# Patient Record
Sex: MALE | Race: WHITE | Employment: OTHER | URBAN - METROPOLITAN AREA
[De-identification: names, ages, dates, MRNs, and addresses within clinical notes are randomized per-mention and may not be internally consistent; named-entity substitution may affect disease eponyms.]

---

## 2018-02-22 ENCOUNTER — HOSPITAL ENCOUNTER (OUTPATIENT)
Dept: SURGERY | Age: 53
Discharge: HOME | End: 2018-02-22
Payer: MEDICARE

## 2018-02-22 VITALS
SYSTOLIC BLOOD PRESSURE: 123 MMHG | RESPIRATION RATE: 16 BRPM | OXYGEN SATURATION: 93 % | TEMPERATURE: 98 F | DIASTOLIC BLOOD PRESSURE: 72 MMHG | BODY MASS INDEX: 34.95 KG/M2 | HEART RATE: 64 BPM | HEIGHT: 72 IN | WEIGHT: 258 LBS

## 2018-02-22 LAB
ALBUMIN SERPL-MCNC: 4.3 G/DL (ref 3.5–5)
ALBUMIN/GLOB SERPL: 1.2 {RATIO} (ref 1.2–3.5)
ALP SERPL-CCNC: 81 U/L (ref 50–136)
ALT SERPL-CCNC: 25 U/L (ref 12–65)
ANION GAP SERPL CALC-SCNC: 10 MMOL/L (ref 7–16)
AST SERPL-CCNC: 24 U/L (ref 15–37)
BILIRUB SERPL-MCNC: 0.5 MG/DL (ref 0.2–1.1)
BUN SERPL-MCNC: 37 MG/DL (ref 6–23)
CALCIUM SERPL-MCNC: 9.6 MG/DL (ref 8.3–10.4)
CHLORIDE SERPL-SCNC: 99 MMOL/L (ref 98–107)
CO2 SERPL-SCNC: 30 MMOL/L (ref 21–32)
CREAT SERPL-MCNC: 1.76 MG/DL (ref 0.8–1.5)
ERYTHROCYTE [DISTWIDTH] IN BLOOD BY AUTOMATED COUNT: 14.6 % (ref 11.9–14.6)
GLOBULIN SER CALC-MCNC: 3.7 G/DL (ref 2.3–3.5)
GLUCOSE BLD STRIP.AUTO-MCNC: 215 MG/DL (ref 65–100)
GLUCOSE SERPL-MCNC: 210 MG/DL (ref 65–100)
HCT VFR BLD AUTO: 44.8 % (ref 41.1–50.3)
HGB BLD-MCNC: 15.2 G/DL (ref 13.6–17.2)
INR PPP: 2.6
MCH RBC QN AUTO: 28.4 PG (ref 26.1–32.9)
MCHC RBC AUTO-ENTMCNC: 33.9 G/DL (ref 31.4–35)
MCV RBC AUTO: 83.7 FL (ref 79.6–97.8)
PLATELET # BLD AUTO: 156 K/UL (ref 150–450)
PMV BLD AUTO: 11.9 FL (ref 10.8–14.1)
POTASSIUM SERPL-SCNC: 4.8 MMOL/L (ref 3.5–5.1)
PROT SERPL-MCNC: 8 G/DL (ref 6.3–8.2)
PROTHROMBIN TIME: 27.3 SEC (ref 11.5–14.5)
RBC # BLD AUTO: 5.35 M/UL (ref 4.23–5.67)
SODIUM SERPL-SCNC: 139 MMOL/L (ref 136–145)
WBC # BLD AUTO: 9.9 K/UL (ref 4.3–11.1)

## 2018-02-22 PROCEDURE — 80053 COMPREHEN METABOLIC PANEL: CPT | Performed by: ANESTHESIOLOGY

## 2018-02-22 PROCEDURE — 82962 GLUCOSE BLOOD TEST: CPT

## 2018-02-22 PROCEDURE — 85027 COMPLETE CBC AUTOMATED: CPT | Performed by: ANESTHESIOLOGY

## 2018-02-22 PROCEDURE — 85610 PROTHROMBIN TIME: CPT | Performed by: ANESTHESIOLOGY

## 2018-02-22 RX ORDER — WARFARIN 7.5 MG/1
7.5 TABLET ORAL
COMMUNITY
End: 2019-09-11

## 2018-02-22 RX ORDER — OXYCODONE HYDROCHLORIDE 15 MG/1
15 TABLET ORAL 3 TIMES DAILY
COMMUNITY

## 2018-02-22 RX ORDER — SERTRALINE HYDROCHLORIDE 100 MG/1
150 TABLET, FILM COATED ORAL DAILY
COMMUNITY

## 2018-02-22 RX ORDER — INSULIN ASPART 100 [IU]/ML
INJECTION, SUSPENSION SUBCUTANEOUS
COMMUNITY
End: 2018-12-04

## 2018-02-22 NOTE — PERIOP NOTES
Left message with Dr. Sera Peterson assistant that pt is on coumadin and needs instructed on what to do with coumadin. Dr. Sera Peterson office will need to obtain written clearance and notify if they wish for him to hold coumadin.

## 2018-02-22 NOTE — PERIOP NOTES
Recent Results (from the past 12 hour(s))   GLUCOSE, POC    Collection Time: 02/22/18  1:58 PM   Result Value Ref Range    Glucose (POC) 215 (H) 65 - 100 mg/dL   PROTHROMBIN TIME + INR    Collection Time: 02/22/18  1:59 PM   Result Value Ref Range    Prothrombin time 27.3 (H) 11.5 - 14.5 sec    INR 2.6     CBC W/O DIFF    Collection Time: 02/22/18  1:59 PM   Result Value Ref Range    WBC 9.9 4.3 - 11.1 K/uL    RBC 5.35 4.23 - 5.67 M/uL    HGB 15.2 13.6 - 17.2 g/dL    HCT 44.8 41.1 - 50.3 %    MCV 83.7 79.6 - 97.8 FL    MCH 28.4 26.1 - 32.9 PG    MCHC 33.9 31.4 - 35.0 g/dL    RDW 14.6 11.9 - 14.6 %    PLATELET 701 977 - 996 K/uL    MPV 11.9 10.8 - 43.2 FL   METABOLIC PANEL, COMPREHENSIVE    Collection Time: 02/22/18  1:59 PM   Result Value Ref Range    Sodium 139 136 - 145 mmol/L    Potassium 4.8 3.5 - 5.1 mmol/L    Chloride 99 98 - 107 mmol/L    CO2 30 21 - 32 mmol/L    Anion gap 10 7 - 16 mmol/L    Glucose 210 (H) 65 - 100 mg/dL    BUN 37 (H) 6 - 23 MG/DL    Creatinine 1.76 (H) 0.8 - 1.5 MG/DL    GFR est AA 52 (L) >60 ml/min/1.73m2    GFR est non-AA 43 (L) >60 ml/min/1.73m2    Calcium 9.6 8.3 - 10.4 MG/DL    Bilirubin, total 0.5 0.2 - 1.1 MG/DL    ALT (SGPT) 25 12 - 65 U/L    AST (SGOT) 24 15 - 37 U/L    Alk.  phosphatase 81 50 - 136 U/L    Protein, total 8.0 6.3 - 8.2 g/dL    Albumin 4.3 3.5 - 5.0 g/dL    Globulin 3.7 (H) 2.3 - 3.5 g/dL    A-G Ratio 1.2 1.2 - 3.5

## 2018-02-22 NOTE — PERIOP NOTES
POC glucose 215  . Instructed  Patient that if blood sugar 300 or > , surgery may be cancelled. Patient verified name, , and surgery as listed in [facility]. Patient provided medical/health information and PTA medications to the best of their ability. TYPE  CASE:1a- elodia block. Orders per surgeon: Received  and dated 18. Labs per surgeon:none  Labs per anesthesia protocol: cbc, cmp, pt/inr. Results pending. Pt/inr on the dos. EKG  :  None. Notified Dr. Twin Wakefield that pt is concerned about having a elodia block with history of necrotizing fascitis in his upper left arm . Dr. Anne Meza states it will be addressed on the dos. Not seen today. Patient provided with and instructed on education handouts including Guide to Surgery, blood transfusions, pain management, and hand hygiene for the family and community, and [facility] brochure. Ondina mist soap and instructions given per hospital policy. Instructed patient to continue previous medications as prescribed prior to surgery unless otherwise directed and to take the following medications the day of surgery according to anesthesia guidelines : aspirin, pepcid, lantus 48 units the dos and the night before surgery. Oxycodone, protonix, sertraline, betapace. Bring all inhalers. novolog 70/30 1/2 of usual am dose if blood sugar is 120 or above and hold if blood sugar is less than 120 . Instructed patient to hold  the following medications: none. Original medication prescription bottles not  visualized during patient appointment. Patient teach back successful and patient demonstrates knowledge of instruction.

## 2018-02-26 ENCOUNTER — ANESTHESIA EVENT (OUTPATIENT)
Dept: SURGERY | Age: 53
End: 2018-02-26
Payer: MEDICARE

## 2018-02-27 ENCOUNTER — HOSPITAL ENCOUNTER (OUTPATIENT)
Age: 53
Setting detail: OUTPATIENT SURGERY
Discharge: HOME | End: 2018-02-27
Attending: ORTHOPAEDIC SURGERY | Admitting: ORTHOPAEDIC SURGERY
Payer: MEDICARE

## 2018-02-27 ENCOUNTER — ANESTHESIA (OUTPATIENT)
Dept: SURGERY | Age: 53
End: 2018-02-27
Payer: MEDICARE

## 2018-02-27 VITALS
TEMPERATURE: 97.3 F | SYSTOLIC BLOOD PRESSURE: 117 MMHG | BODY MASS INDEX: 34.99 KG/M2 | OXYGEN SATURATION: 97 % | HEART RATE: 60 BPM | DIASTOLIC BLOOD PRESSURE: 70 MMHG | WEIGHT: 258 LBS | RESPIRATION RATE: 15 BRPM

## 2018-02-27 DIAGNOSIS — G89.18 POST-OP PAIN: Primary | ICD-10-CM

## 2018-02-27 LAB
GLUCOSE BLD STRIP.AUTO-MCNC: 112 MG/DL (ref 65–100)
GLUCOSE BLD STRIP.AUTO-MCNC: 126 MG/DL (ref 65–100)
INR BLD: 1.2 (ref 0.9–1.2)
POTASSIUM BLD-SCNC: 3.8 MMOL/L (ref 3.5–5.1)
PT BLD: 14.3 SECS (ref 9.6–11.6)

## 2018-02-27 PROCEDURE — 77030003602 HC NDL NRV BLK BBMI -B: Performed by: ANESTHESIOLOGY

## 2018-02-27 PROCEDURE — 77030011640 HC PAD GRND REM COVD -A: Performed by: ORTHOPAEDIC SURGERY

## 2018-02-27 PROCEDURE — 77030018836 HC SOL IRR NACL ICUM -A: Performed by: ORTHOPAEDIC SURGERY

## 2018-02-27 PROCEDURE — 74011250636 HC RX REV CODE- 250/636

## 2018-02-27 PROCEDURE — 76010000160 HC OR TIME 0.5 TO 1 HR INTENSV-TIER 1: Performed by: ORTHOPAEDIC SURGERY

## 2018-02-27 PROCEDURE — 77030000032 HC CUF TRNQT ZIMM -B: Performed by: ORTHOPAEDIC SURGERY

## 2018-02-27 PROCEDURE — 76210000063 HC OR PH I REC FIRST 0.5 HR: Performed by: ORTHOPAEDIC SURGERY

## 2018-02-27 PROCEDURE — 76010010054 HC POST OP PAIN BLOCK: Performed by: ORTHOPAEDIC SURGERY

## 2018-02-27 PROCEDURE — 82962 GLUCOSE BLOOD TEST: CPT

## 2018-02-27 PROCEDURE — 77030002933 HC SUT MCRYL J&J -A: Performed by: ORTHOPAEDIC SURGERY

## 2018-02-27 PROCEDURE — A4565 SLINGS: HCPCS | Performed by: ORTHOPAEDIC SURGERY

## 2018-02-27 PROCEDURE — 74011250636 HC RX REV CODE- 250/636: Performed by: ANESTHESIOLOGY

## 2018-02-27 PROCEDURE — 84132 ASSAY OF SERUM POTASSIUM: CPT

## 2018-02-27 PROCEDURE — 74011250636 HC RX REV CODE- 250/636: Performed by: ORTHOPAEDIC SURGERY

## 2018-02-27 PROCEDURE — 76060000032 HC ANESTHESIA 0.5 TO 1 HR: Performed by: ORTHOPAEDIC SURGERY

## 2018-02-27 PROCEDURE — 76210000021 HC REC RM PH II 0.5 TO 1 HR: Performed by: ORTHOPAEDIC SURGERY

## 2018-02-27 PROCEDURE — 76942 ECHO GUIDE FOR BIOPSY: CPT | Performed by: ORTHOPAEDIC SURGERY

## 2018-02-27 PROCEDURE — 85610 PROTHROMBIN TIME: CPT

## 2018-02-27 RX ORDER — FLUMAZENIL 0.1 MG/ML
0.2 INJECTION INTRAVENOUS AS NEEDED
Status: DISCONTINUED | OUTPATIENT
Start: 2018-02-27 | End: 2018-02-27 | Stop reason: HOSPADM

## 2018-02-27 RX ORDER — LIDOCAINE HYDROCHLORIDE 10 MG/ML
0.1 INJECTION INFILTRATION; PERINEURAL AS NEEDED
Status: DISCONTINUED | OUTPATIENT
Start: 2018-02-27 | End: 2018-02-27 | Stop reason: HOSPADM

## 2018-02-27 RX ORDER — SODIUM CHLORIDE, SODIUM LACTATE, POTASSIUM CHLORIDE, CALCIUM CHLORIDE 600; 310; 30; 20 MG/100ML; MG/100ML; MG/100ML; MG/100ML
75 INJECTION, SOLUTION INTRAVENOUS CONTINUOUS
Status: DISCONTINUED | OUTPATIENT
Start: 2018-02-27 | End: 2018-02-27 | Stop reason: HOSPADM

## 2018-02-27 RX ORDER — CEFAZOLIN SODIUM/WATER 2 G/20 ML
2 SYRINGE (ML) INTRAVENOUS
Status: COMPLETED | OUTPATIENT
Start: 2018-02-27 | End: 2018-02-27

## 2018-02-27 RX ORDER — MIDAZOLAM HYDROCHLORIDE 1 MG/ML
2 INJECTION, SOLUTION INTRAMUSCULAR; INTRAVENOUS
Status: COMPLETED | OUTPATIENT
Start: 2018-02-27 | End: 2018-02-27

## 2018-02-27 RX ORDER — MIDAZOLAM HYDROCHLORIDE 1 MG/ML
INJECTION, SOLUTION INTRAMUSCULAR; INTRAVENOUS AS NEEDED
Status: DISCONTINUED | OUTPATIENT
Start: 2018-02-27 | End: 2018-02-27 | Stop reason: HOSPADM

## 2018-02-27 RX ORDER — DIPHENHYDRAMINE HYDROCHLORIDE 50 MG/ML
12.5 INJECTION, SOLUTION INTRAMUSCULAR; INTRAVENOUS
Status: DISCONTINUED | OUTPATIENT
Start: 2018-02-27 | End: 2018-02-27 | Stop reason: HOSPADM

## 2018-02-27 RX ORDER — HYDROMORPHONE HYDROCHLORIDE 2 MG/ML
0.5 INJECTION, SOLUTION INTRAMUSCULAR; INTRAVENOUS; SUBCUTANEOUS
Status: DISCONTINUED | OUTPATIENT
Start: 2018-02-27 | End: 2018-02-27 | Stop reason: HOSPADM

## 2018-02-27 RX ORDER — OXYCODONE HYDROCHLORIDE 5 MG/1
5 TABLET ORAL
Status: DISCONTINUED | OUTPATIENT
Start: 2018-02-27 | End: 2018-02-27 | Stop reason: HOSPADM

## 2018-02-27 RX ORDER — OXYCODONE HYDROCHLORIDE 5 MG/1
10 TABLET ORAL
Status: DISCONTINUED | OUTPATIENT
Start: 2018-02-27 | End: 2018-02-27 | Stop reason: HOSPADM

## 2018-02-27 RX ORDER — PREGABALIN 100 MG/1
100 CAPSULE ORAL 3 TIMES DAILY
COMMUNITY

## 2018-02-27 RX ORDER — OXYCODONE HYDROCHLORIDE 5 MG/1
5 TABLET ORAL
Qty: 25 TAB | Refills: 0 | Status: SHIPPED | OUTPATIENT
Start: 2018-02-27 | End: 2018-09-11

## 2018-02-27 RX ORDER — NALOXONE HYDROCHLORIDE 0.4 MG/ML
0.1 INJECTION, SOLUTION INTRAMUSCULAR; INTRAVENOUS; SUBCUTANEOUS
Status: DISCONTINUED | OUTPATIENT
Start: 2018-02-27 | End: 2018-02-27 | Stop reason: HOSPADM

## 2018-02-27 RX ADMIN — MIDAZOLAM HYDROCHLORIDE 2 MG: 1 INJECTION, SOLUTION INTRAMUSCULAR; INTRAVENOUS at 07:05

## 2018-02-27 RX ADMIN — Medication 2 G: at 07:48

## 2018-02-27 RX ADMIN — MIDAZOLAM HYDROCHLORIDE 2 MG: 1 INJECTION, SOLUTION INTRAMUSCULAR; INTRAVENOUS at 08:03

## 2018-02-27 RX ADMIN — SODIUM CHLORIDE, SODIUM LACTATE, POTASSIUM CHLORIDE, AND CALCIUM CHLORIDE 75 ML/HR: 600; 310; 30; 20 INJECTION, SOLUTION INTRAVENOUS at 06:45

## 2018-02-27 NOTE — OP NOTES
Cubital Tunnel Release    Merline Cerise       2/27/2018      Indications: This is a 52 yrs male  who presents with left cubital tunnel syndrome. The patient was admitted for surgery as conservative measures have failed. Pre-operative Diagnosis:  LEFT CUBITAL TUNNEL SYNDROME    Post-operative Diagnosis:   LEFT CUBITAL TUNNEL SYNDROME    Procedure: Left Cubital Tunnel Release - Ulnar Nerve at the Elbow    Surgeon: GUS Portillo. Anesthesia:  Supraclavicular block    Procedure/Operative Note:  After appropriate informed consent was obtained, the patient was taken to the operating suite and placed in a supine position on the operating room table with the left arm outstretched. Preop prophylactic IV antibiotics were given. Anesthesia was instituted. All pressure points were carefully padded. The left upper extremity was prepped and draped in the usual sterile fashion. A timeout was completed and once confirmed by the operative team we proceeded. A posteromedial incision was made posterior to the medial epicondyle and carried down through the subcutaneous tissues. Small bleeders were electrocoagulated. The ulnar nerve was identified. Carefull disection was used to release the nerve at the epicondyle then it was further released both proximally and distally by spreading the sub Q tissue and the releasing the nerve with the scissors . The nerve was released for approximately 8-9 cm proximally as well as down into the pronator flexor fascia and muscle. There was a thickened area in the nerve posterior and distal to the epicondyle. No tendency for anterior subluxation was noted. The margins of the wound were injected with 1% Lidocaine with epinephrine. The subcutaneous tissues were closed with inverted 4-0 Monocryl stitches and then SteriStrips were applied with Mastisol adhesive. A sterile occlusive Honeycomb dressings was applied. An ACE wrapping was used for compression.   The patient tolerated the procedure well and was sent to the RR in good condition. Signed By: Aisha Oleary. Mathieu Left.

## 2018-02-27 NOTE — DISCHARGE INSTRUCTIONS
POST OP INSTRUCTIONS      1. Patient has appointment for 3/9/18 at 9:50 AM at the [facility]. 2.  For problems call Dr Justine Drummond, [facility],  [phone]          Appointments only,  [phone]    3. Ice and elevate the surgical site. Sling for comfort, wear until block wears off    4. May remove Ace wrap tomorrow, leave Honey comb dressing on, and wash in running water or shower. Do not submerge in bath or dish water. TYPICAL SIDE EFFECTS OF PAIN MEDICATION:  *    Constipation: Drink lots of fluids, try prune juice. OTC stool softener if needed. *    Nausea: Take pain medication with food. ACTIVITY  · As tolerated and as directed by your doctor. · Bathe or shower as directed by your doctor. DIET  · Day of surgery: Clear liquids until no nausea or vomiting; small portion, light diet Dare foods (ex: baked chicken, plain rice, grits, scrambled eggs, toast). Nothing greasy, fried or spicy today. · Advance to regular diet on second day, unless your doctor orders otherwise. · If nausea and vomiting continues, call your doctor. PAIN  · Take pain medication as directed by your doctor. · DO NOT take aspirin or blood thinners unless directed by your doctor. CALL YOUR DOCTOR IF    s Call your doctor if pain is NOT relieved by medication.   s Excessive bleeding that does not stop after holding pressure over the area  · Temperature of 101 degrees F or above  · Excessive redness, swelling or bruising, and/ or green or yellow, smelly discharge from incision    AFTER ANESTHESIA   · For the first 24 hours: DO NOT Drive, Drink alcoholic beverages, or Make important decisions. · Be aware of dizziness following anesthesia and while taking pain medication.        DISCHARGE SUMMARY from Nurse    PATIENT INSTRUCTIONS:    After general anesthesia or intravenous sedation, for 24 hours or while taking prescription Narcotics:  · Limit your activities  · Do not drive and operate hazardous machinery  · Do not make important personal or business decisions  · Do  not drink alcoholic beverages  · If you have not urinated within 8 hours after discharge, please contact your surgeon on call. *  Please give a list of your current medications to your Primary Care Provider. *  Please update this list whenever your medications are discontinued, doses are      changed, or new medications (including over-the-counter products) are added. *  Please carry medication information at all times in case of emergency situations. Preventing Infection at Home  We care about preventing infection and avoiding the spread of germs - not only when you are in the hospital but also when you return home. When you return home from the hospital, its important to take the following steps to help prevent infection and avoid spreading germs that could infect you and others. Ask everyone in your home to follow these guidelines, too. Clean Your Hands  · Clean your hands whenever your hands are visibly dirty, before you eat, before or after touching your mouth, nose or eyes, and before preparing food. Clean them after contact with body fluids, using the restroom, touching animals or changing diapers. · When washing hands, wet them with warm water and work up a lather. Rub hands for at least 15 seconds, then rinse them and pat them dry with a clean towel or paper towel. · When using hand sanitizers, it should take about 15 seconds to rub your hands dry. If not, you probably didnt apply enough . Cover Your Sneeze or Cough  Germs are released into the air whenever you sneeze or cough. To prevent the spread of infection:  · Turn away from other people before coughing or sneezing. · Cover your mouth or nose with a tissue when you cough or sneeze. Put the tissue in the trash. · If you dont have a tissue, cough or sneeze into your upper sleeve, not your hands.   · Always clean your hands after coughing or sneezing. Care for Wounds  Your skin is your bodys first line of defense against germs, but an open wound leaves an easy way for germs to enter your body. To prevent infection:  · Clean your hands before and after changing wound dressings, and wear gloves to change dressings if recommended by your doctor. · Take special care with IV lines or other devices inserted into the body. If you must touch them, clean your hands first.  · Follow any specific instructions from your doctor to care for your wounds. Contact your doctor if you experience any signs of infection, such as fever or increased redness at the surgical or wound site. Keep a Clean Home  · Clean or wipe commonly touched hard surfaces like door handles, sinks, tabletops, phones and TV remotes. · Use products labeled disinfectant to kill harmful bacteria and viruses. · Use a clean cloth or paper towel to clean and dry surfaces. Wiping surfaces with a dirty dishcloth, sponge or towel will only spread germs. · Never share toothbrushes, bocanegra, drinking glasses, utensils, razor blades, face cloths or bath towels to avoid spreading germs. · Be sure that the linens that you sleep on are clean. · Keep pets away from wounds and wash your hands after touching pets, their toys or bedding. We care about you and your health. Remember, preventing infections is a team effort between you, your family, friends and health care providers. These are general instructions for a healthy lifestyle:    No smoking/ No tobacco products/ Avoid exposure to second hand smoke    Surgeon General's Warning:  Quitting smoking now greatly reduces serious risk to your health.     Obesity, smoking, and sedentary lifestyle greatly increases your risk for illness    A healthy diet, regular physical exercise & weight monitoring are important for maintaining a healthy lifestyle    You may be retaining fluid if you have a history of heart failure or if you experience any of the following symptoms:  Weight gain of 3 pounds or more overnight or 5 pounds in a week, increased swelling in our hands or feet or shortness of breath while lying flat in bed. Please call your doctor as soon as you notice any of these symptoms; do not wait until your next office visit. Recognize signs and symptoms of STROKE:    F-face looks uneven    A-arms unable to move or move unevenly    S-speech slurred or non-existent    T-time-call 911 as soon as signs and symptoms begin-DO NOT go       Back to bed or wait to see if you get better-TIME IS BRAIN.

## 2018-02-27 NOTE — PERIOP NOTES
Discharge instructions given to spouse. Verbalized understanding and opportunity for questions was given. Dr Mary simonay to discharge at this time. Pt and belongings taken via wheelchair to car.

## 2018-02-27 NOTE — ANESTHESIA PREPROCEDURE EVALUATION
Anesthetic History   No history of anesthetic complications            Review of Systems / Medical History  Patient summary reviewed and pertinent labs reviewed    Pulmonary        Sleep apnea: CPAP           Neuro/Psych             Comments: Chronic pain Cardiovascular    Hypertension: well controlled        Dysrhythmias (s/p ablation - remains on Coumadin and Sotalol) : atrial fibrillation  Hyperlipidemia    Exercise tolerance: >4 METS     GI/Hepatic/Renal     GERD: well controlled           Endo/Other    Diabetes: well controlled, type 2, using insulin    Obesity     Other Findings   Comments: Necrotizing fasciitis of L shoulder - healed but remains painful - Pt will be unable to tolerate L upper arm tourniquet. Physical Exam    Airway  Mallampati: III  TM Distance: > 6 cm  Neck ROM: normal range of motion   Mouth opening: Normal     Cardiovascular    Rhythm: regular  Rate: normal         Dental  No notable dental hx       Pulmonary  Breath sounds clear to auscultation               Abdominal         Other Findings            Anesthetic Plan    ASA: 3  Anesthesia type: total IV anesthesia      Post-op pain plan if not by surgeon: peripheral nerve block single      Anesthetic plan and risks discussed with: Patient      Supraclavicular block - pt will be unable to tolerate upper arm tourniquet for [place].

## 2018-02-27 NOTE — PERIOP NOTES
Discussed with Dr. Sherita Fortune whether or not to put tourniquet on patient's left upper arm due to necrotizing fascitis in that area in the past.  Informed Dr. Sherita Fortune that patient stated left upper arm had healed within the past month. Dr. Sherita Fortune stated he wanted to still have the tourniquet placed on patient's left upper arm.

## 2018-02-27 NOTE — ANESTHESIA POSTPROCEDURE EVALUATION
Post-Anesthesia Evaluation and Assessment    Patient: Nisreen Carballo MRN: [id number]  SSN: [id number]    YOB: 1965  Age: 52 y.o. Sex: male       Cardiovascular Function/Vital Signs  Visit Vitals    /70    Pulse (!) 58    Temp 36.3 °C (97.3 °F)    Resp 16    Wt 117 kg (258 lb)    SpO2 97%    BMI 34.99 kg/m2       Patient is status post total IV anesthesia anesthesia for Procedure(s):  LEFT ELBOW CUBITAL TUNNEL RELEASE. Nausea/Vomiting: None    Postoperative hydration reviewed and adequate. Pain:  Pain Scale 1: Numeric (0 - 10) (02/27/18 0834)  Pain Intensity 1: 0 (02/27/18 0834)   Managed    Neurological Status:   Neuro (WDL): Exceptions to WDL (02/27/18 4645)  Neuro  LUE Motor Response: Pharmacologically paralyzed;Numbness (02/27/18 0834)   At baseline    Mental Status and Level of Consciousness: Arousable    Pulmonary Status:   O2 Device: Nasal cannula (02/27/18 0834)   Adequate oxygenation and airway patent    Complications related to anesthesia: None    Post-anesthesia assessment completed.  No concerns    Signed By: Kyleigh Davenport MD     February 27, 2018

## 2018-02-27 NOTE — IP AVS SNAPSHOT
[facility] 
 
 
 [address] 
[phone] Patient: Michael Epperson MRN: [id number] :1965 About your hospitalization You were admitted on:  2018 You last received care in the:  [facility] OP PACU You were discharged on:  2018 Why you were hospitalized Your primary diagnosis was:  Not on File Follow-up Information Follow up With Details Comments Contact Info MD Zen Craven [address] [facility] [address] 
[phone] Patience Clarke MD   [address] [facility] [address] 
[phone] Discharge Orders None A check hilario indicates which time of day the medication should be taken. My Medications CHANGE how you take these medications Instructions Each Dose to Equal  
 Morning Noon Evening Bedtime * oxyCODONE IR 15 mg immediate release tablet Commonly known as:  OXY-IR What changed:  Another medication with the same name was added. Make sure you understand how and when to take each. Your last dose was: Your next dose is: Take 15 mg by mouth three (3) times daily. Indications: take on the [address] 15 mg  
    
   
   
   
  
 * oxyCODONE IR 5 mg immediate release tablet Commonly known as:  Keith Crowley What changed: You were already taking a medication with the same name, and this prescription was added. Make sure you understand how and when to take each. Your last dose was: Your next dose is: Take 1 Tab by mouth every four (4) hours as needed for Pain (Take as needed for break through pain). Max Daily Amount: 30 mg.  
 5 mg * Notice: This list has 2 medication(s) that are the same as other medications prescribed for you. Read the directions carefully, and ask your doctor or other care provider to review them with you. CONTINUE taking these medications Instructions Each Dose to Equal  
 Morning Noon Evening Bedtime  
 albuterol 90 mcg/actuation inhaler Commonly known as:  PROVENTIL HFA, VENTOLIN HFA, PROAIR HFA Your last dose was: Your next dose is: Take 2 Puffs by inhalation every six (6) hours as needed. Indications: BRONCHOSPASM PREVENTION, bring on the [facility] 2 Puff  
    
   
   
   
  
 aspirin delayed-release 81 mg tablet Your last dose was: Your next dose is: Take 81 mg by mouth daily. Indications: am- take on the [facility] 81 mg  
    
   
   
   
  
 atorvastatin 40 mg tablet Commonly known as:  LIPITOR Your last dose was: Your next dose is: Take 1 Tab by mouth nightly. 40 mg  
    
   
   
   
  
 budesonide-formoterol 80-4.5 mcg/actuation Hfaa Commonly known as:  SYMBICORT Your last dose was: Your next dose is: Take 2 Puffs by inhalation two (2) times daily as needed. Indications: BRONCHOSPASM PREVENTION WITH COPD, bring on the [facility] 2 Puff  
    
   
   
   
  
 cetirizine 10 mg tablet Commonly known as:  ZYRTEC Your last dose was: Your next dose is: Take 10 mg by mouth nightly. Indications: Allergic Rhinitis 10 mg  
    
   
   
   
  
 colestipol 1 gram tablet Commonly known as:  COLESTID Your last dose was: Your next dose is: Take 1 g by mouth two (2) times a day. Indications: hypercholesterolemia 1 g COUMADIN 7.5 mg tablet Generic drug:  warfarin Your last dose was: Your next dose is: Take 7.5 mg by mouth nightly. Indications: has not been instructed to hold 7.5 mg  
    
   
   
   
  
 CREON 24,000-76,000 -120,000 unit capsule Generic drug:  amylase-lipase-protease Your last dose was: Your next dose is: Take 1 Cap by mouth three (3) times daily (with meals). Indications: exocrine pancreatic insufficiency 1 Cap  
    
   
   
   
  
 famotidine 40 mg tablet Commonly known as:  PEPCID Your last dose was: Your next dose is: Take 1 Tab by mouth every twelve (12) hours. 40 mg  
    
   
   
   
  
 furosemide 40 mg tablet Commonly known as:  LASIX Your last dose was: Your next dose is: Take 1 Tab by mouth daily. 40 mg  
    
   
   
   
  
 insulin glargine 100 unit/mL injection Commonly known as:  LANTUS Your last dose was: Your next dose is:    
   
   
 60 Units by SubCUTAneous route Before breakfast and dinner. Indications: type 2 diabetes mellitus, 48 units the night before surgery and 48 units  on the [place] 60 Units  
    
   
   
   
  
 ipratropium-albuterol  mcg/actuation inhaler Commonly known as:  COMBIVENT Your last dose was: Your next dose is: Take 1 Puff by inhalation every six (6) hours as needed. Indications: bring on the [place] 1 Puff  
    
   
   
   
  
 losartan 25 mg tablet Commonly known as:  COZAAR Your last dose was: Your next dose is: Take 12.5 mg by mouth daily. Indications: am  
 12.5 mg  
    
   
   
   
  
 NovoLOG Mix 70-30 U-100 Insuln 100 unit/mL (70-30) injection Generic drug:  insulin aspart protamine/insulin aspart Your last dose was: Your next dose is:    
   
   
 by SubCUTAneous route Before breakfast, lunch, and dinner. Indications: type 2 diabetes mellitus, sliding scale- 1/2 usual am dose if blood sugar is 120 or ^ And hold if blood  sugar is less than or 120  
     
   
   
   
  
 pantoprazole 40 mg tablet Commonly known as:  PROTONIX Your last dose was: Your next dose is: Take 1 Tab by mouth daily. 40 mg  
    
   
   
   
  
 pregabalin 100 mg capsule Commonly known as:  Leslie Courser Your last dose was: Your next dose is: Take 100 mg by mouth three (3) times daily. Indications: Diabetic Peripheral Neuropathy 100 mg  
    
   
   
   
  
 sertraline 100 mg tablet Commonly known as:  ZOLOFT Your last dose was: Your next dose is: Take 100 mg by mouth daily. Indications: am- take on the [facility] 100 mg  
    
   
   
   
  
 sotalol 160 mg tablet Commonly known as:  Dirk Jones Your last dose was: Your next dose is: Take 1 Tab by mouth every twelve (12) hours. 160 mg Where to Get Your Medications Information on where to get these meds will be given to you by the nurse or doctor. ! Ask your nurse or doctor about these medications  
  oxyCODONE IR 5 mg immediate release tablet Discharge Instructions POST OP INSTRUCTIONS 1. Patient has appointment for 3/9/18 at 9:50 AM at the [facility] office. 2.  For problems call Dr Sheila Douglas, [address] [place] [address],  [phone] Appointments only,  [phone] 
 
3. Ice and elevate the surgical site. Sling for comfort, wear until block wears off 4. May remove Ace wrap tomorrow, leave Honey comb dressing on, and wash in running water or shower. Do not submerge in bath or dish water. TYPICAL SIDE EFFECTS OF PAIN MEDICATION: 
*    Constipation: Drink lots of fluids, try prune juice. OTC stool softener if needed. *    Nausea: Take pain medication with food. ACTIVITY · As tolerated and as directed by your doctor. · Bathe or shower as directed by your doctor. DIET 
· Day of surgery: Clear liquids until no nausea or vomiting; small portion, light diet [place] foods (ex: baked chicken, plain rice, grits, scrambled eggs, toast). Nothing greasy, fried or spicy today. · [place] to regular diet on second day, unless your doctor orders otherwise. · If nausea and vomiting continues, call your doctor. PAIN 
· Take pain medication as directed by your doctor. · DO NOT take aspirin or blood thinners unless directed by your doctor. CALL YOUR DOCTOR IF   
s Call your doctor if pain is NOT relieved by medication.  
s Excessive bleeding that does not stop after holding pressure over the area · Temperature of 101 degrees F or above · Excessive redness, swelling or bruising, and/ or green or yellow, smelly discharge from incision AFTER ANESTHESIA · For the first 24 hours: DO NOT Drive, Drink alcoholic beverages, or Make important decisions. · Be aware of dizziness following anesthesia and while taking pain medication. DISCHARGE SUMMARY from Nurse PATIENT INSTRUCTIONS: 
 
After general anesthesia or intravenous sedation, for 24 hours or while taking prescription Narcotics: · Limit your activities · Do not drive and operate hazardous machinery · Do not make important personal or business decisions · Do  not drink alcoholic beverages · If you have not urinated within 8 hours after discharge, please contact your surgeon on call. *  Please give a list of your current medications to your Primary Care Provider. *  Please update this list whenever your medications are discontinued, doses are 
    changed, or new medications (including over-the-counter products) are added. *  Please carry medication information at all times in case of emergency situations. Preventing Infection at Home We care about preventing infection and avoiding the spread of germs  not only when you are in the hospital but also when you return home. When you return home from the hospital, its important to take the following steps to help prevent infection and avoid spreading germs that could infect you and others. Ask everyone in your home to follow these guidelines, too. Clean Your Hands · Clean your hands whenever your hands are visibly dirty, before you eat, before or after touching your mouth, nose or eyes, and before preparing food. Clean them after contact with body fluids, using the restroom, touching animals or changing diapers. · When washing hands, wet them with warm water and work up a lather. Rub hands for at least 15 seconds, then rinse them and pat them dry with a clean towel or paper towel. · When using hand sanitizers, it should take about 15 seconds to rub your hands dry. If not, you probably didnt apply enough . Cover Your Sneeze or Cough Germs are released into the air whenever you sneeze or cough. To prevent the spread of infection: · Turn away from other people before coughing or sneezing. · Cover your mouth or nose with a tissue when you cough or sneeze. Put the tissue in the trash. · If you dont have a tissue, cough or sneeze into your upper sleeve, not your hands. · Always clean your hands after coughing or sneezing. Care for Wounds Your skin is your bodys first line of defense against germs, but an open wound leaves an easy way for germs to enter your body. To prevent infection: · Clean your hands before and after changing wound dressings, and wear gloves to change dressings if recommended by your doctor. · Take special care with IV lines or other devices inserted into the body. If you must touch them, clean your hands first. 
· Follow any specific instructions from your doctor to care for your wounds. Contact your doctor if you experience any signs of infection, such as fever or increased redness at the surgical or wound site. Keep a [place] [address] · Clean or wipe commonly touched hard surfaces like door handles, sinks, tabletops, phones and TV remotes. · Use products labeled disinfectant to kill harmful bacteria and viruses. · Use a clean cloth or paper towel to clean and dry surfaces.  Wiping surfaces with a dirty dishcloth, sponge or towel will only spread germs. · Never share toothbrushes, bocanegra, drinking glasses, utensils, razor blades, face cloths or bath towels to avoid spreading germs. · Be sure that the linens that you sleep on are clean. · Keep pets away from wounds and wash your hands after touching pets, their toys or bedding. We care about you and your health. Remember, preventing infections is a team effort between you, your family, friends and health care providers. These are general instructions for a healthy lifestyle: No smoking/ No tobacco products/ Avoid exposure to second hand smoke Surgeon General's Warning:  Quitting smoking now greatly reduces serious risk to your health. Obesity, smoking, and sedentary lifestyle greatly increases your risk for illness A healthy diet, regular physical exercise & weight monitoring are important for maintaining a healthy lifestyle You may be retaining fluid if you have a history of heart failure or if you experience any of the following symptoms:  Weight gain of 3 pounds or more overnight or 5 pounds in a week, increased swelling in our hands or feet or shortness of breath while lying flat in bed. Please call your doctor as soon as you notice any of these symptoms; do not wait until your next office visit. Recognize signs and symptoms of STROKE: 
 
F-face looks uneven A-arms unable to move or move unevenly S-speech slurred or non-existent T-time-call 911 as soon as signs and symptoms begin-DO NOT go Back to bed or wait to see if you get better-TIME IS BRAIN. Introducing [facility]! Roxana Bailey introduces [employer] patient portal. Now you can access parts of your medical record, email your doctor's office, and request medication refills online. 1. In your internet browser, go to [email or web address] 2. Click on the First Time User? Click Here link in the Sign In box. You will see the New Member Sign Up page. 3. Enter your [employer] Access Code exactly as it appears below. You will not need to use this code after youve completed the sign-up process. If you do not sign up before the expiration date, you must request a new code. · [employer] Access Code: [email or web address] Expires: 5/21/2018  4:40 PM 
 
4. Enter the last four digits of your Social Security Number (xxxx) and Date of Birth (mm/dd/yyyy) as indicated and click Submit. You will be taken to the next sign-up page. 5. Create a Pro[employer]ify ID. This will be your [employer] login ID and cannot be changed, so think of one that is secure and easy to remember. 6. Create a [employer] password. You can change your password at any time. 7. Enter your Password Reset Question and Answer. This can be used at a later time if you forget your password. 8. Enter your e-mail address. You will receive e-mail notification when new information is available in [address] 9. Click Sign Up. You can now view and download portions of your medical record. 10. Click the Download Summary menu link to download a portable copy of your medical information. If you have questions, please visit the Frequently Asked Questions section of the [employer] website. Remember, [employer] is NOT to be used for urgent needs. For medical emergencies, dial 911. Now available from your iPhone and Android! Providers Seen During Your Hospitalization Provider Specialty Primary office phone Parveen Davidson MD Orthopedic Surgery [phone] Your Primary Care Physician (PCP) Primary Care Physician Office Phone Office Fax Sandy HARO [phone] You are allergic to the following Allergen Reactions Eliquis (Apixaban) Rash Lisinopril Unknown (comments) Lyrica (Pregabalin) Rash Pt denies allergy. Metformin Shortness of Breath Rash Morphine Nausea and Vomiting Recent Documentation Weight BMI Smoking Status 117 kg 34.99 kg/m2 Current Every Day Smoker Emergency Contacts Name Discharge Info Relation Home Work Mobile Elizabeth Morgan DISCHARGE CAREGIVER [3] Spouse [[phone] Patient Belongings The following personal items are in your possession at time of discharge: 
  Dental Appliances: None  Visual Aid: Glasses      Home Medications: None   Jewelry: None  Clothing: Footwear, Pants, Shirt    Other Valuables: Froilan, [employer] Please provide this summary of care documentation to your next provider. Signatures-by signing, you are acknowledging that this After Visit Summary has been reviewed with you and you have received a copy. Patient Signature:  ____________________________________________________________ Date:  ____________________________________________________________  
  
[facility] Provider Signature:  ____________________________________________________________ Date:  ____________________________________________________________

## 2018-02-27 NOTE — PERIOP NOTES
[facility] (RN & OT, at [facility] in [address]) would like Dr Jyoti Danielle office to call with new orders for what to do with LEFT  arm post operatively (i.e. Wound care and OT).

## 2018-08-17 ENCOUNTER — HOSPITAL ENCOUNTER (OUTPATIENT)
Age: 53
Setting detail: OBSERVATION
Discharge: HOME | End: 2018-08-20
Attending: EMERGENCY MEDICINE | Admitting: INTERNAL MEDICINE
Payer: MEDICARE

## 2018-08-17 ENCOUNTER — APPOINTMENT (OUTPATIENT)
Dept: GENERAL RADIOLOGY | Age: 53
End: 2018-08-17
Attending: EMERGENCY MEDICINE
Payer: MEDICARE

## 2018-08-17 DIAGNOSIS — I48.91 ATRIAL FIBRILLATION, UNSPECIFIED TYPE (HCC): Primary | ICD-10-CM

## 2018-08-17 LAB
ALBUMIN SERPL-MCNC: 4.2 G/DL (ref 3.5–5)
ALBUMIN/GLOB SERPL: 1.1 {RATIO} (ref 1.2–3.5)
ALP SERPL-CCNC: 62 U/L (ref 50–136)
ALT SERPL-CCNC: 36 U/L (ref 12–65)
ANION GAP SERPL CALC-SCNC: 11 MMOL/L (ref 7–16)
APTT PPP: 46.8 SEC (ref 23.2–35.3)
AST SERPL-CCNC: 25 U/L (ref 15–37)
ATRIAL RATE: 104 BPM
ATRIAL RATE: 144 BPM
BASOPHILS # BLD: 0.1 K/UL
BASOPHILS NFR BLD: 0 % (ref 0–2)
BILIRUB SERPL-MCNC: 0.5 MG/DL (ref 0.2–1.1)
BNP SERPL-MCNC: 74 PG/ML
BUN SERPL-MCNC: 22 MG/DL (ref 6–23)
CALCIUM SERPL-MCNC: 9.1 MG/DL (ref 8.3–10.4)
CALCULATED R AXIS, ECG10: 41 DEGREES
CALCULATED R AXIS, ECG10: 59 DEGREES
CALCULATED T AXIS, ECG11: 28 DEGREES
CALCULATED T AXIS, ECG11: 67 DEGREES
CHLORIDE SERPL-SCNC: 99 MMOL/L (ref 98–107)
CO2 SERPL-SCNC: 29 MMOL/L (ref 21–32)
CREAT SERPL-MCNC: 1.76 MG/DL (ref 0.8–1.5)
DIAGNOSIS, 93000: NORMAL
DIAGNOSIS, 93000: NORMAL
DIFFERENTIAL METHOD BLD: ABNORMAL
EOSINOPHIL # BLD: 0.1 K/UL
EOSINOPHIL NFR BLD: 1 % (ref 0.5–7.8)
ERYTHROCYTE [DISTWIDTH] IN BLOOD BY AUTOMATED COUNT: 16.5 %
GLOBULIN SER CALC-MCNC: 3.7 G/DL (ref 2.3–3.5)
GLUCOSE BLD STRIP.AUTO-MCNC: 128 MG/DL (ref 65–100)
GLUCOSE BLD STRIP.AUTO-MCNC: 207 MG/DL (ref 65–100)
GLUCOSE SERPL-MCNC: 151 MG/DL (ref 65–100)
HCT VFR BLD AUTO: 49.6 % (ref 41.1–50.3)
HGB BLD-MCNC: 16.1 G/DL (ref 13.6–17.2)
IMM GRANULOCYTES # BLD: 0.1 K/UL
IMM GRANULOCYTES NFR BLD AUTO: 1 % (ref 0–5)
INR PPP: 1.2
LYMPHOCYTES # BLD: 5.3 K/UL
LYMPHOCYTES NFR BLD: 46 % (ref 13–44)
MAGNESIUM SERPL-MCNC: 2.1 MG/DL (ref 1.8–2.4)
MCH RBC QN AUTO: 28.2 PG (ref 26.1–32.9)
MCHC RBC AUTO-ENTMCNC: 32.5 G/DL (ref 31.4–35)
MCV RBC AUTO: 86.9 FL (ref 79.6–97.8)
MONOCYTES # BLD: 0.7 K/UL
MONOCYTES NFR BLD: 6 % (ref 4–12)
NEUTS SEG # BLD: 5.2 K/UL
NEUTS SEG NFR BLD: 46 % (ref 43–78)
NRBC # BLD: 0 K/UL (ref 0–0.2)
PLATELET # BLD AUTO: 184 K/UL (ref 150–450)
PMV BLD AUTO: 11.9 FL (ref 9.4–12.3)
POTASSIUM SERPL-SCNC: 4.4 MMOL/L (ref 3.5–5.1)
PROT SERPL-MCNC: 7.9 G/DL (ref 6.3–8.2)
PROTHROMBIN TIME: 14.7 SEC (ref 11.5–14.5)
Q-T INTERVAL, ECG07: 362 MS
Q-T INTERVAL, ECG07: 392 MS
QRS DURATION, ECG06: 80 MS
QRS DURATION, ECG06: 80 MS
QTC CALCULATION (BEZET), ECG08: 435 MS
QTC CALCULATION (BEZET), ECG08: 457 MS
RBC # BLD AUTO: 5.71 M/UL (ref 4.23–5.6)
SODIUM SERPL-SCNC: 139 MMOL/L (ref 136–145)
TROPONIN I BLD-MCNC: 0 NG/ML (ref 0.02–0.05)
TROPONIN I SERPL-MCNC: 0.02 NG/ML (ref 0.02–0.05)
TROPONIN I SERPL-MCNC: 0.02 NG/ML (ref 0.02–0.05)
TROPONIN I SERPL-MCNC: <0.02 NG/ML (ref 0.02–0.05)
TSH SERPL DL<=0.005 MIU/L-ACNC: 2.19 UIU/ML (ref 0.36–3.74)
VENTRICULAR RATE, ECG03: 74 BPM
VENTRICULAR RATE, ECG03: 96 BPM
WBC # BLD AUTO: 11.4 K/UL (ref 4.3–11.1)

## 2018-08-17 PROCEDURE — 84443 ASSAY THYROID STIM HORMONE: CPT

## 2018-08-17 PROCEDURE — 80053 COMPREHEN METABOLIC PANEL: CPT

## 2018-08-17 PROCEDURE — 84484 ASSAY OF TROPONIN QUANT: CPT

## 2018-08-17 PROCEDURE — 85730 THROMBOPLASTIN TIME PARTIAL: CPT

## 2018-08-17 PROCEDURE — 74011250636 HC RX REV CODE- 250/636: Performed by: NURSE PRACTITIONER

## 2018-08-17 PROCEDURE — 83735 ASSAY OF MAGNESIUM: CPT

## 2018-08-17 PROCEDURE — 96366 THER/PROPH/DIAG IV INF ADDON: CPT

## 2018-08-17 PROCEDURE — 71046 X-RAY EXAM CHEST 2 VIEWS: CPT

## 2018-08-17 PROCEDURE — 74011636637 HC RX REV CODE- 636/637: Performed by: PHYSICIAN ASSISTANT

## 2018-08-17 PROCEDURE — 96360 HYDRATION IV INFUSION INIT: CPT | Performed by: EMERGENCY MEDICINE

## 2018-08-17 PROCEDURE — 85025 COMPLETE CBC W/AUTO DIFF WBC: CPT

## 2018-08-17 PROCEDURE — 99218 HC RM OBSERVATION: CPT

## 2018-08-17 PROCEDURE — 74011250636 HC RX REV CODE- 250/636: Performed by: INTERNAL MEDICINE

## 2018-08-17 PROCEDURE — 82962 GLUCOSE BLOOD TEST: CPT

## 2018-08-17 PROCEDURE — 74011250637 HC RX REV CODE- 250/637: Performed by: INTERNAL MEDICINE

## 2018-08-17 PROCEDURE — 74011250636 HC RX REV CODE- 250/636: Performed by: EMERGENCY MEDICINE

## 2018-08-17 PROCEDURE — 85610 PROTHROMBIN TIME: CPT

## 2018-08-17 PROCEDURE — 83880 ASSAY OF NATRIURETIC PEPTIDE: CPT

## 2018-08-17 PROCEDURE — 36415 COLL VENOUS BLD VENIPUNCTURE: CPT

## 2018-08-17 PROCEDURE — 99285 EMERGENCY DEPT VISIT HI MDM: CPT | Performed by: EMERGENCY MEDICINE

## 2018-08-17 PROCEDURE — 74011000250 HC RX REV CODE- 250: Performed by: INTERNAL MEDICINE

## 2018-08-17 PROCEDURE — 96365 THER/PROPH/DIAG IV INF INIT: CPT

## 2018-08-17 PROCEDURE — 93005 ELECTROCARDIOGRAM TRACING: CPT | Performed by: EMERGENCY MEDICINE

## 2018-08-17 PROCEDURE — C8929 TTE W OR WO FOL WCON,DOPPLER: HCPCS

## 2018-08-17 RX ORDER — OXYCODONE HYDROCHLORIDE 5 MG/1
5 TABLET ORAL
Status: DISCONTINUED | OUTPATIENT
Start: 2018-08-17 | End: 2018-08-20 | Stop reason: HOSPADM

## 2018-08-17 RX ORDER — ACETAMINOPHEN 500 MG
1000 TABLET ORAL
Status: DISCONTINUED | OUTPATIENT
Start: 2018-08-17 | End: 2018-08-20 | Stop reason: HOSPADM

## 2018-08-17 RX ORDER — BUDESONIDE 0.5 MG/2ML
500 INHALANT ORAL
Status: DISCONTINUED | OUTPATIENT
Start: 2018-08-17 | End: 2018-08-20 | Stop reason: HOSPADM

## 2018-08-17 RX ORDER — INSULIN DEGLUDEC 100 U/ML
120 INJECTION, SOLUTION SUBCUTANEOUS
COMMUNITY
End: 2018-12-04

## 2018-08-17 RX ORDER — ATORVASTATIN CALCIUM 40 MG/1
40 TABLET, FILM COATED ORAL
Status: DISCONTINUED | OUTPATIENT
Start: 2018-08-17 | End: 2018-08-20 | Stop reason: HOSPADM

## 2018-08-17 RX ORDER — INSULIN LISPRO 100 [IU]/ML
INJECTION, SOLUTION INTRAVENOUS; SUBCUTANEOUS
Status: DISCONTINUED | OUTPATIENT
Start: 2018-08-17 | End: 2018-08-20 | Stop reason: HOSPADM

## 2018-08-17 RX ORDER — HEPARIN SODIUM 5000 [USP'U]/ML
4000 INJECTION, SOLUTION INTRAVENOUS; SUBCUTANEOUS ONCE
Status: COMPLETED | OUTPATIENT
Start: 2018-08-17 | End: 2018-08-17

## 2018-08-17 RX ORDER — HEPARIN SODIUM 5000 [USP'U]/100ML
12-25 INJECTION, SOLUTION INTRAVENOUS
Status: DISCONTINUED | OUTPATIENT
Start: 2018-08-17 | End: 2018-08-19

## 2018-08-17 RX ORDER — NITROGLYCERIN 0.4 MG/1
0.4 TABLET SUBLINGUAL
Status: DISCONTINUED | OUTPATIENT
Start: 2018-08-17 | End: 2018-08-20 | Stop reason: HOSPADM

## 2018-08-17 RX ORDER — LORATADINE 10 MG/1
10 TABLET ORAL DAILY
Status: DISCONTINUED | OUTPATIENT
Start: 2018-08-18 | End: 2018-08-20 | Stop reason: HOSPADM

## 2018-08-17 RX ORDER — ONDANSETRON 2 MG/ML
4 INJECTION INTRAMUSCULAR; INTRAVENOUS
Status: DISCONTINUED | OUTPATIENT
Start: 2018-08-17 | End: 2018-08-20 | Stop reason: HOSPADM

## 2018-08-17 RX ORDER — SODIUM CHLORIDE 0.9 % (FLUSH) 0.9 %
5-10 SYRINGE (ML) INJECTION AS NEEDED
Status: DISCONTINUED | OUTPATIENT
Start: 2018-08-17 | End: 2018-08-20 | Stop reason: HOSPADM

## 2018-08-17 RX ORDER — MONTELUKAST SODIUM 4 MG/1
1 TABLET, CHEWABLE ORAL 2 TIMES DAILY
Status: DISCONTINUED | OUTPATIENT
Start: 2018-08-17 | End: 2018-08-20 | Stop reason: HOSPADM

## 2018-08-17 RX ORDER — ALBUTEROL SULFATE 0.83 MG/ML
1.25 SOLUTION RESPIRATORY (INHALATION)
Status: DISCONTINUED | OUTPATIENT
Start: 2018-08-17 | End: 2018-08-20 | Stop reason: HOSPADM

## 2018-08-17 RX ORDER — IPRATROPIUM BROMIDE AND ALBUTEROL SULFATE 2.5; .5 MG/3ML; MG/3ML
3 SOLUTION RESPIRATORY (INHALATION)
Status: DISCONTINUED | OUTPATIENT
Start: 2018-08-17 | End: 2018-08-20 | Stop reason: HOSPADM

## 2018-08-17 RX ORDER — HEPARIN SODIUM 5000 [USP'U]/ML
35 INJECTION, SOLUTION INTRAVENOUS; SUBCUTANEOUS ONCE
Status: COMPLETED | OUTPATIENT
Start: 2018-08-17 | End: 2018-08-17

## 2018-08-17 RX ORDER — INSULIN DEGLUDEC 200 U/ML
120 INJECTION, SOLUTION SUBCUTANEOUS
Status: DISCONTINUED | OUTPATIENT
Start: 2018-08-17 | End: 2018-08-20 | Stop reason: HOSPADM

## 2018-08-17 RX ORDER — SODIUM CHLORIDE 0.9 % (FLUSH) 0.9 %
5-10 SYRINGE (ML) INJECTION EVERY 8 HOURS
Status: DISCONTINUED | OUTPATIENT
Start: 2018-08-17 | End: 2018-08-20 | Stop reason: HOSPADM

## 2018-08-17 RX ORDER — PANTOPRAZOLE SODIUM 40 MG/1
40 TABLET, DELAYED RELEASE ORAL
Status: DISCONTINUED | OUTPATIENT
Start: 2018-08-18 | End: 2018-08-20 | Stop reason: HOSPADM

## 2018-08-17 RX ORDER — INSULIN GLARGINE 100 [IU]/ML
60 INJECTION, SOLUTION SUBCUTANEOUS
Status: DISCONTINUED | OUTPATIENT
Start: 2018-08-17 | End: 2018-08-17

## 2018-08-17 RX ORDER — INSULIN DEGLUDEC 100 U/ML
120 INJECTION, SOLUTION SUBCUTANEOUS
Status: DISCONTINUED | OUTPATIENT
Start: 2018-08-17 | End: 2018-08-17 | Stop reason: SDUPTHER

## 2018-08-17 RX ORDER — SOTALOL HYDROCHLORIDE 80 MG/1
160 TABLET ORAL EVERY 12 HOURS
Status: DISCONTINUED | OUTPATIENT
Start: 2018-08-17 | End: 2018-08-20 | Stop reason: HOSPADM

## 2018-08-17 RX ORDER — ASPIRIN 81 MG/1
81 TABLET ORAL DAILY
Status: DISCONTINUED | OUTPATIENT
Start: 2018-08-18 | End: 2018-08-20 | Stop reason: HOSPADM

## 2018-08-17 RX ORDER — OXYCODONE HYDROCHLORIDE 15 MG/1
15 TABLET ORAL 3 TIMES DAILY
Status: DISCONTINUED | OUTPATIENT
Start: 2018-08-17 | End: 2018-08-20 | Stop reason: HOSPADM

## 2018-08-17 RX ORDER — SERTRALINE HYDROCHLORIDE 50 MG/1
100 TABLET, FILM COATED ORAL DAILY
Status: DISCONTINUED | OUTPATIENT
Start: 2018-08-18 | End: 2018-08-20 | Stop reason: HOSPADM

## 2018-08-17 RX ADMIN — OXYCODONE HYDROCHLORIDE 15 MG: 15 TABLET ORAL at 21:04

## 2018-08-17 RX ADMIN — HEPARIN SODIUM 4400 UNITS: 5000 INJECTION INTRAVENOUS; SUBCUTANEOUS at 23:03

## 2018-08-17 RX ADMIN — PERFLUTREN 1 ML: 6.52 INJECTION, SUSPENSION INTRAVENOUS at 16:45

## 2018-08-17 RX ADMIN — SODIUM CHLORIDE 1000 ML: 900 INJECTION, SOLUTION INTRAVENOUS at 12:51

## 2018-08-17 RX ADMIN — Medication 5 ML: at 16:00

## 2018-08-17 RX ADMIN — INSULIN DEGLUDEC 120 UNITS: 200 INJECTION, SOLUTION SUBCUTANEOUS at 21:09

## 2018-08-17 RX ADMIN — INSULIN LISPRO 4 UNITS: 100 INJECTION, SOLUTION INTRAVENOUS; SUBCUTANEOUS at 22:34

## 2018-08-17 RX ADMIN — OXYCODONE HYDROCHLORIDE 15 MG: 15 TABLET ORAL at 15:45

## 2018-08-17 RX ADMIN — Medication 1 AMPULE: at 21:05

## 2018-08-17 RX ADMIN — ATORVASTATIN CALCIUM 40 MG: 40 TABLET, FILM COATED ORAL at 21:05

## 2018-08-17 RX ADMIN — Medication 10 ML: at 21:05

## 2018-08-17 RX ADMIN — HEPARIN SODIUM AND DEXTROSE 12 UNITS/KG/HR: 5000; 5 INJECTION INTRAVENOUS at 16:19

## 2018-08-17 RX ADMIN — HEPARIN SODIUM 4000 UNITS: 5000 INJECTION INTRAVENOUS; SUBCUTANEOUS at 16:19

## 2018-08-17 RX ADMIN — PANCRELIPASE LIPASE, PANCRELIPASE PROTEASE, PANCRELIPASE AMYLASE 1 CAPSULE: 20000; 63000; 84000 CAPSULE, DELAYED RELEASE ORAL at 17:31

## 2018-08-17 RX ADMIN — SOTALOL HYDROCHLORIDE 160 MG: 80 TABLET ORAL at 21:05

## 2018-08-17 NOTE — ED NOTES
TRANSFER - OUT REPORT:    Verbal report given to Beau Kiser RN on Laqueta Schwab  being transferred to [facility] for routine progression of care       Report consisted of patients Situation, Background, Assessment and   Recommendations(SBAR). Information from the following report(s) SBAR was reviewed with the receiving nurse. Lines:   Peripheral IV 08/17/18 Right Antecubital (Active)        Opportunity for questions and clarification was provided.       Patient transported with:   Monitor  Registered Nurse

## 2018-08-17 NOTE — IP AVS SNAPSHOT
[address] [facility] 
 
 
 [address] [facility] [address] 
[phone] Patient: Declan Ellis MRN: [id number] :1965 About your hospitalization You were admitted on:  2018 You last received care in the:  [facility] 3 CLINICAL OBSERVATION You were discharged on:  2018 Why you were hospitalized Your primary diagnosis was:  Not on File Your diagnoses also included:  Htn (Hypertension), Atrial Fibrillation (Hcc), Diabetes Mellitus (Hcc), Jesus On Cpap, Dyslipidemia Follow-up Information Follow up With Details Comments Contact Info Alie Thomas MD   [address] [facility] [address] 
[phone] Jaguar Granados MD   @ 8:15 in [address] [place] [address] Dr KOHLI 400 [facility] Cardiology MARY Diaz [place] [address] 
[phone] Your Scheduled Appointments 2018  8:15 AM [facility] HOSPITAL FOLLOW-UP with Jaguar Granados MD  
[facility] ([address] [facility]) [address] [place] [address] Dr Trent 400 [facility] [address]  
[phone] Discharge Orders None A check hilario indicates which time of day the medication should be taken. My Medications CONTINUE taking these medications Instructions Each Dose to Equal  
 Morning Noon Evening Bedtime  
 albuterol 90 mcg/actuation inhaler Commonly known as:  PROVENTIL HFA, VENTOLIN HFA, PROAIR HFA Take 2 Puffs by inhalation every six (6) hours as needed. Indications: BRONCHOSPASM PREVENTION, bring on the [facility] 2 Puff  
    
   
   
   
  
 aspirin delayed-release 81 mg tablet Take 81 mg by mouth daily. Indications: am- take on the [facility] 81 mg  
    
  
   
   
   
  
 atorvastatin 40 mg tablet Commonly known as:  LIPITOR Take 1 Tab by mouth nightly. 40 mg  
    
   
   
   
  
  
 budesonide-formoterol 80-4.5 mcg/actuation Hfaa Commonly known as:  SYMBICORT  
   
 Take 2 Puffs by inhalation two (2) times daily as needed. Indications: BRONCHOSPASM PREVENTION WITH COPD, bring on the [facility] 2 Puff  
    
   
   
   
  
 cetirizine 10 mg tablet Commonly known as:  ZYRTEC Take 10 mg by mouth nightly. Indications: Allergic Rhinitis 10 mg  
    
   
   
   
  
  
 colestipol 1 gram tablet Commonly known as:  COLESTID Take 1 g by mouth two (2) times a day. Indications: hypercholesterolemia 1 g COUMADIN 7.5 mg tablet Generic drug:  warfarin Take 7.5 mg by mouth nightly. Indications: has not been instructed to hold 7.5 mg  
    
   
   
   
  
  
 CREON 24,000-76,000 -120,000 unit capsule Generic drug:  lipase-protease-amylase Take 1 Cap by mouth three (3) times daily (with meals). Indications: exocrine pancreatic insufficiency 1 Cap  
    
  
   
  
   
  
   
  
 famotidine 40 mg tablet Commonly known as:  PEPCID Take 1 Tab by mouth every twelve (12) hours. 40 mg  
    
  
   
   
  
   
  
 furosemide 40 mg tablet Commonly known as:  LASIX Take 1 Tab by mouth daily. 40 mg  
    
  
   
   
   
  
 ipratropium-albuterol  mcg/actuation inhaler Commonly known as:  COMBIVENT Take 1 Puff by inhalation every six (6) hours as needed. Indications: bring on the [facility] 1 Puff  
    
   
   
   
  
 losartan 25 mg tablet Commonly known as:  COZAAR Take 12.5 mg by mouth daily. Indications: am  
 12.5 mg  
    
  
   
   
   
  
 NovoLOG Mix 70-30 U-100 Insuln 100 unit/mL (70-30) injection Generic drug:  insulin aspart protamine/insulin aspart  
   
 by SubCUTAneous route Before breakfast, lunch, and dinner. Indications: type 2 diabetes mellitus, sliding scale- 1/2 usual am dose if blood sugar is 120 or ^ And hold if blood  sugar is less than or 120  
     
   
   
   
  
 * oxyCODONE IR 15 mg immediate release tablet Commonly known as:  OXY-IR  
   
 Take 15 mg by mouth three (3) times daily. Indications: take on the [facility] 15 mg  
    
   
   
   
  
 * oxyCODONE IR 5 mg immediate release tablet Commonly known as:  Vivi Aquino Take 1 Tab by mouth every four (4) hours as needed for Pain (Take as needed for break through pain). Max Daily Amount: 30 mg.  
 5 mg  
    
   
   
   
  
 pantoprazole 40 mg tablet Commonly known as:  PROTONIX Take 1 Tab by mouth daily. 40 mg  
    
  
   
   
   
  
 pregabalin 100 mg capsule Commonly known as:  Drew Kelly Take 100 mg by mouth three (3) times daily. Indications: Diabetic Peripheral Neuropathy 100 mg  
    
   
   
   
  
 sertraline 100 mg tablet Commonly known as:  ZOLOFT Take 100 mg by mouth daily. Indications: am- take on the [facility] 100 mg  
    
  
   
   
   
  
 sotalol 160 mg tablet Commonly known as:  Sharmila Ni Take 1 Tab by mouth every twelve (12) hours. 160 mg  
    
  
   
   
   
  
  
 TRESIBA FLEXTOUCH U-100 100 unit/mL (3 mL) Inpn Generic drug:  insulin degludec  
   
 120 Units by SubCUTAneous route nightly. Indications: type 2 diabetes mellitus 120 Units * Notice: This list has 2 medication(s) that are the same as other medications prescribed for you. Read the directions carefully, and ask your doctor or other care provider to review them with you. Opioid Education Prescription Opioids: What You Need to Know: 
 
Prescription opioids can be used to help relieve moderate-to-severe pain and are often prescribed following a surgery or injury, or for certain health conditions. These medications can be an important part of treatment but also come with serious risks. Opioids are strong pain medicines. Examples include hydrocodone, oxycodone, fentanyl, and morphine. Heroin is an example of an illegal opioid.   It is important to work with your health care provider to make sure you are getting the safest, most effective care. WHAT ARE THE RISKS AND SIDE EFFECTS OF OPIOID USE? Prescription opioids carry serious risks of addiction and overdose, especially with prolonged use. An opioid overdose, often marked by slow breathing, can cause sudden death. The use of prescription opioids can have a number of side effects as well, even when taken as directed. · Tolerance-meaning you might need to take more of a medication for the same pain relief · Physical dependence-meaning you have symptoms of withdrawal when the medication is stopped. Withdrawal symptoms can include nausea, sweating, chills, diarrhea, stomach cramps, and muscle aches. Withdrawal can last up to several weeks, depending on which drug you took and how long you took it. · Increased sensitivity to pain · Constipation · Nausea, vomiting, and dry mouth · Sleepiness and dizziness · Confusion · Depression · Low levels of testosterone that can result in lower sex drive, energy, and strength · Itching and sweating RISKS ARE GREATER WITH:      
· History of drug misuse, substance use disorder, or overdose · Mental health conditions (such as depression or anxiety) · Sleep apnea · Older age (72 years or older) · Pregnancy Avoid alcohol while taking prescription opioids. Also, unless specifically advised by your health care provider, medications to avoid include: · Benzodiazepines (such as Xanax or Valium) · Muscle relaxants (such as Soma or Flexeril) · Hypnotics (such as Ambien or Lunesta) · Other prescription opioids KNOW YOUR OPTIONS Talk to your health care provider about ways to manage your pain that don't involve prescription opioids. Some of these options may actually work better and have fewer risks and side effects. Options may include: 
· Pain relievers such as acetaminophen, ibuprofen, and naproxen · Some medications that are also used for depression or seizures · Physical therapy and exercise · Counseling to help patients learn how to cope better with triggers of pain and stress. · Application of heat or cold compress · Massage therapy · Relaxation techniques Be Informed Make sure you know the name of your medication, how much and how often to take it, and its potential risks & side effects. IF YOU ARE PRESCRIBED OPIOIDS FOR PAIN: 
· Never take opioids in greater amounts or more often than prescribed. Remember the goal is not to be pain-free but to manage your pain at a tolerable level. · Follow up with your primary care provider to: · Work together to create a plan on how to manage your pain. · Talk about ways to help manage your pain that don't involve prescription opioids. · Talk about any and all concerns and side effects. · Help prevent misuse and abuse. · Never sell or share prescription opioids · Help prevent misuse and abuse. · Store prescription opioids in a secure place and out of reach of others (this may include visitors, children, friends, and family). · Safely dispose of unused/unwanted prescription opioids: Find your community drug take-back program or your pharmacy mail-back program, or flush them down the toilet, following guidance from the Food and Drug Administration (www.fda.gov/Drugs/ResourcesForYou). · Visit www.cdc.gov/drugoverdose to learn about the risks of opioid abuse and overdose. · If you believe you may be struggling with addiction, tell your health care provider and ask for guidance or call [address] [place] [address] at [phone]. Discharge Instructions Atrial Fibrillation: Care Instructions Your Care Instructions Atrial fibrillation is an irregular and often fast heartbeat. Treating this condition is important for several reasons. It can cause blood clots, which can travel from your heart to your brain and cause a stroke.  If you have a fast heartbeat, you may feel lightheaded, dizzy, and weak. An irregular heartbeat can also increase your risk for heart failure. Atrial fibrillation is often the result of another heart condition, such as high blood pressure or coronary artery disease. Making changes to improve your heart condition will help you stay healthy and active. Follow-up care is a key part of your treatment and safety. Be sure to make and go to all appointments, and call your doctor if you are having problems. It's also a good idea to know your test results and keep a list of the medicines you take. How can you care for yourself at home? Medicines 
  · Take your medicines exactly as prescribed. Call your doctor if you think you are having a problem with your medicine. You will get more details on the specific medicines your doctor prescribes.  
  · If your doctor has given you a blood thinner to prevent a stroke, be sure you get instructions about how to take your medicine safely. Blood thinners can cause serious bleeding problems.  
  · Do not take any vitamins, over-the-counter drugs, or herbal products without talking to your doctor first.  
 Lifestyle changes 
  · Do not smoke. Smoking can increase your chance of a stroke and heart attack. If you need help quitting, talk to your doctor about stop-smoking programs and medicines. These can increase your chances of quitting for good.  
  · Eat a heart-healthy diet.  
  · Stay at a healthy weight. Lose weight if you need to.  
  · Limit alcohol to 2 drinks a day for men and 1 drink a day for women. Too much alcohol can cause health problems.  
  · Avoid colds and flu. Get a pneumococcal vaccine shot. If you have had one before, ask your doctor whether you need another dose. Get a flu shot every year. If you must be around people with colds or flu, wash your hands often. Activity 
  · If your doctor recommends it, get more exercise.  Walking is a good choice. Bit by bit, increase the amount you walk every day. Try for at least 30 minutes on most days of the week. You also may want to swim, bike, or do other activities. Your doctor may suggest that you join a cardiac rehabilitation program so that you can have help increasing your physical activity safely.  
  · Start light exercise if your doctor says it is okay. Even a small amount will help you get stronger, have more energy, and manage stress. Walking is an easy way to get exercise. Start out by walking a little more than you did in the hospital. Gradually increase the amount you walk.  
  · When you exercise, watch for signs that your heart is working too hard. You are pushing too hard if you cannot talk while you are exercising. If you become short of breath or dizzy or have chest pain, sit down and rest immediately.  
  · Check your pulse regularly. Place two fingers on the artery at the palm side of your wrist, in line with your thumb. If your heartbeat seems uneven or fast, talk to your doctor. When should you call for help? Call 911 anytime you think you may need emergency care. For example, call if: 
  · You have symptoms of a heart attack. These may include: ¨ Chest pain or pressure, or a strange feeling in the chest. 
¨ Sweating. ¨ Shortness of breath. ¨ Nausea or vomiting. ¨ Pain, pressure, or a strange feeling in the back, neck, jaw, or upper belly or in one or both shoulders or arms. ¨ Lightheadedness or sudden weakness. ¨ A fast or irregular heartbeat. After you call 911, the  may tell you to chew 1 adult-strength or 2 to 4 low-dose aspirin. Wait for an ambulance. Do not try to drive yourself.  
  · You have symptoms of a stroke. These may include: 
¨ Sudden numbness, tingling, weakness, or loss of movement in your face, arm, or leg, especially on only one side of your body. ¨ Sudden vision changes. ¨ Sudden trouble speaking. ¨ Sudden confusion or trouble understanding simple statements. ¨ Sudden problems with walking or balance. ¨ A sudden, severe headache that is different from past headaches.  
  · You passed out (lost consciousness).  
 Call your doctor now or seek immediate medical care if: 
  · You have new or increased shortness of breath.  
  · You feel dizzy or lightheaded, or you feel like you may faint.  
  · Your heart rate becomes irregular.  
  · You can feel your heart flutter in your chest or skip heartbeats. Tell your doctor if these symptoms are new or worse.  
 Watch closely for changes in your health, and be sure to contact your doctor if you have any problems. Where can you learn more? Go to http://pebbles-royer.info/. Enter U020 in the search box to learn more about \"Atrial Fibrillation: Care Instructions. \" Current as of: December 6, 2017 Content Version: 11.7 © 8840-1898 [employer]. Care instructions adapted under license by [employer] (which disclaims liability or warranty for this information). If you have questions about a medical condition or this instruction, always ask your healthcare professional. [address] any warranty or liability for your use of this information. eigital Announcement We are excited to announce that we are making your provider's discharge notes available to you in eigital. You will see these notes when they are completed and signed by the physician that discharged you from your recent hospital stay. If you have any questions or concerns about any information you see in eigital, please call the Health Information Department where you were seen or reach out to your Primary Care Provider for more information about your plan of care. Introducing [facility]!    
 Bonnie Santos introduces eigital patient portal. Now you can access parts of your medical record, email your doctor's office, and request medication refills online. 1. In your internet browser, go to [email or web address] 2. Click on the First Time User? Click Here link in the Sign In box. You will see the New Member Sign Up page. 3. Enter your [employer] Access Code exactly as it appears below. You will not need to use this code after youve completed the sign-up process. If you do not sign up before the expiration date, you must request a new code. · [employer] Access Code: [email or web address] Expires: 11/15/2018 11:34 AM 
 
4. Enter the last four digits of your Social Security Number (xxxx) and Date of Birth (mm/dd/yyyy) as indicated and click Submit. You will be taken to the next sign-up page. 5. Create a [employer] ID. This will be your [employer] login ID and cannot be changed, so think of one that is secure and easy to remember. 6. Create a [employer] password. You can change your password at any time. 7. Enter your Password Reset Question and Answer. This can be used at a later time if you forget your password. 8. Enter your e-mail address. You will receive e-mail notification when new information is available in [address] 9. Click Sign Up. You can now view and download portions of your medical record. 10. Click the Download Summary menu link to download a portable copy of your medical information. If you have questions, please visit the Frequently Asked Questions section of the [employer] website. Remember, [employer] is NOT to be used for urgent needs. For medical emergencies, dial 911. Now available from your iPhone and Android! Introducing Nick Miranda As a [facility] patient, I wanted to make you aware of our electronic visit tool called Nick Miranda. [facility] 24/7 allows you to connect within minutes with a medical provider 24 hours a day, seven days a week via a mobile device or tablet or logging into a secure website from your computer. You can access [address] from anywhere in the [place]. A virtual visit might be right for you when you have a simple condition and feel like you just dont want to get out of bed, or cant get away from work for an appointment, when your regular [facility] provider is not available (evenings, weekends or holidays), or when youre out of town and need minor care. Electronic visits cost only $49 and if the [employer] provider determines a prescription is needed to treat your condition, one can be electronically transmitted to a nearby pharmacy*. Please take a moment to enroll today if you have not already done so. The enrollment process is free and takes just a few minutes. To enroll, please download the SCHAD ryan to your tablet or phone, or visit [email or web address] to enroll on your computer. And, as an [address] [place] [address] patient with a [employer] account, the results of your visits will be scanned into your electronic medical record and your primary care provider will be able to view the scanned results. We urge you to continue to see your regular [facility] provider for your ongoing medical care. And while your primary care provider may not be the one available when you seek a In Flow[employer] virtual visit, the peace of mind you get from getting a real diagnosis real time can be priceless. For more information on [address], view our Frequently Asked Questions (FAQs) at [email or web address]. Sincerely, 
 
Debra Smith MD 
Chief Medical Officer [address] *:  certain medications cannot be prescribed via [address] Providers Seen During Your Hospitalization Provider Specialty Primary office phone Nani Cordero MD Emergency Medicine [phone] Matt Rios MD Cardiology [phone] Your Primary Care Physician (PCP) Primary Care Physician Office Phone Office Fax Tyler HARO [phone] You are allergic to the following Allergen Reactions Eliquis (Apixaban) Rash Lisinopril Unknown (comments) Lyrica (Pregabalin) Rash Pt denies allergy. Metformin Shortness of Breath Rash Morphine Nausea and Vomiting Recent Documentation Height Weight BMI Smoking Status 1.829 m 125.1 kg 37.42 kg/m2 Current Every Day Smoker Emergency Contacts Name Discharge Info Relation Home Work Mobile Elizabeth Morgan DISCHARGE CAREGIVER [3] Spouse [3] [phone] Patient Belongings The following personal items are in your possession at time of discharge: 
  Dental Appliances: Uppers, At home  Visual Aid: Glasses, With patient      Home Medications: Sent to pharmacy   Jewelry: Bracelet  Clothing: Shirt, Pants, Socks, Undergarments, Footwear    Other Valuables: Cell Phone Please provide this summary of care documentation to your next provider. Signatures-by signing, you are acknowledging that this After Visit Summary has been reviewed with you and you have received a copy. Patient Signature:  ____________________________________________________________ Date:  ____________________________________________________________  
  
[facility] Provider Signature:  ____________________________________________________________ Date:  ____________________________________________________________

## 2018-08-17 NOTE — PROGRESS NOTES
Physical Exam   Skin:             TRANSFER - IN REPORT:    Verbal report received from ER on Pedro Gu     Report consisted of patients Situation, Background, Assessment and Recommendations(SBAR). Information from the following report(s) SBAR and Recent Results was reviewed. Opportunity for questions and clarification was provided. Assessment completed upon patients arrival to unit and care assumed. Patient received to room 335. Patient connected to monitor and assessment completed. Plan of care reviewed. Patient oriented to room and call light. Patient aware to use call light to communicate any chest pain or needs.

## 2018-08-17 NOTE — H&P
[address] Cardiology History & Physical      Date of  Admission: 8/17/2018 11:34 AM     Primary Care Physician: Dr. Stuart Austin  Primary Cardiologist: Dr. Praveen Gutierrez  Referring Physician: Dr. Tamera Lucia [facility] ER MD)  Admitting Physician: Dr. Luanne Quinteros    CC: SOB, palpitations    HPI:  Werner Winchester is a 52 y.o. obese WM with h/o HTN, dyslipidemia, DM II, WILBERTO, COPD, tobacco abuse and AF and atrial flutter s/p flutter ablation 2016 on Sotalol. He has not seen Cardiology since 3 month follow up appt after ablation in 2016. He states he had rashes on all newer oral AC and is now on warfarin. His last INR was last week and was subtherapeutic at 1.8 and he didn't take the warfarin last night in preparation for oral surgery on this coming Tuesday. He went camping last week and noted LE swelling with associated chest pressure 3 mornings when he woke up lasting about 1 hour each morning. He thought it was reflux and therefore did not seek medical attention. Yesterday, he was feeling fine w/o complaints but this AM he felt fatigued and SOB. He presented to [facility] ER and ECG showed AF 96 bpm. Troponin negative, BNP 74 and CXR showed no acute abnormality. He had a LHC in 1/2016 showing normal LVSF and normal coronaries. Pt only c/o HA currently. Past Medical History:   Diagnosis Date    Acute renal failure (ARF) ([address] 06/2017    septic from necrotizing fascitis. was on dialysis short term.  Anticoagulated on Coumadin     has not been instructed to hold.  Atrial fibrillation ([address]     Chest pain, precordial 5/4/2016    Chronic pain     back    Chronic pancreatitis ([address]     Diabetes ([address] 2008    insulin reliant. bs: 120's .      Edema     Gastrointestinal disorder     Headache     History of peptic ulcer disease     years ago    History of sepsis 06/2017    Hypertension     Morbid obesity ([address] 5/4/2016    35 bmi    Necrotizing fasciitis ([address] 06/2017    left upper arm    Palpitations     Restless leg syndrome 5/4/2016    Sleep apnea     cpap    Tobacco dependence 5/4/2016      Past Surgical History:   Procedure Laterality Date    HX APPENDECTOMY      HX CATARACT REMOVAL Right     with IOL    HX CHOLECYSTECTOMY      HX HEART CATHETERIZATION      HX LUMBAR FUSION  2012 and 2013    x 2    HX OTHER SURGICAL      cardioversion       Allergies   Allergen Reactions    Eliquis [Apixaban] Rash    Lisinopril Unknown (comments)    Lyrica [Pregabalin] Rash     Pt denies allergy.  Metformin Shortness of Breath and Rash    Morphine Nausea and Vomiting      Social History     Social History    Marital status:      Spouse name: N/A    Number of children: N/A    Years of education: N/A     Occupational History    Not on file. Social History Main Topics    Smoking status: Current Every Day Smoker     Packs/day: 1.00     Years: 40.00    Smokeless tobacco: Never Used      Comment: currently cutting down    Alcohol use No    Drug use: No    Sexual activity: Not on file     Other Topics Concern    Not on file     Social History Narrative     Family History   Problem Relation Age of Onset    Diabetes Mother     Heart Disease Mother     No Known Problems Father     Pulmonary Embolism Sister         Current Facility-Administered Medications   Medication Dose Route Frequency    sodium chloride 0.9 % bolus infusion 1,000 mL  1,000 mL IntraVENous ONCE     Current Outpatient Prescriptions   Medication Sig    pregabalin (LYRICA) 100 mg capsule Take 100 mg by mouth three (3) times daily. Indications: Diabetic Peripheral Neuropathy    oxyCODONE IR (ROXICODONE) 5 mg immediate release tablet Take 1 Tab by mouth every four (4) hours as needed for Pain (Take as needed for break through pain). Max Daily Amount: 30 mg.    sertraline (ZOLOFT) 100 mg tablet Take 100 mg by mouth daily.  Indications: am- take on the [facility] oxyCODONE IR (OXY-IR) 15 mg immediate release tablet Take 15 mg by mouth three (3) times daily. Indications: take on the dos    insulin aspart protamine/insulin aspart (NOVOLOG MIX 70-30 U-100 INSULN) 100 unit/mL (70-30) injection by SubCUTAneous route Before breakfast, lunch, and dinner. Indications: type 2 diabetes mellitus, sliding scale- 1/2 usual am dose if blood sugar is 120 or ^ And hold if blood  sugar is less than or 120    warfarin (COUMADIN) 7.5 mg tablet Take 7.5 mg by mouth nightly. Indications: has not been instructed to hold    amylase-lipase-protease (CREON) 24,000-76,000 -120,000 unit capsule Take 1 Cap by mouth three (3) times daily (with meals). Indications: exocrine pancreatic insufficiency    sotalol (BETAPACE) 160 mg tablet Take 1 Tab by mouth every twelve (12) hours. (Patient taking differently: Take 160 mg by mouth every twelve (12) hours. Indications: PREVENTION OF RECURRENT ATRIAL FIBRILLATION, take on the dos)    famotidine (PEPCID) 40 mg tablet Take 1 Tab by mouth every twelve (12) hours. (Patient taking differently: Take 40 mg by mouth every twelve (12) hours. Indications: take on the dos)    pantoprazole (PROTONIX) 40 mg tablet Take 1 Tab by mouth daily. (Patient taking differently: Take 40 mg by mouth daily. Indications: am- take on the dos)    colestipol (COLESTID) 1 gram tablet Take 1 g by mouth two (2) times a day. Indications: hypercholesterolemia    aspirin delayed-release 81 mg tablet Take 81 mg by mouth daily. Indications: am- take on the dos    budesonide-formoterol (SYMBICORT) 80-4.5 mcg/actuation HFAA inhaler Take 2 Puffs by inhalation two (2) times daily as needed. Indications: BRONCHOSPASM PREVENTION WITH COPD, bring on the dos    albuterol (PROVENTIL HFA, VENTOLIN HFA, PROAIR HFA) 90 mcg/actuation inhaler Take 2 Puffs by inhalation every six (6) hours as needed. Indications: BRONCHOSPASM PREVENTION, bring on the dos    furosemide (LASIX) 40 mg tablet Take 1 Tab by mouth daily. (Patient taking differently: Take 40 mg by mouth daily. Indications: Edema, am)    atorvastatin (LIPITOR) 40 mg tablet Take 1 Tab by mouth nightly.  losartan (COZAAR) 25 mg tablet Take 12.5 mg by mouth daily. Indications: am    insulin glargine (LANTUS) 100 unit/mL injection 60 Units by SubCUTAneous route Before breakfast and dinner. Indications: type 2 diabetes mellitus, 48 units the night before surgery and 48 units  on the dos    cetirizine (ZYRTEC) 10 mg tablet Take 10 mg by mouth nightly. Indications: Allergic Rhinitis    ipratropium-albuterol (COMBIVENT)  mcg/actuation inhaler Take 1 Puff by inhalation every six (6) hours as needed.  Indications: bring on the dos       Review of symptoms:  General: no recent weight loss/gain, +weakness/fatigue, no fever or chills   Skin: no rashes, lumps, or other skin changes   HEENT: +headache, dizziness, lightheadedness, vision changes, hearing changes, tinnitus, vertigo, sinus pressure/pain, bleeding gums, sore throat, or hoarseness   Neck: no swollen glands, goiter, pain or stiffness   Respiratory: no cough, sputum, hemoptysis, +dyspnea, no wheezing   Cardiovascular: +chest pain or discomfort- last week, +palpitations, +dyspnea, no orthopnea, paroxysmal nocturnal dyspnea, peripheral edema   Gastrointestinal: no trouble swallowing, heartburn, change of appetite, nausea, change in bowel habits, pain with defecation, rectal bleeding or black/tarry stools, hemorrhoids, constipation, diarrhea, abdominal pain, jaundice, liver or gallbladder problems   Urinary: no frequency, urgency , hematuria, burning/pain with urination, recent flank pain, polyuria, nocturia, or difficulty urinating   Peripheral Vascular: no claudication, leg cramps, prior DVTs, swelling of calves, legs, or feet, color change, or swelling with redness or tenderness   Musculoskeletal: no muscle or joint pain/stiffness, joint swelling, erythema of joints, or back pain   Psychiatric: no depression, mental disorders, or excessive stress   Neurological: no history of CVA, dizziness, no sensory or motor loss, seizures, syncope, tremors, numbness, tingling, no changes in mood, attention, or speech, no changes in orientation, memory, insight, or judgment. no headache, vertigo. Hematologic: no anemia, +easy bruising or bleeding   Endocrine: +diabetes, no thyroid problems, heat or cold intolerance, excessive sweating, polyuria, polydipsia      Subjective:   Physical Exam    Visit Vitals    BP 92/50    Pulse 75    Temp 98 °F (36.7 °C)    Resp 15    Ht 6' (1.829 m)    Wt 124.7 kg (275 lb)    SpO2 99%    BMI 37.3 kg/m2     General Appearance:  Well developed, obese, alert and oriented x 3, and individual in no acute distress. Ears/Nose/Mouth/Throat:   Hearing grossly normal.         Neck: Supple. No JVD   Chest:   Lungs fairly clear to auscultation bilaterally. No wheezing, rales   Cardiovascular:  Regular rate and rhythm, S1, S2 normal, no murmur. Abdomen:   Soft, obese, non-tender, bowel sounds are active. Extremities: No edema bilaterally. Skin: Warm and dry.            Cardiographics  Telemetry: AFIB  ECG: atrial fibrillation, rate 96 bpm  Echocardiogram: pending    Labs:   Recent Results (from the past 24 hour(s))   EKG, 12 LEAD, INITIAL    Collection Time: 08/17/18 11:36 AM   Result Value Ref Range    Ventricular Rate 96 BPM    Atrial Rate 144 BPM    QRS Duration 80 ms    Q-T Interval 362 ms    QTC Calculation (Bezet) 457 ms    Calculated R Axis 59 degrees    Calculated T Axis 28 degrees    Diagnosis       Atrial fibrillation  Cannot rule out Anterior infarct , age undetermined  Abnormal ECG  When compared with ECG of 24-JUN-2016 10:34,  Atrial fibrillation has replaced Sinus rhythm     CBC WITH AUTOMATED DIFF    Collection Time: 08/17/18 11:51 AM   Result Value Ref Range    WBC 11.4 (H) 4.3 - 11.1 K/uL    RBC 5.71 (H) 4.23 - 5.6 M/uL    HGB 16.1 13.6 - 17.2 g/dL    HCT 49.6 41.1 - 50.3 %    MCV 86.9 79.6 - 97.8 FL    MCH 28.2 26.1 - 32.9 PG    MCHC 32.5 31.4 - 35.0 g/dL    RDW 16.5 %    PLATELET 609 894 - 138 K/uL    MPV 11.9 9.4 - 12.3 FL    ABSOLUTE NRBC 0.00 0.0 - 0.2 K/uL    DF AUTOMATED      NEUTROPHILS 46 43 - 78 %    LYMPHOCYTES 46 (H) 13 - 44 %    MONOCYTES 6 4.0 - 12.0 %    EOSINOPHILS 1 0.5 - 7.8 %    BASOPHILS 0 0.0 - 2.0 %    IMMATURE GRANULOCYTES 1 0.0 - 5.0 %    ABS. NEUTROPHILS 5.2 K/UL    ABS. LYMPHOCYTES 5.3 K/UL    ABS. MONOCYTES 0.7 K/UL    ABS. EOSINOPHILS 0.1 K/UL    ABS. BASOPHILS 0.1 K/UL    ABS. IMM. GRANS. 0.1 K/UL   METABOLIC PANEL, COMPREHENSIVE    Collection Time: 08/17/18 11:51 AM   Result Value Ref Range    Sodium 139 136 - 145 mmol/L    Potassium 4.4 3.5 - 5.1 mmol/L    Chloride 99 98 - 107 mmol/L    CO2 29 21 - 32 mmol/L    Anion gap 11 7 - 16 mmol/L    Glucose 151 (H) 65 - 100 mg/dL    BUN 22 6 - 23 MG/DL    Creatinine 1.76 (H) 0.8 - 1.5 MG/DL    GFR est AA 52 (L) >60 ml/min/1.73m2    GFR est non-AA 43 (L) >60 ml/min/1.73m2    Calcium 9.1 8.3 - 10.4 MG/DL    Bilirubin, total 0.5 0.2 - 1.1 MG/DL    ALT (SGPT) 36 12 - 65 U/L    AST (SGOT) 25 15 - 37 U/L    Alk.  phosphatase 62 50 - 136 U/L    Protein, total 7.9 6.3 - 8.2 g/dL    Albumin 4.2 3.5 - 5.0 g/dL    Globulin 3.7 (H) 2.3 - 3.5 g/dL    A-G Ratio 1.1 (L) 1.2 - 3.5     TROPONIN I    Collection Time: 08/17/18 11:51 AM   Result Value Ref Range    Troponin-I, Qt. <0.02 (L) 0.02 - 0.05 NG/ML   BNP    Collection Time: 08/17/18 11:51 AM   Result Value Ref Range    BNP 74 pg/mL   MAGNESIUM    Collection Time: 08/17/18 11:51 AM   Result Value Ref Range    Magnesium 2.1 1.8 - 2.4 mg/dL   POC TROPONIN-I    Collection Time: 08/17/18 11:57 AM   Result Value Ref Range    Troponin-I (POC) 0 (L) 0.02 - 0.05 ng/ml       Pt has been seen and examined by Dr. Brenda Sparks and he agrees with the following assessment and plan:     Assessment/Plan:          Diagnosis    Atrial fibrillation [address]- admit to telemetry,Pt is off Warfarin for oral surgery scheduled Tuesday and INR 1.2 today-start Heparin, continue Sotalol, check echo and TSH, serial CE's to r/o MI, further recommendations will be made based upon echo results    CARYN- Cr 1.76, IVF, monitor, hold ARB with lower BP and CARYN, monitor    HTN (hypertension)- controlled, continue home meds, monitor    H/o Atrial flutter ([address] s/p ablation 2016- on Sotalol, Warfarin    Diabetes mellitus ([address]- insulin, SSI    WILBERTO on CPAP, COPD    Dyslipidemia- statin       Lorri Amezcua PA-C     ATTENDING ADDENDUM:    Patient seen and examined by me. Agree with above note by physician extender. Key findings are:  No CP or palpitations but increased SOB, fatigue/malaise starting early this AM.  Felt fine last night. Similar symptoms prior to AFlutter ablation a couple of years ago. In AF today with good rate control and compliant with sotalol therapy, INR subtherapeutic and holding for oral surgery this Tuesday. CV- IRRR IRRR without murmur  Lungs- Clear bilaterally, mildly decreased bibasilar but no crackles or wheezes  Abd- soft, nontender, nondistended  Ext- no edema    Plan:  Admit, serial trop's, echo, continue sotalol for now. Follow tele and start heparin gtt for now. Difficult situation given need for oral surgery and holding coumadin, ? Converted to AF early this AM prior to awakening? INR low. Will need to discuss delaying oral surgery, resuming coumadin and heparin bridge with KSENIA/DCCV over weekend, vs. Continuing rate control/sotalol, get oral surgery, resume coumadin post oral surgery when OK with MD's, and then do KSENIA/DCCV after that. CHADS-VASC IS 2. A.  Lynn Mckeon MD  [facility]iology  Pager [phone]

## 2018-08-17 NOTE — PROGRESS NOTES
Bedside and Verbal shift change report given to Cindy Hooker RN (oncoming nurse) by self Kevin Rojasg nurse). Report included the following information SBAR and Recent Results.

## 2018-08-17 NOTE — ED PROVIDER NOTES
HPI Comments: Patient is ER complaining of shortness of breath. Patient states he awoke this morning feeling short of breath. Reports shortness of breath is worse with exertion. Denies any chest pain, vomiting or diaphoresis. Reports he Feels that his heart is skipping. Reports a history of a flutter as well as A. Fib status post ablation. Currently does take liquids as well as sotalol. Patient is a 52 y.o. male presenting with shortness of breath. The history is provided by the patient. Shortness of Breath   This is a new problem. The problem occurs frequently. The current episode started 3 to 5 hours ago. The problem has not changed since onset. Pertinent negatives include no cough, no orthopnea, no chest pain, no abdominal pain, no rash, no leg pain and no leg swelling. Past Medical History:   Diagnosis Date    Acute renal failure (ARF) ([address] 06/2017    septic from necrotizing fascitis. was on dialysis short term.  Anticoagulated on Coumadin     has not been instructed to hold.  Atrial fibrillation ([address]     Chest pain, precordial 5/4/2016    Chronic pain     back    Chronic pancreatitis ([address]     Diabetes ([address] 2008    insulin reliant. bs: 120's .      Edema     Gastrointestinal disorder     Headache     History of peptic ulcer disease     years ago    History of sepsis 06/2017    Hypertension     Morbid obesity ([address] 5/4/2016    35 bmi    Necrotizing fasciitis ([address] 06/2017    left upper arm    Palpitations     Restless leg syndrome 5/4/2016    Sleep apnea     cpap    Tobacco dependence 5/4/2016       Past Surgical History:   Procedure Laterality Date    HX APPENDECTOMY      HX CATARACT REMOVAL Right     with IOL    HX CHOLECYSTECTOMY      HX HEART CATHETERIZATION      HX LUMBAR FUSION  2012 and 2013    x 2    HX OTHER SURGICAL      cardioversion         Family History:   Problem Relation Age of Onset    Diabetes Mother     Heart Disease Mother     No Known Problems Father     Pulmonary Embolism Sister        Social History     Social History    Marital status:      Spouse name: N/A    Number of children: N/A    Years of education: N/A     Occupational History    Not on file. Social History Main Topics    Smoking status: Current Every Day Smoker     Packs/day: 1.00     Years: 40.00    Smokeless tobacco: Never Used      Comment: currently cutting down    Alcohol use No    Drug use: No    Sexual activity: Not on file     Other Topics Concern    Not on file     Social History Narrative         ALLERGIES: Eliquis [apixaban]; Lisinopril; Lyrica [pregabalin]; Metformin; and Morphine    Review of Systems   Constitutional: Positive for fatigue. Negative for unexpected weight change. HENT: Negative for congestion and dental problem. Eyes: Negative for photophobia, redness and visual disturbance. Respiratory: Positive for shortness of breath. Negative for cough and chest tightness. Cardiovascular: Negative for chest pain, palpitations, orthopnea and leg swelling. Gastrointestinal: Negative for abdominal pain. Endocrine: Negative for polydipsia, polyphagia and polyuria. Genitourinary: Negative for flank pain, frequency and urgency. Musculoskeletal: Negative for back pain. Skin: Negative for rash. Allergic/Immunologic: Negative for food allergies and immunocompromised state. Neurological: Negative for light-headedness and numbness. Hematological: Negative for adenopathy. Does not bruise/bleed easily. Psychiatric/Behavioral: Negative for behavioral problems and confusion. All other systems reviewed and are negative. Vitals:    08/17/18 1134   BP: 125/76   Pulse: 80   Resp: 22   Temp: 98 °F (36.7 °C)   SpO2: 98%   Weight: 124.7 kg (275 lb)   Height: 6' (1.829 m)            Physical Exam   Constitutional: He is oriented to person, place, and time. He appears well-developed and well-nourished.    HENT:   Head: Normocephalic and atraumatic. Mouth/Throat: Oropharynx is clear and moist.   Eyes: Conjunctivae and EOM are normal. Pupils are equal, round, and reactive to light. Neck: Normal range of motion. Neck supple. No tracheal deviation present. No thyromegaly present. Cardiovascular: Normal rate. An irregularly irregular rhythm present. No murmur heard. Pulmonary/Chest: Effort normal and breath sounds normal. No respiratory distress. He has no wheezes. Abdominal: Soft. Bowel sounds are normal. He exhibits no distension. There is no tenderness. Musculoskeletal: Normal range of motion. He exhibits no edema or tenderness. Neurological: He is alert and oriented to person, place, and time. He has normal reflexes. Nursing note and vitals reviewed. MDM  Number of Diagnoses or Management Options  Diagnosis management comments: Patient appears to be a recurrent show A. Fib currently  We'll check basic labs as well as electrolytes here. Vital signs appear stable. 1:00 PM  Normal basic labs and electrolytes here. Case has been discussed with cardiology.   We'll have them come and evaluate patient       Amount and/or Complexity of Data Reviewed  Clinical lab tests: ordered and reviewed  Tests in the radiology section of CPT®: ordered and reviewed    Risk of Complications, Morbidity, and/or Mortality  Presenting problems: moderate  Diagnostic procedures: moderate  Management options: moderate    Patient Progress  Patient progress: stable        ED Course       Procedures

## 2018-08-18 LAB
ANION GAP SERPL CALC-SCNC: 10 MMOL/L (ref 7–16)
APTT PPP: 103.4 SEC (ref 23.2–35.3)
APTT PPP: 155.5 SEC (ref 23.2–35.3)
APTT PPP: 55.2 SEC (ref 23.2–35.3)
ATRIAL RATE: 52 BPM
BUN SERPL-MCNC: 20 MG/DL (ref 6–23)
CALCIUM SERPL-MCNC: 8.3 MG/DL (ref 8.3–10.4)
CALCULATED P AXIS, ECG09: 40 DEGREES
CALCULATED R AXIS, ECG10: 33 DEGREES
CALCULATED T AXIS, ECG11: 36 DEGREES
CHLORIDE SERPL-SCNC: 104 MMOL/L (ref 98–107)
CHOLEST SERPL-MCNC: 84 MG/DL
CO2 SERPL-SCNC: 27 MMOL/L (ref 21–32)
CREAT SERPL-MCNC: 1.39 MG/DL (ref 0.8–1.5)
DIAGNOSIS, 93000: NORMAL
ERYTHROCYTE [DISTWIDTH] IN BLOOD BY AUTOMATED COUNT: 16.1 %
GLUCOSE BLD STRIP.AUTO-MCNC: 127 MG/DL (ref 65–100)
GLUCOSE BLD STRIP.AUTO-MCNC: 154 MG/DL (ref 65–100)
GLUCOSE BLD STRIP.AUTO-MCNC: 170 MG/DL (ref 65–100)
GLUCOSE SERPL-MCNC: 81 MG/DL (ref 65–100)
HCT VFR BLD AUTO: 39.3 % (ref 41.1–50.3)
HDLC SERPL-MCNC: 26 MG/DL (ref 40–60)
HDLC SERPL: 3.2 {RATIO}
HGB BLD-MCNC: 12.9 G/DL (ref 13.6–17.2)
LDLC SERPL CALC-MCNC: 16.8 MG/DL
LIPID PROFILE,FLP: ABNORMAL
MCH RBC QN AUTO: 28 PG (ref 26.1–32.9)
MCHC RBC AUTO-ENTMCNC: 32.8 G/DL (ref 31.4–35)
MCV RBC AUTO: 85.4 FL (ref 79.6–97.8)
NRBC # BLD: 0 K/UL (ref 0–0.2)
P-R INTERVAL, ECG05: 208 MS
PLATELET # BLD AUTO: 114 K/UL (ref 150–450)
PMV BLD AUTO: 12 FL (ref 9.4–12.3)
POTASSIUM SERPL-SCNC: 3.5 MMOL/L (ref 3.5–5.1)
Q-T INTERVAL, ECG07: 446 MS
QRS DURATION, ECG06: 86 MS
QTC CALCULATION (BEZET), ECG08: 414 MS
RBC # BLD AUTO: 4.6 M/UL (ref 4.23–5.6)
SODIUM SERPL-SCNC: 141 MMOL/L (ref 136–145)
TRIGL SERPL-MCNC: 206 MG/DL (ref 35–150)
TROPONIN I SERPL-MCNC: 0.02 NG/ML (ref 0.02–0.05)
VENTRICULAR RATE, ECG03: 52 BPM
VLDLC SERPL CALC-MCNC: 41.2 MG/DL (ref 6–23)
WBC # BLD AUTO: 8.7 K/UL (ref 4.3–11.1)

## 2018-08-18 PROCEDURE — 99218 HC RM OBSERVATION: CPT

## 2018-08-18 PROCEDURE — 93005 ELECTROCARDIOGRAM TRACING: CPT | Performed by: INTERNAL MEDICINE

## 2018-08-18 PROCEDURE — 80048 BASIC METABOLIC PNL TOTAL CA: CPT

## 2018-08-18 PROCEDURE — 74011250636 HC RX REV CODE- 250/636: Performed by: INTERNAL MEDICINE

## 2018-08-18 PROCEDURE — 74011250637 HC RX REV CODE- 250/637: Performed by: INTERNAL MEDICINE

## 2018-08-18 PROCEDURE — 80061 LIPID PANEL: CPT

## 2018-08-18 PROCEDURE — 74011250637 HC RX REV CODE- 250/637: Performed by: NURSE PRACTITIONER

## 2018-08-18 PROCEDURE — 85027 COMPLETE CBC AUTOMATED: CPT

## 2018-08-18 PROCEDURE — 85730 THROMBOPLASTIN TIME PARTIAL: CPT

## 2018-08-18 PROCEDURE — 36415 COLL VENOUS BLD VENIPUNCTURE: CPT

## 2018-08-18 PROCEDURE — 96366 THER/PROPH/DIAG IV INF ADDON: CPT

## 2018-08-18 PROCEDURE — 84484 ASSAY OF TROPONIN QUANT: CPT

## 2018-08-18 PROCEDURE — 74011636637 HC RX REV CODE- 636/637: Performed by: PHYSICIAN ASSISTANT

## 2018-08-18 PROCEDURE — 82962 GLUCOSE BLOOD TEST: CPT

## 2018-08-18 RX ORDER — DIPHENHYDRAMINE HCL 25 MG
50 CAPSULE ORAL
Status: DISCONTINUED | OUTPATIENT
Start: 2018-08-18 | End: 2018-08-20 | Stop reason: HOSPADM

## 2018-08-18 RX ORDER — HEPARIN SODIUM 5000 [USP'U]/ML
35 INJECTION, SOLUTION INTRAVENOUS; SUBCUTANEOUS ONCE
Status: COMPLETED | OUTPATIENT
Start: 2018-08-18 | End: 2018-08-18

## 2018-08-18 RX ADMIN — DIPHENHYDRAMINE HYDROCHLORIDE 50 MG: 25 CAPSULE ORAL at 23:37

## 2018-08-18 RX ADMIN — Medication 1 AMPULE: at 07:55

## 2018-08-18 RX ADMIN — Medication 5 ML: at 14:39

## 2018-08-18 RX ADMIN — OXYCODONE HYDROCHLORIDE 5 MG: 5 TABLET ORAL at 13:42

## 2018-08-18 RX ADMIN — SOTALOL HYDROCHLORIDE 160 MG: 80 TABLET ORAL at 07:52

## 2018-08-18 RX ADMIN — ASPIRIN 81 MG: 81 TABLET, COATED ORAL at 07:53

## 2018-08-18 RX ADMIN — OXYCODONE HYDROCHLORIDE 15 MG: 15 TABLET ORAL at 21:08

## 2018-08-18 RX ADMIN — PANCRELIPASE LIPASE, PANCRELIPASE PROTEASE, PANCRELIPASE AMYLASE 1 CAPSULE: 20000; 63000; 84000 CAPSULE, DELAYED RELEASE ORAL at 11:58

## 2018-08-18 RX ADMIN — HEPARIN SODIUM AND DEXTROSE 16 UNITS/KG/HR: 5000; 5 INJECTION INTRAVENOUS at 10:53

## 2018-08-18 RX ADMIN — OXYCODONE HYDROCHLORIDE 15 MG: 15 TABLET ORAL at 07:54

## 2018-08-18 RX ADMIN — Medication 1 AMPULE: at 21:08

## 2018-08-18 RX ADMIN — ATORVASTATIN CALCIUM 40 MG: 40 TABLET, FILM COATED ORAL at 21:08

## 2018-08-18 RX ADMIN — OXYCODONE HYDROCHLORIDE 5 MG: 5 TABLET ORAL at 23:37

## 2018-08-18 RX ADMIN — WARFARIN SODIUM 7.5 MG: 2.5 TABLET ORAL at 21:08

## 2018-08-18 RX ADMIN — PANTOPRAZOLE SODIUM 40 MG: 40 TABLET, DELAYED RELEASE ORAL at 07:54

## 2018-08-18 RX ADMIN — LORATADINE 10 MG: 10 TABLET ORAL at 07:53

## 2018-08-18 RX ADMIN — OXYCODONE HYDROCHLORIDE 15 MG: 15 TABLET ORAL at 16:35

## 2018-08-18 RX ADMIN — INSULIN LISPRO 2 UNITS: 100 INJECTION, SOLUTION INTRAVENOUS; SUBCUTANEOUS at 11:59

## 2018-08-18 RX ADMIN — SOTALOL HYDROCHLORIDE 160 MG: 80 TABLET ORAL at 21:08

## 2018-08-18 RX ADMIN — INSULIN LISPRO 2 UNITS: 100 INJECTION, SOLUTION INTRAVENOUS; SUBCUTANEOUS at 16:35

## 2018-08-18 RX ADMIN — PANCRELIPASE LIPASE, PANCRELIPASE PROTEASE, PANCRELIPASE AMYLASE 1 CAPSULE: 20000; 63000; 84000 CAPSULE, DELAYED RELEASE ORAL at 16:36

## 2018-08-18 RX ADMIN — HEPARIN SODIUM 4350 UNITS: 5000 INJECTION INTRAVENOUS; SUBCUTANEOUS at 13:37

## 2018-08-18 RX ADMIN — SERTRALINE HYDROCHLORIDE 100 MG: 50 TABLET ORAL at 07:53

## 2018-08-18 RX ADMIN — INSULIN DEGLUDEC 120 UNITS: 200 INJECTION, SOLUTION SUBCUTANEOUS at 21:11

## 2018-08-18 RX ADMIN — PANCRELIPASE LIPASE, PANCRELIPASE PROTEASE, PANCRELIPASE AMYLASE 1 CAPSULE: 20000; 63000; 84000 CAPSULE, DELAYED RELEASE ORAL at 07:54

## 2018-08-18 NOTE — PROGRESS NOTES
Problem: Falls - Risk of  Goal: *Absence of Falls  Document Tasneem Fall Risk and appropriate interventions in the flowsheet. Outcome: Progressing Towards Goal  Fall Risk Interventions:  Medication Interventions: Patient to call before getting OOB, Teach patient to arise slowly   Patient progressing towards goal with no falls on current admission. Patient without confusion, agitation, or sensory perception deficits. Patient has steady gait on ambulation. Personal belongings are within reach. Bed is in the low and locked position with side rails up x2. Yellow gripper socks to bilateral feet. Call light within reach and patient verbalizes understanding of use. Problem: Afib Pathway: Day 1  Goal: *Stable cardiac rhythm  Outcome: Resolved/Met Date Met: 08/17/18  Patient in Sinus Rhythm. Rate WNL.

## 2018-08-18 NOTE — PROGRESS NOTES
Verbal and bedside report received from [place] [id number] [facility]. Heparin gtt verified.

## 2018-08-18 NOTE — PROGRESS NOTES
Bedside and Verbal shift change report given to self (oncoming nurse) by Nicki Kussmaul (offgoing nurse). Report included the following information SBAR, Kardex, ED Summary, Procedure Summary, Intake/Output, MAR, Recent Results and Cardiac Rhythm SR. Heparin gtt verified at bedside and on MAR.

## 2018-08-18 NOTE — PROGRESS NOTES
Bedside and Verbal shift change report given to Leana Sharp RN (oncoming nurse) by self Ann Marie harrison). Report included the following information SBAR, Kardex, ED Summary, Procedure Summary, Intake/Output, MAR, Recent Results and Cardiac Rhythm SR. Heparin gtt verified at bedside and on MAR.

## 2018-08-18 NOTE — PROGRESS NOTES
[facility] CARDIOLOGY PROGRESS NOTE           8/18/2018 11:54 AM    Admit Date: 8/17/2018      Subjective:   Feeling better today now in sinus rhythm    Review of Systems   Constitutional: Negative for fever. Respiratory: Negative for cough. Cardiovascular: Negative for chest pain. Skin: Negative for rash. Neurological: Negative for dizziness. Objective:      Vitals:    08/17/18 2102 08/18/18 0119 08/18/18 0524 08/18/18 0905   BP: 109/64 107/60 122/70 126/66   Pulse: 65 (!) 55 (!) 54 (!) 56   Resp: 18 18 18 18   Temp: 98 °F (36.7 °C) 98.2 °F (36.8 °C) 97.7 °F (36.5 °C) 96.8 °F (36 °C)   SpO2: 99% 98% 96% 96%   Weight:   124 kg (273 lb 4.8 oz)    Height:             Physical Exam   Constitutional: He appears well-developed. HENT:   Head: Normocephalic and atraumatic. Eyes: Pupils are equal, round, and reactive to light. Neck: Normal range of motion. Cardiovascular: Normal rate. No murmur heard. Pulmonary/Chest: Effort normal.   Abdominal: Soft. He exhibits no distension. There is no tenderness. Musculoskeletal: He exhibits no edema. Neurological: He is alert. No cranial nerve deficit. Skin: Skin is warm and dry. No rash noted. No erythema.        Data Review:   Recent Labs      08/18/18   0927  08/18/18   0453  08/17/18   2206   08/17/18   1152  08/17/18   1151   NA   --   141   --    --    --   139   K   --   3.5   --    --    --   4.4   MG   --    --    --    --    --   2.1   BUN   --   20   --    --    --   22   CREA   --   1.39   --    --    --   1.76*   GLU   --   81   --    --    --   151*   WBC   --   8.7   --    --    --   11.4*   HGB   --   12.9*   --    --    --   16.1   HCT   --   39.3*   --    --    --   49.6   PLT   --   114*   --    --    --   184   INR   --    --    --    --   1.2   --    TROIQ  0.02   --   0.02   < >   --   <0.02*   CHOL   --   84   --    --    --    --    LDLC   --   16.8   --    --    --    --    HDL   --   26*   --    --    --    -- < > = values in this interval not displayed.          Intake/Output Summary (Last 24 hours) at 08/18/18 1154  Last data filed at 08/18/18 0905   Gross per 24 hour   Intake              980 ml   Output             1125 ml   Net             -145 ml     Current Facility-Administered Medications   Medication Dose Route Frequency    warfarin (COUMADIN) tablet 7.5 mg  7.5 mg Oral QHS    albuterol (PROVENTIL VENTOLIN) nebulizer solution 1.25 mg  1.25 mg Nebulization Q6H PRN    lipase-protease-amylase (ZENPEP 20,000) capsule 1 Cap  1 Cap Oral TID WITH MEALS    atorvastatin (LIPITOR) tablet 40 mg  40 mg Oral QHS    aspirin delayed-release tablet 81 mg  81 mg Oral DAILY    budesonide (PULMICORT) 500 mcg/2 ml nebulizer suspension  500 mcg Nebulization BID PRN    loratadine (CLARITIN) tablet 10 mg  10 mg Oral DAILY    colestipol (COLESTID) tablet 1 g (Patient Supplied)  1 g Oral BID    albuterol-ipratropium (DUO-NEB) 2.5 MG-0.5 MG/3 ML  3 mL Nebulization Q6H PRN    pantoprazole (PROTONIX) tablet 40 mg  40 mg Oral ACB    oxyCODONE IR (OXY-IR) immediate release tablet 15 mg  15 mg Oral TID    oxyCODONE IR (ROXICODONE) tablet 5 mg  5 mg Oral Q4H PRN    sertraline (ZOLOFT) tablet 100 mg  100 mg Oral DAILY    sotalol (BETAPACE) tablet 160 mg  160 mg Oral Q12H    sodium chloride (NS) flush 5-10 mL  5-10 mL IntraVENous Q8H    sodium chloride (NS) flush 5-10 mL  5-10 mL IntraVENous PRN    nitroglycerin (NITROSTAT) tablet 0.4 mg  0.4 mg SubLINGual Q5MIN PRN    ondansetron (ZOFRAN) injection 4 mg  4 mg IntraVENous Q4H PRN    heparin 25,000 units in dextrose 500 mL infusion  12-25 Units/kg/hr (Adjusted) IntraVENous TITRATE    acetaminophen (TYLENOL) tablet 1,000 mg  1,000 mg Oral Q6H PRN    insulin lispro (HUMALOG) injection   SubCUTAneous AC&HS    alcohol 62% (NOZIN) nasal  1 Ampule  1 Ampule Topical Q12H    insulin degludec inpn 120 Units (Patient Supplied)  120 Units SubCUTAneous QHS Assessment/Plan:     1. Hypertension controlled  2. Atrial fibrillation now in sinus rhythm unclear if patient is compliant with sotalol as atrial fibrillation is of unknown duration possibly greater than 48 hours and patient is undergone cardioversion with antiarrhythmic drug therapy he should be therapeutically treated prior to discharge. Currently on heparin drip on warfarin. Normally this patient would be able to be bridged on novel anticoagulants but this had allergies on these in the past.  We'll continue to monitor to see if sotalol is effectively rate controlling patient for the next 24 hours. The patient is stable for discharge one option would be initiating Lovenox therapy at discharge. Patient has planned oral surgery often these procedures could be done on anticoagulation this would have to be discussed with surgeon in the future. 3. Diabetes controlled elevated blood sugar reading yesterday continue current therapies  4.  WILBERTO on CPAP      Ashley Awan MD  8/18/2018 11:54 AM

## 2018-08-18 NOTE — PROGRESS NOTES
Initial visit to assess pt's spiritual needs. Ministry of presence & prayer to demonstrate caring & concern, convey emotional & spiritual support.     Chaplain Sarah Madden MDiv,ThM,PhD

## 2018-08-18 NOTE — PROGRESS NOTES
Nutrition:  BPA for EN/PN PTA. Visited and interviewed the patient in room 335 related to receiving EN and/or PN PTA. The patient reports that he was a patient at [facility] in June 2017 for about 3 weeks for the treatment of RUE necrotizing fasciitis. He reports that he received EN while on the ventilator in CCU but doesn't remember anything about that admission. Once extubated he transitioned to an oral diet. He continues on an oral diet with no changes in his weight. Brenda Cotto.  [facility]  [phone]

## 2018-08-19 LAB
ANION GAP SERPL CALC-SCNC: 12 MMOL/L (ref 7–16)
APTT PPP: 51.5 SEC (ref 23.2–35.3)
APTT PPP: 67.1 SEC (ref 23.2–35.3)
BUN SERPL-MCNC: 16 MG/DL (ref 6–23)
CALCIUM SERPL-MCNC: 8.5 MG/DL (ref 8.3–10.4)
CHLORIDE SERPL-SCNC: 105 MMOL/L (ref 98–107)
CO2 SERPL-SCNC: 24 MMOL/L (ref 21–32)
CREAT SERPL-MCNC: 1.57 MG/DL (ref 0.8–1.5)
GLUCOSE BLD STRIP.AUTO-MCNC: 111 MG/DL (ref 65–100)
GLUCOSE BLD STRIP.AUTO-MCNC: 112 MG/DL (ref 65–100)
GLUCOSE BLD STRIP.AUTO-MCNC: 141 MG/DL (ref 65–100)
GLUCOSE BLD STRIP.AUTO-MCNC: 145 MG/DL (ref 65–100)
GLUCOSE SERPL-MCNC: 220 MG/DL (ref 65–100)
INR PPP: 1.3
POTASSIUM SERPL-SCNC: 4 MMOL/L (ref 3.5–5.1)
PROTHROMBIN TIME: 15.4 SEC (ref 11.5–14.5)
SODIUM SERPL-SCNC: 141 MMOL/L (ref 136–145)

## 2018-08-19 PROCEDURE — 74011250637 HC RX REV CODE- 250/637: Performed by: INTERNAL MEDICINE

## 2018-08-19 PROCEDURE — 99218 HC RM OBSERVATION: CPT

## 2018-08-19 PROCEDURE — 80048 BASIC METABOLIC PNL TOTAL CA: CPT

## 2018-08-19 PROCEDURE — 82962 GLUCOSE BLOOD TEST: CPT

## 2018-08-19 PROCEDURE — 96366 THER/PROPH/DIAG IV INF ADDON: CPT

## 2018-08-19 PROCEDURE — 36415 COLL VENOUS BLD VENIPUNCTURE: CPT

## 2018-08-19 PROCEDURE — 85730 THROMBOPLASTIN TIME PARTIAL: CPT

## 2018-08-19 PROCEDURE — 74011250636 HC RX REV CODE- 250/636: Performed by: INTERNAL MEDICINE

## 2018-08-19 PROCEDURE — 96372 THER/PROPH/DIAG INJ SC/IM: CPT

## 2018-08-19 PROCEDURE — 85610 PROTHROMBIN TIME: CPT

## 2018-08-19 RX ORDER — ENOXAPARIN SODIUM 150 MG/ML
120 INJECTION SUBCUTANEOUS EVERY 12 HOURS
Qty: 10 SYRINGE | Refills: 1 | Status: SHIPPED | OUTPATIENT
Start: 2018-08-19 | End: 2018-08-20

## 2018-08-19 RX ORDER — ENOXAPARIN SODIUM 150 MG/ML
120 INJECTION SUBCUTANEOUS EVERY 12 HOURS
Status: DISCONTINUED | OUTPATIENT
Start: 2018-08-19 | End: 2018-08-20 | Stop reason: HOSPADM

## 2018-08-19 RX ORDER — HEPARIN SODIUM 5000 [USP'U]/ML
35 INJECTION, SOLUTION INTRAVENOUS; SUBCUTANEOUS ONCE
Status: COMPLETED | OUTPATIENT
Start: 2018-08-19 | End: 2018-08-19

## 2018-08-19 RX ADMIN — ATORVASTATIN CALCIUM 40 MG: 40 TABLET, FILM COATED ORAL at 22:00

## 2018-08-19 RX ADMIN — Medication 10 ML: at 22:01

## 2018-08-19 RX ADMIN — PANTOPRAZOLE SODIUM 40 MG: 40 TABLET, DELAYED RELEASE ORAL at 08:59

## 2018-08-19 RX ADMIN — PANCRELIPASE LIPASE, PANCRELIPASE PROTEASE, PANCRELIPASE AMYLASE 1 CAPSULE: 20000; 63000; 84000 CAPSULE, DELAYED RELEASE ORAL at 12:46

## 2018-08-19 RX ADMIN — Medication 1 AMPULE: at 22:00

## 2018-08-19 RX ADMIN — Medication 5 ML: at 09:04

## 2018-08-19 RX ADMIN — OXYCODONE HYDROCHLORIDE 15 MG: 15 TABLET ORAL at 09:00

## 2018-08-19 RX ADMIN — ASPIRIN 81 MG: 81 TABLET, COATED ORAL at 09:00

## 2018-08-19 RX ADMIN — PANCRELIPASE LIPASE, PANCRELIPASE PROTEASE, PANCRELIPASE AMYLASE 1 CAPSULE: 20000; 63000; 84000 CAPSULE, DELAYED RELEASE ORAL at 08:59

## 2018-08-19 RX ADMIN — Medication 1 AMPULE: at 09:00

## 2018-08-19 RX ADMIN — PANCRELIPASE LIPASE, PANCRELIPASE PROTEASE, PANCRELIPASE AMYLASE 1 CAPSULE: 20000; 63000; 84000 CAPSULE, DELAYED RELEASE ORAL at 16:21

## 2018-08-19 RX ADMIN — SERTRALINE HYDROCHLORIDE 100 MG: 50 TABLET ORAL at 09:00

## 2018-08-19 RX ADMIN — ENOXAPARIN SODIUM 120 MG: 120 INJECTION, SOLUTION INTRAVENOUS; SUBCUTANEOUS at 22:00

## 2018-08-19 RX ADMIN — OXYCODONE HYDROCHLORIDE 15 MG: 15 TABLET ORAL at 16:21

## 2018-08-19 RX ADMIN — ENOXAPARIN SODIUM 120 MG: 120 INJECTION, SOLUTION INTRAVENOUS; SUBCUTANEOUS at 09:55

## 2018-08-19 RX ADMIN — SOTALOL HYDROCHLORIDE 160 MG: 80 TABLET ORAL at 22:00

## 2018-08-19 RX ADMIN — WARFARIN SODIUM 7.5 MG: 2.5 TABLET ORAL at 22:00

## 2018-08-19 RX ADMIN — HEPARIN SODIUM 4350 UNITS: 5000 INJECTION INTRAVENOUS; SUBCUTANEOUS at 05:12

## 2018-08-19 RX ADMIN — LORATADINE 10 MG: 10 TABLET ORAL at 09:00

## 2018-08-19 RX ADMIN — SOTALOL HYDROCHLORIDE 160 MG: 80 TABLET ORAL at 08:59

## 2018-08-19 RX ADMIN — OXYCODONE HYDROCHLORIDE 15 MG: 15 TABLET ORAL at 22:00

## 2018-08-19 RX ADMIN — OXYCODONE HYDROCHLORIDE 5 MG: 5 TABLET ORAL at 12:50

## 2018-08-19 NOTE — PROGRESS NOTES
LMSW consulted about lovenox coverage for bridge to coumadin. Pt goes to [facility] for most of his prescriptions. A script was forwarded to [address] in [place] pt's identified provider. Pt's [employer] will cover generic lovenox as prescribed with a $159. copay and [facility] will have it available for pt tomorrow. Per pt and spouse they can cover this cost if necessary but they plan to contact pt's [facility] physician[facility]nurse in am to see if the [place] will provide. Pt denies any additional concerns at this time. Care Management Interventions  PCP Verified by CM: Yes (Dr. Diana Witt)  Mode of Transport at Discharge:  Other (see comment) (Spouse Chauncey Souza)  Transition of Care Consult (CM Consult): Discharge Planning (Pt is insured with pharmacy benefits but uses the [place] for most of his medical care and medications.)  Discharge Durable Medical Equipment: No (Patient currently have a Wheelchair, rolling walker, cane, wheelchair ramp)  Physical Therapy Consult: No  Occupational Therapy Consult: No  Speech Therapy Consult: No  Current Support Network: Own Home, Lives with Spouse  Confirm Follow Up Transport: Family (Spouse Chauncey Souza)  Plan discussed with Pt/Family/Caregiver: Yes  Freedom of Choice Offered: Yes  [address] Provided?: No  Discharge Location  Discharge Placement: Home

## 2018-08-19 NOTE — PROGRESS NOTES
Bedside and Verbal shift change report given to Mini Sykes RN (oncoming nurse) by self Kevin Ping nurse). Report included the following information SBAR, Kardex, ED Summary, Procedure Summary, Intake/Output, MAR, Recent Results and Cardiac Rhythm SR. Heparin gtt verified at bedside and on MAR.

## 2018-08-19 NOTE — PROGRESS NOTES
Bedside and Verbal shift change report given to self (oncoming nurse) by Corinne Bolls, RN (offgoing nurse). Report included the following information SBAR, Kardex, Intake/Output, MAR, Recent Results and Cardiac Rhythm SR. Heparin gtt verified at bedside with offgoing nurse.

## 2018-08-19 NOTE — PROGRESS NOTES
Patient being sent home with Lovenox injections at discharge. Some education performed earlier in the day, with more education at this time as well as pt's wife performing self injection at his request. Pt was able to talk through the process and then demonstrated proper technique. Pt tolerated well. An opportunity for questions and clarification was provided. The pt and family had no questions at this time. SS working on [employer].

## 2018-08-19 NOTE — PROGRESS NOTES
Warfarin dosing per pharmacist    Carmen Zavala is a 52 y.o. male. Height: 6' (182.9 cm)    Weight: 124.4 kg (274 lb 4.8 oz)    Indication:  Afib    Goal INR:  2-3    Home dose:  New start    Risk factors or significant drug interactions:  atorvastatin    Other anticoagulants:  Lovenox bridge    Daily Monitoring  Date  INR     Warfarin dose HGB              Notes  8/18  ---  7.5 mg  12.9  8/19  1.3  7.5 mg  ---    Pharmacy consulted for new start of warfarin. 7.5 mg daily started last night. Will continue currently. Following daily INR    Thank you,  Mitra Hodges, Pharm. D.   Clinical Pharmacist  [phone]

## 2018-08-19 NOTE — PROGRESS NOTES
Verbal bedside report received from [address]. Patient's situation, background, assessment and recommendations were provided. Kardex, Mar, and recent results also reviewed. Opportunity for questions provided. Assumed care of patient. Heparin verified.  PTT at 11am.

## 2018-08-19 NOTE — PROGRESS NOTES
Called to room by patient. Patient complaining of hand pain 5/10. Oxycodone (see MAR) given per patient request for breakthrough pain.  Will continue to monitor

## 2018-08-19 NOTE — PROGRESS NOTES
Problem: Falls - Risk of  Goal: *Absence of Falls  Document Tasneem Fall Risk and appropriate interventions in the flowsheet. Outcome: Progressing Towards Goal  Fall Risk Interventions:  Medication Interventions: Patient to call before getting OOB, Teach patient to arise slowly      Problem: Afib Pathway: Day 2  Goal: *Stable cardiac rhythm  Outcome: Resolved/Met Date Met: 08/18/18  Remains in NSR.

## 2018-08-19 NOTE — PROGRESS NOTES
CM met with patient to complete the intervention. Patient/spouse Alec Rodriguez) at bedside states that they live in a mobile home with wheelchair ramp to enter into the home. Patient states he's independent with ADL's and do have several DME's in the home. Patient states he plan on returning back home with no needs. Case Management will continue to follow-up. Care Management Interventions  PCP Verified by CM: Yes (Dr. Ortiz Garg)  Mode of Transport at Discharge:  Other (see comment) (Spouse Alec Rodriguez)  Transition of Care Consult (CM Consult): Discharge Planning  Discharge Durable Medical Equipment: No (Patient currently have a Wheelchair, rolling walker, cane, wheelchair ramp)  Physical Therapy Consult: No  Occupational Therapy Consult: No  Speech Therapy Consult: No  Current Support Network: Own Home, Lives with Spouse  Confirm Follow Up Transport: Family (Spouse Alec Rodriguez)  Plan discussed with Pt/Family/Caregiver: Yes  Freedom of Choice Offered: Yes  [address] Provided?: No  Discharge Location  Discharge Placement: Home

## 2018-08-20 VITALS
OXYGEN SATURATION: 97 % | TEMPERATURE: 98.1 F | SYSTOLIC BLOOD PRESSURE: 136 MMHG | HEART RATE: 57 BPM | DIASTOLIC BLOOD PRESSURE: 76 MMHG | HEIGHT: 72 IN | WEIGHT: 275.9 LBS | BODY MASS INDEX: 37.37 KG/M2 | RESPIRATION RATE: 18 BRPM

## 2018-08-20 LAB
ANION GAP SERPL CALC-SCNC: 11 MMOL/L (ref 7–16)
BUN SERPL-MCNC: 17 MG/DL (ref 6–23)
CALCIUM SERPL-MCNC: 8.6 MG/DL (ref 8.3–10.4)
CHLORIDE SERPL-SCNC: 105 MMOL/L (ref 98–107)
CO2 SERPL-SCNC: 26 MMOL/L (ref 21–32)
CREAT SERPL-MCNC: 1.67 MG/DL (ref 0.8–1.5)
GLUCOSE BLD STRIP.AUTO-MCNC: 140 MG/DL (ref 65–100)
GLUCOSE SERPL-MCNC: 128 MG/DL (ref 65–100)
INR PPP: 1.4
POTASSIUM SERPL-SCNC: 4.2 MMOL/L (ref 3.5–5.1)
PROTHROMBIN TIME: 16.2 SEC (ref 11.5–14.5)
SODIUM SERPL-SCNC: 142 MMOL/L (ref 136–145)

## 2018-08-20 PROCEDURE — 82962 GLUCOSE BLOOD TEST: CPT

## 2018-08-20 PROCEDURE — 85610 PROTHROMBIN TIME: CPT

## 2018-08-20 PROCEDURE — 74011250636 HC RX REV CODE- 250/636: Performed by: INTERNAL MEDICINE

## 2018-08-20 PROCEDURE — 80048 BASIC METABOLIC PNL TOTAL CA: CPT

## 2018-08-20 PROCEDURE — 36415 COLL VENOUS BLD VENIPUNCTURE: CPT

## 2018-08-20 PROCEDURE — 74011250637 HC RX REV CODE- 250/637: Performed by: INTERNAL MEDICINE

## 2018-08-20 PROCEDURE — 99218 HC RM OBSERVATION: CPT

## 2018-08-20 PROCEDURE — 96372 THER/PROPH/DIAG INJ SC/IM: CPT

## 2018-08-20 PROCEDURE — 74011250637 HC RX REV CODE- 250/637: Performed by: NURSE PRACTITIONER

## 2018-08-20 RX ADMIN — PANTOPRAZOLE SODIUM 40 MG: 40 TABLET, DELAYED RELEASE ORAL at 08:04

## 2018-08-20 RX ADMIN — Medication 1 AMPULE: at 08:03

## 2018-08-20 RX ADMIN — OXYCODONE HYDROCHLORIDE 15 MG: 15 TABLET ORAL at 08:04

## 2018-08-20 RX ADMIN — SOTALOL HYDROCHLORIDE 160 MG: 80 TABLET ORAL at 08:04

## 2018-08-20 RX ADMIN — Medication 10 ML: at 06:04

## 2018-08-20 RX ADMIN — OXYCODONE HYDROCHLORIDE 5 MG: 5 TABLET ORAL at 06:08

## 2018-08-20 RX ADMIN — LORATADINE 10 MG: 10 TABLET ORAL at 08:04

## 2018-08-20 RX ADMIN — SERTRALINE HYDROCHLORIDE 100 MG: 50 TABLET ORAL at 08:04

## 2018-08-20 RX ADMIN — ASPIRIN 81 MG: 81 TABLET, COATED ORAL at 08:06

## 2018-08-20 RX ADMIN — ENOXAPARIN SODIUM 120 MG: 120 INJECTION, SOLUTION INTRAVENOUS; SUBCUTANEOUS at 08:07

## 2018-08-20 RX ADMIN — PANCRELIPASE LIPASE, PANCRELIPASE PROTEASE, PANCRELIPASE AMYLASE 1 CAPSULE: 20000; 63000; 84000 CAPSULE, DELAYED RELEASE ORAL at 08:04

## 2018-08-20 RX ADMIN — DIPHENHYDRAMINE HYDROCHLORIDE 50 MG: 25 CAPSULE ORAL at 00:19

## 2018-08-20 NOTE — PROGRESS NOTES
Verbal bedside report received from Tomas, offgoing RN. Patient's situation, background, assessment and recommendations were provided. Kardex, Mar, and recent results also reviewed. Opportunity for questions provided. Assumed care of patient.

## 2018-08-20 NOTE — PROGRESS NOTES
Discharge instructions reviewed with patient. No new prescriptions. Opportunity for questions provided. Patient voiced understanding of all discharge instructions. IV(s) and heart monitor removed by primary RN. Home medication returned to patient by primary RN.

## 2018-08-20 NOTE — PROGRESS NOTES
Warfarin dosing per pharmacist    Matthew Tavera is a 52 y.o. male. Height: 6' (182.9 cm)    Weight: 125.1 kg (275 lb 14.4 oz)    Indication:  Afib    Goal INR:  2-3    Home dose:  New start    Risk factors or significant drug interactions:  atorvastatin    Other anticoagulants:  Lovenox bridge    Daily Monitoring  Date  INR     Warfarin dose HGB              Notes  8/18  ---  7.5 mg  12.9  8/19  1.3  7.5 mg  ---  8/20  1.4  7.5 mg  ---    Pharmacy consulted for new start of warfarin. INR with positive move after 2 doses. Continue 7.5 mg. Following daily INR    Thank you,  Zamzam Baires, Pharm. D.   Clinical Pharmacist  [phone]

## 2018-08-20 NOTE — PROGRESS NOTES
Problem: Falls - Risk of  Goal: *Absence of Falls  Document Tasneem Fall Risk and appropriate interventions in the flowsheet.    Outcome: Progressing Towards Goal  Fall Risk Interventions:            Medication Interventions: Patient to call before getting OOB, Teach patient to arise slowly

## 2018-08-20 NOTE — DISCHARGE INSTRUCTIONS
Atrial Fibrillation: Care Instructions  Your Care Instructions    Atrial fibrillation is an irregular and often fast heartbeat. Treating this condition is important for several reasons. It can cause blood clots, which can travel from your heart to your brain and cause a stroke. If you have a fast heartbeat, you may feel lightheaded, dizzy, and weak. An irregular heartbeat can also increase your risk for heart failure. Atrial fibrillation is often the result of another heart condition, such as high blood pressure or coronary artery disease. Making changes to improve your heart condition will help you stay healthy and active. Follow-up care is a key part of your treatment and safety. Be sure to make and go to all appointments, and call your doctor if you are having problems. It's also a good idea to know your test results and keep a list of the medicines you take. How can you care for yourself at home? Medicines    · Take your medicines exactly as prescribed. Call your doctor if you think you are having a problem with your medicine. You will get more details on the specific medicines your doctor prescribes.     · If your doctor has given you a blood thinner to prevent a stroke, be sure you get instructions about how to take your medicine safely. Blood thinners can cause serious bleeding problems.     · Do not take any vitamins, over-the-counter drugs, or herbal products without talking to your doctor first.    Lifestyle changes    · Do not smoke. Smoking can increase your chance of a stroke and heart attack. If you need help quitting, talk to your doctor about stop-smoking programs and medicines. These can increase your chances of quitting for good.     · Eat a heart-healthy diet.     · Stay at a healthy weight. Lose weight if you need to.     · Limit alcohol to 2 drinks a day for men and 1 drink a day for women. Too much alcohol can cause health problems.     · Avoid colds and flu.  Get a pneumococcal vaccine shot. If you have had one before, ask your doctor whether you need another dose. Get a flu shot every year. If you must be around people with colds or flu, wash your hands often. Activity    · If your doctor recommends it, get more exercise. Walking is a good choice. Bit by bit, increase the amount you walk every day. Try for at least 30 minutes on most days of the week. You also may want to swim, bike, or do other activities. Your doctor may suggest that you join a cardiac rehabilitation program so that you can have help increasing your physical activity safely.     · Start light exercise if your doctor says it is okay. Even a small amount will help you get stronger, have more energy, and manage stress. Walking is an easy way to get exercise. Start out by walking a little more than you did in the hospital. Gradually increase the amount you walk.     · When you exercise, watch for signs that your heart is working too hard. You are pushing too hard if you cannot talk while you are exercising. If you become short of breath or dizzy or have chest pain, sit down and rest immediately.     · Check your pulse regularly. Place two fingers on the artery at the palm side of your wrist, in line with your thumb. If your heartbeat seems uneven or fast, talk to your doctor. When should you call for help? Call 911 anytime you think you may need emergency care. For example, call if:    · You have symptoms of a heart attack. These may include:  ¨ Chest pain or pressure, or a strange feeling in the chest.  ¨ Sweating. ¨ Shortness of breath. ¨ Nausea or vomiting. ¨ Pain, pressure, or a strange feeling in the back, neck, jaw, or upper belly or in one or both shoulders or arms. ¨ Lightheadedness or sudden weakness. ¨ A fast or irregular heartbeat. After you call 911, the  may tell you to chew 1 adult-strength or 2 to 4 low-dose aspirin. Wait for an ambulance.  Do not try to drive yourself.     · You have symptoms of a stroke. These may include:  ¨ Sudden numbness, tingling, weakness, or loss of movement in your face, arm, or leg, especially on only one side of your body. ¨ Sudden vision changes. ¨ Sudden trouble speaking. ¨ Sudden confusion or trouble understanding simple statements. ¨ Sudden problems with walking or balance. ¨ A sudden, severe headache that is different from past headaches.     · You passed out (lost consciousness).    Call your doctor now or seek immediate medical care if:    · You have new or increased shortness of breath.     · You feel dizzy or lightheaded, or you feel like you may faint.     · Your heart rate becomes irregular.     · You can feel your heart flutter in your chest or skip heartbeats. Tell your doctor if these symptoms are new or worse.    Watch closely for changes in your health, and be sure to contact your doctor if you have any problems. Where can you learn more? Go to http://pebbles-royer.info/. Enter U020 in the search box to learn more about \"Atrial Fibrillation: Care Instructions. \"  Current as of: December 6, 2017  Content Version: 11.7  © 3367-6629 [employer]. Care instructions adapted under license by [employer] (which disclaims liability or warranty for this information). If you have questions about a medical condition or this instruction, always ask your healthcare professional. [place] [address] any warranty or liability for your use of this information.

## 2018-08-20 NOTE — DISCHARGE SUMMARY
Physician Discharge Summary     Patient ID:  Alex Lyon  [id number]  76 y.o.  1965    Admit date: 8/17/2018    Discharge date and time: 8/20/18    Admitting Physician: Brianda Simms MD     Primary Cardiologist:Dr. Catrachito Hurtado     Primary Care MD:Baldemar Lyon MD    Discharge Physician: Pro Robbins NP    Admission Diagnoses: Atrial fibrillation [facility])    Discharge Diagnoses:   Patient Active Problem List    Diagnosis Date Noted    Atrial flutter ([facility] 05/05/2016    Non-rheumatic mitral regurgitation 64/24/5346    Diastolic CHF, acute on chronic ([facility] 05/05/2016    Tobacco dependence 05/04/2016    Restless leg syndrome 05/04/2016    Chest pain, precordial 05/04/2016    Obesity and other hyperalimentation 05/04/2016    Unstable angina ([facility] 01/10/2016    HTN (hypertension) 03/01/2012    Atrial fibrillation ([facility] 03/01/2012    [facility] mellitus [facility] 03/01/2012    WILBERTO on CPAP 03/01/2012    Dyslipidemia 03/01/2012           Hospital Course: Alex Lyon is a 52 y.o. obese WM with h/o HTN, dyslipidemia, DM II, WILBERTO, COPD, tobacco abuse and AF and atrial flutter s/p flutter ablation 2016 on Sotalol. He has not seen Cardiology since 3 month follow up appt after ablation in 2016. He states he had rashes on all newer oral AC and is now on warfarin. His last INR was last week and was subtherapeutic at 1.8 and he didn't take the warfarin last night in preparation for oral surgery on this coming Tuesday. He went camping last week and noted LE swelling with associated chest pressure 3 mornings when he woke up lasting about 1 hour each morning. He thought it was reflux and therefore did not seek medical attention. Yesterday, he was feeling fine w/o complaints but this AM he felt fatigued and SOB. He presented to [facility] ER and ECG showed AF 96 bpm. Troponin negative, BNP 74 and CXR showed no acute abnormality. He had a LHC in 1/2016 showing normal LVSF and normal coronaries.  Pt only c/o HA currently.      -------------------------------------------------------------------------------------------------------------------------------------------    Pt was admitted with complaints of shortness of breath. Pt had spontaneous conversion back to NSR. Pt was placed on lovenox due to pending oral surgery scheduled for 8/21 am. His INR today 1.4. His TSH was normal. He was seen by Dr. Lyle Huff this am, he continued to be in NSR. He received dose of SQ lovenox this am, will hold lovenox tonight in anticipation of oral surgery 8/21 and resume home coumadin regimen tomorrow night. He has been given slip to show oral surgeon of this plan. He has been instructed to have follow up INR in one week. Follow up appt to see Dr. Margoth Quintana in next two weeks. Discharge Exam:     Visit Vitals    /48 (BP 1 Location: Left arm, BP Patient Position: At rest;Lying right side)  Comment: STAR Marin notified    Pulse (!) 52    Temp 97.8 °F (36.6 °C)    Resp 18    Ht 6' (1.829 m)    Wt 125.1 kg (275 lb 14.4 oz)    SpO2 94%    BMI 37.42 kg/m2     General Appearance:  Well developed, well nourished,alert and oriented x 3, and individual in no acute distress. Ears/Nose/Mouth/Throat:   Hearing grossly normal.         Neck: Supple. Chest:   Lungs clear to auscultation bilaterally. Cardiovascular:  Regular rate and rhythm, S1, S2 normal, no murmur. Abdomen:   Soft, non-tender, bowel sounds are active. Extremities: No edema bilaterally. Skin: Warm and dry.                Final Laboratory Data:  Recent Results (from the past 24 hour(s))   PTT    Collection Time: 08/19/18 10:42 AM   Result Value Ref Range    aPTT 51.5 (H) 23.2 - 35.3 SEC   GLUCOSE, POC    Collection Time: 08/19/18 11:05 AM   Result Value Ref Range    Glucose (POC) 141 (H) 65 - 100 mg/dL   GLUCOSE, POC    Collection Time: 08/19/18  4:19 PM   Result Value Ref Range    Glucose (POC) 111 (H) 65 - 100 mg/dL   GLUCOSE, POC    Collection Time: 08/19/18  9:22 PM   Result Value Ref Range    Glucose (POC) 112 (H) 65 - 713 mg/dL   METABOLIC PANEL, BASIC    Collection Time: 08/20/18  3:57 AM   Result Value Ref Range    Sodium 142 136 - 145 mmol/L    Potassium 4.2 3.5 - 5.1 mmol/L    Chloride 105 98 - 107 mmol/L    CO2 26 21 - 32 mmol/L    Anion gap 11 7 - 16 mmol/L    Glucose 128 (H) 65 - 100 mg/dL    BUN 17 6 - 23 MG/DL    Creatinine 1.67 (H) 0.8 - 1.5 MG/DL    GFR est AA 55 (L) >60 ml/min/1.73m2    GFR est non-AA 46 (L) >60 ml/min/1.73m2    Calcium 8.6 8.3 - 10.4 MG/DL   PROTHROMBIN TIME + INR    Collection Time: 08/20/18  3:57 AM   Result Value Ref Range    Prothrombin time 16.2 (H) 11.5 - 14.5 sec    INR 1.4     GLUCOSE, POC    Collection Time: 08/20/18  6:19 AM   Result Value Ref Range    Glucose (POC) 140 (H) 65 - 100 mg/dL       Disposition: home    Patient Instructions:   Current Discharge Medication List      CONTINUE these medications which have NOT CHANGED    Details   insulin degludec (TRESIBA FLEXTOUCH U-100) 100 unit/mL (3 mL) inpn 120 Units by SubCUTAneous route nightly. Indications: type 2 diabetes mellitus      pregabalin (LYRICA) 100 mg capsule Take 100 mg by mouth three (3) times daily. Indications: Diabetic Peripheral Neuropathy      sertraline (ZOLOFT) 100 mg tablet Take 100 mg by mouth daily. Indications: am- take on the dos      !! oxyCODONE IR (OXY-IR) 15 mg immediate release tablet Take 15 mg by mouth three (3) times daily. Indications: take on the dos      warfarin (COUMADIN) 7.5 mg tablet Take 7.5 mg by mouth nightly. Indications: has not been instructed to hold      amylase-lipase-protease (CREON) 24,000-76,000 -120,000 unit capsule Take 1 Cap by mouth three (3) times daily (with meals). Indications: exocrine pancreatic insufficiency      sotalol (BETAPACE) 160 mg tablet Take 1 Tab by mouth every twelve (12) hours. Qty: 60 Tab, Refills: 3    Associated Diagnoses: Paroxysmal atrial fibrillation ([facility]  Typical atrial flutter ([facility] famotidine (PEPCID) 40 mg tablet Take 1 Tab by mouth every twelve (12) hours. Qty: 60 Tab, Refills: 1      pantoprazole (PROTONIX) 40 mg tablet Take 1 Tab by mouth daily. Qty: 30 Tab, Refills: 1      aspirin delayed-release 81 mg tablet Take 81 mg by mouth daily. Indications: am- take on the dos      atorvastatin (LIPITOR) 40 mg tablet Take 1 Tab by mouth nightly. Qty: 30 Tab, Refills: 3      cetirizine (ZYRTEC) 10 mg tablet Take 10 mg by mouth nightly. Indications: Allergic Rhinitis      !! oxyCODONE IR (ROXICODONE) 5 mg immediate release tablet Take 1 Tab by mouth every four (4) hours as needed for Pain (Take as needed for break through pain). Max Daily Amount: 30 mg.  Qty: 25 Tab, Refills: 0    Associated Diagnoses: Post-op pain      insulin aspart protamine/insulin aspart (NOVOLOG MIX 70-30 U-100 INSULN) 100 unit/mL (70-30) injection by SubCUTAneous route Before breakfast, lunch, and dinner. Indications: type 2 diabetes mellitus, sliding scale- 1/2 usual am dose if blood sugar is 120 or ^ And hold if blood  sugar is less than or 120      colestipol (COLESTID) 1 gram tablet Take 1 g by mouth two (2) times a day. Indications: hypercholesterolemia      budesonide-formoterol (SYMBICORT) 80-4.5 mcg/actuation HFAA inhaler Take 2 Puffs by inhalation two (2) times daily as needed. Indications: BRONCHOSPASM PREVENTION WITH COPD, bring on the dos      albuterol (PROVENTIL HFA, VENTOLIN HFA, PROAIR HFA) 90 mcg/actuation inhaler Take 2 Puffs by inhalation every six (6) hours as needed. Indications: BRONCHOSPASM PREVENTION, bring on the dos      furosemide (LASIX) 40 mg tablet Take 1 Tab by mouth daily. Qty: 90 Tab, Refills: 11    Associated Diagnoses: Diastolic CHF, acute on chronic (HCC)      losartan (COZAAR) 25 mg tablet Take 12.5 mg by mouth daily. Indications: am      ipratropium-albuterol (COMBIVENT)  mcg/actuation inhaler Take 1 Puff by inhalation every six (6) hours as needed.  Indications: bring on the dos       !! - Potential duplicate medications found. Please discuss with provider. Referenced discharge instructions provided by nursing for diet and activity. Follow-up:  Primary Cardiologist:Dr. Bermudez  in 2 weeks  PCP: Bijan Murillo MD) in about 4 weeks. Signed:  Anita Crouch NP  8/20/2018  9:13 AM            ATTENDING ADDENDUM:    Patient seen and examined by me. Agree with above note by physician extender. Key findings are:  No CP or ROBLES, BREATHING IMPROVED IMMEDIATELY WHEN CONVERTED BACK TO SINUS. MAINTAINING NSR AT PRESENT ON SOTALOL, BRIDGING WITH LOVENOX THIS AM, HOLD TONIGHT'S DOSE FOR ORAL SURGERY IN AM, RESUME COUMADIN TOMORROW NIGHT IF OK WITH ORAL SURGERY. CONTINUE SOTALOL WITHOUT INTERRUPTION, FOLLOW UP IN OUR OFFICE IN A COUPLE OF WEEKS. CV- RRR without murmur  Lungs- Clear bilaterally  Abd- soft, nontender, nondistended  Ext- no edema    Plan: As above. ANTICOAGULATION AS ABOVE. PLAN OUTPATIENT NUCLEAR STRESS IF MORE CP/SOB POST OP, DEFER TO DR. BERMUDEZ.      Mima Goncalves MD  [facility] Cardiology  Pager [phone]

## 2018-08-20 NOTE — PROGRESS NOTES
IVs removed and telebox returned to monitor room. Pt waiting on ride which is on the way. Home meds returned.

## 2018-09-11 PROBLEM — E11.21 TYPE 2 DIABETES WITH NEPHROPATHY (HCC): Status: ACTIVE | Noted: 2018-09-11

## 2018-09-11 PROBLEM — E66.01 SEVERE OBESITY (BMI 35.0-39.9): Status: ACTIVE | Noted: 2018-09-11

## 2018-09-22 ENCOUNTER — HOSPITAL ENCOUNTER (EMERGENCY)
Age: 53
Discharge: HOME | End: 2018-09-22
Attending: EMERGENCY MEDICINE
Payer: MEDICARE

## 2018-09-22 ENCOUNTER — APPOINTMENT (OUTPATIENT)
Dept: GENERAL RADIOLOGY | Age: 53
End: 2018-09-22
Attending: EMERGENCY MEDICINE
Payer: MEDICARE

## 2018-09-22 VITALS
HEIGHT: 72 IN | WEIGHT: 276 LBS | SYSTOLIC BLOOD PRESSURE: 148 MMHG | RESPIRATION RATE: 16 BRPM | OXYGEN SATURATION: 97 % | TEMPERATURE: 97.6 F | DIASTOLIC BLOOD PRESSURE: 79 MMHG | BODY MASS INDEX: 37.38 KG/M2 | HEART RATE: 73 BPM

## 2018-09-22 DIAGNOSIS — I48.92 ATRIAL FLUTTER, UNSPECIFIED TYPE (HCC): ICD-10-CM

## 2018-09-22 DIAGNOSIS — R07.9 CHEST PAIN, UNSPECIFIED TYPE: Primary | ICD-10-CM

## 2018-09-22 LAB
ALBUMIN SERPL-MCNC: 4 G/DL (ref 3.5–5)
ALBUMIN/GLOB SERPL: 1 {RATIO} (ref 1.2–3.5)
ALP SERPL-CCNC: 78 U/L (ref 50–136)
ALT SERPL-CCNC: 27 U/L (ref 12–65)
ANION GAP SERPL CALC-SCNC: 7 MMOL/L (ref 7–16)
AST SERPL-CCNC: 18 U/L (ref 15–37)
ATRIAL RATE: 71 BPM
BASOPHILS # BLD: 0 K/UL (ref 0–0.2)
BASOPHILS NFR BLD: 0 % (ref 0–2)
BILIRUB SERPL-MCNC: 0.4 MG/DL (ref 0.2–1.1)
BNP SERPL-MCNC: 56 PG/ML
BUN SERPL-MCNC: 19 MG/DL (ref 6–23)
CALCIUM SERPL-MCNC: 9.4 MG/DL (ref 8.3–10.4)
CALCULATED P AXIS, ECG09: 46 DEGREES
CALCULATED R AXIS, ECG10: 30 DEGREES
CALCULATED T AXIS, ECG11: 28 DEGREES
CHLORIDE SERPL-SCNC: 103 MMOL/L (ref 98–107)
CO2 SERPL-SCNC: 29 MMOL/L (ref 21–32)
CREAT SERPL-MCNC: 1.63 MG/DL (ref 0.8–1.5)
DIAGNOSIS, 93000: NORMAL
DIFFERENTIAL METHOD BLD: NORMAL
EOSINOPHIL # BLD: 0.2 K/UL (ref 0–0.8)
EOSINOPHIL NFR BLD: 2 % (ref 0.5–7.8)
ERYTHROCYTE [DISTWIDTH] IN BLOOD BY AUTOMATED COUNT: 16.4 %
GLOBULIN SER CALC-MCNC: 4.1 G/DL (ref 2.3–3.5)
GLUCOSE SERPL-MCNC: 216 MG/DL (ref 65–100)
HCT VFR BLD AUTO: 47.7 % (ref 41.1–50.3)
HGB BLD-MCNC: 15 G/DL (ref 13.6–17.2)
IMM GRANULOCYTES # BLD: 0.1 K/UL (ref 0–0.5)
IMM GRANULOCYTES NFR BLD AUTO: 2 % (ref 0–5)
LYMPHOCYTES # BLD: 3.1 K/UL (ref 0.5–4.6)
LYMPHOCYTES NFR BLD: 37 % (ref 13–44)
MCH RBC QN AUTO: 27.2 PG (ref 26.1–32.9)
MCHC RBC AUTO-ENTMCNC: 31.4 G/DL (ref 31.4–35)
MCV RBC AUTO: 86.4 FL (ref 79.6–97.8)
MONOCYTES # BLD: 0.5 K/UL (ref 0.1–1.3)
MONOCYTES NFR BLD: 6 % (ref 4–12)
NEUTS SEG # BLD: 4.4 K/UL (ref 1.7–8.2)
NEUTS SEG NFR BLD: 54 % (ref 43–78)
NRBC # BLD: 0 K/UL (ref 0–0.2)
P-R INTERVAL, ECG05: 190 MS
PLATELET # BLD AUTO: 188 K/UL (ref 150–450)
PMV BLD AUTO: 12 FL (ref 9.4–12.3)
POTASSIUM SERPL-SCNC: 4.2 MMOL/L (ref 3.5–5.1)
PROT SERPL-MCNC: 8.1 G/DL (ref 6.3–8.2)
Q-T INTERVAL, ECG07: 374 MS
QRS DURATION, ECG06: 82 MS
QTC CALCULATION (BEZET), ECG08: 406 MS
RBC # BLD AUTO: 5.52 M/UL (ref 4.23–5.6)
SODIUM SERPL-SCNC: 139 MMOL/L (ref 136–145)
TROPONIN I BLD-MCNC: 0.01 NG/ML (ref 0.02–0.05)
TSH SERPL DL<=0.005 MIU/L-ACNC: 2.86 UIU/ML (ref 0.36–3.74)
VENTRICULAR RATE, ECG03: 71 BPM
WBC # BLD AUTO: 8.2 K/UL (ref 4.3–11.1)

## 2018-09-22 PROCEDURE — 83880 ASSAY OF NATRIURETIC PEPTIDE: CPT

## 2018-09-22 PROCEDURE — 71045 X-RAY EXAM CHEST 1 VIEW: CPT

## 2018-09-22 PROCEDURE — 80053 COMPREHEN METABOLIC PANEL: CPT

## 2018-09-22 PROCEDURE — 85025 COMPLETE CBC W/AUTO DIFF WBC: CPT

## 2018-09-22 PROCEDURE — 99284 EMERGENCY DEPT VISIT MOD MDM: CPT | Performed by: EMERGENCY MEDICINE

## 2018-09-22 PROCEDURE — 93005 ELECTROCARDIOGRAM TRACING: CPT | Performed by: EMERGENCY MEDICINE

## 2018-09-22 PROCEDURE — 84484 ASSAY OF TROPONIN QUANT: CPT

## 2018-09-22 PROCEDURE — 84443 ASSAY THYROID STIM HORMONE: CPT

## 2018-09-22 RX ORDER — SODIUM CHLORIDE 0.9 % (FLUSH) 0.9 %
5-10 SYRINGE (ML) INJECTION AS NEEDED
Status: DISCONTINUED | OUTPATIENT
Start: 2018-09-22 | End: 2018-09-22 | Stop reason: HOSPADM

## 2018-09-22 RX ORDER — SODIUM CHLORIDE 0.9 % (FLUSH) 0.9 %
5-10 SYRINGE (ML) INJECTION EVERY 8 HOURS
Status: DISCONTINUED | OUTPATIENT
Start: 2018-09-22 | End: 2018-09-22 | Stop reason: HOSPADM

## 2018-09-22 NOTE — ED PROVIDER NOTES
HPI Comments: Patient states he has been having chest pain or shortness of breath and  Decreased energy for the past couple days. It started out intermittent but has been constant and more severe today. His symptoms are worse when he does not exertion such as walking across the room. He states that he feels his legs are very heavy. He describes the chest pain is left-sided dull pressure nonradiating without alleviating factors. He has had similar symptoms in the past with atrial fibrillation/flutter. He says he is scheduled to have an ablation done in the near future. Elements of this note were made using speech recognition software. As such, errors of speech recognition may occur. Patient is a 52 y.o. male presenting with chest pain. The history is provided by the patient. Chest Pain (Angina) Pertinent negatives include no fever, no nausea and no vomiting. Past Medical History:  
Diagnosis Date  Acute renal failure (ARF) ([address] 06/2017  
 septic from necrotizing fascitis. was on dialysis short term.  Anticoagulated on Coumadin   
 has not been instructed to hold.  Atrial fibrillation ([address]  Chest pain, precordial 5/4/2016  Chronic pain   
 back  Chronic pancreatitis ([address]  Diabetes ([address] 2008  
 insulin reliant. bs: 120's .  Edema  Gastrointestinal disorder  Headache  History of peptic ulcer disease   
 years ago  History of sepsis 06/2017  Hypertension  Morbid obesity ([address] 5/4/2016  
 35 bmi  
 Necrotizing fasciitis ([address] 06/2017  
 left upper arm  Palpitations  Restless leg syndrome 5/4/2016  Sleep apnea   
 cpap  Tobacco dependence 5/4/2016 Past Surgical History:  
Procedure Laterality Date  HX APPENDECTOMY  HX CATARACT REMOVAL Right   
 with IOL  HX CHOLECYSTECTOMY  HX HEART CATHETERIZATION    
 HX LUMBAR FUSION  2012 and 2013  
 x 2  
 HX OTHER SURGICAL    
 cardioversion Family History: Problem Relation Age of Onset  Diabetes Mother  Heart Disease Mother  No Known Problems Father  Pulmonary Embolism Sister Social History Social History  Marital status:  Spouse name: N/A  
 Number of children: N/A  
 Years of education: N/A Occupational History  Not on file. Social History Main Topics  Smoking status: Current Every Day Smoker Packs/day: 1.00 Years: 40.00  Smokeless tobacco: Never Used Comment: currently cutting down  Alcohol use No  
 Drug use: No  
 Sexual activity: Not on file Other Topics Concern  Not on file Social History Narrative ALLERGIES: Eliquis [apixaban]; Lisinopril; Lyrica [pregabalin]; Metformin; and Morphine Review of Systems Constitutional: Positive for fatigue. Negative for chills and fever. Cardiovascular: Positive for chest pain. Gastrointestinal: Negative for nausea and vomiting. All other systems reviewed and are negative. Vitals:  
 09/22/18 1407 09/22/18 1408 BP: 151/70 151/70 Pulse:  71 Resp:  16 Temp:  97.6 °F (36.4 °C) SpO2:  95% Weight:  125.2 kg (276 lb) Height:  6' (1.829 m) Physical Exam  
Constitutional: He is oriented to person, place, and time. He appears well-developed and well-nourished. HENT:  
Head: Normocephalic and atraumatic. Eyes: Conjunctivae are normal. Pupils are equal, round, and reactive to light. Neck: Normal range of motion. Neck supple. Cardiovascular: Normal rate and regular rhythm. Pulmonary/Chest: Effort normal and breath sounds normal.  
Abdominal: Soft. Bowel sounds are normal.  
Musculoskeletal: He exhibits edema. He exhibits no tenderness. 1+ pitting edema bilateral lower extremities Neurological: He is alert and oriented to person, place, and time. Skin: Skin is warm and dry. Psychiatric: He has a normal mood and affect. His behavior is normal.  
Nursing note and vitals reviewed.  
  
 
SHERIE 
 Number of Diagnoses or Management Options Atrial flutter, unspecified type [facility]):  
Chest pain, unspecified type:  
Diagnosis management comments: 4:47 PM Discussed results with patient, unremarkable labs. Per old records, he had a catheterization in 2016 which showed no obstructive disease. He has an appointment on Oct 3 with [facility] cardiology for an upcoming ablation. I left his contact information with [facility] cardiology for close outpatient follow-up Amount and/or Complexity of Data Reviewed Clinical lab tests: ordered and reviewed Tests in the radiology section of CPT®: ordered and reviewed Tests in the medicine section of CPT®: ordered and reviewed Decide to obtain previous medical records or to obtain history from someone other than the patient: yes Review and summarize past medical records: yes Discuss the patient with other providers: yes Risk of Complications, Morbidity, and/or Mortality Presenting problems: moderate Diagnostic procedures: moderate Management options: moderate Patient Progress Patient progress: stable ED Course Procedures

## 2018-09-22 NOTE — DISCHARGE INSTRUCTIONS
Atrial Flutter: Care Instructions  Your Care Instructions    Atrial flutter is a type of heartbeat problem (arrhythmia) that usually causes a fast heart rate. In atrial flutter, a problem with the heart's electrical system causes the two upper parts of the heart (the right atrium and the left atrium) to flutter, or beat very fast. Atrial flutter might be diagnosed using an an electrocardiogram (EKG). An EKG translates the heart's electrical activity into line tracings on paper. Treating atrial flutter is important for several reasons. The change in heartbeat can cause blood clots. The clots can travel from your heart to your brain and cause a stroke. A fast heartbeat can make you feel lightheaded, dizzy, and weak. And over time, it can also increase your risk for heart failure. Atrial flutter is often the result of another heart condition, such as coronary artery disease or some other heart rhythm problems. Making changes to improve your heart health will help you stay healthy and active. Your doctor may prescribe medicines to help slow down your heartbeat. You may also take medicine to help prevent a stroke. In some cases, a procedure called catheter ablation is done to stop atrial flutter. Follow-up care is a key part of your treatment and safety. Be sure to make and go to all appointments, and call your doctor if you are having problems. It's also a good idea to know your test results and keep a list of the medicines you take. How can you care for yourself at home? Medicines    · Take your medicines exactly as prescribed. Call your doctor if you think you are having a problem with your medicine. You will get more details on the specific medicines your doctor prescribes.     · If your doctor has given you a blood thinner to prevent a stroke, be sure you get instructions about how to take your medicine safely.  Blood thinners can cause serious bleeding problems.     · Do not take any vitamins, over-the-counter drugs, or herbal products without talking to your doctor first.    Lifestyle changes    · Do not smoke. Smoking can increase your chance of a stroke and heart attack. If you need help quitting, talk to your doctor about stop-smoking programs and medicines. These can increase your chances of quitting for good.     · Eat a heart-healthy diet.     · Stay at a healthy weight. Lose weight if you need to.     · Limit alcohol to 2 drinks a day for men and 1 drink a day for women. Too much alcohol can cause health problems.     · Avoid colds and flu. Get a pneumococcal vaccine shot. If you have had one before, ask your doctor whether you need another dose. Get a flu shot every year. If you must be around people with colds or flu, wash your hands often. Activity    · Talk to your doctor about what type and level of exercise is safe for you. Start light exercise if your doctor says it is okay. Walking is a good choice. Try for at least 30 minutes on most days of the week. You also may want to swim, bike, or do other activities.     · When you exercise, watch for signs that your heart is working too hard. You are pushing too hard if you can't talk while you exercise. If you become short of breath or dizzy or have chest pain, sit down and rest right away. When should you call for help? Call 911 anytime you think you may need emergency care. For example, call if:    · You have symptoms of a stroke. These may include:  ¨ Sudden numbness, tingling, weakness, or loss of movement in your face, arm, or leg, especially on only one side of your body. ¨ Sudden vision changes. ¨ Sudden trouble speaking. ¨ Sudden confusion or trouble understanding simple statements. ¨ Sudden problems with walking or balance.   ¨ A sudden, severe headache that is different from past headaches.     · You passed out (lost consciousness).    Call your doctor now or seek immediate medical care if:    · You have new or increased shortness of breath.     · You feel dizzy or lightheaded, or you feel like you may faint.     · Your heart rate becomes irregular.     · You can feel your heart flutter in your chest or skip heartbeats. Tell your doctor if these symptoms are new or worse.    Watch closely for changes in your health, and be sure to contact your doctor if you have any problems. Where can you learn more? Go to http://pebbles-royer.info/. Enter W561 in the search box to learn more about \"Atrial Flutter: Care Instructions. \"  Current as of: December 6, 2017  Content Version: 11.7  © 5032-6599 "[employer]. Care instructions adapted under license by [employer] (which disclaims liability or warranty for this information). If you have questions about a medical condition or this instruction, always ask your healthcare professional. [address] any warranty or liability for your use of this information.

## 2018-09-22 NOTE — ED NOTES
I have reviewed discharge instructions with the patient. The patient verbalized understanding. Patient left ED via Discharge Method: ambulatory to Home with family. Opportunity for questions and clarification provided. Patient given 0 scripts. To continue your aftercare when you leave the hospital, you may receive an automated call from our care team to check in on how you are doing. This is a free service and part of our promise to provide the best care and service to meet your aftercare needs.  If you have questions, or wish to unsubscribe from this service please call [phone]. Thank you for Choosing our [facility] Emergency Department.

## 2018-09-22 NOTE — ED TRIAGE NOTES
Pt states chest pain and SOB, states that he is scheduled to see cardiology to set up and appt for ablation. Chest pain that started yesterday and leg heaviness.

## 2018-10-15 RX ORDER — WARFARIN SODIUM 5 MG/1
5 TABLET ORAL
COMMUNITY
End: 2019-09-11

## 2018-10-15 RX ORDER — INSULIN GLARGINE 100 [IU]/ML
60 INJECTION, SOLUTION SUBCUTANEOUS 2 TIMES DAILY
COMMUNITY
End: 2020-08-06

## 2018-10-15 NOTE — PROGRESS NOTES
Patient pre-assessment complete for atrial fib ablation with Dr Blanca Gonzalez scheduled for 10/17/18 at 7:30am, arrival time 6am. Patient verified using . Patient instructed to bring all home medications in labeled bottles on the day of procedure. NPO status reinforced. Patient instructed to HOLD insulin & lasix the am of procedure & take 1/2 insulin on Tuesday night. . Instructed they can take all other medications excluding vitamins & supplements. Patient verbalizes understanding of all instructions & denies any questions at this time.    INR = 1.8 pt will continue coumadin

## 2018-10-16 ENCOUNTER — HOSPITAL ENCOUNTER (OUTPATIENT)
Dept: CT IMAGING | Age: 53
Discharge: HOME | End: 2018-10-16
Attending: INTERNAL MEDICINE
Payer: MEDICARE

## 2018-10-16 ENCOUNTER — HOSPITAL ENCOUNTER (OUTPATIENT)
Dept: LAB | Age: 53
Discharge: HOME | End: 2018-10-16
Attending: INTERNAL MEDICINE
Payer: MEDICARE

## 2018-10-16 ENCOUNTER — ANESTHESIA EVENT (OUTPATIENT)
Dept: SURGERY | Age: 53
End: 2018-10-16
Payer: MEDICARE

## 2018-10-16 VITALS — HEIGHT: 72 IN | WEIGHT: 280 LBS | BODY MASS INDEX: 37.93 KG/M2

## 2018-10-16 DIAGNOSIS — I48.92 ATRIAL FLUTTER, UNSPECIFIED TYPE (HCC): ICD-10-CM

## 2018-10-16 DIAGNOSIS — F17.200 TOBACCO DEPENDENCE: ICD-10-CM

## 2018-10-16 DIAGNOSIS — E11.8 TYPE 2 DIABETES MELLITUS WITH COMPLICATION, WITHOUT LONG-TERM CURRENT USE OF INSULIN (HCC): ICD-10-CM

## 2018-10-16 DIAGNOSIS — E11.21 TYPE 2 DIABETES WITH NEPHROPATHY (HCC): ICD-10-CM

## 2018-10-16 DIAGNOSIS — G47.33 OSA ON CPAP: ICD-10-CM

## 2018-10-16 DIAGNOSIS — I48.19 PERSISTENT ATRIAL FIBRILLATION (HCC): ICD-10-CM

## 2018-10-16 DIAGNOSIS — Z99.89 OSA ON CPAP: ICD-10-CM

## 2018-10-16 LAB
ANION GAP SERPL CALC-SCNC: 7 MMOL/L (ref 7–16)
BASOPHILS # BLD: 0 K/UL (ref 0–0.2)
BASOPHILS NFR BLD: 0 % (ref 0–2)
BUN SERPL-MCNC: 12 MG/DL (ref 6–23)
CALCIUM SERPL-MCNC: 8.5 MG/DL (ref 8.3–10.4)
CHLORIDE SERPL-SCNC: 103 MMOL/L (ref 98–107)
CO2 SERPL-SCNC: 29 MMOL/L (ref 21–32)
CREAT SERPL-MCNC: 1.54 MG/DL (ref 0.8–1.5)
DIFFERENTIAL METHOD BLD: ABNORMAL
EOSINOPHIL # BLD: 0.1 K/UL (ref 0–0.8)
EOSINOPHIL NFR BLD: 1 % (ref 0.5–7.8)
ERYTHROCYTE [DISTWIDTH] IN BLOOD BY AUTOMATED COUNT: 15.9 %
GLUCOSE SERPL-MCNC: 276 MG/DL (ref 65–100)
HCT VFR BLD AUTO: 40.3 % (ref 41.1–50.3)
HGB BLD-MCNC: 13.1 G/DL (ref 13.6–17.2)
IMM GRANULOCYTES # BLD: 0 K/UL (ref 0–0.5)
IMM GRANULOCYTES NFR BLD AUTO: 0 % (ref 0–5)
INR PPP: 4.5
LYMPHOCYTES # BLD: 1.9 K/UL (ref 0.5–4.6)
LYMPHOCYTES NFR BLD: 21 % (ref 13–44)
MCH RBC QN AUTO: 28.1 PG (ref 26.1–32.9)
MCHC RBC AUTO-ENTMCNC: 32.5 G/DL (ref 31.4–35)
MCV RBC AUTO: 86.5 FL (ref 79.6–97.8)
MONOCYTES # BLD: 0.6 K/UL (ref 0.1–1.3)
MONOCYTES NFR BLD: 6 % (ref 4–12)
NEUTS SEG # BLD: 6.6 K/UL (ref 1.7–8.2)
NEUTS SEG NFR BLD: 71 % (ref 43–78)
NRBC # BLD: 0 K/UL (ref 0–0.2)
PLATELET # BLD AUTO: 141 K/UL (ref 150–450)
PMV BLD AUTO: 11.5 FL (ref 9.4–12.3)
POTASSIUM SERPL-SCNC: 4.4 MMOL/L (ref 3.5–5.1)
PROTHROMBIN TIME: 40.4 SEC (ref 11.5–14.5)
RBC # BLD AUTO: 4.66 M/UL (ref 4.23–5.6)
SODIUM SERPL-SCNC: 139 MMOL/L (ref 136–145)
WBC # BLD AUTO: 9.3 K/UL (ref 4.3–11.1)

## 2018-10-16 PROCEDURE — 80048 BASIC METABOLIC PNL TOTAL CA: CPT

## 2018-10-16 PROCEDURE — 75572 CT HRT W/3D IMAGE: CPT

## 2018-10-16 PROCEDURE — 85610 PROTHROMBIN TIME: CPT

## 2018-10-16 PROCEDURE — 74011636320 HC RX REV CODE- 636/320: Performed by: INTERNAL MEDICINE

## 2018-10-16 PROCEDURE — 74011000258 HC RX REV CODE- 258: Performed by: INTERNAL MEDICINE

## 2018-10-16 PROCEDURE — 85025 COMPLETE CBC W/AUTO DIFF WBC: CPT

## 2018-10-16 PROCEDURE — 36415 COLL VENOUS BLD VENIPUNCTURE: CPT

## 2018-10-16 RX ORDER — SODIUM CHLORIDE, SODIUM LACTATE, POTASSIUM CHLORIDE, CALCIUM CHLORIDE 600; 310; 30; 20 MG/100ML; MG/100ML; MG/100ML; MG/100ML
100 INJECTION, SOLUTION INTRAVENOUS CONTINUOUS
Status: CANCELLED | OUTPATIENT
Start: 2018-10-16

## 2018-10-16 RX ORDER — SODIUM CHLORIDE 0.9 % (FLUSH) 0.9 %
10 SYRINGE (ML) INJECTION
Status: COMPLETED | OUTPATIENT
Start: 2018-10-16 | End: 2018-10-16

## 2018-10-16 RX ORDER — SODIUM CHLORIDE 0.9 % (FLUSH) 0.9 %
5-10 SYRINGE (ML) INJECTION AS NEEDED
Status: CANCELLED | OUTPATIENT
Start: 2018-10-16

## 2018-10-16 RX ADMIN — IOPAMIDOL 119 ML: 755 INJECTION, SOLUTION INTRAVENOUS at 09:20

## 2018-10-16 RX ADMIN — SODIUM CHLORIDE 100 ML: 900 INJECTION, SOLUTION INTRAVENOUS at 09:20

## 2018-10-16 RX ADMIN — Medication 10 ML: at 09:20

## 2018-10-17 ENCOUNTER — APPOINTMENT (OUTPATIENT)
Dept: CARDIAC CATH/INVASIVE PROCEDURES | Age: 53
End: 2018-10-17
Payer: MEDICARE

## 2018-10-17 ENCOUNTER — ANESTHESIA (OUTPATIENT)
Dept: SURGERY | Age: 53
End: 2018-10-17
Payer: MEDICARE

## 2018-10-17 ENCOUNTER — HOSPITAL ENCOUNTER (OUTPATIENT)
Age: 53
Setting detail: OUTPATIENT SURGERY
Discharge: HOME | End: 2018-10-17
Attending: INTERNAL MEDICINE | Admitting: INTERNAL MEDICINE
Payer: MEDICARE

## 2018-10-17 VITALS
SYSTOLIC BLOOD PRESSURE: 122 MMHG | RESPIRATION RATE: 20 BRPM | WEIGHT: 270 LBS | OXYGEN SATURATION: 94 % | BODY MASS INDEX: 36.57 KG/M2 | HEIGHT: 72 IN | DIASTOLIC BLOOD PRESSURE: 55 MMHG

## 2018-10-17 LAB
ANION GAP SERPL CALC-SCNC: 7 MMOL/L (ref 7–16)
ATRIAL RATE: 70 BPM
BUN SERPL-MCNC: 13 MG/DL (ref 6–23)
CALCIUM SERPL-MCNC: 8.6 MG/DL (ref 8.3–10.4)
CALCULATED P AXIS, ECG09: 19 DEGREES
CALCULATED R AXIS, ECG10: 38 DEGREES
CALCULATED T AXIS, ECG11: 25 DEGREES
CHLORIDE SERPL-SCNC: 101 MMOL/L (ref 98–107)
CO2 SERPL-SCNC: 30 MMOL/L (ref 21–32)
CREAT SERPL-MCNC: 1.56 MG/DL (ref 0.8–1.5)
DIAGNOSIS, 93000: NORMAL
ERYTHROCYTE [DISTWIDTH] IN BLOOD BY AUTOMATED COUNT: 15.9 %
GLUCOSE SERPL-MCNC: 275 MG/DL (ref 65–100)
HCT VFR BLD AUTO: 39.9 % (ref 41.1–50.3)
HGB BLD-MCNC: 13.1 G/DL (ref 13.6–17.2)
INR PPP: 3.5
MAGNESIUM SERPL-MCNC: 1.8 MG/DL (ref 1.8–2.4)
MCH RBC QN AUTO: 28.1 PG (ref 26.1–32.9)
MCHC RBC AUTO-ENTMCNC: 32.8 G/DL (ref 31.4–35)
MCV RBC AUTO: 85.6 FL (ref 79.6–97.8)
NRBC # BLD: 0 K/UL (ref 0–0.2)
P-R INTERVAL, ECG05: 218 MS
PLATELET # BLD AUTO: 138 K/UL (ref 150–450)
PMV BLD AUTO: 11.5 FL (ref 9.4–12.3)
POTASSIUM SERPL-SCNC: 4.2 MMOL/L (ref 3.5–5.1)
PROTHROMBIN TIME: 34.1 SEC (ref 11.5–14.5)
Q-T INTERVAL, ECG07: 420 MS
QRS DURATION, ECG06: 88 MS
QTC CALCULATION (BEZET), ECG08: 453 MS
RBC # BLD AUTO: 4.66 M/UL (ref 4.23–5.6)
SODIUM SERPL-SCNC: 138 MMOL/L (ref 136–145)
VENTRICULAR RATE, ECG03: 70 BPM
WBC # BLD AUTO: 7.3 K/UL (ref 4.3–11.1)

## 2018-10-17 PROCEDURE — 85027 COMPLETE CBC AUTOMATED: CPT

## 2018-10-17 PROCEDURE — 83735 ASSAY OF MAGNESIUM: CPT

## 2018-10-17 PROCEDURE — 93005 ELECTROCARDIOGRAM TRACING: CPT | Performed by: INTERNAL MEDICINE

## 2018-10-17 PROCEDURE — 74011250636 HC RX REV CODE- 250/636

## 2018-10-17 PROCEDURE — 85610 PROTHROMBIN TIME: CPT

## 2018-10-17 PROCEDURE — 80048 BASIC METABOLIC PNL TOTAL CA: CPT

## 2018-10-17 RX ORDER — SODIUM CHLORIDE, SODIUM LACTATE, POTASSIUM CHLORIDE, CALCIUM CHLORIDE 600; 310; 30; 20 MG/100ML; MG/100ML; MG/100ML; MG/100ML
100 INJECTION, SOLUTION INTRAVENOUS CONTINUOUS
Status: DISCONTINUED | OUTPATIENT
Start: 2018-10-17 | End: 2018-10-17 | Stop reason: HOSPADM

## 2018-10-17 RX ORDER — SODIUM CHLORIDE 9 MG/ML
75 INJECTION, SOLUTION INTRAVENOUS CONTINUOUS
Status: DISCONTINUED | OUTPATIENT
Start: 2018-10-17 | End: 2018-10-17 | Stop reason: HOSPADM

## 2018-10-17 NOTE — PROGRESS NOTES
Patient received to [address] [place] [address] room # 9  Ambulatory from [facility]. Patient scheduled for KSENIA/AFib ABlation today with Dr Abby Santiago. Procedure reviewed & questions answered, voiced good understanding consent obtained & placed on chart. All medications and medical history reviewed. Will prep patient per orders. Patient & family updated on plan of care. The patient has a fraility score of 3-MANAGING WELL, based on patient A&Ox3, patient able to ambulate to room without difficulty, patient able to perform ADL's independently.

## 2018-10-17 NOTE — ANESTHESIA PREPROCEDURE EVALUATION
Anesthetic History No history of anesthetic complications Review of Systems / Medical History Patient summary reviewed and pertinent labs reviewed Pulmonary Sleep apnea: CPAP Neuro/Psych Cardiovascular Hypertension Dysrhythmias : atrial fibrillation GI/Hepatic/Renal 
  
 
 
 
 
 
 Endo/Other Diabetes (> 9): poorly controlled, type 2, using insulin Obesity Other Findings Physical Exam 
 
Airway Mallampati: III 
TM Distance: 4 - 6 cm Neck ROM: normal range of motion Mouth opening: Normal 
 
 Cardiovascular Rhythm: regular Rate: normal 
 
Murmur: Grade 3, Mitral area Dental 
 
Dentition: Full upper dentures Pulmonary Breath sounds clear to auscultation Abdominal 
 
 
 
 Other Findings Anesthetic Plan ASA: 3 Anesthesia type: general 
 
 
 
 
Induction: Intravenous Anesthetic plan and risks discussed with: Patient Off Coumadin 2d. Will await INR. Update:  INR 3.5 - procedure cancelled per Dr. Shahriar Peters

## 2018-11-21 ENCOUNTER — APPOINTMENT (OUTPATIENT)
Dept: GENERAL RADIOLOGY | Age: 53
End: 2018-11-21
Payer: MEDICARE

## 2018-11-21 ENCOUNTER — HOSPITAL ENCOUNTER (EMERGENCY)
Age: 53
Discharge: HOME | End: 2018-11-21
Payer: MEDICARE

## 2018-11-21 ENCOUNTER — APPOINTMENT (OUTPATIENT)
Dept: CT IMAGING | Age: 53
End: 2018-11-21
Payer: MEDICARE

## 2018-11-21 VITALS
HEART RATE: 55 BPM | RESPIRATION RATE: 20 BRPM | OXYGEN SATURATION: 92 % | SYSTOLIC BLOOD PRESSURE: 116 MMHG | TEMPERATURE: 97.3 F | DIASTOLIC BLOOD PRESSURE: 53 MMHG

## 2018-11-21 DIAGNOSIS — G89.29 EXACERBATION OF CHRONIC BACK PAIN: ICD-10-CM

## 2018-11-21 DIAGNOSIS — R51.9 HEADACHE: ICD-10-CM

## 2018-11-21 DIAGNOSIS — M54.50 ACUTE BILATERAL LOW BACK PAIN WITHOUT SCIATICA: Primary | ICD-10-CM

## 2018-11-21 DIAGNOSIS — M54.9 EXACERBATION OF CHRONIC BACK PAIN: ICD-10-CM

## 2018-11-21 LAB
INR PPP: 2.1
PROTHROMBIN TIME: 22.5 SEC (ref 11.5–14.5)

## 2018-11-21 PROCEDURE — 74011250636 HC RX REV CODE- 250/636

## 2018-11-21 PROCEDURE — 96374 THER/PROPH/DIAG INJ IV PUSH: CPT

## 2018-11-21 PROCEDURE — 70450 CT HEAD/BRAIN W/O DYE: CPT

## 2018-11-21 PROCEDURE — 85610 PROTHROMBIN TIME: CPT

## 2018-11-21 PROCEDURE — 72100 X-RAY EXAM L-S SPINE 2/3 VWS: CPT

## 2018-11-21 PROCEDURE — 96375 TX/PRO/DX INJ NEW DRUG ADDON: CPT

## 2018-11-21 PROCEDURE — 85027 COMPLETE CBC AUTOMATED: CPT

## 2018-11-21 PROCEDURE — 80053 COMPREHEN METABOLIC PANEL: CPT

## 2018-11-21 PROCEDURE — 71046 X-RAY EXAM CHEST 2 VIEWS: CPT

## 2018-11-21 PROCEDURE — 99284 EMERGENCY DEPT VISIT MOD MDM: CPT

## 2018-11-21 RX ORDER — HYDROMORPHONE HYDROCHLORIDE 1 MG/ML
INJECTION, SOLUTION INTRAMUSCULAR; INTRAVENOUS; SUBCUTANEOUS
Status: DISCONTINUED
Start: 2018-11-21 | End: 2018-11-21 | Stop reason: HOSPADM

## 2018-11-21 RX ORDER — HYDROMORPHONE HYDROCHLORIDE 1 MG/ML
1 INJECTION, SOLUTION INTRAMUSCULAR; INTRAVENOUS; SUBCUTANEOUS ONCE
Status: COMPLETED | OUTPATIENT
Start: 2018-11-21 | End: 2018-11-21

## 2018-11-21 RX ORDER — ONDANSETRON 2 MG/ML
INJECTION INTRAMUSCULAR; INTRAVENOUS
Status: DISCONTINUED
Start: 2018-11-21 | End: 2018-11-21 | Stop reason: HOSPADM

## 2018-11-21 RX ORDER — CYCLOBENZAPRINE HCL 10 MG
10 TABLET ORAL
Qty: 15 TAB | Refills: 0 | Status: SHIPPED | OUTPATIENT
Start: 2018-11-21 | End: 2019-08-16

## 2018-11-21 RX ORDER — ONDANSETRON 2 MG/ML
4 INJECTION INTRAMUSCULAR; INTRAVENOUS
Status: COMPLETED | OUTPATIENT
Start: 2018-11-21 | End: 2018-11-21

## 2018-11-21 RX ADMIN — ONDANSETRON 4 MG: 2 INJECTION INTRAMUSCULAR; INTRAVENOUS at 06:45

## 2018-11-21 RX ADMIN — HYDROMORPHONE HYDROCHLORIDE 1 MG: 1 INJECTION, SOLUTION INTRAMUSCULAR; INTRAVENOUS; SUBCUTANEOUS at 06:45

## 2018-11-21 NOTE — ED TRIAGE NOTES
Pt arrived from home via POV with c/o back pain post fall. Pt fell from a standing position. Pt states, \"I was getting up out of my chair on the porch to go to bed and my legs just gave out. I've had two back surgeries, fusions from L1 to S1. I twisted my back when I went down. \" Pt is on coumadin for A-Fib, denies hitting head during fall.

## 2018-11-21 NOTE — ED PROVIDER NOTES
73-year-old male complaining of low back pain after falling off the porch. Patient has a history of low back pain with fusion of his lumbar spine. No loss of bowel or bladder function after fall. Patient is on Coumadin for chronic atrial fibrillation. Back Pain This is a chronic problem. The current episode started 1 to 2 hours ago. The problem has been rapidly worsening. The problem occurs constantly. The pain is associated with a fall. The pain is present in the lumbar spine. The pain is at a severity of 10/10. The pain is severe. The symptoms are aggravated by bending and twisting. Associated symptoms include headaches. Pertinent negatives include no chest pain, no fever, no numbness and no weight loss. He has tried nothing for the symptoms. Past Medical History:  
Diagnosis Date  Acute renal failure (ARF) ([address] 06/2017  
 septic from necrotizing fascitis. was on dialysis short term.  Anticoagulated on Coumadin   
 has not been instructed to hold.  Atrial fibrillation ([address]  Chest pain, precordial 5/4/2016  Chronic kidney disease  Chronic pain   
 back  Chronic pancreatitis ([address]  Diabetes ([address] 2008  
 insulin reliant. bs: 120's .  Edema  Gastrointestinal disorder  Headache   
 Heart failure ([address]  History of peptic ulcer disease   
 years ago  History of sepsis 06/2017  Hypertension  Morbid obesity ([address] 5/4/2016  
 35 bmi  
 Necrotizing fasciitis ([address] 06/2017  
 left upper arm  Palpitations  PUD (peptic ulcer disease)  Restless leg syndrome 5/4/2016  Sleep apnea   
 cpap  Tobacco dependence 5/4/2016 Past Surgical History:  
Procedure Laterality Date  HX APPENDECTOMY  HX CATARACT REMOVAL Right   
 with IOL  HX CHOLECYSTECTOMY  HX HEART CATHETERIZATION    
 HX LUMBAR FUSION  2012 and 2013  
 x 2  
 HX OTHER SURGICAL    
 cardioversion Family History:  
Problem Relation Age of Onset  Diabetes Mother  Heart Disease Mother  No Known Problems Father  Pulmonary Embolism Sister Social History Socioeconomic History  Marital status:  Spouse name: Not on file  Number of children: Not on file  Years of education: Not on file  Highest education level: Not on file Social Needs  Financial resource strain: Not on file  Food insecurity - worry: Not on file  Food insecurity - inability: Not on file  Transportation needs - medical: Not on file  Transportation needs - non-medical: Not on file Occupational History  Not on file Tobacco Use  Smoking status: Current Every Day Smoker Packs/day: 0.50 Years: 40.00 Pack years: 20.00  Smokeless tobacco: Never Used  Tobacco comment: currently cutting down Substance and Sexual Activity  Alcohol use: No  
  Alcohol/week: 0.0 oz  Drug use: No  
 Sexual activity: Not on file Other Topics Concern  Not on file Social History Narrative  Not on file ALLERGIES: Eliquis [apixaban]; Lisinopril; Lyrica [pregabalin]; Metformin; and Morphine Review of Systems Constitutional: Negative. Negative for activity change, fever and weight loss. HENT: Negative. Eyes: Negative. Respiratory: Negative. Cardiovascular: Negative. Negative for chest pain. Gastrointestinal: Negative. Genitourinary: Negative. Musculoskeletal: Positive for back pain. Skin: Negative. Neurological: Positive for headaches. Negative for numbness. Psychiatric/Behavioral: Negative. All other systems reviewed and are negative. There were no vitals filed for this visit. Physical Exam  
Constitutional: He is oriented to person, place, and time. He appears well-developed and well-nourished. No distress. HENT:  
Head: Normocephalic and atraumatic.   
Right Ear: External ear normal.  
Left Ear: External ear normal.  
Nose: Nose normal.  
 Eyes: Conjunctivae and EOM are normal. Pupils are equal, round, and reactive to light. Right eye exhibits no discharge. Left eye exhibits no discharge. No scleral icterus. Neck: Normal range of motion. Cardiovascular: Regular rhythm. Pulmonary/Chest: Effort normal and breath sounds normal. No stridor. No respiratory distress. He has no wheezes. He has no rales. Abdominal: Soft. Bowel sounds are normal. He exhibits no distension. There is no tenderness. Musculoskeletal: Normal range of motion. Neurological: He is alert and oriented to person, place, and time. He exhibits normal muscle tone. Coordination normal.  
Skin: Skin is warm and dry. No rash noted. Psychiatric: He has a normal mood and affect. His behavior is normal.  
  
 
MDM Number of Diagnoses or Management Options Diagnosis management comments: Assessment strain to lumbar contusion to lumbar area secondary to fall. Prior history of lumbar fusion no obvious fractures or dislocations. Patient slipped throughout his stay in the ED. Patient has sleep apnea and required CPAP assistance while in the ED. Plan:Pain control with minimal use of narcotics. Amount and/or Complexity of Data Reviewed Clinical lab tests: ordered and reviewed Tests in the radiology section of CPT®: ordered and reviewed Tests in the medicine section of CPT®: ordered and reviewed Risk of Complications, Morbidity, and/or Mortality Presenting problems: high Diagnostic procedures: high Management options: high Procedures

## 2018-11-21 NOTE — ED NOTES
Dried blood in patient's right ear canal.  This occurred several days ago and patient was seen and given appointment with the [place] [facility] however wife wants to try to get in somewhat [place] an earlier date. We'll give referral for ENT

## 2018-11-21 NOTE — DISCHARGE INSTRUCTIONS
Learning About Relief for Back Pain  What is back tension and strain? Back strain happens when you overstretch, or pull, a muscle in your back. You may hurt your back in an accident or when you exercise or lift something. Most back pain will get better with rest and time. You can take care of yourself at home to help your back heal.  What can you do first to relieve back pain? When you first feel back pain, try these steps:  · Walk. Take a short walk (10 to 20 minutes) on a level surface (no slopes, hills, or stairs) every 2 to 3 hours. Walk only distances you can manage without pain, especially leg pain. · Relax. Find a comfortable position for rest. Some people are comfortable on the floor or a medium-firm bed with a small pillow under their head and another under their knees. Some people prefer to lie on their side with a pillow between their knees. Don't stay in one position for too long. · Try heat or ice. Try using a heating pad on a low or medium setting, or take a warm shower, for 15 to 20 minutes every 2 to 3 hours. Or you can buy single-use heat wraps that last up to 8 hours. You can also try an ice pack for 10 to 15 minutes every 2 to 3 hours. You can use an ice pack or a bag of frozen vegetables wrapped in a thin towel. There is not strong evidence that either heat or ice will help, but you can try them to see if they help. You may also want to try switching between heat and cold. · Take pain medicine exactly as directed. ¨ If the doctor gave you a prescription medicine for pain, take it as prescribed. ¨ If you are not taking a prescription pain medicine, ask your doctor if you can take an over-the-counter medicine. What else can you do? · Stretch and exercise. Exercises that increase flexibility may relieve your pain and make it easier for your muscles to keep your spine in a good, neutral position. And don't forget to keep walking. · Do self-massage.  You can use self-massage to unwind after work or school or to energize yourself in the morning. You can easily massage your feet, hands, or neck. Self-massage works best if you are in comfortable clothes and are sitting or lying in a comfortable position. Use oil or lotion to massage bare skin. · Reduce stress. Back pain can lead to a vicious [place]: Distress about the pain tenses the muscles in your back, which in turn causes more pain. Learn how to relax your mind and your muscles to lower your stress. Where can you learn more? Go to http://pebbles-royer.info/. Enter Y861 in the search box to learn more about \"Learning About Relief for Back Pain. \"  Current as of: March 21, 2017  Content Version: 11.5  © 6544-7773 [employer]. Care instructions adapted under license by [employer] (which disclaims liability or warranty for this information). If you have questions about a medical condition or this instruction, always ask your healthcare professional. [place] [address] any warranty or liability for your use of this information.

## 2018-11-21 NOTE — ED NOTES
I have reviewed discharge instructions with the patient and spouse. The patient and spouse verbalized understanding. Patient left ED via Discharge Method: ambulatory to Home with family. Opportunity for questions and clarification provided. Patient given 1 scripts.

## 2018-12-04 ENCOUNTER — ANESTHESIA EVENT (OUTPATIENT)
Dept: SURGERY | Age: 53
End: 2018-12-04
Payer: MEDICARE

## 2018-12-04 RX ORDER — INSULIN ASPART 100 [IU]/ML
20 INJECTION, SOLUTION INTRAVENOUS; SUBCUTANEOUS
COMMUNITY

## 2018-12-04 NOTE — PROGRESS NOTES
Patient pre-assessment complete for Atrial fib ablation with Dr Ablation scheduled for 18 at 7:30am, arrival time 6am. Patient verified using . Patient instructed to bring all home medications in labeled bottles on the day of procedure. NPO status reinforced. Patient instructed to HOLD lasix & insulin in am & take 1/2 insulin tonight. Instructed they can take all other medications excluding vitamins & supplements. Patient verbalizes understanding of all instructions & denies any questions at this time.

## 2018-12-05 ENCOUNTER — HOSPITAL ENCOUNTER (OUTPATIENT)
Age: 53
Setting detail: OBSERVATION
Discharge: HOME | End: 2018-12-06
Attending: INTERNAL MEDICINE | Admitting: INTERNAL MEDICINE
Payer: MEDICARE

## 2018-12-05 ENCOUNTER — APPOINTMENT (OUTPATIENT)
Dept: CARDIAC CATH/INVASIVE PROCEDURES | Age: 53
End: 2018-12-05
Payer: MEDICARE

## 2018-12-05 ENCOUNTER — ANESTHESIA (OUTPATIENT)
Dept: SURGERY | Age: 53
End: 2018-12-05
Payer: MEDICARE

## 2018-12-05 PROBLEM — I48.92 ATRIAL FIBRILLATION AND FLUTTER (HCC): Status: ACTIVE | Noted: 2018-12-05

## 2018-12-05 PROBLEM — I48.91 ATRIAL FIBRILLATION AND FLUTTER (HCC): Status: ACTIVE | Noted: 2018-12-05

## 2018-12-05 LAB
ACT BLD: 202 SECS (ref 70–128)
ACT BLD: 235 SECS (ref 70–128)
ACT BLD: 367 SECS (ref 70–128)
ACT BLD: 373 SECS (ref 70–128)
ACT BLD: 384 SECS (ref 70–128)
ANION GAP SERPL CALC-SCNC: 8 MMOL/L (ref 7–16)
ATRIAL RATE: 55 BPM
ATRIAL RATE: 56 BPM
BUN SERPL-MCNC: 16 MG/DL (ref 6–23)
CALCIUM SERPL-MCNC: 8.8 MG/DL (ref 8.3–10.4)
CALCULATED P AXIS, ECG09: -6 DEGREES
CALCULATED P AXIS, ECG09: 58 DEGREES
CALCULATED R AXIS, ECG10: 44 DEGREES
CALCULATED R AXIS, ECG10: 63 DEGREES
CALCULATED T AXIS, ECG11: 17 DEGREES
CALCULATED T AXIS, ECG11: 32 DEGREES
CHLORIDE SERPL-SCNC: 103 MMOL/L (ref 98–107)
CO2 SERPL-SCNC: 28 MMOL/L (ref 21–32)
CREAT SERPL-MCNC: 1.6 MG/DL (ref 0.8–1.5)
DIAGNOSIS, 93000: NORMAL
DIAGNOSIS, 93000: NORMAL
ERYTHROCYTE [DISTWIDTH] IN BLOOD BY AUTOMATED COUNT: 15.2 % (ref 11.9–14.6)
EST. AVERAGE GLUCOSE BLD GHB EST-MCNC: 235 MG/DL
GLUCOSE BLD STRIP.AUTO-MCNC: 162 MG/DL (ref 65–100)
GLUCOSE BLD STRIP.AUTO-MCNC: 258 MG/DL (ref 65–100)
GLUCOSE SERPL-MCNC: 204 MG/DL (ref 65–100)
HBA1C MFR BLD: 9.8 % (ref 4.8–6)
HCT VFR BLD AUTO: 41.2 % (ref 41.1–50.3)
HGB BLD-MCNC: 13.1 G/DL (ref 13.6–17.2)
INR PPP: 1.6
MAGNESIUM SERPL-MCNC: 2.2 MG/DL (ref 1.8–2.4)
MCH RBC QN AUTO: 27.6 PG (ref 26.1–32.9)
MCHC RBC AUTO-ENTMCNC: 31.8 G/DL (ref 31.4–35)
MCV RBC AUTO: 86.9 FL (ref 79.6–97.8)
NRBC # BLD: 0 K/UL (ref 0–0.2)
P-R INTERVAL, ECG05: 196 MS
P-R INTERVAL, ECG05: 210 MS
PLATELET # BLD AUTO: 153 K/UL (ref 150–450)
PMV BLD AUTO: 11.5 FL (ref 9.4–12.3)
POTASSIUM SERPL-SCNC: 3.8 MMOL/L (ref 3.5–5.1)
PROTHROMBIN TIME: 18.7 SEC (ref 11.7–14.5)
Q-T INTERVAL, ECG07: 432 MS
Q-T INTERVAL, ECG07: 460 MS
QRS DURATION, ECG06: 82 MS
QRS DURATION, ECG06: 94 MS
QTC CALCULATION (BEZET), ECG08: 416 MS
QTC CALCULATION (BEZET), ECG08: 440 MS
RBC # BLD AUTO: 4.74 M/UL (ref 4.23–5.6)
SODIUM SERPL-SCNC: 139 MMOL/L (ref 136–145)
VENTRICULAR RATE, ECG03: 55 BPM
VENTRICULAR RATE, ECG03: 56 BPM
WBC # BLD AUTO: 8.6 K/UL (ref 4.3–11.1)

## 2018-12-05 PROCEDURE — 74011250636 HC RX REV CODE- 250/636

## 2018-12-05 PROCEDURE — 74011250636 HC RX REV CODE- 250/636: Performed by: ANESTHESIOLOGY

## 2018-12-05 PROCEDURE — C1732 CATH, EP, DIAG/ABL, 3D/VECT: HCPCS

## 2018-12-05 PROCEDURE — 93005 ELECTROCARDIOGRAM TRACING: CPT | Performed by: INTERNAL MEDICINE

## 2018-12-05 PROCEDURE — 93622 COMP EP EVAL L VENTR PAC&REC: CPT

## 2018-12-05 PROCEDURE — 94640 AIRWAY INHALATION TREATMENT: CPT

## 2018-12-05 PROCEDURE — 77030035291 HC TBNG PMP SMARTABLATE J&J -B

## 2018-12-05 PROCEDURE — 93312 ECHO TRANSESOPHAGEAL: CPT

## 2018-12-05 PROCEDURE — C1894 INTRO/SHEATH, NON-LASER: HCPCS

## 2018-12-05 PROCEDURE — 74011250637 HC RX REV CODE- 250/637: Performed by: INTERNAL MEDICINE

## 2018-12-05 PROCEDURE — C1733 CATH, EP, OTHR THAN COOL-TIP: HCPCS

## 2018-12-05 PROCEDURE — C1893 INTRO/SHEATH, FIXED,NON-PEEL: HCPCS

## 2018-12-05 PROCEDURE — 77030013292 HC BOWL MX PRSM J&J -A: Performed by: REGISTERED NURSE

## 2018-12-05 PROCEDURE — 77030019908 HC STETH ESOPH SIMS -A: Performed by: REGISTERED NURSE

## 2018-12-05 PROCEDURE — 93623 PRGRMD STIMJ&PACG IV RX NFS: CPT

## 2018-12-05 PROCEDURE — 93613 INTRACARDIAC EPHYS 3D MAPG: CPT

## 2018-12-05 PROCEDURE — C1766 INTRO/SHEATH,STRBLE,NON-PEEL: HCPCS

## 2018-12-05 PROCEDURE — 76937 US GUIDE VASCULAR ACCESS: CPT

## 2018-12-05 PROCEDURE — 93662 INTRACARDIAC ECG (ICE): CPT

## 2018-12-05 PROCEDURE — 99218 HC RM OBSERVATION: CPT

## 2018-12-05 PROCEDURE — C1759 CATH, INTRA ECHOCARDIOGRAPHY: HCPCS

## 2018-12-05 PROCEDURE — 76210000006 HC OR PH I REC 0.5 TO 1 HR: Performed by: INTERNAL MEDICINE

## 2018-12-05 PROCEDURE — 74011636637 HC RX REV CODE- 636/637: Performed by: INTERNAL MEDICINE

## 2018-12-05 PROCEDURE — 93656 COMPRE EP EVAL ABLTJ ATR FIB: CPT

## 2018-12-05 PROCEDURE — 77030020506 HC NDL TRNSPTL NRG BAYL -F

## 2018-12-05 PROCEDURE — 94760 N-INVAS EAR/PLS OXIMETRY 1: CPT

## 2018-12-05 PROCEDURE — 74011000250 HC RX REV CODE- 250: Performed by: INTERNAL MEDICINE

## 2018-12-05 PROCEDURE — 77030039425 HC BLD LARYNG TRULITE DISP TELE -A: Performed by: REGISTERED NURSE

## 2018-12-05 PROCEDURE — 74011000250 HC RX REV CODE- 250

## 2018-12-05 PROCEDURE — 82962 GLUCOSE BLOOD TEST: CPT

## 2018-12-05 PROCEDURE — 77030016570 HC BLNKT BAIR HGGR 3M -B: Performed by: REGISTERED NURSE

## 2018-12-05 PROCEDURE — 77030027107 HC PTCH EXT REF CARTO3 J&J -F

## 2018-12-05 PROCEDURE — 74011250637 HC RX REV CODE- 250/637: Performed by: ANESTHESIOLOGY

## 2018-12-05 PROCEDURE — 85610 PROTHROMBIN TIME: CPT

## 2018-12-05 PROCEDURE — 80048 BASIC METABOLIC PNL TOTAL CA: CPT

## 2018-12-05 PROCEDURE — 77030003602 HC NDL NRV BLK BBMI -B: Performed by: REGISTERED NURSE

## 2018-12-05 PROCEDURE — 77030037088 HC TUBE ENDOTRACH ORAL NSL COVD-A: Performed by: REGISTERED NURSE

## 2018-12-05 PROCEDURE — 76060000040 HC ANESTHESIA 4.5 TO 5 HR: Performed by: INTERNAL MEDICINE

## 2018-12-05 PROCEDURE — 85347 COAGULATION TIME ACTIVATED: CPT

## 2018-12-05 PROCEDURE — 83036 HEMOGLOBIN GLYCOSYLATED A1C: CPT

## 2018-12-05 PROCEDURE — 83735 ASSAY OF MAGNESIUM: CPT

## 2018-12-05 PROCEDURE — 85027 COMPLETE CBC AUTOMATED: CPT

## 2018-12-05 RX ORDER — SODIUM CHLORIDE 0.9 % (FLUSH) 0.9 %
5-10 SYRINGE (ML) INJECTION EVERY 8 HOURS
Status: DISCONTINUED | OUTPATIENT
Start: 2018-12-05 | End: 2018-12-06 | Stop reason: HOSPADM

## 2018-12-05 RX ORDER — FENTANYL CITRATE 50 UG/ML
INJECTION, SOLUTION INTRAMUSCULAR; INTRAVENOUS AS NEEDED
Status: DISCONTINUED | OUTPATIENT
Start: 2018-12-05 | End: 2018-12-05 | Stop reason: HOSPADM

## 2018-12-05 RX ORDER — INSULIN LISPRO 100 [IU]/ML
INJECTION, SOLUTION INTRAVENOUS; SUBCUTANEOUS
Status: DISCONTINUED | OUTPATIENT
Start: 2018-12-05 | End: 2018-12-06 | Stop reason: HOSPADM

## 2018-12-05 RX ORDER — BUDESONIDE 0.5 MG/2ML
500 INHALANT ORAL
Status: DISCONTINUED | OUTPATIENT
Start: 2018-12-05 | End: 2018-12-06 | Stop reason: HOSPADM

## 2018-12-05 RX ORDER — PROPOFOL 10 MG/ML
INJECTION, EMULSION INTRAVENOUS AS NEEDED
Status: DISCONTINUED | OUTPATIENT
Start: 2018-12-05 | End: 2018-12-05 | Stop reason: HOSPADM

## 2018-12-05 RX ORDER — SODIUM CHLORIDE 0.9 % (FLUSH) 0.9 %
5-10 SYRINGE (ML) INJECTION AS NEEDED
Status: DISCONTINUED | OUTPATIENT
Start: 2018-12-05 | End: 2018-12-06 | Stop reason: HOSPADM

## 2018-12-05 RX ORDER — ACETAMINOPHEN 500 MG
1000 TABLET ORAL ONCE
Status: DISCONTINUED | OUTPATIENT
Start: 2018-12-05 | End: 2018-12-05 | Stop reason: HOSPADM

## 2018-12-05 RX ORDER — DOCUSATE SODIUM 100 MG/1
100 CAPSULE, LIQUID FILLED ORAL
Status: DISCONTINUED | OUTPATIENT
Start: 2018-12-05 | End: 2018-12-06 | Stop reason: HOSPADM

## 2018-12-05 RX ORDER — DEXTROSE 40 %
15 GEL (GRAM) ORAL AS NEEDED
Status: DISCONTINUED | OUTPATIENT
Start: 2018-12-05 | End: 2018-12-06 | Stop reason: HOSPADM

## 2018-12-05 RX ORDER — ONDANSETRON 2 MG/ML
4 INJECTION INTRAMUSCULAR; INTRAVENOUS
Status: DISCONTINUED | OUTPATIENT
Start: 2018-12-05 | End: 2018-12-06 | Stop reason: HOSPADM

## 2018-12-05 RX ORDER — HEPARIN SODIUM 1000 [USP'U]/ML
INJECTION, SOLUTION INTRAVENOUS; SUBCUTANEOUS AS NEEDED
Status: DISCONTINUED | OUTPATIENT
Start: 2018-12-05 | End: 2018-12-05 | Stop reason: HOSPADM

## 2018-12-05 RX ORDER — SODIUM CHLORIDE 0.9 % (FLUSH) 0.9 %
5-10 SYRINGE (ML) INJECTION AS NEEDED
Status: DISCONTINUED | OUTPATIENT
Start: 2018-12-05 | End: 2018-12-05 | Stop reason: HOSPADM

## 2018-12-05 RX ORDER — PANTOPRAZOLE SODIUM 40 MG/1
40 TABLET, DELAYED RELEASE ORAL
Status: DISCONTINUED | OUTPATIENT
Start: 2018-12-06 | End: 2018-12-06 | Stop reason: HOSPADM

## 2018-12-05 RX ORDER — SOTALOL HYDROCHLORIDE 80 MG/1
160 TABLET ORAL EVERY 12 HOURS
Status: DISCONTINUED | OUTPATIENT
Start: 2018-12-05 | End: 2018-12-06 | Stop reason: HOSPADM

## 2018-12-05 RX ORDER — SODIUM CHLORIDE, SODIUM LACTATE, POTASSIUM CHLORIDE, CALCIUM CHLORIDE 600; 310; 30; 20 MG/100ML; MG/100ML; MG/100ML; MG/100ML
100 INJECTION, SOLUTION INTRAVENOUS CONTINUOUS
Status: DISCONTINUED | OUTPATIENT
Start: 2018-12-05 | End: 2018-12-05 | Stop reason: HOSPADM

## 2018-12-05 RX ORDER — WARFARIN SODIUM 5 MG/1
5 TABLET ORAL
Status: DISCONTINUED | OUTPATIENT
Start: 2018-12-05 | End: 2018-12-05

## 2018-12-05 RX ORDER — DEXTROSE 50 % IN WATER (D50W) INTRAVENOUS SYRINGE
25-50 AS NEEDED
Status: DISCONTINUED | OUTPATIENT
Start: 2018-12-05 | End: 2018-12-06 | Stop reason: HOSPADM

## 2018-12-05 RX ORDER — WARFARIN SODIUM 5 MG/1
5 TABLET ORAL
Status: DISCONTINUED | OUTPATIENT
Start: 2018-12-05 | End: 2018-12-06 | Stop reason: HOSPADM

## 2018-12-05 RX ORDER — NEOSTIGMINE METHYLSULFATE 1 MG/ML
INJECTION INTRAVENOUS AS NEEDED
Status: DISCONTINUED | OUTPATIENT
Start: 2018-12-05 | End: 2018-12-05 | Stop reason: HOSPADM

## 2018-12-05 RX ORDER — HYDROMORPHONE HYDROCHLORIDE 1 MG/ML
1 INJECTION, SOLUTION INTRAMUSCULAR; INTRAVENOUS; SUBCUTANEOUS
Status: DISCONTINUED | OUTPATIENT
Start: 2018-12-05 | End: 2018-12-06 | Stop reason: HOSPADM

## 2018-12-05 RX ORDER — HYDROMORPHONE HYDROCHLORIDE 2 MG/ML
0.5 INJECTION, SOLUTION INTRAMUSCULAR; INTRAVENOUS; SUBCUTANEOUS
Status: DISCONTINUED | OUTPATIENT
Start: 2018-12-05 | End: 2018-12-05 | Stop reason: HOSPADM

## 2018-12-05 RX ORDER — ACETAMINOPHEN 325 MG/1
650 TABLET ORAL
Status: DISCONTINUED | OUTPATIENT
Start: 2018-12-05 | End: 2018-12-06 | Stop reason: HOSPADM

## 2018-12-05 RX ORDER — ACETAMINOPHEN 10 MG/ML
1000 INJECTION, SOLUTION INTRAVENOUS ONCE
Status: COMPLETED | OUTPATIENT
Start: 2018-12-05 | End: 2018-12-05

## 2018-12-05 RX ORDER — HYDROCODONE BITARTRATE AND ACETAMINOPHEN 5; 325 MG/1; MG/1
1 TABLET ORAL
Status: DISCONTINUED | OUTPATIENT
Start: 2018-12-05 | End: 2018-12-06 | Stop reason: HOSPADM

## 2018-12-05 RX ORDER — SODIUM CHLORIDE 0.9 % (FLUSH) 0.9 %
5-10 SYRINGE (ML) INJECTION EVERY 8 HOURS
Status: DISCONTINUED | OUTPATIENT
Start: 2018-12-05 | End: 2018-12-05 | Stop reason: HOSPADM

## 2018-12-05 RX ORDER — FUROSEMIDE 10 MG/ML
INJECTION INTRAMUSCULAR; INTRAVENOUS AS NEEDED
Status: DISCONTINUED | OUTPATIENT
Start: 2018-12-05 | End: 2018-12-05 | Stop reason: HOSPADM

## 2018-12-05 RX ORDER — PREGABALIN 50 MG/1
100 CAPSULE ORAL 3 TIMES DAILY
Status: DISCONTINUED | OUTPATIENT
Start: 2018-12-05 | End: 2018-12-06 | Stop reason: HOSPADM

## 2018-12-05 RX ORDER — DIPHENHYDRAMINE HYDROCHLORIDE 50 MG/ML
12.5 INJECTION, SOLUTION INTRAMUSCULAR; INTRAVENOUS ONCE
Status: DISCONTINUED | OUTPATIENT
Start: 2018-12-05 | End: 2018-12-05 | Stop reason: HOSPADM

## 2018-12-05 RX ORDER — INSULIN LISPRO 100 [IU]/ML
0.05 INJECTION, SOLUTION INTRAVENOUS; SUBCUTANEOUS
Status: DISCONTINUED | OUTPATIENT
Start: 2018-12-05 | End: 2018-12-06 | Stop reason: HOSPADM

## 2018-12-05 RX ORDER — OXYCODONE AND ACETAMINOPHEN 5; 325 MG/1; MG/1
1 TABLET ORAL AS NEEDED
Status: DISCONTINUED | OUTPATIENT
Start: 2018-12-05 | End: 2018-12-05 | Stop reason: HOSPADM

## 2018-12-05 RX ORDER — ATORVASTATIN CALCIUM 40 MG/1
40 TABLET, FILM COATED ORAL
Status: DISCONTINUED | OUTPATIENT
Start: 2018-12-05 | End: 2018-12-06 | Stop reason: HOSPADM

## 2018-12-05 RX ORDER — PANTOPRAZOLE SODIUM 20 MG/1
20 TABLET, DELAYED RELEASE ORAL 2 TIMES DAILY
COMMUNITY
End: 2018-12-06

## 2018-12-05 RX ORDER — SODIUM CHLORIDE, SODIUM LACTATE, POTASSIUM CHLORIDE, CALCIUM CHLORIDE 600; 310; 30; 20 MG/100ML; MG/100ML; MG/100ML; MG/100ML
INJECTION, SOLUTION INTRAVENOUS
Status: DISCONTINUED | OUTPATIENT
Start: 2018-12-05 | End: 2018-12-05 | Stop reason: HOSPADM

## 2018-12-05 RX ORDER — SUCRALFATE 1 G/10ML
1 SUSPENSION ORAL
Status: DISCONTINUED | OUTPATIENT
Start: 2018-12-06 | End: 2018-12-06

## 2018-12-05 RX ORDER — MIDAZOLAM HYDROCHLORIDE 1 MG/ML
2 INJECTION, SOLUTION INTRAMUSCULAR; INTRAVENOUS ONCE
Status: DISCONTINUED | OUTPATIENT
Start: 2018-12-05 | End: 2018-12-05 | Stop reason: HOSPADM

## 2018-12-05 RX ORDER — FUROSEMIDE 40 MG/1
40 TABLET ORAL DAILY
Status: DISCONTINUED | OUTPATIENT
Start: 2018-12-06 | End: 2018-12-06 | Stop reason: HOSPADM

## 2018-12-05 RX ORDER — HEPARIN SODIUM 5000 [USP'U]/100ML
INJECTION, SOLUTION INTRAVENOUS
Status: DISCONTINUED | OUTPATIENT
Start: 2018-12-05 | End: 2018-12-05 | Stop reason: HOSPADM

## 2018-12-05 RX ORDER — ALBUTEROL SULFATE 0.83 MG/ML
2.5 SOLUTION RESPIRATORY (INHALATION)
Status: DISCONTINUED | OUTPATIENT
Start: 2018-12-05 | End: 2018-12-06 | Stop reason: HOSPADM

## 2018-12-05 RX ORDER — PANTOPRAZOLE SODIUM 20 MG/1
20 TABLET, DELAYED RELEASE ORAL 2 TIMES DAILY
Status: DISCONTINUED | OUTPATIENT
Start: 2018-12-05 | End: 2018-12-05 | Stop reason: CLARIF

## 2018-12-05 RX ORDER — OXYCODONE HYDROCHLORIDE 5 MG/1
15 TABLET ORAL 3 TIMES DAILY
Status: DISCONTINUED | OUTPATIENT
Start: 2018-12-05 | End: 2018-12-06 | Stop reason: HOSPADM

## 2018-12-05 RX ORDER — LIDOCAINE HYDROCHLORIDE 20 MG/ML
INJECTION, SOLUTION EPIDURAL; INFILTRATION; INTRACAUDAL; PERINEURAL AS NEEDED
Status: DISCONTINUED | OUTPATIENT
Start: 2018-12-05 | End: 2018-12-05 | Stop reason: HOSPADM

## 2018-12-05 RX ORDER — FAMOTIDINE 20 MG/1
20 TABLET, FILM COATED ORAL ONCE
Status: DISCONTINUED | OUTPATIENT
Start: 2018-12-05 | End: 2018-12-05 | Stop reason: HOSPADM

## 2018-12-05 RX ORDER — FENTANYL CITRATE 50 UG/ML
25 INJECTION, SOLUTION INTRAMUSCULAR; INTRAVENOUS ONCE
Status: DISCONTINUED | OUTPATIENT
Start: 2018-12-05 | End: 2018-12-05 | Stop reason: HOSPADM

## 2018-12-05 RX ORDER — ONDANSETRON 2 MG/ML
INJECTION INTRAMUSCULAR; INTRAVENOUS AS NEEDED
Status: DISCONTINUED | OUTPATIENT
Start: 2018-12-05 | End: 2018-12-05 | Stop reason: HOSPADM

## 2018-12-05 RX ORDER — PROTAMINE SULFATE 10 MG/ML
INJECTION, SOLUTION INTRAVENOUS AS NEEDED
Status: DISCONTINUED | OUTPATIENT
Start: 2018-12-05 | End: 2018-12-05 | Stop reason: HOSPADM

## 2018-12-05 RX ORDER — ALBUTEROL SULFATE 90 UG/1
2 AEROSOL, METERED RESPIRATORY (INHALATION)
Status: DISCONTINUED | OUTPATIENT
Start: 2018-12-05 | End: 2018-12-05 | Stop reason: CLARIF

## 2018-12-05 RX ORDER — OXYCODONE HYDROCHLORIDE 5 MG/1
5 TABLET ORAL
Status: COMPLETED | OUTPATIENT
Start: 2018-12-05 | End: 2018-12-05

## 2018-12-05 RX ORDER — INSULIN GLARGINE 100 [IU]/ML
0.2 INJECTION, SOLUTION SUBCUTANEOUS
Status: DISCONTINUED | OUTPATIENT
Start: 2018-12-05 | End: 2018-12-06 | Stop reason: HOSPADM

## 2018-12-05 RX ORDER — GLYCOPYRROLATE 0.2 MG/ML
INJECTION INTRAMUSCULAR; INTRAVENOUS AS NEEDED
Status: DISCONTINUED | OUTPATIENT
Start: 2018-12-05 | End: 2018-12-05 | Stop reason: HOSPADM

## 2018-12-05 RX ORDER — ROCURONIUM BROMIDE 10 MG/ML
INJECTION, SOLUTION INTRAVENOUS AS NEEDED
Status: DISCONTINUED | OUTPATIENT
Start: 2018-12-05 | End: 2018-12-05 | Stop reason: HOSPADM

## 2018-12-05 RX ORDER — SODIUM CHLORIDE 9 MG/ML
50 INJECTION, SOLUTION INTRAVENOUS CONTINUOUS
Status: DISCONTINUED | OUTPATIENT
Start: 2018-12-05 | End: 2018-12-05 | Stop reason: HOSPADM

## 2018-12-05 RX ORDER — MIDAZOLAM HYDROCHLORIDE 1 MG/ML
2 INJECTION, SOLUTION INTRAMUSCULAR; INTRAVENOUS
Status: DISCONTINUED | OUTPATIENT
Start: 2018-12-05 | End: 2018-12-05 | Stop reason: HOSPADM

## 2018-12-05 RX ORDER — DEXAMETHASONE SODIUM PHOSPHATE 4 MG/ML
INJECTION, SOLUTION INTRA-ARTICULAR; INTRALESIONAL; INTRAMUSCULAR; INTRAVENOUS; SOFT TISSUE AS NEEDED
Status: DISCONTINUED | OUTPATIENT
Start: 2018-12-05 | End: 2018-12-05 | Stop reason: HOSPADM

## 2018-12-05 RX ORDER — EPHEDRINE SULFATE 50 MG/ML
INJECTION, SOLUTION INTRAVENOUS AS NEEDED
Status: DISCONTINUED | OUTPATIENT
Start: 2018-12-05 | End: 2018-12-05 | Stop reason: HOSPADM

## 2018-12-05 RX ORDER — LIDOCAINE HYDROCHLORIDE 10 MG/ML
0.1 INJECTION INFILTRATION; PERINEURAL AS NEEDED
Status: DISCONTINUED | OUTPATIENT
Start: 2018-12-05 | End: 2018-12-05 | Stop reason: HOSPADM

## 2018-12-05 RX ORDER — CYCLOBENZAPRINE HCL 10 MG
10 TABLET ORAL
Status: DISCONTINUED | OUTPATIENT
Start: 2018-12-05 | End: 2018-12-06 | Stop reason: HOSPADM

## 2018-12-05 RX ADMIN — PROTAMINE SULFATE 10 MG: 10 INJECTION, SOLUTION INTRAVENOUS at 12:04

## 2018-12-05 RX ADMIN — ACETAMINOPHEN 1000 MG: 10 INJECTION, SOLUTION INTRAVENOUS at 15:03

## 2018-12-05 RX ADMIN — EPHEDRINE SULFATE 5 MG: 50 INJECTION, SOLUTION INTRAVENOUS at 09:15

## 2018-12-05 RX ADMIN — HEPARIN SODIUM 18000 UNITS: 1000 INJECTION, SOLUTION INTRAVENOUS; SUBCUTANEOUS at 09:26

## 2018-12-05 RX ADMIN — HEPARIN SODIUM 3000 UNITS: 1000 INJECTION, SOLUTION INTRAVENOUS; SUBCUTANEOUS at 08:57

## 2018-12-05 RX ADMIN — SODIUM CHLORIDE, SODIUM LACTATE, POTASSIUM CHLORIDE, CALCIUM CHLORIDE: 600; 310; 30; 20 INJECTION, SOLUTION INTRAVENOUS at 08:52

## 2018-12-05 RX ADMIN — PROTAMINE SULFATE 10 MG: 10 INJECTION, SOLUTION INTRAVENOUS at 11:24

## 2018-12-05 RX ADMIN — PROTAMINE SULFATE 10 MG: 10 INJECTION, SOLUTION INTRAVENOUS at 11:23

## 2018-12-05 RX ADMIN — PROTAMINE SULFATE 10 MG: 10 INJECTION, SOLUTION INTRAVENOUS at 11:25

## 2018-12-05 RX ADMIN — NEOSTIGMINE METHYLSULFATE 1.5 MG: 1 INJECTION INTRAVENOUS at 12:05

## 2018-12-05 RX ADMIN — GLYCOPYRROLATE 0.2 MG: 0.2 INJECTION INTRAMUSCULAR; INTRAVENOUS at 12:05

## 2018-12-05 RX ADMIN — PROPOFOL 200 MG: 10 INJECTION, EMULSION INTRAVENOUS at 07:46

## 2018-12-05 RX ADMIN — PROTAMINE SULFATE 10 MG: 10 INJECTION, SOLUTION INTRAVENOUS at 11:26

## 2018-12-05 RX ADMIN — PANCRELIPASE LIPASE, PANCRELIPASE PROTEASE, PANCRELIPASE AMYLASE 1 CAPSULE: 20000; 63000; 84000 CAPSULE, DELAYED RELEASE ORAL at 19:55

## 2018-12-05 RX ADMIN — ROCURONIUM BROMIDE 20 MG: 10 INJECTION, SOLUTION INTRAVENOUS at 08:30

## 2018-12-05 RX ADMIN — SODIUM CHLORIDE, SODIUM LACTATE, POTASSIUM CHLORIDE, CALCIUM CHLORIDE: 600; 310; 30; 20 INJECTION, SOLUTION INTRAVENOUS at 07:33

## 2018-12-05 RX ADMIN — INSULIN GLARGINE 25 UNITS: 100 INJECTION, SOLUTION SUBCUTANEOUS at 21:45

## 2018-12-05 RX ADMIN — HEPARIN SODIUM 40 UNITS/KG/HR: 5000 INJECTION, SOLUTION INTRAVENOUS at 09:26

## 2018-12-05 RX ADMIN — EPHEDRINE SULFATE 5 MG: 50 INJECTION, SOLUTION INTRAVENOUS at 12:03

## 2018-12-05 RX ADMIN — ROCURONIUM BROMIDE 50 MG: 10 INJECTION, SOLUTION INTRAVENOUS at 07:46

## 2018-12-05 RX ADMIN — ALBUTEROL SULFATE 2.5 MG: 2.5 SOLUTION RESPIRATORY (INHALATION) at 20:29

## 2018-12-05 RX ADMIN — Medication 10 ML: at 21:47

## 2018-12-05 RX ADMIN — LIDOCAINE HYDROCHLORIDE 100 MG: 20 INJECTION, SOLUTION EPIDURAL; INFILTRATION; INTRACAUDAL; PERINEURAL at 07:46

## 2018-12-05 RX ADMIN — EPHEDRINE SULFATE 5 MG: 50 INJECTION, SOLUTION INTRAVENOUS at 09:19

## 2018-12-05 RX ADMIN — EPHEDRINE SULFATE 5 MG: 50 INJECTION, SOLUTION INTRAVENOUS at 12:01

## 2018-12-05 RX ADMIN — ATORVASTATIN CALCIUM 40 MG: 40 TABLET, FILM COATED ORAL at 21:36

## 2018-12-05 RX ADMIN — ONDANSETRON 4 MG: 2 INJECTION INTRAMUSCULAR; INTRAVENOUS at 11:52

## 2018-12-05 RX ADMIN — FUROSEMIDE 40 MG: 10 INJECTION INTRAMUSCULAR; INTRAVENOUS at 12:10

## 2018-12-05 RX ADMIN — DEXAMETHASONE SODIUM PHOSPHATE 4 MG: 4 INJECTION, SOLUTION INTRA-ARTICULAR; INTRALESIONAL; INTRAMUSCULAR; INTRAVENOUS; SOFT TISSUE at 11:52

## 2018-12-05 RX ADMIN — SOTALOL HYDROCHLORIDE 160 MG: 80 TABLET ORAL at 20:00

## 2018-12-05 RX ADMIN — OXYCODONE HYDROCHLORIDE 15 MG: 5 TABLET ORAL at 21:36

## 2018-12-05 RX ADMIN — HYDROMORPHONE HYDROCHLORIDE 0.5 MG: 2 INJECTION, SOLUTION INTRAMUSCULAR; INTRAVENOUS; SUBCUTANEOUS at 13:58

## 2018-12-05 RX ADMIN — BUDESONIDE 500 MCG: 0.5 INHALANT RESPIRATORY (INHALATION) at 20:29

## 2018-12-05 RX ADMIN — PREGABALIN 100 MG: 50 CAPSULE ORAL at 21:36

## 2018-12-05 RX ADMIN — FENTANYL CITRATE 50 MCG: 50 INJECTION, SOLUTION INTRAMUSCULAR; INTRAVENOUS at 09:47

## 2018-12-05 RX ADMIN — WARFARIN SODIUM 5 MG: 5 TABLET ORAL at 19:54

## 2018-12-05 RX ADMIN — PROTAMINE SULFATE 10 MG: 10 INJECTION, SOLUTION INTRAVENOUS at 11:42

## 2018-12-05 RX ADMIN — EPHEDRINE SULFATE 5 MG: 50 INJECTION, SOLUTION INTRAVENOUS at 08:35

## 2018-12-05 RX ADMIN — OXYCODONE HYDROCHLORIDE 5 MG: 5 TABLET ORAL at 17:10

## 2018-12-05 RX ADMIN — ROCURONIUM BROMIDE 10 MG: 10 INJECTION, SOLUTION INTRAVENOUS at 09:28

## 2018-12-05 RX ADMIN — PROTAMINE SULFATE 10 MG: 10 INJECTION, SOLUTION INTRAVENOUS at 11:43

## 2018-12-05 RX ADMIN — HYDROMORPHONE HYDROCHLORIDE 0.5 MG: 2 INJECTION, SOLUTION INTRAMUSCULAR; INTRAVENOUS; SUBCUTANEOUS at 12:51

## 2018-12-05 RX ADMIN — PROTAMINE SULFATE 10 MG: 10 INJECTION, SOLUTION INTRAVENOUS at 11:22

## 2018-12-05 RX ADMIN — FENTANYL CITRATE 50 MCG: 50 INJECTION, SOLUTION INTRAMUSCULAR; INTRAVENOUS at 07:46

## 2018-12-05 NOTE — H&P
The patient has been examined and the previous clinic note dated, 10/3/2018, has been reviewed and changes have been noted below. 52year old male with a history of pAF/AFL who comes in for planned catheter ablation. He has undergone informed consent. We will proceed if his INR is <3.0. He has undergone informed consent and will proceed with planned procedure under GA. Oral Boga. Latoya Boateng MD, MS Clinical Cardiac Electrophysiology [facility] [address], [address] [place] [address] PHONE: [phone] Chen Dsouza 
1965 
10/3/2018 
  
Referring Cardiologist: Kerrie Perez MD 
  
Reason for Consultation: Atrial fibrillation 
  
ASSESSMENT and PLAN: 
Diagnoses and all orders for this visit: 
  
1. Atrial flutter, unspecified type ([address] 2. WILBERTO on CPAP 3. Type 2 diabetes with nephropathy (HCC) 4. Negative C 2016 5. Persistent atrial fibrillation ([address] on sotalol and warfarin. [id number] = 2+ 
6. Essential hypertension 7. Dyslipidemia 8. Tobacco dependence 9. Chest pain, precordial 
  
52year old male with a history of AF/AFL s/p AFL in 2016. He comes in with recurrent AF which is drug refractory at this time. He is motivated to undergo AF ablation. We reviewed the pathophysiology of AF including progression. He is less interested in drug therapy or change in AAD at this time. We fully reviewed the ablation procedure and we will sign him up at his convenience. 
  
-Continue sotalol, warfarin and digoxin. -Monitor to assess AF burden. -CT prior to ablation 
  
Procedure to be performed: AF/AFL ablation Device/ablation Company: [employer] [id number] Procedure Date: TBD Medications to hold, days to hold [facility], AAD): On sheet, warfarin continue, but INR needs to be 2-3. Sotalol 4 days, digoxin 2 days. Anesthesia: GA If ablation, device company to join (company)?: N 
KSENIA required?: Y 
  
AF ABLATION EDUCATION (done today): 
 I discussed with the patient the pathophysiology of atrial fibrillation, including details about potential triggers from the pulmonary veins (PVs), as well as non-PV sites. We also discussed that in certain patients (especially those with more persistent AF) there is often atrial substrate (i.e. fibrosis, dilatation, etc.) that promotes more sustained AF. We also discussed the therapeutic options for treatment of atrial fibrillation, including: 
--Rate control --Rhythm control with antiarrhythmic drugs (AAD) and supplemental rate control 
--Catheter ablation, including pulmonary vein isolation (PVI), with or without additional substrate modification, in attempt to maintain sinus rhythm long-term 
--AV vladimir ablation with a permanent pacemaker (ablate & pace). 
  
I emphasized to the patient that all of these options require stroke prophylaxis with oral anticoagulation therapy ([address] [place] [address]) with  Coumadin or novel oral anticoagulant (NOACs), depending on risk factors. I explained that successful catheter ablation with sufficient long-term follow-up documenting a lack of recurrent AF may allow for discontinuation of anticoagulation in the future; however, this has not been proven safe in prospective clinical studies and is still considered experimental.  Data from retrospective trials, however, has suggested that stopping oral anticoagulation after successful ablation does not result in increased in stroke rates and appears to be safe. 
  
The benefits and risks of each of these approaches were outlined in detail.   We discussed the variable success rates of AF ablation, which can range from 50-80+%, depending on multiple factors, including paroxysmal vs. persistent AF, duration of AF, AF burden, left atrial size and remodeling, concomitant valvular or other structural heart disease, non-PV triggers, prior ablation lesion sets, obstructive sleep apnea, and other potential confounding factors. I also explained that often (approximately 30-50% of the time) patients will require multiple procedures to achieve long-term AF suppression. Additionally, we discussed that ablation may decrease AF burden and/or symptoms, but additional therapeutic strategies (i.e. concomitant AAD therapy, rate controlling medications, oral anticoagulants, etc.) may be needed long term for AF management. 
  
The catheter ablation procedure was described to the patient in detail, including the risks of recurrent AF/AT, need for repeat ablation, esophageal perforation/fistula, pulmonary vein stenosis,  bronchial injury/fistula, stroke, cardiac perforation with the need for catheter drainage or surgical repair, vascular damage, DVT/PE, bleeding, thermal skin burns, radiation skin injury, kidney failure, pneumo/hemothorax, need for permanent pacemaker, phrenic or vagus nerve damage resulting in diaphragmatic paralysis or gastroparesis, stiff left atrial syndrome, and even death.   
  
We also discussed in detail today the options for anticoagulation periprocedure. Recent data suggests that performing AF ablation while therapeutic on Coumadin is safe and potentially reduces periprocedure complications associated with LMWH, including bleeding and hematomas, as well as stroke. We discussed that theoretically there could be greater difficulty reversing anticoagulation during the procedure if there were a perforation, and this might require plasma transfusion and/or an increased risk of needing surgical intervention.   The patient is aware of these risks/benefits and wishes to proceed with the ablation while therapeutic on Coumadin. 
  
TOTAL TIME: 25 minutes, >50% during counseling and coordination of care 
  
  
Patient has been instructed and agrees to call our office with any issues or other concerns related to their cardiac condition(s) and/or complaint(s). 
  
Follow-up Disposition: Not on File 
  
 Thank you for allowing me to participate in the electrophysiologic care of Mr. Monster Roman. Please contact me if any questions or concerns were to arise. 
  
Oralia Pozo. Mirella COTA, MS Clinical Cardiac Electrophysiology [address] [facility] Cardiology 10/03/18 
8:30 AM 
  
=================================================================== Chief Complant:   
   
Chief Complaint Patient presents with  Irregular Heart Beat  
  
  
Consultation is requested by [place] [place] for evaluation of Irregular Heart Beat 
  
  
History: 
Monster Roman is a most pleasant 52 y.o. male with a past medical and cardiac history significant for atrial fibrillation and atrial flutter.  He was diagnosed with atrial fibrillation of March 2012 and underwent a cardioversion he had done well until the spring of 2016 when he developed atrial fibrillation and atrial flutter with severe symptoms of shortness of breath. He underwent a flutter ablation at that time. 
  
He comes in for follow up today. He as admitted in August of this year with a recurrence of atrial fibrillation. He continues to have drug refractory symptoms. He does have WILBERTO and is treated with CPAP. He is down to 5 cigarettes per day. He is a  of the [employer] and gets his medicines from there. He is working with the bariatric team.  
  
Cardiac PMH: (Old records have been reviewed and summarized below) 1. Evern Ray fibrillation-diagnosed March 2012 2. Evern Ray flutter-diagnosed 2016 3.  Hypertension 4. Roach Snooks 5.  Diabetes 6.  COPD 7.  Cath-chamber 2016-no significant coronary artery disease 8.  Obstructive sleep apnea-starting CPAP 9.  Event monitor-May 2016-documenting atrial fibrillation and atrial flutter 10. A. Flutter ablation - June 2016 
  
EKG:  (EKG has been independently visualized by me with interpretation below): NSR, flat p waves.  
  
ECHO: 8/17/2018 -  Left ventricle: Systolic function was at the lower limits of normal. 
 Ejection fraction was estimated to be 50 %. Although no diagnostic regional 
wall motion abnormality was identified, this possibility cannot be completely 
excluded on the basis of this study. Wall thickness was mildly to moderately 
increased. -  Aortic valve: The valve was trileaflet. Leaflets exhibited mild to Moderate sclerosis (predominantly the left coronary cusp). There was mild Regurgitation. 
- LA size was normal. 
  
Previous Heart Catheterization: 1/2016, normal coronary anatomy and normal EF. FINDINGS 1. Left ventricle: Normal left ventricular size, normal left ventricular 
systolic function, ejection fraction 60 to 65%. There is no significant 
mitral regurgitation. There is no aortic valve gradient. Left ventricular 
end-diastolic pressures measured at 18 mmHg. 2. Left main: The left main is large, bifurcates in the LAD and 
circumflex systems, appears angiographically normal. 
3. Left anterior descending coronary: This is a very large vessel, gives 
rise to 3 moderate-sized diagonals. The entire LAD diagonal system 
appears angiographically normal. 
4. Left circumflex coronary: This is a large vessel, gives rise to a 
moderate-sized first obtuse marginal, large second obtuse marginal, a 
moderate-sized left posterolateral branch and a moderate size left 
posterior descending branch. The entire system appears angiographically 
normal. 
5. Right coronary: This is a small to moderate size nondominant vessel 
which appears angiographically normal. 
6. Successful hemostasis with pneumatic radial band. 
  
CONCLUSIONS 1. Normal left ventricular systolic function. 2. Normal-appearing coronary arteries. 
  
Past Medical History, Past Surgical History, Family history, Social History, and Medications were all reviewed with the patient today and updated as necessary.  
  
      
Current Outpatient Prescriptions Medication Sig Dispense Refill  sertraline (ZOLOFT) 50 mg tablet Take 50 mg by mouth daily.      
 digoxin (LANOXIN) 0.125 mg tablet Take 1 Tab by mouth daily. 30 Tab 11  
 insulin degludec (TRESIBA FLEXTOUCH U-100) 100 unit/mL (3 mL) inpn 120 Units by SubCUTAneous route nightly. Indications: type 2 diabetes mellitus      
 pregabalin (LYRICA) 100 mg capsule Take 100 mg by mouth three (3) times daily. Indications: Diabetic Peripheral Neuropathy      
 sertraline (ZOLOFT) 100 mg tablet Take 100 mg by mouth daily. Indications: am- take on the dos      
 oxyCODONE IR (OXY-IR) 15 mg immediate release tablet Take 15 mg by mouth three (3) times daily. Indications: take on the dos      
 insulin aspart protamine/insulin aspart (NOVOLOG MIX 70-30 U-100 INSULN) 100 unit/mL (70-30) injection by SubCUTAneous route Before breakfast, lunch, and dinner. Indications: type 2 diabetes mellitus, sliding scale- 1/2 usual am dose if blood sugar is 120 or ^ And hold if blood  sugar is less than or 120      
 warfarin (COUMADIN) 7.5 mg tablet Take 7.5 mg by mouth nightly. Indications: has not been instructed to hold      
 amylase-lipase-protease (CREON) 24,000-76,000 -120,000 unit capsule Take 1 Cap by mouth three (3) times daily (with meals). Indications: exocrine pancreatic insufficiency      
 sotalol (BETAPACE) 160 mg tablet Take 1 Tab by mouth every twelve (12) hours. (Patient taking differently: Take 160 mg by mouth every twelve (12) hours. Indications: PREVENTION OF RECURRENT ATRIAL FIBRILLATION, take on the dos) 60 Tab 3  
 famotidine (PEPCID) 40 mg tablet Take 1 Tab by mouth every twelve (12) hours. (Patient taking differently: Take 40 mg by mouth every twelve (12) hours. Indications: take on the dos) 60 Tab 1  
 pantoprazole (PROTONIX) 40 mg tablet Take 1 Tab by mouth daily. (Patient taking differently: Take 40 mg by mouth daily.  Indications: am- take on the dos) 30 Tab 1  
 colestipol (COLESTID) 1 gram tablet Take 1 g by mouth two (2) times a day. Indications: hypercholesterolemia      
 aspirin delayed-release 81 mg tablet Take 81 mg by mouth daily. Indications: am- take on the dos      
 budesonide-formoterol (SYMBICORT) 80-4.5 mcg/actuation HFAA inhaler Take 2 Puffs by inhalation two (2) times daily as needed. Indications: BRONCHOSPASM PREVENTION WITH COPD, bring on the dos      
 albuterol (PROVENTIL HFA, VENTOLIN HFA, PROAIR HFA) 90 mcg/actuation inhaler Take 2 Puffs by inhalation every six (6) hours as needed. Indications: BRONCHOSPASM PREVENTION, bring on the dos      
 furosemide (LASIX) 40 mg tablet Take 1 Tab by mouth daily. (Patient taking differently: Take 40 mg by mouth daily. Indications: Edema, am) 90 Tab 11  
 atorvastatin (LIPITOR) 40 mg tablet Take 1 Tab by mouth nightly. 30 Tab 3  cetirizine (ZYRTEC) 10 mg tablet Take 10 mg by mouth nightly. Indications: Allergic Rhinitis      
 ipratropium-albuterol (COMBIVENT)  mcg/actuation inhaler Take 1 Puff by inhalation every six (6) hours as needed. Indications: bring on the dos      
  
     
Allergies Allergen Reactions  Eliquis [Apixaban] Rash  Lisinopril Unknown (comments)  Lyrica [Pregabalin] Rash  
    Pt denies allergy.  Metformin Shortness of Breath and Rash  Morphine Nausea and Vomiting  
  
    
Patient Active Problem List  
  Diagnosis  Type 2 diabetes with nephropathy ([address]  Severe obesity (BMI 35.0-39. 9)  Atrial flutter (HCC)  
    1. A. Flutter ablation - June 2016 
   
 Non-rheumatic mitral regurgitation  
    moderate on [place]  echo   EF nl   
   
 Diastolic CHF, acute on chronic (HCC)  
    new onset with aflutter  
   
 Tobacco dependence  Restless leg syndrome  Chest pain, precordial  
    Normal Cors cath 1/2016  
   
 Obesity and other hyperalimentation  Unstable angina (HCC)  HTN (hypertension)  Atrial fibrillation ([address]  
    atrial flutter  
   
 Diabetes mellitus ([address]  WILBERTO on CPAP  Dyslipidemia  
  
   
    
Past Medical History:  
Diagnosis Date  Acute renal failure (ARF) ([facility] 06/2017  
  septic from necrotizing fascitis. was on dialysis short term.  Anticoagulated on Coumadin    
  has not been instructed to hold.  Atrial fibrillation [facility])    
 Chest pain, precordial 5/4/2016  Chronic pain    
  back  Chronic pancreatitis ([facility]    
 Diabetes ([facility] 2008  
  insulin reliant. bs: 120's .  Edema    
 Gastrointestinal disorder    
 Headache    
 History of peptic ulcer disease    
  years ago  History of sepsis 06/2017  Hypertension    
 Morbid obesity ([facility] 5/4/2016  
  35 bmi  
 Necrotizing fasciitis ([facility] 06/2017  
  left upper arm  Palpitations    
 Restless leg syndrome 5/4/2016  Sleep apnea    
  cpap  Tobacco dependence 5/4/2016  
  
     
Past Surgical History:  
Procedure Laterality Date  HX APPENDECTOMY      
 HX CATARACT REMOVAL Right    
  with IOL  HX CHOLECYSTECTOMY      
 HX HEART CATHETERIZATION      
 HX LUMBAR FUSION   2012 and 2013  
  x 2  
 HX OTHER SURGICAL      
  cardioversion  
  
     
Family History Problem Relation Age of Onset  Diabetes Mother    
 Heart Disease Mother    
 No Known Problems Father    
 Pulmonary Embolism Sister    
  
      
Social History Substance Use Topics  Smoking status: Current Every Day Smoker  
    Packs/day: 1.00  
    Years: 40.00  Smokeless tobacco: Never Used  
      Comment: currently cutting down  Alcohol use No  
  
  
ROS:  A comprehensive review of systems was performed with the pertinent positives and negatives as noted in the HPI in addition to: 
  
Review of Systems Constitutional: Positive for malaise/fatigue. HENT: Negative. Eyes: Negative. Respiratory: Negative. Cardiovascular: Positive for palpitations. Gastrointestinal: Negative. Genitourinary: Negative. Musculoskeletal: Negative. Skin: Negative. Neurological: Negative. Endo/Heme/Allergies: Negative. Psychiatric/Behavioral: Negative.   
  
PHYSICAL EXAM: 
  
    
Visit Vitals  /64  Pulse 67  Ht 6' (1.829 m)  Wt 283 lb (128.4 kg)  BMI 38.38 kg/m2  
  
  
   
Wt Readings from Last 3 Encounters:  
10/03/18 283 lb (128.4 kg) 09/27/18 280 lb (127 kg) 09/22/18 276 lb (125.2 kg)  
  
   
BP Readings from Last 3 Encounters:  
10/03/18 124/64  
09/27/18 110/70  
09/22/18 148/79  
  
  
Gen: Well appearing, well developed, no acute distress Eyes: Pupils equal, round. Extraocular movements are intact ENT: Oropharynx clear, no oral lesions, normal dentition CV: S1S2, regular rate and rhythm, no murmurs, rubs or gallops, normal JVD, no carotid bruits, normal distal pulses, no TANVI Pulm: Clear to auscultation bilaterally, no accessory muscle uses, no wheezes or rales GI: Soft, NT, ND, +BS Neuro: Alert and oriented, nonfocal 
Psych: Appropriate affect Skin: Normal color and skin turgor MSK: Normal muscle bulk and tone 
  
Medical problems and test results were reviewed with the patient today.  
  
No results found for any visits on 10/03/18. 
  
     
Lab Results Component Value Date/Time  
  Potassium 4.2 09/22/2018 02:10 PM  
  Potassium, POC 3.8 02/27/2018 06:53 AM  
  
     
Lab Results Component Value Date/Time  
  Creatinine 1.63 (H) 09/22/2018 02:10 PM  
  
     
Lab Results Component Value Date/Time  
  HGB 15.0 09/22/2018 02:10 PM  
  
     
Lab Results Component Value Date/Time  
  INR 1.4 08/20/2018 03:57 AM  
  INR 1.3 08/19/2018 03:29 AM  
  INR 1.2 08/17/2018 11:52 AM  
  Prothrombin time 16.2 (H) 08/20/2018 03:57 AM  
  Prothrombin time 15.4 (H) 08/19/2018 03:29 AM  
  Prothrombin time 14.7 (H) 08/17/2018 11:52 AM

## 2018-12-05 NOTE — PROGRESS NOTES
Patient received to [address] [place] [address] room # 9  Ambulatory from [facility]. Patient scheduled for Fib Ablation today with Dr Mamta Morfin. Procedure reviewed & questions answered, voiced good understanding consent obtained & placed on chart. All medications and medical history reviewed. Will prep patient per orders. Patient & family updated on plan of care. The patient has a fraility score of 3-MANAGING WELL, based on independent of ADLs/ambulation. Increased symptoms with exertion.

## 2018-12-05 NOTE — PROGRESS NOTES
Dual admission skin assessment completed. Bilateral groin sites are clean, dry, and intact with some bruising noted- covered with gauze and tegaderm. Some healed scars seen. Sacrum and heels dry and intact. No other abnormalities noted.

## 2018-12-05 NOTE — ROUTINE PROCESS
TRANSFER - OUT REPORT: 
 
Verbal report given to [address] [place] [address] on [address]  being transferred to [facility] routine post - op Report consisted of patients Situation, Background, Assessment and  
Recommendations(SBAR). Information from the following report(s) Procedure Summary and Cardiac Rhythm Sinus Bradycardia with First Degree AV Block was reviewed with the receiving nurse. Lines:  
Peripheral IV 12/05/18 Inferior;Left;Lower; Posterior Arm (Active) Site Assessment Clean, dry, & intact 12/5/2018  1:13 PM  
Phlebitis Assessment 0 12/5/2018  1:13 PM  
Infiltration Assessment 0 12/5/2018  1:13 PM  
Dressing Status Clean, dry, & intact 12/5/2018  1:13 PM  
Dressing Type Transparent;Tape 12/5/2018  1:13 PM  
Hub Color/Line Status Pink; Infusing 12/5/2018  1:13 PM  
Alcohol Cap Used No 12/5/2018  1:13 PM  
   
Peripheral IV 12/05/18 Anterior; Inferior; Lower;Right Arm (Active) Site Assessment Clean, dry, & intact 12/5/2018  1:13 PM  
Phlebitis Assessment 0 12/5/2018  1:13 PM  
Infiltration Assessment 0 12/5/2018  1:13 PM  
Dressing Status Clean, dry, & intact 12/5/2018  1:13 PM  
Dressing Type Transparent;Tape 12/5/2018  1:13 PM  
Hub Color/Line Status Pink; Infusing 12/5/2018  1:13 PM  
Alcohol Cap Used No 12/5/2018  1:13 PM  
  
 
Opportunity for questions and clarification was provided. Patient transported with: 
 Tech 
 
VTE prophylaxis orders have not been written for [address] [place], [place] [address]s Yang. Patient and family given floor number and nurses name. Family updated re: pt status after security code verified.

## 2018-12-05 NOTE — ANESTHESIA PROCEDURE NOTES
Arterial Line Placement Start time: 12/5/2018 8:00 AM 
End time: 12/5/2018 8:10 AM 
Performed by: Omar Shannon MD 
Authorized by: Omar Shannon MD  
 
Pre-Procedure Indications:  Arterial pressure monitoring Preanesthetic Checklist: patient identified, risks and benefits discussed, anesthesia consent, patient being monitored, timeout performed and patient being monitored Timeout Time: 08:00 Procedure:  
Prep:  Chlorhexidine Orientation:  Left Location:  Radial artery Catheter size:  20 G Number of attempts:  3 or more Cont Cardiac Output Sensor: No   
 
Assessment:  
Post-procedure:  Line secured and sterile dressing applied Patient Tolerance:  Patient tolerated the procedure well with no immediate complications Comment:  
Ultrasound guided placement. No adverse sequelae noted.

## 2018-12-05 NOTE — PROGRESS NOTES
Bedside and Verbal shift change report given to Nela Melgar RN (oncoming nurse) by self Donnell Vickers nurse). Report included the following information SBAR, Kardex, Procedure Summary, MAR and Recent Results. Bilateral groin sites visualized with offgoing/oncoming RN.

## 2018-12-05 NOTE — PROCEDURES
Attending: Delfino Khanna. Abby Santiago MD    Referring: Matilda Perez MD    Pre-Electrophysiology Diagnosis  1. Atrial fibrillation  2. Atrial flutter     Procedure Performed  1. EPS afib ablation/pulmonary vein isolation  2. Left atrial pacing recording from the coronary sinus. 3. 3-D Electroanatomical mapping  4. Intracardiac echo  5. Transesophageal echo  6. Drug infusion    Anesthesia: General     Estimated Blood Loss: Less than 50 cc     Specimens: * No specimens in log *    Antibiotics: None    Fluroscopy time: 11.3 minutes    Fluoroscopy dose: 543.0 mGy    Complications: None      Procedure in Detail:  The patient was brought to the electrophysiology lab in the fasting state. The patient was intubated by anesthesiology and an esophageal temperature probe inserted and advanced to a location directly posterior to the LA at the level of the pulmonary veins. The esophageal temperature probe was repositioned throughout the case to a location as close the the ablation catheter as possible. If the esophageal temperature increased 0.5 degrees Celsius ablation was stopped and high flush performed until the temperature returned to baseline. A tranesophageal echocardiogram was performed directly prior to the procedure and was negative for a THERESA thrombus (see full report in chart). A Ref-Star CARTO patch was placed, the patient was then prepped and draped in sterile fashion. Access:  Venous access was obtained under ultrasound guidance x4 using modified Seldinger technique, with placement of three 8Fr short sidearm sheaths into the right femoral vein and placement of a 10Fr sheath into the left femoral vein. Two of the RFV short 8Fr sheaths were exchanged over long wires for an 8.5Fr SL-1 sheath and an 8.5Fr medium curve Agilis sheaths. These sheaths were advanced into the right atrium under fluoroscopic and ICE guidance. The patient presented to the EP lab in normal sinus rhythm.  An intra-cardiac echo probe was prepped, and then inserted into an 10 Fr short sheath and used to localize the fossa ovalis and create 3D LA and pulmonary vein anatomy utilizing [facility]. A baseline echo study demonstrated grossly normal LV function and no significant pericardial effusion. The fossa ovalis was noted to be more superiorly directed that usual and a floppy septum was noted. A Biosense Imner Pentaray catheter was then inserted into an 8.5 SL1 Fr sheath and used to create a 3D electroanatomical map of the right atrium, including attempts at His localization and the os of the CS. Then a Smart Touch porous irrigated BW 3.5mm ablation catheter was used to map out the body of the CS. A decapolar Biosense Miner CS catheter was inserted via an 8Fr sheath and positioned in the mid coronary sinus. Next, a trans-septal needle was inserted into the SL1 sheath and was used to perform a trans-septal puncture with assistance from ICE (intra-cardiac echo) as well as the [address] [place] [address],[address] map and the right atrial impedence map. The SL1 sheath was advanced into the LA. Total weight based heparin bolus was administered just after transeptal access, and systemic blood pressure monitored invasively. The patient was then placed on our heparin weight based nomogram for targeted ACT of 300-350 during the ablation procedure. A second trans-septal access was obtained with the ablation catheter by use of the errol wire technique. The ablation catheter and Agilis sheath were then carefully advanced into the LA. The Pentaray catheter was then inserted into the LA via the SL-1 sheath and was used to map pulmonary vein potentials and target ablation. An 8 Fr, 3.5mm tip saline irrigated bidirectional D/F curve Thermo-cool SmartTouch catheter was used for RF ablation and ablation was performed at 30-35W unless ablation was in the proximity of the esophagus where ablation was performed at 20-25W.  The esophagus was noted to be closest to the right pulmonary veins. Antral pulmonary vein isolation was then performed for all 4 pulmonary veins. Entrance and exit block was demonstrated by left atrial pacing and within the pulmonary veins. Lack of any conducted atrial EGMs into the pulmonary veins was documented. Prior to ablation of the right antral pulmonary veins, the ablation catheter was used to pace at high output to try to localize the right phrenic nerve and map its location prior to ablation. Repeat ablation of the right posteroinferior wall was required to fully isolate the RIPV. Adenosine was injected (12 mg) to demonstrate the lack of early reconnection with the Pentaray in the PVs. There was no early reconnection with adenosine. All catheters were then removed from the LA. Cavotricuspid Isthmus ablation (right atrial flutter)  The patient had a previous flutter ablation. The local electrogram activation sequence, differential pacing maneuvers and electrogram timing was used to demonstrate bidirectional block along the cavotricuspid isthmus. Post-Ablation trans-isthmus time: 160 msec  Local double potentials: 110 msec    Next, baseline recordings and a diagnostic EP study was performed. The coronary sinus multipolar catheter was used to pace the left atrium during the EP study. The LA CS electrograms were documented and interpreted during the procedure. A comprehensive EP study was performed with 1:1 AV decremental pacing, atrial extrastimuli and ventricular pacing to assess retrograde conduction. The patient did not have sustained slow pathway conduction or evidence of an accessory pathway. Ventricular pacing revealed retrograde AV conduction which was concentric and decremental. The patient was noted to have mild AV vladimir disease. At the completion of the ablation and EPS, all catheters were removed, 60mg Protamine was administered and sheaths were pulled. Hemostasis was achieved by manual pressure.  The patient tolerated the procedure well with no acute complications recognized. The patient left the EP lab in stable condition, in normal sinus rhythm. Just prior to pulling shealths, the ICE catheter was used to obtain ultrasound images and revealed no evidence of pericardial effusion. Baseline Intervals:  AH interval: 140 msec  HV interval: 43 msec  UT interval: 219 msec  QRS duration: 93 msec  QT interval: 502 msec  RR interval: 1224 msec      EP Study  AV Wenchebach: 510 msec  AV vladimir ERP: < or equal to 600/480 msec  VA [place]: 550 msec    Figures 1 and 2. Anterior and posterior 3D electroanatomic voltage maps using the CARTO 3D mapping system. Plan of care: The patient will be placed in observation on telemetry, 5 hour flat time, followed by ambulation as tolerated and will continue anticoagulation as prescribed pre-procedure. Impression:   1. Successful pulmonary vein isolation (PVAI) of the 4 pulmonary veins. 2.  The previous flutter line demonstrated bidirectional block  3. Comprehensive EP study with intra-atrial and infrahissian conduction times. There was mild AV vladimir disease. Plan:   1. Continue OAC (warfarin) indefinitely until EP follow up. 2.  Continue AAD (sotalol) for at least 4-6 weeks and stop if no AF. 3.  Family updated with plan. 4.  Routine cardiology follow up with Dr. Christopher Perkins. 5.  Patient and family updated about small risk of atrioesophageal fistula and need for emergent ED evaluation if any concerning symptoms arise. Neal Sanders.  Mirella COTA, MS  Clinical Cardiac Electrophysiology  12/5/2018  11:45 AM

## 2018-12-05 NOTE — ANESTHESIA PREPROCEDURE EVALUATION
Anesthetic History No history of anesthetic complications Review of Systems / Medical History Patient summary reviewed and pertinent labs reviewed Pulmonary Sleep apnea: CPAP Smoker Neuro/Psych Headaches Cardiovascular Hypertension: well controlled CHF Dysrhythmias : atrial fibrillation CAD Exercise tolerance: <4 METS: Limited by back/leg pain; also worse now with a fib Comments: Ef 50% GI/Hepatic/Renal 
  
 
 
Renal disease: ARF 
PUD Endo/Other Diabetes: well controlled, type 2, using insulin Morbid obesity Other Findings Comments: RLS Necrotizing fascitis--from steroid shot left arm Pancreatitis; required dialysis for 5 months 6/17-11/17 Physical Exam 
 
Airway Mallampati: I 
TM Distance: 4 - 6 cm Neck ROM: normal range of motion Mouth opening: Normal 
 
 Cardiovascular Regular rate and rhythm,  S1 and S2 normal,  no murmur, click, rub, or gallop Rhythm: regular Rate: normal 
 
 
 
 Dental 
 
Dentition: Edentulous Comments: Recently had teeth extracted on bottom; has stitches in place Pulmonary Breath sounds clear to auscultation Abdominal 
GI exam deferred Other Findings Anesthetic Plan ASA: 3 Anesthesia type: general 
 
Monitoring Plan: Arterial line Induction: Intravenous Anesthetic plan and risks discussed with: Patient

## 2018-12-05 NOTE — ANESTHESIA POSTPROCEDURE EVALUATION
Procedure(s): AFIB  ABLATION. Anesthesia Post Evaluation Multimodal analgesia: multimodal analgesia used between 6 hours prior to anesthesia start to PACU discharge Patient location during evaluation: bedside Patient participation: complete - patient participated Level of consciousness: awake Pain management: adequate Airway patency: patent Anesthetic complications: no 
Cardiovascular status: acceptable and stable Respiratory status: acceptable and room air Hydration status: acceptable Post anesthesia nausea and vomiting:  none Visit Vitals /72 Pulse (!) 51 Temp 36.5 °C (97.7 °F) Resp 16 Ht 6' (1.829 m) Wt 122.5 kg (270 lb) SpO2 96% BMI 36.62 kg/m²

## 2018-12-05 NOTE — PROGRESS NOTES
Warfarin dosing per pharmacist 
 
Kushal Tavares is a 52 y.o. male. Height: 6' (182.9 cm)    Weight: 125.6 kg (277 lb) Indication:  afib Goal INR:  2-3 Home dose:  7.5 mg (5 mg x 1.5) on Mon, Fri; 5 mg (5 mg x 1) all other days Risk factors or significant drug interactions:  n/a Other anticoagulants:  n/a Daily Monitoring Date  INR     Warfarin dose HGB              Notes 12/5  1.6  (5mg)  13.1 Thank you, 
Clarita Damonr PharmD 
[phone]

## 2018-12-05 NOTE — PROGRESS NOTES
TRANSFER - IN REPORT: 
 
Verbal report received from Darrel Thompson RN on [id number]  being received from PACU for routine progression of care Report consisted of patients Situation, Background, Assessment and  
Recommendations(SBAR). Information from the following report(s) SBAR, Kardex, Procedure Summary, MAR and Recent Results was reviewed with the receiving nurse. Opportunity for questions and clarification was provided. Assessment completed upon patients arrival to unit and care assumed.

## 2018-12-05 NOTE — PROGRESS NOTES
Bedside and Verbal shift change report given to self (oncoming nurse) by Meaghan Sorensen RN (offgoing nurse). Report included the following information SBAR, Kardex, Procedure Summary, Intake/Output, MAR, Recent Results and Cardiac Rhythm SB 1st degree HB. BL groin sites visualized. CDI with gauze and tegaderm applied.

## 2018-12-06 VITALS
WEIGHT: 279.7 LBS | OXYGEN SATURATION: 91 % | SYSTOLIC BLOOD PRESSURE: 113 MMHG | HEIGHT: 72 IN | RESPIRATION RATE: 18 BRPM | BODY MASS INDEX: 37.88 KG/M2 | HEART RATE: 64 BPM | DIASTOLIC BLOOD PRESSURE: 50 MMHG | TEMPERATURE: 99.3 F

## 2018-12-06 LAB
ANION GAP SERPL CALC-SCNC: 7 MMOL/L (ref 7–16)
BUN SERPL-MCNC: 16 MG/DL (ref 6–23)
CALCIUM SERPL-MCNC: 8.4 MG/DL (ref 8.3–10.4)
CHLORIDE SERPL-SCNC: 102 MMOL/L (ref 98–107)
CO2 SERPL-SCNC: 28 MMOL/L (ref 21–32)
CREAT SERPL-MCNC: 1.31 MG/DL (ref 0.8–1.5)
ERYTHROCYTE [DISTWIDTH] IN BLOOD BY AUTOMATED COUNT: 15 % (ref 11.9–14.6)
GLUCOSE BLD STRIP.AUTO-MCNC: 217 MG/DL (ref 65–100)
GLUCOSE SERPL-MCNC: 196 MG/DL (ref 65–100)
HCT VFR BLD AUTO: 37.2 % (ref 41.1–50.3)
HGB BLD-MCNC: 12 G/DL (ref 13.6–17.2)
INR PPP: 1.8
MCH RBC QN AUTO: 27.6 PG (ref 26.1–32.9)
MCHC RBC AUTO-ENTMCNC: 32.3 G/DL (ref 31.4–35)
MCV RBC AUTO: 85.5 FL (ref 79.6–97.8)
NRBC # BLD: 0 K/UL (ref 0–0.2)
PLATELET # BLD AUTO: 128 K/UL (ref 150–450)
PMV BLD AUTO: 12 FL (ref 9.4–12.3)
POTASSIUM SERPL-SCNC: 3.5 MMOL/L (ref 3.5–5.1)
PROTHROMBIN TIME: 20.9 SEC (ref 11.7–14.5)
RBC # BLD AUTO: 4.35 M/UL (ref 4.23–5.6)
SODIUM SERPL-SCNC: 137 MMOL/L (ref 136–145)
WBC # BLD AUTO: 11.6 K/UL (ref 4.3–11.1)

## 2018-12-06 PROCEDURE — 80048 BASIC METABOLIC PNL TOTAL CA: CPT

## 2018-12-06 PROCEDURE — 85027 COMPLETE CBC AUTOMATED: CPT

## 2018-12-06 PROCEDURE — 82962 GLUCOSE BLOOD TEST: CPT

## 2018-12-06 PROCEDURE — 36415 COLL VENOUS BLD VENIPUNCTURE: CPT

## 2018-12-06 PROCEDURE — 74011636637 HC RX REV CODE- 636/637: Performed by: INTERNAL MEDICINE

## 2018-12-06 PROCEDURE — 99218 HC RM OBSERVATION: CPT

## 2018-12-06 PROCEDURE — 85610 PROTHROMBIN TIME: CPT

## 2018-12-06 PROCEDURE — 74011250637 HC RX REV CODE- 250/637: Performed by: INTERNAL MEDICINE

## 2018-12-06 RX ORDER — SUCRALFATE 1 G/1
1 TABLET ORAL
Status: DISCONTINUED | OUTPATIENT
Start: 2018-12-06 | End: 2018-12-06 | Stop reason: HOSPADM

## 2018-12-06 RX ORDER — SUCRALFATE 1 G/1
1 TABLET ORAL
Qty: 120 TAB | Refills: 0 | Status: SHIPPED | OUTPATIENT
Start: 2018-12-06 | End: 2019-01-05

## 2018-12-06 RX ORDER — PANTOPRAZOLE SODIUM 40 MG/1
40 TABLET, DELAYED RELEASE ORAL
Qty: 60 TAB | Refills: 0 | Status: SHIPPED | OUTPATIENT
Start: 2018-12-06 | End: 2019-01-05

## 2018-12-06 RX ADMIN — SERTRALINE 150 MG: 100 TABLET, FILM COATED ORAL at 08:36

## 2018-12-06 RX ADMIN — FUROSEMIDE 40 MG: 40 TABLET ORAL at 08:36

## 2018-12-06 RX ADMIN — INSULIN LISPRO 4 UNITS: 100 INJECTION, SOLUTION INTRAVENOUS; SUBCUTANEOUS at 07:30

## 2018-12-06 RX ADMIN — SOTALOL HYDROCHLORIDE 160 MG: 80 TABLET ORAL at 08:36

## 2018-12-06 RX ADMIN — Medication 10 ML: at 05:17

## 2018-12-06 RX ADMIN — PREGABALIN 100 MG: 50 CAPSULE ORAL at 08:36

## 2018-12-06 RX ADMIN — PANTOPRAZOLE SODIUM 40 MG: 40 TABLET, DELAYED RELEASE ORAL at 08:36

## 2018-12-06 RX ADMIN — OXYCODONE HYDROCHLORIDE 15 MG: 5 TABLET ORAL at 08:36

## 2018-12-06 RX ADMIN — HYDROCODONE BITARTRATE AND ACETAMINOPHEN 1 TABLET: 5; 325 TABLET ORAL at 05:26

## 2018-12-06 NOTE — DISCHARGE SUMMARY
[facility] Cardiology Discharge Summary     Patient ID:  Rip Hernandez  [id number]  28 y.o.  1965    Admit date: 12/5/2018    Discharge date:  12/6/2018    Admitting Physician: Jeramy Brooks MD     Discharge Physician: ANA Serrano/Dr. Chiara Manrique    Admission Diagnoses: Paroxysmal atrial fibrillation [facility]) [I48.0]    Discharge Diagnoses:    Diagnosis    Atrial fibrillation and flutter ([facility]    Type 2 diabetes with nephropathy ([facility]    Severe obesity (BMI 35.0-39. 9)    Atrial flutter ([facility]    Non-rheumatic mitral regurgitation    Diastolic CHF, acute on chronic (HCC)    Tobacco dependence    Restless leg syndrome    Chest pain, precordial    Obesity and other hyperalimentation    Unstable angina (HCC)    HTN (hypertension)    Atrial fibrillation (HCC)    Diabetes mellitus ([facility]    WILBERTO on CPAP    Dyslipidemia       Cardiology Procedures this admission:  A fib ablation   Consults: None    Hospital Course: Patient was seen at the office of [facility] Cardiology by Dr. Abby Santiago for management of a fib/flutter and was scheduled for an AM Admission AF ablation at [facility] on 12/5/18. INR was 1.6. Patient underwent ablation by Dr. Abby Santiago. Patient tolerated the procedure well and was taken to the telemetry floor for recovery. Echo showed EF 55-60%, inadequate for the evaluation of regional wall motion, there was an atrial septal aneurysm with probable patent foramen ovale, functionally bicuspid AV with mild sclerosis and mild MR. The morning of discharge, patient was up feeling well without any complaints of chest pain or shortness of breath. Patient's bilateral femoral cath sites were clean, dry and intact without hematoma or bruit. Patient was seen and examined by Dr. Chiara Manrique and determined stable and ready for discharge.  Patient was instructed on the importance of medication compliance including taking aspirin, Carafate, PPI and Coumadin everyday without missing a dose. The patient will follow up with [facility] -- Dr. Diane Cota in 2-4 weeks    DISPOSITION: The patient is being discharged home in stable condition on a low saturated fat, low cholesterol and low salt diet. The patient is instructed to advance activities as tolerated to the limit of fatigue or shortness of breath. The patient is instructed to avoid all heavy lifting, straining, stooping or squatting for 3-5 days. The patient is instructed to watch the cath site for bleeding/oozing; if seen, the patient is instructed to apply firm pressure with a clean cloth and call [facility] at [phone]. The patient is instructed to watch for signs of infection which include: increasing area of redness, fever/hot to touch or purulent drainage at the catheterization site. The patient is instructed not to soak in a bathtub for 7-10 days, but is cleared to shower. Discharge Exam:   Visit Vitals  /50   Pulse 64   Temp 99.3 °F (37.4 °C)   Resp 18   Ht 6' (1.829 m)   Wt 126.9 kg (279 lb 11.2 oz)   SpO2 91%   BMI 37.93 kg/m²     Patient has been seen by Dr. Renan Suarez: see his progress note for exam details.     Recent Results (from the past 24 hour(s))   CBC W/O DIFF    Collection Time: 12/05/18  6:41 AM   Result Value Ref Range    WBC 8.6 4.3 - 11.1 K/uL    RBC 4.74 4.23 - 5.6 M/uL    HGB 13.1 (L) 13.6 - 17.2 g/dL    HCT 41.2 41.1 - 50.3 %    MCV 86.9 79.6 - 97.8 FL    MCH 27.6 26.1 - 32.9 PG    MCHC 31.8 31.4 - 35.0 g/dL    RDW 15.2 (H) 11.9 - 14.6 %    PLATELET 736 915 - 579 K/uL    MPV 11.5 9.4 - 12.3 FL    ABSOLUTE NRBC 0.00 0.0 - 0.2 K/uL   METABOLIC PANEL, BASIC    Collection Time: 12/05/18  6:41 AM   Result Value Ref Range    Sodium 139 136 - 145 mmol/L    Potassium 3.8 3.5 - 5.1 mmol/L    Chloride 103 98 - 107 mmol/L    CO2 28 21 - 32 mmol/L    Anion gap 8 7 - 16 mmol/L    Glucose 204 (H) 65 - 100 mg/dL    BUN 16 6 - 23 MG/DL    Creatinine 1.60 (H) 0.8 - 1.5 MG/DL    GFR est AA 58 (L) >60 ml/min/1.73m2    GFR est non-AA 48 (L) >60 ml/min/1.73m2    Calcium 8.8 8.3 - 10.4 MG/DL   PROTHROMBIN TIME + INR    Collection Time: 12/05/18  6:41 AM   Result Value Ref Range    Prothrombin time 18.7 (H) 11.7 - 14.5 sec    INR 1.6     MAGNESIUM    Collection Time: 12/05/18  6:41 AM   Result Value Ref Range    Magnesium 2.2 1.8 - 2.4 mg/dL   HEMOGLOBIN A1C WITH EAG    Collection Time: 12/05/18  6:41 AM   Result Value Ref Range    Hemoglobin A1c 9.8 (H) 4.8 - 6.0 %    Est. average glucose 235 mg/dL   EKG, 12 LEAD, INITIAL    Collection Time: 12/05/18  7:15 AM   Result Value Ref Range    Ventricular Rate 56 BPM    Atrial Rate 56 BPM    P-R Interval 196 ms    QRS Duration 82 ms    Q-T Interval 432 ms    QTC Calculation (Bezet) 416 ms    Calculated P Axis -6 degrees    Calculated R Axis 44 degrees    Calculated T Axis 17 degrees    Diagnosis       Sinus bradycardia  Nonspecific T wave abnormality  Abnormal ECG  When compared with ECG of 17-OCT-2018 06:35,  No significant change was found  Confirmed by HECTOR COTA (), MAHI FLORES ([id number]) on 12/5/2018 11:23:47 AM     POC ACTIVATED CLOTTING TIME    Collection Time: 12/05/18  9:38 AM   Result Value Ref Range    Activated Clotting Time (POC) 384 (H) 70 - 128 SECS   POC ACTIVATED CLOTTING TIME    Collection Time: 12/05/18 10:14 AM   Result Value Ref Range    Activated Clotting Time (POC) 367 (H) 70 - 128 SECS   POC ACTIVATED CLOTTING TIME    Collection Time: 12/05/18 10:51 AM   Result Value Ref Range    Activated Clotting Time (POC) 373 (H) 70 - 128 SECS   POC ACTIVATED CLOTTING TIME    Collection Time: 12/05/18 11:37 AM   Result Value Ref Range    Activated Clotting Time (POC) 235 (H) 70 - 128 SECS   POC ACTIVATED CLOTTING TIME    Collection Time: 12/05/18 11:58 AM   Result Value Ref Range    Activated Clotting Time (POC) 202 (H) 70 - 128 SECS   GLUCOSE, POC    Collection Time: 12/05/18 12:43 PM   Result Value Ref Range    Glucose (POC) 162 (H) 65 - 100 mg/dL   EKG, 12 LEAD, INITIAL    Collection Time: 12/05/18  2:21 PM   Result Value Ref Range    Ventricular Rate 55 BPM    Atrial Rate 55 BPM    P-R Interval 210 ms    QRS Duration 94 ms    Q-T Interval 460 ms    QTC Calculation (Bezet) 440 ms    Calculated P Axis 58 degrees    Calculated R Axis 63 degrees    Calculated T Axis 32 degrees    Diagnosis       Sinus bradycardia with 1st degree A-V block  Nonspecific T wave abnormality  Abnormal ECG  When compared with ECG of 05-DEC-2018 07:15,  No significant change was found  Confirmed by [facility]  MD ()PANFILO ([id number]) on 12/5/2018 3:58:20 PM     GLUCOSE, POC    Collection Time: 12/05/18  9:45 PM   Result Value Ref Range    Glucose (POC) 258 (H) 65 - 100 mg/dL   PROTHROMBIN TIME + INR    Collection Time: 12/06/18  4:17 AM   Result Value Ref Range    Prothrombin time 20.9 (H) 11.7 - 14.5 sec    INR 1.8     CBC W/O DIFF    Collection Time: 12/06/18  4:17 AM   Result Value Ref Range    WBC 11.6 (H) 4.3 - 11.1 K/uL    RBC 4.35 4.23 - 5.6 M/uL    HGB 12.0 (L) 13.6 - 17.2 g/dL    HCT 37.2 (L) 41.1 - 50.3 %    MCV 85.5 79.6 - 97.8 FL    MCH 27.6 26.1 - 32.9 PG    MCHC 32.3 31.4 - 35.0 g/dL    RDW 15.0 (H) 11.9 - 14.6 %    PLATELET 561 (L) 554 - 450 K/uL    MPV 12.0 9.4 - 12.3 FL    ABSOLUTE NRBC 0.00 0.0 - 0.2 K/uL   METABOLIC PANEL, BASIC    Collection Time: 12/06/18  4:17 AM   Result Value Ref Range    Sodium 137 136 - 145 mmol/L    Potassium 3.5 3.5 - 5.1 mmol/L    Chloride 102 98 - 107 mmol/L    CO2 28 21 - 32 mmol/L    Anion gap 7 7 - 16 mmol/L    Glucose 196 (H) 65 - 100 mg/dL    BUN 16 6 - 23 MG/DL    Creatinine 1.31 0.8 - 1.5 MG/DL    GFR est AA >60 >60 ml/min/1.73m2    GFR est non-AA >60 >60 ml/min/1.73m2    Calcium 8.4 8.3 - 10.4 MG/DL   GLUCOSE, POC    Collection Time: 12/06/18  6:08 AM   Result Value Ref Range    Glucose (POC) 217 (H) 65 - 100 mg/dL         Patient Instructions:   Current Discharge Medication List      START taking these medications    Details sucralfate (CARAFATE) 1 gram tablet Take 1 Tab by mouth Before breakfast, lunch, dinner and at bedtime for 30 days. Qty: 120 Tab, Refills: 0         CONTINUE these medications which have CHANGED    Details   pantoprazole (PROTONIX) 40 mg tablet Take 1 Tab by mouth Before breakfast and dinner for 30 days. Qty: 60 Tab, Refills: 0         CONTINUE these medications which have NOT CHANGED    Details   insulin aspart U-100 (NOVOLOG FLEXPEN U-100 INSULIN) 100 unit/mL inpn by SubCUTAneous route Before breakfast, lunch, dinner and at bedtime. Sliding scale with meals      insulin glargine (LANTUS U-100 INSULIN) 100 unit/mL injection 60 Units by SubCUTAneous route two (2) times a day. pregabalin (LYRICA) 100 mg capsule Take 100 mg by mouth three (3) times daily. Indications: Diabetic Peripheral Neuropathy      sertraline (ZOLOFT) 100 mg tablet Take 150 mg by mouth daily. oxyCODONE IR (OXY-IR) 15 mg immediate release tablet Take 15 mg by mouth three (3) times daily. warfarin (COUMADIN) 7.5 mg tablet Take 7.5 mg by mouth every Monday, Wednesday, Friday. amylase-lipase-protease (CREON) 24,000-76,000 -120,000 unit capsule Take 1 Cap by mouth three (3) times daily (with meals). Indications: exocrine pancreatic insufficiency      sotalol (BETAPACE) 160 mg tablet Take 1 Tab by mouth every twelve (12) hours. Qty: 60 Tab, Refills: 3    Associated Diagnoses: Paroxysmal atrial fibrillation ([address] Typical atrial flutter (HCC)      colestipol (COLESTID) 1 gram tablet Take 1 g by mouth two (2) times a day. Indications: hypercholesterolemia      furosemide (LASIX) 40 mg tablet Take 1 Tab by mouth daily. Qty: 90 Tab, Refills: 11    Associated Diagnoses: Diastolic CHF, acute on chronic (HCC)      atorvastatin (LIPITOR) 40 mg tablet Take 1 Tab by mouth nightly. Qty: 30 Tab, Refills: 3      cetirizine (ZYRTEC) 10 mg tablet Take 10 mg by mouth nightly. Indications:  Allergic Rhinitis      cyclobenzaprine (FLEXERIL) 10 mg tablet Take 1 Tab by mouth three (3) times daily as needed for Muscle Spasm(s). Qty: 15 Tab, Refills: 0      warfarin (COUMADIN) 5 mg tablet Take 5 mg by mouth every Tuesday, Thursday, Saturday & Sunday. budesonide-formoterol (SYMBICORT) 80-4.5 mcg/actuation HFAA inhaler Take 2 Puffs by inhalation two (2) times daily as needed. Indications: BRONCHOSPASM PREVENTION WITH COPD, bring on the dos      albuterol (PROVENTIL HFA, VENTOLIN HFA, PROAIR HFA) 90 mcg/actuation inhaler Take 2 Puffs by inhalation every six (6) hours as needed. Indications: BRONCHOSPASM PREVENTION, bring on the dos      ipratropium-albuterol (COMBIVENT)  mcg/actuation inhaler Take 1 Puff by inhalation every six (6) hours as needed.          STOP taking these medications       digoxin (LANOXIN) 0.125 mg tablet Comments:   Reason for Stopping:             Dr. Portia Vargas

## 2018-12-06 NOTE — PROGRESS NOTES
Problem: Afib Pathway: Day 1 Goal: Activity/Safety Outcome: Progressing Towards Goal 
Pt has been told to call for assistance. Pt is on a bed alarm. Goal: Diagnostic Test/Procedures Outcome: Progressing Towards Goal 
Hgb A1C checked - 9.8 Goal: Nutrition/Diet Outcome: Progressing Towards Goal 
Pt has had a good appetite. Educating on Diabetic diet management.

## 2018-12-06 NOTE — PROGRESS NOTES
Bedside and Verbal shift change report given to [facility], RN (oncoming nurse) by self (offgoing nurse). Report included the following information SBAR, Kardex, Intake/Output, MAR and Cardiac Rhythm NSR. BL groin sites cdi

## 2018-12-06 NOTE — PROGRESS NOTES
Bedside and verbal report [facility]. BL groin sites visualized and are clean, dry, and intact.

## 2018-12-06 NOTE — DISCHARGE INSTRUCTIONS
Catheter Ablation Discharge Instructions    *Check the puncture site frequently for swelling or bleeding. If you see any bleeding, lie down and apply pressure over the area with a clean town or washcloth. Notify your doctor for any redness, swelling, drainage or oozing from the puncture site. Notify your doctor for any fever or chills. *If the leg with the puncture becomes cold, numb or painful, call [facility] Cardiology at [phone]    *Activity should be limited for the next 48 hours. Climb stairs as little as possible and avoid any stooping, bending or strenuous activity for 48 hours. No heavy lifting (anything over 10 pounds) for three days. *Do not drive for 48 hours. *You may resume your usual diet. Drink more fluids than usual.    *Have a responsible person drive you home and stay with you for at least 24 hours after your heart catheterization/angiography. *You may remove the bandage from your Right and Left groins in 24 hours. You may shower in 24 hours. No tub baths, hot tubs or swimming for one week. Do not place any lotions, creams, powders, ointments over the puncture site for one week. You may place a clean band-aid over the puncture site each day for 5 days. Change this daily. Learning About Catheter Ablation for Heart Rhythm Problems  What is catheter ablation? Catheter ablation is a procedure that treats heart rhythm problems. These problems include atrial fibrillation, supraventricular tachycardia (SVT), atrial flutter, and ventricular tachycardia. Your heart should have a strong, steady beat. That beat is controlled by the heart's electrical system. Sometimes that system misfires. This causes a heartbeat that is too fast and isn't steady. Catheter ablation is a way to get into your heart and fix the problem. Ablation is not surgery. How is catheter ablation done? Your doctor inserts thin tubes called catheters into a blood vessel in your groin, arm, or neck. Then your doctor feeds them into the heart. Wires in the catheters help the doctor find the problem areas. Then he or she uses the wires to send energy to destroy the tiny areas of heart tissue that are causing the problems. It may seem like a bad idea to destroy parts of your heart on purpose. But the areas that are destroyed are very tiny. They should not affect your heart's ability to do its job. You may be awake during the procedure. Or you may be asleep. The doctor will give you medicines to help you feel relaxed and to numb the areas where the catheters go in. You may feel a little uncomfortable, but you should not feel pain. This procedure usually takes 2 to 6 hours. In rare cases, it can take longer. You may stay overnight in the hospital. How long you stay in the hospital depends on the type of ablation you have. What can you expect after catheter ablation? Do not exercise hard or lift anything heavy for a week. You will probably be able to go back to work and to your normal routine in 1 or 2 days. You may have swelling, bruising, or a small lump around the site where the catheters went into your body. These should go away in 3 to 4 weeks. You may have to take some medicines for a while. Follow-up care is a key part of your treatment and safety. Be sure to make and go to all appointments, and call your doctor if you are having problems. It's also a good idea to know your test results and keep a list of the medicines you take. Where can you learn more? Go to http://pebbles-royer.info/. Enter O245 in the search box to learn more about \"Learning About Catheter Ablation for Heart Rhythm Problems. \"  Current as of: December 6, 2017  Content Version: 11.8  © 6088-8350 [employer]. Care instructions adapted under license by [employer] (which disclaims liability or warranty for this information).  If you have questions about a medical condition or this instruction, always ask your healthcare professional. [place] [address] any warranty or liability for your use of this information.

## 2018-12-06 NOTE — PROGRESS NOTES
Discharge instructions reviewed with patient. Prescriptions given for Carafate and protonix - med info sheets provided for all new medications. Opportunity for questions provided. Patient voiced understanding of all discharge instructions. IV(s) and heart monitor removed by primary RN.

## 2018-12-06 NOTE — PROGRESS NOTES
[facility] CARDIOLOGY PROGRESS NOTE 
      
 
12/6/2018 6:42 AM 
 
Admit Date: 12/5/2018 Admit Diagnosis: Paroxysmal atrial fibrillation ([facility] [address] [I48.0] Subjective: No complaints this AM, no chest pain or shortness of breath Interval History: (History of pertinent interval events obtained from nursing staff) S/p cardiac ablation yesterday without immediate complications ROS: 
GEN:  No fever or chills Cardiovascular:  As noted above Pulmonary:  As noted above Neuro:  No new focal motor or sensory loss Objective:  
 
Vitals:  
 12/05/18 2029 12/05/18 2204 12/06/18 0058 12/06/18 0507 BP:  155/71 148/88 113/50 Pulse:  (!) 58 65 64 Resp:  18 18 18 Temp:  97.6 °F (36.4 °C) 97.8 °F (36.6 °C) 99.3 °F (37.4 °C) SpO2: 95% 97% 96% 91% Weight:    126.9 kg (279 lb 11.2 oz) Height:      
 
 
Physical Exam: 
Arash Sandoval, Well Nourished, No Acute Distress, Alert & Oriented x 3, appropriate mood. Neck- supple, no JVD 
CV- regular rate and rhythm no MRG Lung- clear bilaterally Abd- soft, nontender, nondistended Ext- no edema bilaterally, B/L femoral access sites without hematoma or bruits Skin- warm and dry Current Facility-Administered Medications Medication Dose Route Frequency  lipase-protease-amylase (ZENPEP 20,000) capsule 1 Cap  1 Cap Oral TID WITH MEALS  
 atorvastatin (LIPITOR) tablet 40 mg  40 mg Oral QHS  cyclobenzaprine (FLEXERIL) tablet 10 mg  10 mg Oral TID PRN  
 furosemide (LASIX) tablet 40 mg  40 mg Oral DAILY  oxyCODONE IR (ROXICODONE) tablet 15 mg  15 mg Oral TID  pregabalin (LYRICA) capsule 100 mg  100 mg Oral TID  sertraline (ZOLOFT) tablet 150 mg  150 mg Oral DAILY  sotalol (BETAPACE) tablet 160 mg  160 mg Oral Q12H  
 sodium chloride (NS) flush 5-10 mL  5-10 mL IntraVENous Q8H  
 sodium chloride (NS) flush 5-10 mL  5-10 mL IntraVENous PRN  
 acetaminophen (TYLENOL) tablet 650 mg  650 mg Oral Q4H PRN  
  HYDROcodone-acetaminophen (NORCO) 5-325 mg per tablet 1 Tab  1 Tab Oral Q4H PRN  
 HYDROmorphone (PF) (DILAUDID) injection 1 mg  1 mg IntraVENous Q4H PRN  
 ondansetron (ZOFRAN) injection 4 mg  4 mg IntraVENous Q4H PRN  
 docusate sodium (COLACE) capsule 100 mg  100 mg Oral BID PRN  
 sucralfate (CARAFATE) 100 mg/mL oral suspension 1 g  1 g Oral TIDAC  insulin glargine (LANTUS) injection 25 Units  0.2 Units/kg SubCUTAneous QHS  insulin lispro (HUMALOG) injection 6 Units  0.05 Units/kg SubCUTAneous TID WITH MEALS  dextrose 40% (GLUTOSE) oral gel 1 Tube  15 g Oral PRN  
 glucagon (GLUCAGEN) injection 1 mg  1 mg IntraMUSCular PRN  
 dextrose (D50W) injection syrg 12.5-25 g  25-50 mL IntraVENous PRN  
 insulin lispro (HUMALOG) injection   SubCUTAneous TIDAC  albuterol (PROVENTIL VENTOLIN) nebulizer solution 2.5 mg  2.5 mg Nebulization Q6H PRN  
 budesonide (PULMICORT) 500 mcg/2 ml nebulizer suspension  500 mcg Nebulization BID RT And  
 albuterol (PROVENTIL VENTOLIN) nebulizer solution 2.5 mg  2.5 mg Nebulization Q6HWA RT  
 pantoprazole (PROTONIX) tablet 40 mg  40 mg Oral ACB&D  [START ON 12/7/2018] warfarin (COUMADIN) tablet 7.5 mg  7.5 mg Oral EVERY MON & FRI  warfarin (COUMADIN) tablet 5 mg  5 mg Oral Once per day on Sun Tue Wed Thu Sat Data Review:  
Recent Results (from the past 24 hour(s)) EKG, 12 LEAD, INITIAL Collection Time: 12/05/18  7:15 AM  
Result Value Ref Range Ventricular Rate 56 BPM  
 Atrial Rate 56 BPM  
 P-R Interval 196 ms QRS Duration 82 ms Q-T Interval 432 ms QTC Calculation (Bezet) 416 ms Calculated P Axis -6 degrees Calculated R Axis 44 degrees Calculated T Axis 17 degrees Diagnosis Sinus bradycardia Nonspecific T wave abnormality Abnormal ECG When compared with ECG of 17-OCT-2018 06:35, No significant change was found Confirmed by Celina Lauren MD (), Joshua Soria ([id number]) on 12/5/2018 11:23:47 AM 
  
 POC ACTIVATED CLOTTING TIME Collection Time: 12/05/18  9:38 AM  
Result Value Ref Range Activated Clotting Time (POC) 384 (H) 70 - 128 SECS  
POC ACTIVATED CLOTTING TIME Collection Time: 12/05/18 10:14 AM  
Result Value Ref Range Activated Clotting Time (POC) 367 (H) 70 - 128 SECS  
POC ACTIVATED CLOTTING TIME Collection Time: 12/05/18 10:51 AM  
Result Value Ref Range Activated Clotting Time (POC) 373 (H) 70 - 128 SECS  
POC ACTIVATED CLOTTING TIME Collection Time: 12/05/18 11:37 AM  
Result Value Ref Range Activated Clotting Time (POC) 235 (H) 70 - 128 SECS  
POC ACTIVATED CLOTTING TIME Collection Time: 12/05/18 11:58 AM  
Result Value Ref Range Activated Clotting Time (POC) 202 (H) 70 - 128 SECS  
GLUCOSE, POC Collection Time: 12/05/18 12:43 PM  
Result Value Ref Range Glucose (POC) 162 (H) 65 - 100 mg/dL EKG, 12 LEAD, INITIAL Collection Time: 12/05/18  2:21 PM  
Result Value Ref Range Ventricular Rate 55 BPM  
 Atrial Rate 55 BPM  
 P-R Interval 210 ms QRS Duration 94 ms Q-T Interval 460 ms QTC Calculation (Bezet) 440 ms Calculated P Axis 58 degrees Calculated R Axis 63 degrees Calculated T Axis 32 degrees Diagnosis Sinus bradycardia with 1st degree A-V block Nonspecific T wave abnormality Abnormal ECG When compared with ECG of 05-DEC-2018 07:15, No significant change was found Confirmed by [facility]  MD ()PANFILO ([id number]) on 12/5/2018 3:58:20 PM 
  
GLUCOSE, POC Collection Time: 12/05/18  9:45 PM  
Result Value Ref Range Glucose (POC) 258 (H) 65 - 100 mg/dL PROTHROMBIN TIME + INR Collection Time: 12/06/18  4:17 AM  
Result Value Ref Range Prothrombin time 20.9 (H) 11.7 - 14.5 sec INR 1.8    
CBC W/O DIFF Collection Time: 12/06/18  4:17 AM  
Result Value Ref Range WBC 11.6 (H) 4.3 - 11.1 K/uL  
 RBC 4.35 4.23 - 5.6 M/uL  
 HGB 12.0 (L) 13.6 - 17.2 g/dL HCT 37.2 (L) 41.1 - 50.3 % MCV 85.5 79.6 - 97.8 FL  
 MCH 27.6 26.1 - 32.9 PG  
 MCHC 32.3 31.4 - 35.0 g/dL  
 RDW 15.0 (H) 11.9 - 14.6 % PLATELET 879 (L) 131 - 450 K/uL MPV 12.0 9.4 - 12.3 FL ABSOLUTE NRBC 0.00 0.0 - 0.2 K/uL METABOLIC PANEL, BASIC Collection Time: 12/06/18  4:17 AM  
Result Value Ref Range Sodium 137 136 - 145 mmol/L Potassium 3.5 3.5 - 5.1 mmol/L Chloride 102 98 - 107 mmol/L  
 CO2 28 21 - 32 mmol/L Anion gap 7 7 - 16 mmol/L Glucose 196 (H) 65 - 100 mg/dL BUN 16 6 - 23 MG/DL Creatinine 1.31 0.8 - 1.5 MG/DL  
 GFR est AA >60 >60 ml/min/1.73m2 GFR est non-AA >60 >60 ml/min/1.73m2 Calcium 8.4 8.3 - 10.4 MG/DL  
GLUCOSE, POC Collection Time: 12/06/18  6:08 AM  
Result Value Ref Range Glucose (POC) 217 (H) 65 - 100 mg/dL EKG:  (EKG has been independently visualized by me with interpretation below) Assessment:  
 
Active Problems: 
  Atrial fibrillation and flutter ([address] (12/5/2018) Plan: 1. afib:  Pt underwent cardiac ablation for afib yesterday without immediate complication, no questions or concerns this AM, ready and stable for discharge with appropriate follow up. Cont protonix, carafate, home sotalol, and warfarin 2. Dispo: home today with coumadin clinic and  [facility] follow up Bernarda Ken MD 
Cardiology/Electrophysiology

## 2018-12-06 NOTE — PROGRESS NOTES
Pt admitted to 3rd floor tele for Atrial fib & flutter. CM met with pt to discuss CM needs & DCP. Pt is A&Ox4. Pt is indep at home with all ADLS. Pt lives with spouse. Pt has walker, WC, cane, cpap, WC ramp; no further DME needs. Pt has no difficulty with obtaining medications or transport. Pt is currently on disability. DCP home with spouse. Pt is connected to [place]. No further needs noted. CM to continue to monitor. Care Management Interventions PCP Verified by CM: Yes Last Visit to PCP: 08/13/18 Mode of Transport at Discharge: Other (see comment)(Spouse Zada Cogan [phone]) Transition of Care Consult (CM Consult): Discharge Planning Discharge Durable Medical Equipment: No 
Physical Therapy Consult: No 
Occupational Therapy Consult: No 
Speech Therapy Consult: No 
Current Support Network: Lives with Spouse, Own Home Confirm Follow Up Transport: Family Plan discussed with Pt/Family/Caregiver: Yes Freedom of Choice Offered: Yes [employer] Information Provided?: Yes(Pt already connected) Discharge Location Discharge Placement: Home

## 2019-09-09 ENCOUNTER — HOSPITAL ENCOUNTER (OUTPATIENT)
Dept: LAB | Age: 54
Discharge: HOME | End: 2019-09-09
Payer: MEDICARE

## 2019-09-09 ENCOUNTER — TELEPHONE (OUTPATIENT)
Dept: CARDIAC CATH/INVASIVE PROCEDURES | Age: 54
End: 2019-09-09

## 2019-09-09 DIAGNOSIS — I48.19 PERSISTENT ATRIAL FIBRILLATION (HCC): Primary | ICD-10-CM

## 2019-09-09 DIAGNOSIS — I48.19 PERSISTENT ATRIAL FIBRILLATION (HCC): ICD-10-CM

## 2019-09-09 LAB
ANION GAP SERPL CALC-SCNC: 5 MMOL/L (ref 7–16)
BUN SERPL-MCNC: 15 MG/DL (ref 6–23)
CALCIUM SERPL-MCNC: 9.5 MG/DL (ref 8.3–10.4)
CHLORIDE SERPL-SCNC: 99 MMOL/L (ref 98–107)
CO2 SERPL-SCNC: 32 MMOL/L (ref 21–32)
CREAT SERPL-MCNC: 1.45 MG/DL (ref 0.8–1.5)
ERYTHROCYTE [DISTWIDTH] IN BLOOD BY AUTOMATED COUNT: 14.8 % (ref 11.9–14.6)
GLUCOSE SERPL-MCNC: 404 MG/DL (ref 65–100)
HCT VFR BLD AUTO: 44.3 % (ref 41.1–50.3)
HGB BLD-MCNC: 14.4 G/DL (ref 13.6–17.2)
INR PPP: 0.9
MAGNESIUM SERPL-MCNC: 2 MG/DL (ref 1.8–2.4)
MCH RBC QN AUTO: 28.1 PG (ref 26.1–32.9)
MCHC RBC AUTO-ENTMCNC: 32.5 G/DL (ref 31.4–35)
MCV RBC AUTO: 86.4 FL (ref 79.6–97.8)
NRBC # BLD: 0 K/UL (ref 0–0.2)
PLATELET # BLD AUTO: 122 K/UL (ref 150–450)
PMV BLD AUTO: 12.7 FL (ref 9.4–12.3)
POTASSIUM SERPL-SCNC: 4 MMOL/L (ref 3.5–5.1)
PROTHROMBIN TIME: 11.9 SEC (ref 11.7–14.5)
RBC # BLD AUTO: 5.13 M/UL (ref 4.23–5.6)
SODIUM SERPL-SCNC: 136 MMOL/L (ref 136–145)
WBC # BLD AUTO: 7 K/UL (ref 4.3–11.1)

## 2019-09-09 PROCEDURE — 80048 BASIC METABOLIC PNL TOTAL CA: CPT

## 2019-09-09 PROCEDURE — 85610 PROTHROMBIN TIME: CPT

## 2019-09-09 PROCEDURE — 36415 COLL VENOUS BLD VENIPUNCTURE: CPT

## 2019-09-09 PROCEDURE — 83735 ASSAY OF MAGNESIUM: CPT

## 2019-09-09 PROCEDURE — 85027 COMPLETE CBC AUTOMATED: CPT

## 2019-09-09 NOTE — TELEPHONE ENCOUNTER
Received call from lab, pt there for labs to be collected for Loop placement scheduled on 9/11/19. No orders in system.  Orders placed

## 2019-09-11 RX ORDER — LIDOCAINE HYDROCHLORIDE 10 MG/ML
0.1 INJECTION INFILTRATION; PERINEURAL AS NEEDED
Status: CANCELLED | OUTPATIENT
Start: 2019-09-11

## 2019-09-11 RX ORDER — SODIUM CHLORIDE, SODIUM LACTATE, POTASSIUM CHLORIDE, CALCIUM CHLORIDE 600; 310; 30; 20 MG/100ML; MG/100ML; MG/100ML; MG/100ML
100 INJECTION, SOLUTION INTRAVENOUS CONTINUOUS
Status: CANCELLED | OUTPATIENT
Start: 2019-09-11 | End: 2019-09-12

## 2019-09-11 RX ORDER — ASPIRIN 81 MG/1
81 TABLET ORAL DAILY
COMMUNITY
End: 2020-05-08 | Stop reason: DRUGHIGH

## 2019-09-11 RX ORDER — FENTANYL CITRATE 50 UG/ML
100 INJECTION, SOLUTION INTRAMUSCULAR; INTRAVENOUS ONCE
Status: CANCELLED | OUTPATIENT
Start: 2019-09-11 | End: 2019-09-11

## 2019-09-11 RX ORDER — MIDAZOLAM HYDROCHLORIDE 1 MG/ML
2 INJECTION, SOLUTION INTRAMUSCULAR; INTRAVENOUS
Status: CANCELLED | OUTPATIENT
Start: 2019-09-11 | End: 2019-09-12

## 2019-09-11 RX ORDER — HYDROMORPHONE HYDROCHLORIDE 2 MG/ML
0.5 INJECTION, SOLUTION INTRAMUSCULAR; INTRAVENOUS; SUBCUTANEOUS
Status: CANCELLED | OUTPATIENT
Start: 2019-09-11

## 2019-09-11 RX ORDER — MIDAZOLAM HYDROCHLORIDE 1 MG/ML
2 INJECTION, SOLUTION INTRAMUSCULAR; INTRAVENOUS ONCE
Status: CANCELLED | OUTPATIENT
Start: 2019-09-11 | End: 2019-09-11

## 2019-09-11 RX ORDER — OXYCODONE HYDROCHLORIDE 5 MG/1
5 TABLET ORAL
Status: CANCELLED | OUTPATIENT
Start: 2019-09-11 | End: 2019-09-12

## 2019-09-11 RX ORDER — NALOXONE HYDROCHLORIDE 0.4 MG/ML
0.04 INJECTION, SOLUTION INTRAMUSCULAR; INTRAVENOUS; SUBCUTANEOUS
Status: CANCELLED | OUTPATIENT
Start: 2019-09-11

## 2019-09-11 RX ORDER — SODIUM CHLORIDE, SODIUM LACTATE, POTASSIUM CHLORIDE, CALCIUM CHLORIDE 600; 310; 30; 20 MG/100ML; MG/100ML; MG/100ML; MG/100ML
100 INJECTION, SOLUTION INTRAVENOUS CONTINUOUS
Status: CANCELLED | OUTPATIENT
Start: 2019-09-11

## 2019-09-12 ENCOUNTER — HOSPITAL ENCOUNTER (OUTPATIENT)
Dept: CARDIAC CATH/INVASIVE PROCEDURES | Age: 54
Discharge: HOME | End: 2019-09-12
Payer: MEDICARE

## 2019-09-12 ENCOUNTER — HOSPITAL ENCOUNTER (OUTPATIENT)
Age: 54
Setting detail: OUTPATIENT SURGERY
Discharge: HOME | End: 2019-09-12
Attending: INTERNAL MEDICINE | Admitting: INTERNAL MEDICINE
Payer: MEDICARE

## 2019-09-12 VITALS
OXYGEN SATURATION: 98 % | WEIGHT: 240 LBS | BODY MASS INDEX: 32.51 KG/M2 | RESPIRATION RATE: 16 BRPM | TEMPERATURE: 98.1 F | HEIGHT: 72 IN

## 2019-09-12 VITALS — HEART RATE: 73 BPM | OXYGEN SATURATION: 98 % | RESPIRATION RATE: 16 BRPM

## 2019-09-12 PROCEDURE — 74011250636 HC RX REV CODE- 250/636: Performed by: INTERNAL MEDICINE

## 2019-09-12 PROCEDURE — 74011000250 HC RX REV CODE- 250: Performed by: INTERNAL MEDICINE

## 2019-09-12 PROCEDURE — 33285 INSJ SUBQ CAR RHYTHM MNTR: CPT

## 2019-09-12 PROCEDURE — 77030012935 HC DRSG AQUACEL BMS -B

## 2019-09-12 PROCEDURE — 77030031139 HC SUT VCRL2 J&J -A

## 2019-09-12 PROCEDURE — C1764 EVENT RECORDER, CARDIAC: HCPCS

## 2019-09-12 RX ORDER — LIDOCAINE HYDROCHLORIDE 10 MG/ML
2-20 INJECTION INFILTRATION; PERINEURAL
Status: DISCONTINUED | OUTPATIENT
Start: 2019-09-12 | End: 2019-09-16 | Stop reason: HOSPADM

## 2019-09-12 RX ORDER — CEFAZOLIN SODIUM/WATER 2 G/20 ML
2 SYRINGE (ML) INTRAVENOUS ONCE
Status: COMPLETED | OUTPATIENT
Start: 2019-09-12 | End: 2019-09-12

## 2019-09-12 RX ADMIN — Medication 2 G: at 11:07

## 2019-09-12 RX ADMIN — LIDOCAINE HYDROCHLORIDE 10 ML: 10 INJECTION, SOLUTION INFILTRATION; PERINEURAL at 11:01

## 2019-09-12 NOTE — PROGRESS NOTES
Patient received to [address] [place] [address] room # 10  Ambulatory from [facility]. Patient scheduled for Loop placement today with Dr Kerrie Seaman. Procedure reviewed & questions answered, voiced good understanding consent obtained & placed on chart. All medications and medical history reviewed. Will prep patient per orders. Patient & family updated on plan of care.       The patient has a fraility score of 3-MANAGING WELL, based on reports no recent falls

## 2019-09-12 NOTE — DISCHARGE INSTRUCTIONS
Post Op Device Instructions      Incision & Dressing Care   Please keep Aquacel dressing on wound until your 1-2 week follow up in device clinic. The dressing promotes healing and is meant to stay on the wound. You may take a sponge bath or shower wound with soap and skaggs. Do not use any     Do not massage or excessively rub the implant site.      For four weeks after your implant   Do not lift anything heavier than a gallon of milk.   Avoid excessive pushing, pulling, or twisting.     Call you doctor for any of the following:   Any signs of infection, including redness, swelling or pain at the incision site, or a   temperature of 101° F or greater.        Please notify your doctors and dentists that you have a Loop monitor.                           Follow Up Appointments  You will need to have your device checked 1- 2 weeks after the procedure and should have an appointment with the device clinic.

## 2019-09-12 NOTE — PROGRESS NOTES
1120 Discharge instructions and home medications reviewed with patient. Time allowed for questions and answers. 1130 Patient discharged to home with family.

## 2020-06-23 PROBLEM — I25.10 CORONARY ARTERY DISEASE INVOLVING NATIVE CORONARY ARTERY OF NATIVE HEART: Status: ACTIVE | Noted: 2020-06-23

## 2020-07-02 ENCOUNTER — APPOINTMENT (RX ONLY)
Dept: URBAN - METROPOLITAN AREA CLINIC 349 | Facility: CLINIC | Age: 55
Setting detail: DERMATOLOGY
End: 2020-07-02

## 2020-07-02 ENCOUNTER — TELEPHONE (OUTPATIENT)
Dept: CARDIAC CATH/INVASIVE PROCEDURES | Age: 55
End: 2020-07-02

## 2020-07-02 DIAGNOSIS — D22 MELANOCYTIC NEVI: ICD-10-CM

## 2020-07-02 DIAGNOSIS — L72.8 OTHER FOLLICULAR CYSTS OF THE SKIN AND SUBCUTANEOUS TISSUE: ICD-10-CM

## 2020-07-02 PROBLEM — C44.222 SQUAMOUS CELL CARCINOMA OF SKIN OF RIGHT EAR AND EXTERNAL AURICULAR CANAL: Status: ACTIVE | Noted: 2020-07-02

## 2020-07-02 PROBLEM — D04.22 CARCINOMA IN SITU OF SKIN OF LEFT EAR AND EXTERNAL AURICULAR CANAL: Status: ACTIVE | Noted: 2020-07-02

## 2020-07-02 PROBLEM — C44.219 BASAL CELL CARCINOMA OF SKIN OF LEFT EAR AND EXTERNAL AURICULAR CANAL: Status: ACTIVE | Noted: 2020-07-02

## 2020-07-02 PROBLEM — C44.212 BASAL CELL CARCINOMA OF SKIN OF RIGHT EAR AND EXTERNAL AURICULAR CANAL: Status: ACTIVE | Noted: 2020-07-02

## 2020-07-02 PROBLEM — D22.5 MELANOCYTIC NEVI OF TRUNK: Status: ACTIVE | Noted: 2020-07-02

## 2020-07-02 PROCEDURE — 69100 BIOPSY OF EXTERNAL EAR: CPT

## 2020-07-02 PROCEDURE — ? PATHOLOGY BILLING

## 2020-07-02 PROCEDURE — 99242 OFF/OP CONSLTJ NEW/EST SF 20: CPT | Mod: 25

## 2020-07-02 PROCEDURE — 88305 TISSUE EXAM BY PATHOLOGIST: CPT

## 2020-07-02 PROCEDURE — A4550 SURGICAL TRAYS: HCPCS

## 2020-07-02 PROCEDURE — ? COUNSELING

## 2020-07-02 PROCEDURE — ? BIOPSY BY SHAVE METHOD

## 2020-07-02 PROCEDURE — 69100 BIOPSY OF EXTERNAL EAR: CPT | Mod: 76

## 2020-07-02 ASSESSMENT — LOCATION SIMPLE DESCRIPTION DERM: LOCATION SIMPLE: LEFT UPPER BACK

## 2020-07-02 ASSESSMENT — LOCATION DETAILED DESCRIPTION DERM
LOCATION DETAILED: LEFT SUPERIOR MEDIAL UPPER BACK
LOCATION DETAILED: LEFT SUPERIOR UPPER BACK

## 2020-07-02 ASSESSMENT — LOCATION ZONE DERM: LOCATION ZONE: TRUNK

## 2020-07-02 NOTE — TELEPHONE ENCOUNTER
Dr. Alaina Cheng wants patient to start coumadin 7.5mg on July 5th in preparation for his watchman procedure on July 21st. He will have his INR checked at 10:30 on 7/10 at the [facility] coumadin clinic. This information given to the patient. He verbalized his understanding and he has no questions at this time.

## 2020-07-02 NOTE — PROCEDURE: BIOPSY BY SHAVE METHOD
Accession #: [id number] [email or web address] read
Validate Lesion Size: No
Was A Bandage Applied: Yes
Wound Care: Vaseline
Biopsy Method: Dermablade
Additional Anesthesia Volume In Cc (Will Not Render If 0): 0
Electrodesiccation And Curettage Text: The wound bed was treated with electrodesiccation and curettage after the biopsy was performed.
Post-Care Instructions: I reviewed with the patient in detail post-care instructions. Patient is to keep the biopsy site dry overnight, and then apply bacitracin twice daily until healed. Patient may apply hydrogen peroxide soaks to remove any crusting.  After the procedure, the patient was observed for 5-10 minutes and was oriented to person, place and time and demied feeling dizzy, queasy, and stated that they did not feel that they were going to faint.
Anesthesia Type: 2% lidocaine with epinephrine
Type Of Destruction Used: Curettage
Cryotherapy Text: The wound bed was treated with cryotherapy after the biopsy was performed.
Notification Instructions: Patient will be notified of biopsy results. However, patient instructed to call the office if not contacted within 2 weeks.
Consent: Written consent was obtained and risks were reviewed including but not limited to scarring, infection, bleeding, scabbing, incomplete removal, nerve damage and allergy to anesthesia.
Biopsy Type: H and E
Anesthesia Volume In Cc (Will Not Render If 0): 0.5
Silver Nitrate Text: The wound bed was treated with silver nitrate after the biopsy was performed.
Electrodesiccation Text: The wound bed was treated with electrodesiccation after the biopsy was performed.
Billing Type: Third-Party Bill
Depth Of Biopsy: dermis
Hemostasis: Aluminum Chloride
Dressing: bandage
Information: Selecting Yes will display possible errors in your note based on the variables you have selected. This validation is only offered as a suggestion for you. PLEASE NOTE THAT THE VALIDATION TEXT WILL BE REMOVED WHEN YOU FINALIZE YOUR NOTE. IF YOU WANT TO FAX A PRELIMINARY NOTE YOU WILL NEED TO TOGGLE THIS TO 'NO' IF YOU DO NOT WANT IT IN YOUR FAXED NOTE.
Detail Level: Detailed

## 2020-07-02 NOTE — PROCEDURE: PATHOLOGY BILLING
Immunohistochemistry ([id number] and [id number]) billing is not performed here. Please use the Immunohistochemistry Stain Billing plan to accomplish this. Immunohistochemistry ([id number] and [id number]) billing is not performed here. Please use the Immunohistochemistry Stain Billing plan to accomplish this.

## 2020-07-02 NOTE — PROCEDURE: PATHOLOGY BILLING
Immunohistochemistry ([id number] and [id number]) billing is not performed here. Please use the Immunohistochemistry Stain Billing plan to accomplish this.

## 2020-07-07 ENCOUNTER — TELEPHONE (OUTPATIENT)
Dept: CARDIAC CATH/INVASIVE PROCEDURES | Age: 55
End: 2020-07-07

## 2020-07-07 NOTE — TELEPHONE ENCOUNTER
Watchman procedures have been canceled due to hospital volume, which will push this patient's watchman procedure to august.  Dr. Liz Faye wants patient to d/c coumadin at this time due to bleeding risk. He will restart 2 weeks prior to scheduled watchman procedure. Patient was given this information, he has no questions at this time. He will be updated once we have a date for his watchman procedure.

## 2020-07-15 ENCOUNTER — APPOINTMENT (RX ONLY)
Dept: URBAN - METROPOLITAN AREA CLINIC 349 | Facility: CLINIC | Age: 55
Setting detail: DERMATOLOGY
End: 2020-07-15

## 2020-07-15 PROBLEM — C44.222 SQUAMOUS CELL CARCINOMA OF SKIN OF RIGHT EAR AND EXTERNAL AURICULAR CANAL: Status: ACTIVE | Noted: 2020-07-15

## 2020-07-15 PROBLEM — D04.22 CARCINOMA IN SITU OF SKIN OF LEFT EAR AND EXTERNAL AURICULAR CANAL: Status: ACTIVE | Noted: 2020-07-15

## 2020-07-15 PROCEDURE — 17282 DSTR MAL LS F/E/E/N/L/M1.1-2: CPT

## 2020-07-15 PROCEDURE — ? SHAVE REMOVAL AND DESTRUCTION

## 2020-07-15 PROCEDURE — ? CURETTAGE AND DESTRUCTION

## 2020-07-15 PROCEDURE — 17282 DSTR MAL LS F/E/E/N/L/M1.1-2: CPT | Mod: 76

## 2020-07-15 PROCEDURE — A4550 SURGICAL TRAYS: HCPCS

## 2020-07-15 NOTE — PROCEDURE: CURETTAGE AND DESTRUCTION
Additional Information: (Optional): The wound was cleaned, and a pressure dressing was applied.  The patient received detailed post-op instructions.
Bill For Surgical Tray: yes
Size Of Lesion After Curettage: 1.3
Render Post-Care Instructions In Note?: no
Anesthesia Volume In Cc: 1
Detail Level: Detailed
What Was Performed First?: Curettage
Number Of Curettages: 3
Consent was obtained from the patient. The risks, benefits and alternatives to therapy were discussed in detail. Specifically, the risks of infection, scarring, bleeding, prolonged wound healing, nerve injury, incomplete removal, allergy to anesthesia and recurrence were addressed. Alternatives to ED&C, such as: surgical removal and XRT were also discussed.  Prior to the procedure, the treatment site was clearly identified and confirmed by the patient. All components of Universal Protocol/PAUSE Rule completed.
Anesthesia Type: 2% lidocaine with epinephrine
Bill As A Line Item Or As Units: Line Item
Cautery Type: electrodesiccation
Post-Care Instructions: I reviewed with the patient in detail post-care instructions. Patient is to keep the area dry for 48 hours, and not to engage in any swimming until the area is healed. Should the patient develop any fevers, chills, bleeding, severe pain patient will contact the office immediately. After the procedure, the patient was observed for 5-10 mins and was oriented to person, place anddenied feeling dizzy, queasy and stated that they did not feel that they were going to faint.            denied

## 2020-07-15 NOTE — PROCEDURE: SHAVE REMOVAL AND DESTRUCTION
Post-Care Instructions: I reviewed with the patient in detail post-care instructions. Patient is to keep the biopsy site dry overnight, and then apply bacitracin twice daily until healed. Patient may apply hydrogen peroxide soaks to remove any crusting.  After the procedure, the patient was observed for 5-10 minutes and was oriented to,person, place and time and denied feeling dizzy, queasy and stated that they were not going to faint
Was Curettage Performed?: Yes
Anesthesia Volume In Cc: 0.2
Number Of Curettages: 2
Size After Destruction (Required For Destruction Billing): 1.3
Render Path Notes In Note?: No
Anesthesia Type: 2% lidocaine with epinephrine
Size Of Lesion In Cm: 0
Cautery Type: electrodesiccation
Dressing: Band-Aid
Consent: Written consent was obtained and risks were reviewed including but not limited to scarring, infection, bleeding, scabbing, incomplete removal, nerve damage and allergy to anesthesia.
Notification Instructions: Patient will be notified of biopsy results. However, patient instructed to call the office if not contacted within 2 weeks.
Detail Level: Detailed
Billing Type: Third-Party Bill
Bill As?: Note: Bill Malignant Destruction If Path Confirms Malignant Lesion. Only Bill As Shave Removal If Path Comes Back Benign. Do Not Bill Shave Removal On Malignant Lesions.: Malignant Destruction
Wound Care: Vaseline
Hemostasis: Electrocautery

## 2020-07-30 ENCOUNTER — TELEPHONE (OUTPATIENT)
Dept: CARDIAC CATH/INVASIVE PROCEDURES | Age: 55
End: 2020-07-30

## 2020-07-30 NOTE — TELEPHONE ENCOUNTER
Patient will be scheduled for watchman implant on 8/11 with Dr. Saurabh Saxena. Patient will start coumadin 7.5mg tonight. I will call tomorrow and have an INR check scheduled in the [facility] office for 8/4/2020. Patient given this information and he voiced his understanding.

## 2020-07-31 ENCOUNTER — TELEPHONE (OUTPATIENT)
Dept: CARDIAC CATH/INVASIVE PROCEDURES | Age: 55
End: 2020-07-31

## 2020-07-31 NOTE — TELEPHONE ENCOUNTER
Patient called today stating that he has a colonoscopy scheduled for Monday 8/3. Patient was told that the Juliana Anes will need to be canceled due to the fact that he may not be able to take [facility] after the colonoscopy. Patient wishes to wait on colonoscopy and have watchman instead. Patient was encouraged to call his GI doctor and make sure this colonoscopy is not urgent. Patient states that he had a glucose breath test that was positive and he was started on antibiotics. He feels that the colonoscopy was scheduled to assess causes of diarrhea, and he still wishes to proceed with the watchman. Patient will be scheduled with Dr. Lorna Rubalcava for Juliana Anes on 8/11. If anything changes, patient is to call and update me.

## 2020-08-06 RX ORDER — WARFARIN 7.5 MG/1
7.5 TABLET ORAL
COMMUNITY
End: 2020-09-24

## 2020-08-06 NOTE — PROGRESS NOTES
Patient pre-assessment complete for [facility] scheduled for 2020, arrival time 0800. Patient verified using . Patient instructed to bring all home medications in labeled bottles the day of procedure. NPO status reinforced. Patient will be contacted on 8/10/2020 after his INR results to let him know if he will take or hold the coumadin that night. He will take [id number], protonix, zoloft and oxycodone  with a small sip of water, the morning of his procedure. He will also take 100 units of toujeo the morning of his procedure, which is 80% of his normal dose. Patient instructed to HOLD all other medications and supplements. Patient verbalizes understanding of all instructions & denies any questions at this time. Patient has watchman contact info if he were to have any other questions. I have reviewed and confirmed nurses' notes for patient's medications, allergies, medical history, and surgical history.

## 2020-08-10 ENCOUNTER — HOSPITAL ENCOUNTER (OUTPATIENT)
Dept: LAB | Age: 55
Discharge: HOME | DRG: 274 | End: 2020-08-10
Payer: MEDICARE

## 2020-08-10 ENCOUNTER — TELEPHONE (OUTPATIENT)
Dept: CARDIAC CATH/INVASIVE PROCEDURES | Age: 55
End: 2020-08-10

## 2020-08-10 ENCOUNTER — ANESTHESIA EVENT (OUTPATIENT)
Dept: SURGERY | Age: 55
DRG: 274 | End: 2020-08-10
Payer: MEDICARE

## 2020-08-10 DIAGNOSIS — I48.0 PAROXYSMAL ATRIAL FIBRILLATION (HCC): ICD-10-CM

## 2020-08-10 DIAGNOSIS — I48.0 PAROXYSMAL ATRIAL FIBRILLATION (HCC): Primary | ICD-10-CM

## 2020-08-10 LAB
ALBUMIN SERPL-MCNC: 4.1 G/DL (ref 3.5–5)
ANION GAP SERPL CALC-SCNC: 4 MMOL/L (ref 7–16)
BASOPHILS # BLD: 0 K/UL (ref 0–0.2)
BASOPHILS NFR BLD: 1 % (ref 0–2)
BUN SERPL-MCNC: 13 MG/DL (ref 6–23)
CALCIUM SERPL-MCNC: 9.3 MG/DL (ref 8.3–10.4)
CHLORIDE SERPL-SCNC: 105 MMOL/L (ref 98–107)
CO2 SERPL-SCNC: 32 MMOL/L (ref 21–32)
CREAT SERPL-MCNC: 1.49 MG/DL (ref 0.8–1.5)
DIFFERENTIAL METHOD BLD: ABNORMAL
EOSINOPHIL # BLD: 0.3 K/UL (ref 0–0.8)
EOSINOPHIL NFR BLD: 4 % (ref 0.5–7.8)
ERYTHROCYTE [DISTWIDTH] IN BLOOD BY AUTOMATED COUNT: 14.2 % (ref 11.9–14.6)
GLUCOSE SERPL-MCNC: 176 MG/DL (ref 65–100)
HCT VFR BLD AUTO: 48.4 % (ref 41.1–50.3)
HGB BLD-MCNC: 15.6 G/DL (ref 13.6–17.2)
IMM GRANULOCYTES # BLD AUTO: 0 K/UL (ref 0–0.5)
IMM GRANULOCYTES NFR BLD AUTO: 0 % (ref 0–5)
INR PPP: 1.7
LYMPHOCYTES # BLD: 2.2 K/UL (ref 0.5–4.6)
LYMPHOCYTES NFR BLD: 30 % (ref 13–44)
MCH RBC QN AUTO: 27.7 PG (ref 26.1–32.9)
MCHC RBC AUTO-ENTMCNC: 32.2 G/DL (ref 31.4–35)
MCV RBC AUTO: 86 FL (ref 79.6–97.8)
MONOCYTES # BLD: 0.3 K/UL (ref 0.1–1.3)
MONOCYTES NFR BLD: 4 % (ref 4–12)
NEUTS SEG # BLD: 4.6 K/UL (ref 1.7–8.2)
NEUTS SEG NFR BLD: 62 % (ref 43–78)
NRBC # BLD: 0 K/UL (ref 0–0.2)
PLATELET # BLD AUTO: 120 K/UL (ref 150–450)
PMV BLD AUTO: 11.7 FL (ref 9.4–12.3)
POTASSIUM SERPL-SCNC: 4.5 MMOL/L (ref 3.5–5.1)
PROTHROMBIN TIME: 20.1 SEC (ref 12–14.7)
RBC # BLD AUTO: 5.63 M/UL (ref 4.23–5.6)
SODIUM SERPL-SCNC: 141 MMOL/L (ref 136–145)
WBC # BLD AUTO: 7.5 K/UL (ref 4.3–11.1)

## 2020-08-10 PROCEDURE — 36415 COLL VENOUS BLD VENIPUNCTURE: CPT

## 2020-08-10 PROCEDURE — 86900 BLOOD TYPING SEROLOGIC ABO: CPT

## 2020-08-10 PROCEDURE — 82040 ASSAY OF SERUM ALBUMIN: CPT

## 2020-08-10 PROCEDURE — 85610 PROTHROMBIN TIME: CPT

## 2020-08-10 PROCEDURE — 80048 BASIC METABOLIC PNL TOTAL CA: CPT

## 2020-08-10 PROCEDURE — 86923 COMPATIBILITY TEST ELECTRIC: CPT

## 2020-08-10 PROCEDURE — 85025 COMPLETE CBC W/AUTO DIFF WBC: CPT

## 2020-08-10 NOTE — PROGRESS NOTES
Patient is to hold coumadin tonight, per Dr. Trenton Lopez. Patient verbalized his understanding. He has no other questions at this time.

## 2020-08-11 ENCOUNTER — ANESTHESIA (OUTPATIENT)
Dept: SURGERY | Age: 55
DRG: 274 | End: 2020-08-11
Payer: MEDICARE

## 2020-08-11 ENCOUNTER — APPOINTMENT (OUTPATIENT)
Dept: CARDIAC CATH/INVASIVE PROCEDURES | Age: 55
DRG: 274 | End: 2020-08-11
Payer: MEDICARE

## 2020-08-11 ENCOUNTER — HOSPITAL ENCOUNTER (INPATIENT)
Age: 55
LOS: 1 days | Discharge: HOME | DRG: 274 | End: 2020-08-12
Attending: INTERNAL MEDICINE | Admitting: INTERNAL MEDICINE
Payer: MEDICARE

## 2020-08-11 DIAGNOSIS — I10 ESSENTIAL HYPERTENSION: ICD-10-CM

## 2020-08-11 DIAGNOSIS — I25.10 CORONARY ARTERY DISEASE INVOLVING NATIVE CORONARY ARTERY OF NATIVE HEART WITHOUT ANGINA PECTORIS: ICD-10-CM

## 2020-08-11 DIAGNOSIS — I48.0 PAROXYSMAL ATRIAL FIBRILLATION (HCC): ICD-10-CM

## 2020-08-11 DIAGNOSIS — I50.33 DIASTOLIC CHF, ACUTE ON CHRONIC (HCC): ICD-10-CM

## 2020-08-11 PROBLEM — I48.91 A-FIB (HCC): Status: ACTIVE | Noted: 2020-08-11

## 2020-08-11 LAB
ACT BLD: 312 SECS (ref 70–128)
ANION GAP SERPL CALC-SCNC: 7 MMOL/L (ref 7–16)
ATRIAL RATE: 87 BPM
BUN SERPL-MCNC: 15 MG/DL (ref 6–23)
CALCIUM SERPL-MCNC: 9.9 MG/DL (ref 8.3–10.4)
CALCULATED P AXIS, ECG09: 26 DEGREES
CALCULATED R AXIS, ECG10: 19 DEGREES
CALCULATED T AXIS, ECG11: 72 DEGREES
CHLORIDE SERPL-SCNC: 104 MMOL/L (ref 98–107)
CO2 SERPL-SCNC: 30 MMOL/L (ref 21–32)
CREAT SERPL-MCNC: 1.5 MG/DL (ref 0.8–1.5)
DIAGNOSIS, 93000: NORMAL
GLUCOSE BLD STRIP.AUTO-MCNC: 167 MG/DL (ref 65–100)
GLUCOSE BLD STRIP.AUTO-MCNC: 266 MG/DL (ref 65–100)
GLUCOSE BLD STRIP.AUTO-MCNC: 298 MG/DL (ref 65–100)
GLUCOSE BLD STRIP.AUTO-MCNC: 355 MG/DL (ref 65–100)
GLUCOSE SERPL-MCNC: 163 MG/DL (ref 65–100)
INR PPP: 1.5
MAGNESIUM SERPL-MCNC: 2.2 MG/DL (ref 1.8–2.4)
P-R INTERVAL, ECG05: 210 MS
POTASSIUM SERPL-SCNC: 4.1 MMOL/L (ref 3.5–5.1)
PROTHROMBIN TIME: 19 SEC (ref 12–14.7)
Q-T INTERVAL, ECG07: 332 MS
QRS DURATION, ECG06: 84 MS
QTC CALCULATION (BEZET), ECG08: 399 MS
SODIUM SERPL-SCNC: 141 MMOL/L (ref 136–145)
VENTRICULAR RATE, ECG03: 87 BPM

## 2020-08-11 PROCEDURE — 74011250636 HC RX REV CODE- 250/636: Performed by: ANESTHESIOLOGY

## 2020-08-11 PROCEDURE — 77030038110 HC IMPL LAA WATCHMAN 24MM BSC -L

## 2020-08-11 PROCEDURE — B24BZZ4 ULTRASONOGRAPHY OF HEART WITH AORTA, TRANSESOPHAGEAL: ICD-10-PCS | Performed by: INTERNAL MEDICINE

## 2020-08-11 PROCEDURE — 77030037088 HC TUBE ENDOTRACH ORAL NSL COVD-A: Performed by: NURSE ANESTHETIST, CERTIFIED REGISTERED

## 2020-08-11 PROCEDURE — C1760 CLOSURE DEV, VASC: HCPCS

## 2020-08-11 PROCEDURE — 82962 GLUCOSE BLOOD TEST: CPT

## 2020-08-11 PROCEDURE — 93005 ELECTROCARDIOGRAM TRACING: CPT | Performed by: INTERNAL MEDICINE

## 2020-08-11 PROCEDURE — 77030020407 HC IV BLD WRMR ST 3M -A: Performed by: NURSE ANESTHETIST, CERTIFIED REGISTERED

## 2020-08-11 PROCEDURE — 74011636637 HC RX REV CODE- 636/637: Performed by: NURSE PRACTITIONER

## 2020-08-11 PROCEDURE — 80048 BASIC METABOLIC PNL TOTAL CA: CPT

## 2020-08-11 PROCEDURE — 77030004559 HC CATH ANGI DX SUPT CARD -B

## 2020-08-11 PROCEDURE — 76060000033 HC ANESTHESIA 1 TO 1.5 HR: Performed by: INTERNAL MEDICINE

## 2020-08-11 PROCEDURE — 85610 PROTHROMBIN TIME: CPT

## 2020-08-11 PROCEDURE — C1769 GUIDE WIRE: HCPCS

## 2020-08-11 PROCEDURE — C1894 INTRO/SHEATH, NON-LASER: HCPCS

## 2020-08-11 PROCEDURE — 93312 ECHO TRANSESOPHAGEAL: CPT

## 2020-08-11 PROCEDURE — 77030019908 HC STETH ESOPH SIMS -A: Performed by: NURSE ANESTHETIST, CERTIFIED REGISTERED

## 2020-08-11 PROCEDURE — 74011000250 HC RX REV CODE- 250: Performed by: NURSE ANESTHETIST, CERTIFIED REGISTERED

## 2020-08-11 PROCEDURE — 74011250637 HC RX REV CODE- 250/637: Performed by: INTERNAL MEDICINE

## 2020-08-11 PROCEDURE — 85347 COAGULATION TIME ACTIVATED: CPT

## 2020-08-11 PROCEDURE — 94760 N-INVAS EAR/PLS OXIMETRY 1: CPT

## 2020-08-11 PROCEDURE — 94640 AIRWAY INHALATION TREATMENT: CPT

## 2020-08-11 PROCEDURE — 77030013292 HC BOWL MX PRSM J&J -A: Performed by: NURSE ANESTHETIST, CERTIFIED REGISTERED

## 2020-08-11 PROCEDURE — 74011250637 HC RX REV CODE- 250/637: Performed by: ANESTHESIOLOGY

## 2020-08-11 PROCEDURE — 33340 PERQ CLSR TCAT L ATR APNDGE: CPT

## 2020-08-11 PROCEDURE — 74011636320 HC RX REV CODE- 636/320: Performed by: INTERNAL MEDICINE

## 2020-08-11 PROCEDURE — 02L73DK OCCLUSION OF LEFT ATRIAL APPENDAGE WITH INTRALUMINAL DEVICE, PERCUTANEOUS APPROACH: ICD-10-PCS | Performed by: INTERNAL MEDICINE

## 2020-08-11 PROCEDURE — 77030013794 HC KT TRNSDUC BLD EDWD -B: Performed by: NURSE ANESTHETIST, CERTIFIED REGISTERED

## 2020-08-11 PROCEDURE — 74011250636 HC RX REV CODE- 250/636: Performed by: NURSE ANESTHETIST, CERTIFIED REGISTERED

## 2020-08-11 PROCEDURE — C1893 INTRO/SHEATH, FIXED,NON-PEEL: HCPCS

## 2020-08-11 PROCEDURE — 77030020506 HC NDL TRNSPTL NRG BAYL -F

## 2020-08-11 PROCEDURE — 65660000000 HC RM CCU STEPDOWN

## 2020-08-11 PROCEDURE — 74011250636 HC RX REV CODE- 250/636: Performed by: INTERNAL MEDICINE

## 2020-08-11 PROCEDURE — 77030040922 HC BLNKT HYPOTHRM STRY -A: Performed by: NURSE ANESTHETIST, CERTIFIED REGISTERED

## 2020-08-11 PROCEDURE — 77030039425 HC BLD LARYNG TRULITE DISP TELE -A: Performed by: NURSE ANESTHETIST, CERTIFIED REGISTERED

## 2020-08-11 PROCEDURE — 77030005401 HC CATH RAD ARRO -A: Performed by: NURSE ANESTHETIST, CERTIFIED REGISTERED

## 2020-08-11 PROCEDURE — 83735 ASSAY OF MAGNESIUM: CPT

## 2020-08-11 PROCEDURE — 77030013687 HC GD NDL BARD -B

## 2020-08-11 RX ORDER — NALOXONE HYDROCHLORIDE 0.4 MG/ML
0.2 INJECTION, SOLUTION INTRAMUSCULAR; INTRAVENOUS; SUBCUTANEOUS AS NEEDED
Status: DISCONTINUED | OUTPATIENT
Start: 2020-08-11 | End: 2020-08-12 | Stop reason: HOSPADM

## 2020-08-11 RX ORDER — PANTOPRAZOLE SODIUM 40 MG/1
40 TABLET, DELAYED RELEASE ORAL
Status: DISCONTINUED | OUTPATIENT
Start: 2020-08-12 | End: 2020-08-12 | Stop reason: HOSPADM

## 2020-08-11 RX ORDER — VECURONIUM BROMIDE FOR INJECTION 1 MG/ML
INJECTION, POWDER, LYOPHILIZED, FOR SOLUTION INTRAVENOUS AS NEEDED
Status: DISCONTINUED | OUTPATIENT
Start: 2020-08-11 | End: 2020-08-11 | Stop reason: HOSPADM

## 2020-08-11 RX ORDER — HYDROCODONE BITARTRATE AND ACETAMINOPHEN 5; 325 MG/1; MG/1
1 TABLET ORAL
Status: DISCONTINUED | OUTPATIENT
Start: 2020-08-11 | End: 2020-08-12 | Stop reason: HOSPADM

## 2020-08-11 RX ORDER — NALOXONE HYDROCHLORIDE 0.4 MG/ML
0.2 INJECTION, SOLUTION INTRAMUSCULAR; INTRAVENOUS; SUBCUTANEOUS
Status: DISCONTINUED | OUTPATIENT
Start: 2020-08-11 | End: 2020-08-11

## 2020-08-11 RX ORDER — NEOSTIGMINE METHYLSULFATE 1 MG/ML
INJECTION, SOLUTION INTRAVENOUS AS NEEDED
Status: DISCONTINUED | OUTPATIENT
Start: 2020-08-11 | End: 2020-08-11 | Stop reason: HOSPADM

## 2020-08-11 RX ORDER — ONDANSETRON 2 MG/ML
INJECTION INTRAMUSCULAR; INTRAVENOUS AS NEEDED
Status: DISCONTINUED | OUTPATIENT
Start: 2020-08-11 | End: 2020-08-11 | Stop reason: HOSPADM

## 2020-08-11 RX ORDER — PROPOFOL 10 MG/ML
INJECTION, EMULSION INTRAVENOUS AS NEEDED
Status: DISCONTINUED | OUTPATIENT
Start: 2020-08-11 | End: 2020-08-11 | Stop reason: HOSPADM

## 2020-08-11 RX ORDER — SODIUM CHLORIDE, SODIUM LACTATE, POTASSIUM CHLORIDE, CALCIUM CHLORIDE 600; 310; 30; 20 MG/100ML; MG/100ML; MG/100ML; MG/100ML
INJECTION, SOLUTION INTRAVENOUS
Status: DISCONTINUED | OUTPATIENT
Start: 2020-08-11 | End: 2020-08-11 | Stop reason: HOSPADM

## 2020-08-11 RX ORDER — CEFAZOLIN SODIUM/WATER 2 G/20 ML
2 SYRINGE (ML) INTRAVENOUS
Status: COMPLETED | OUTPATIENT
Start: 2020-08-11 | End: 2020-08-11

## 2020-08-11 RX ORDER — HYDROMORPHONE HYDROCHLORIDE 2 MG/ML
0.5 INJECTION, SOLUTION INTRAMUSCULAR; INTRAVENOUS; SUBCUTANEOUS
Status: DISCONTINUED | OUTPATIENT
Start: 2020-08-11 | End: 2020-08-12 | Stop reason: HOSPADM

## 2020-08-11 RX ORDER — MIDAZOLAM HYDROCHLORIDE 1 MG/ML
2 INJECTION, SOLUTION INTRAMUSCULAR; INTRAVENOUS ONCE
Status: DISCONTINUED | OUTPATIENT
Start: 2020-08-11 | End: 2020-08-11

## 2020-08-11 RX ORDER — HEPARIN SODIUM 1000 [USP'U]/ML
INJECTION, SOLUTION INTRAVENOUS; SUBCUTANEOUS AS NEEDED
Status: DISCONTINUED | OUTPATIENT
Start: 2020-08-11 | End: 2020-08-11 | Stop reason: HOSPADM

## 2020-08-11 RX ORDER — SODIUM CHLORIDE 0.9 % (FLUSH) 0.9 %
5-40 SYRINGE (ML) INJECTION AS NEEDED
Status: DISCONTINUED | OUTPATIENT
Start: 2020-08-11 | End: 2020-08-12 | Stop reason: HOSPADM

## 2020-08-11 RX ORDER — PREGABALIN 100 MG/1
100 CAPSULE ORAL 3 TIMES DAILY
Status: DISCONTINUED | OUTPATIENT
Start: 2020-08-11 | End: 2020-08-12 | Stop reason: HOSPADM

## 2020-08-11 RX ORDER — LORATADINE 10 MG/1
10 TABLET ORAL DAILY
Status: DISCONTINUED | OUTPATIENT
Start: 2020-08-12 | End: 2020-08-12 | Stop reason: HOSPADM

## 2020-08-11 RX ORDER — ACETAMINOPHEN 325 MG/1
650 TABLET ORAL
Status: DISCONTINUED | OUTPATIENT
Start: 2020-08-11 | End: 2020-08-12 | Stop reason: HOSPADM

## 2020-08-11 RX ORDER — SUCCINYLCHOLINE CHLORIDE 20 MG/ML
INJECTION INTRAMUSCULAR; INTRAVENOUS AS NEEDED
Status: DISCONTINUED | OUTPATIENT
Start: 2020-08-11 | End: 2020-08-11 | Stop reason: HOSPADM

## 2020-08-11 RX ORDER — FUROSEMIDE 40 MG/1
40 TABLET ORAL DAILY
Status: DISCONTINUED | OUTPATIENT
Start: 2020-08-12 | End: 2020-08-12 | Stop reason: HOSPADM

## 2020-08-11 RX ORDER — DEXAMETHASONE SODIUM PHOSPHATE 100 MG/10ML
INJECTION INTRAMUSCULAR; INTRAVENOUS AS NEEDED
Status: DISCONTINUED | OUTPATIENT
Start: 2020-08-11 | End: 2020-08-11 | Stop reason: HOSPADM

## 2020-08-11 RX ORDER — SODIUM CHLORIDE 0.9 % (FLUSH) 0.9 %
5-40 SYRINGE (ML) INJECTION EVERY 8 HOURS
Status: DISCONTINUED | OUTPATIENT
Start: 2020-08-11 | End: 2020-08-12 | Stop reason: HOSPADM

## 2020-08-11 RX ORDER — SODIUM CHLORIDE 9 MG/ML
75 INJECTION, SOLUTION INTRAVENOUS CONTINUOUS
Status: DISCONTINUED | OUTPATIENT
Start: 2020-08-11 | End: 2020-08-12 | Stop reason: HOSPADM

## 2020-08-11 RX ORDER — ONDANSETRON 2 MG/ML
4 INJECTION INTRAMUSCULAR; INTRAVENOUS
Status: DISCONTINUED | OUTPATIENT
Start: 2020-08-11 | End: 2020-08-11

## 2020-08-11 RX ORDER — SODIUM CHLORIDE, SODIUM LACTATE, POTASSIUM CHLORIDE, CALCIUM CHLORIDE 600; 310; 30; 20 MG/100ML; MG/100ML; MG/100ML; MG/100ML
25 INJECTION, SOLUTION INTRAVENOUS CONTINUOUS
Status: DISCONTINUED | OUTPATIENT
Start: 2020-08-11 | End: 2020-08-11

## 2020-08-11 RX ORDER — HEPARIN SODIUM 200 [USP'U]/100ML
2 INJECTION, SOLUTION INTRAVENOUS CONTINUOUS
Status: DISCONTINUED | OUTPATIENT
Start: 2020-08-11 | End: 2020-08-11 | Stop reason: HOSPADM

## 2020-08-11 RX ORDER — ESMOLOL HYDROCHLORIDE 10 MG/ML
INJECTION INTRAVENOUS AS NEEDED
Status: DISCONTINUED | OUTPATIENT
Start: 2020-08-11 | End: 2020-08-11 | Stop reason: HOSPADM

## 2020-08-11 RX ORDER — EPHEDRINE SULFATE/0.9% NACL/PF 50 MG/5 ML
SYRINGE (ML) INTRAVENOUS AS NEEDED
Status: DISCONTINUED | OUTPATIENT
Start: 2020-08-11 | End: 2020-08-11 | Stop reason: HOSPADM

## 2020-08-11 RX ORDER — NITROGLYCERIN 0.4 MG/1
0.4 TABLET SUBLINGUAL
Status: DISCONTINUED | OUTPATIENT
Start: 2020-08-11 | End: 2020-08-12 | Stop reason: HOSPADM

## 2020-08-11 RX ORDER — OXYCODONE HYDROCHLORIDE 5 MG/1
15 TABLET ORAL 3 TIMES DAILY
Status: DISCONTINUED | OUTPATIENT
Start: 2020-08-11 | End: 2020-08-12 | Stop reason: HOSPADM

## 2020-08-11 RX ORDER — INSULIN LISPRO 100 [IU]/ML
INJECTION, SOLUTION INTRAVENOUS; SUBCUTANEOUS
Status: DISCONTINUED | OUTPATIENT
Start: 2020-08-11 | End: 2020-08-12 | Stop reason: HOSPADM

## 2020-08-11 RX ORDER — SODIUM CHLORIDE, SODIUM LACTATE, POTASSIUM CHLORIDE, CALCIUM CHLORIDE 600; 310; 30; 20 MG/100ML; MG/100ML; MG/100ML; MG/100ML
75 INJECTION, SOLUTION INTRAVENOUS CONTINUOUS
Status: DISCONTINUED | OUTPATIENT
Start: 2020-08-11 | End: 2020-08-12 | Stop reason: HOSPADM

## 2020-08-11 RX ORDER — HALOPERIDOL 5 MG/ML
1 INJECTION INTRAMUSCULAR
Status: DISCONTINUED | OUTPATIENT
Start: 2020-08-11 | End: 2020-08-12 | Stop reason: HOSPADM

## 2020-08-11 RX ORDER — MORPHINE SULFATE 2 MG/ML
2 INJECTION, SOLUTION INTRAMUSCULAR; INTRAVENOUS
Status: DISCONTINUED | OUTPATIENT
Start: 2020-08-11 | End: 2020-08-12 | Stop reason: HOSPADM

## 2020-08-11 RX ORDER — MIDAZOLAM HYDROCHLORIDE 1 MG/ML
2 INJECTION, SOLUTION INTRAMUSCULAR; INTRAVENOUS
Status: DISCONTINUED | OUTPATIENT
Start: 2020-08-11 | End: 2020-08-11

## 2020-08-11 RX ORDER — LIDOCAINE HYDROCHLORIDE 20 MG/ML
INJECTION, SOLUTION EPIDURAL; INFILTRATION; INTRACAUDAL; PERINEURAL AS NEEDED
Status: DISCONTINUED | OUTPATIENT
Start: 2020-08-11 | End: 2020-08-11 | Stop reason: HOSPADM

## 2020-08-11 RX ORDER — FENTANYL CITRATE 50 UG/ML
100 INJECTION, SOLUTION INTRAMUSCULAR; INTRAVENOUS ONCE
Status: DISCONTINUED | OUTPATIENT
Start: 2020-08-11 | End: 2020-08-11

## 2020-08-11 RX ORDER — GLYCOPYRROLATE 0.2 MG/ML
INJECTION INTRAMUSCULAR; INTRAVENOUS AS NEEDED
Status: DISCONTINUED | OUTPATIENT
Start: 2020-08-11 | End: 2020-08-11 | Stop reason: HOSPADM

## 2020-08-11 RX ORDER — LIDOCAINE HYDROCHLORIDE 10 MG/ML
0.1 INJECTION INFILTRATION; PERINEURAL AS NEEDED
Status: DISCONTINUED | OUTPATIENT
Start: 2020-08-11 | End: 2020-08-11

## 2020-08-11 RX ORDER — WARFARIN SODIUM 5 MG/1
5 TABLET ORAL
Status: DISCONTINUED | OUTPATIENT
Start: 2020-08-12 | End: 2020-08-12 | Stop reason: HOSPADM

## 2020-08-11 RX ORDER — ATORVASTATIN CALCIUM 40 MG/1
40 TABLET, FILM COATED ORAL
Status: DISCONTINUED | OUTPATIENT
Start: 2020-08-11 | End: 2020-08-12 | Stop reason: HOSPADM

## 2020-08-11 RX ORDER — OXYCODONE HYDROCHLORIDE 5 MG/1
5 TABLET ORAL
Status: COMPLETED | OUTPATIENT
Start: 2020-08-11 | End: 2020-08-11

## 2020-08-11 RX ORDER — ROCURONIUM BROMIDE 10 MG/ML
INJECTION, SOLUTION INTRAVENOUS AS NEEDED
Status: DISCONTINUED | OUTPATIENT
Start: 2020-08-11 | End: 2020-08-11 | Stop reason: HOSPADM

## 2020-08-11 RX ORDER — SODIUM CHLORIDE 9 MG/ML
INJECTION, SOLUTION INTRAVENOUS
Status: DISCONTINUED | OUTPATIENT
Start: 2020-08-11 | End: 2020-08-11 | Stop reason: HOSPADM

## 2020-08-11 RX ORDER — IPRATROPIUM BROMIDE AND ALBUTEROL SULFATE 2.5; .5 MG/3ML; MG/3ML
3 SOLUTION RESPIRATORY (INHALATION)
Status: DISCONTINUED | OUTPATIENT
Start: 2020-08-11 | End: 2020-08-12 | Stop reason: HOSPADM

## 2020-08-11 RX ORDER — GUAIFENESIN 100 MG/5ML
81 LIQUID (ML) ORAL DAILY
Status: DISCONTINUED | OUTPATIENT
Start: 2020-08-12 | End: 2020-08-12 | Stop reason: HOSPADM

## 2020-08-11 RX ORDER — FENTANYL CITRATE 50 UG/ML
INJECTION, SOLUTION INTRAMUSCULAR; INTRAVENOUS AS NEEDED
Status: DISCONTINUED | OUTPATIENT
Start: 2020-08-11 | End: 2020-08-11 | Stop reason: HOSPADM

## 2020-08-11 RX ORDER — BUDESONIDE AND FORMOTEROL FUMARATE DIHYDRATE 80; 4.5 UG/1; UG/1
2 AEROSOL RESPIRATORY (INHALATION)
Status: DISCONTINUED | OUTPATIENT
Start: 2020-08-11 | End: 2020-08-12 | Stop reason: HOSPADM

## 2020-08-11 RX ADMIN — ONDANSETRON 4 MG: 2 INJECTION INTRAMUSCULAR; INTRAVENOUS at 10:59

## 2020-08-11 RX ADMIN — PHENYLEPHRINE HYDROCHLORIDE 120 MCG: 10 INJECTION INTRAVENOUS at 10:55

## 2020-08-11 RX ADMIN — PREGABALIN 100 MG: 100 CAPSULE ORAL at 18:08

## 2020-08-11 RX ADMIN — SODIUM CHLORIDE, SODIUM LACTATE, POTASSIUM CHLORIDE, AND CALCIUM CHLORIDE: 600; 310; 30; 20 INJECTION, SOLUTION INTRAVENOUS at 10:35

## 2020-08-11 RX ADMIN — FENTANYL CITRATE 100 MCG: 50 INJECTION INTRAMUSCULAR; INTRAVENOUS at 10:37

## 2020-08-11 RX ADMIN — ROCURONIUM BROMIDE 45 MG: 10 INJECTION, SOLUTION INTRAVENOUS at 10:42

## 2020-08-11 RX ADMIN — GLYCOPYRROLATE 0.8 MG: 0.2 INJECTION, SOLUTION INTRAMUSCULAR; INTRAVENOUS at 11:31

## 2020-08-11 RX ADMIN — Medication 5 MG: at 11:13

## 2020-08-11 RX ADMIN — IOPAMIDOL 50 ML: 755 INJECTION, SOLUTION INTRAVENOUS at 11:34

## 2020-08-11 RX ADMIN — HEPARIN SODIUM 5000 UNITS: 1000 INJECTION, SOLUTION INTRAVENOUS; SUBCUTANEOUS at 11:14

## 2020-08-11 RX ADMIN — PHENYLEPHRINE HYDROCHLORIDE 60 MCG: 10 INJECTION INTRAVENOUS at 11:13

## 2020-08-11 RX ADMIN — VECURONIUM BROMIDE 1 MG: 1 INJECTION, POWDER, LYOPHILIZED, FOR SOLUTION INTRAVENOUS at 11:21

## 2020-08-11 RX ADMIN — INSULIN LISPRO 6 UNITS: 100 INJECTION, SOLUTION INTRAVENOUS; SUBCUTANEOUS at 18:08

## 2020-08-11 RX ADMIN — PHENYLEPHRINE HYDROCHLORIDE 60 MCG: 10 INJECTION INTRAVENOUS at 11:19

## 2020-08-11 RX ADMIN — PHENYLEPHRINE HYDROCHLORIDE 120 MCG: 10 INJECTION INTRAVENOUS at 10:56

## 2020-08-11 RX ADMIN — Medication 5 MG: at 11:30

## 2020-08-11 RX ADMIN — OXYCODONE HYDROCHLORIDE 5 MG: 5 TABLET ORAL at 14:58

## 2020-08-11 RX ADMIN — LIDOCAINE HYDROCHLORIDE 60 MG: 20 INJECTION, SOLUTION EPIDURAL; INFILTRATION; INTRACAUDAL; PERINEURAL at 10:42

## 2020-08-11 RX ADMIN — OXYCODONE 15 MG: 5 TABLET ORAL at 20:46

## 2020-08-11 RX ADMIN — PHENYLEPHRINE HYDROCHLORIDE 60 MCG: 10 INJECTION INTRAVENOUS at 11:30

## 2020-08-11 RX ADMIN — PREGABALIN 100 MG: 100 CAPSULE ORAL at 20:45

## 2020-08-11 RX ADMIN — PROPOFOL 20 MG: 10 INJECTION, EMULSION INTRAVENOUS at 11:05

## 2020-08-11 RX ADMIN — PHENYLEPHRINE HYDROCHLORIDE 120 MCG: 10 INJECTION INTRAVENOUS at 11:09

## 2020-08-11 RX ADMIN — Medication 5 MG: at 11:09

## 2020-08-11 RX ADMIN — HEPARIN SODIUM 5000 UNITS: 1000 INJECTION, SOLUTION INTRAVENOUS; SUBCUTANEOUS at 11:17

## 2020-08-11 RX ADMIN — INSULIN LISPRO 10 UNITS: 100 INJECTION, SOLUTION INTRAVENOUS; SUBCUTANEOUS at 20:42

## 2020-08-11 RX ADMIN — ATORVASTATIN CALCIUM 40 MG: 40 TABLET, FILM COATED ORAL at 20:44

## 2020-08-11 RX ADMIN — Medication 5 MG: at 11:12

## 2020-08-11 RX ADMIN — WARFARIN SODIUM 7.5 MG: 2.5 TABLET ORAL at 20:41

## 2020-08-11 RX ADMIN — Medication 5 MG: at 11:19

## 2020-08-11 RX ADMIN — DEXAMETHASONE SODIUM PHOSPHATE 4 MG: 10 INJECTION INTRAMUSCULAR; INTRAVENOUS at 10:59

## 2020-08-11 RX ADMIN — PROPOFOL 150 MG: 10 INJECTION, EMULSION INTRAVENOUS at 10:42

## 2020-08-11 RX ADMIN — PHENYLEPHRINE HYDROCHLORIDE 120 MCG: 10 INJECTION INTRAVENOUS at 10:51

## 2020-08-11 RX ADMIN — OXYCODONE 15 MG: 5 TABLET ORAL at 18:08

## 2020-08-11 RX ADMIN — VECURONIUM BROMIDE 1 MG: 1 INJECTION, POWDER, LYOPHILIZED, FOR SOLUTION INTRAVENOUS at 11:06

## 2020-08-11 RX ADMIN — Medication 10 MG: at 10:57

## 2020-08-11 RX ADMIN — PHENYLEPHRINE HYDROCHLORIDE 60 MCG: 10 INJECTION INTRAVENOUS at 11:24

## 2020-08-11 RX ADMIN — SODIUM CHLORIDE: 9 INJECTION, SOLUTION INTRAVENOUS at 10:35

## 2020-08-11 RX ADMIN — HEPARIN SODIUM 2 UNITS/HR: 200 INJECTION, SOLUTION INTRAVENOUS at 10:56

## 2020-08-11 RX ADMIN — SODIUM CHLORIDE 75 ML/HR: 900 INJECTION, SOLUTION INTRAVENOUS at 18:08

## 2020-08-11 RX ADMIN — SUCCINYLCHOLINE CHLORIDE 140 MG: 20 INJECTION, SOLUTION INTRAMUSCULAR; INTRAVENOUS at 10:43

## 2020-08-11 RX ADMIN — BUDESONIDE AND FORMOTEROL FUMARATE DIHYDRATE 2 PUFF: 80; 4.5 AEROSOL RESPIRATORY (INHALATION) at 19:47

## 2020-08-11 RX ADMIN — Medication 2 G: at 10:53

## 2020-08-11 RX ADMIN — Medication 5 MG: at 11:31

## 2020-08-11 RX ADMIN — Medication 10 ML: at 18:09

## 2020-08-11 RX ADMIN — PHENYLEPHRINE HYDROCHLORIDE 120 MCG: 10 INJECTION INTRAVENOUS at 11:12

## 2020-08-11 RX ADMIN — Medication 10 ML: at 20:47

## 2020-08-11 RX ADMIN — ESMOLOL HYDROCHLORIDE 30 MG: 10 INJECTION, SOLUTION INTRAVENOUS at 11:38

## 2020-08-11 NOTE — PROGRESS NOTES
[facility] with Dr Liz Faye  Successful deployment of Watchman device  16 fr RFV closed with Perclose device  Site covered with tegaderm and gauze  No bleeding or hematoma noted at site.  Site soft   Pt to go to PACU

## 2020-08-11 NOTE — ROUTINE PROCESS
TRANSFER - IN REPORT:    Verbal report received from Karen Queen RN(name) on Abner Dryer  being received from cath lab(unit) for routine progression of care      Report consisted of patients Situation, Background, Assessment and   Recommendations(SBAR). Information from the following report(s) SBAR, Kardex and MAR was reviewed with the receiving nurse. Opportunity for questions and clarification was provided. Pt R groin cath site CDI, heels intact. Unable to visualize sacrum at this time r/t strict BR. Scars noted to pt L arm and shoulder area and scar on midline of abdomen. No other impairments noted.

## 2020-08-11 NOTE — PROGRESS NOTES
TRANSFER - IN REPORT:    Verbal report received from Michael Bullock RN on [id number] being received from OR for routine progression of care      Report consisted of patients Situation, Background, Assessment and Recommendations(SBAR). Information from the following report(s) Procedure Summary was reviewed with the receiving nurse. Opportunity for questions and clarification was provided. Assessment completed upon patients arrival to unit and care assumed.

## 2020-08-11 NOTE — ANESTHESIA PREPROCEDURE EVALUATION
Relevant Problems   No relevant active problems       Anesthetic History   No history of anesthetic complications            Review of Systems / Medical History  Patient summary reviewed and pertinent labs reviewed    Pulmonary        Sleep apnea (Oxygen therapy also): CPAP  Smoker         Neuro/Psych              Cardiovascular    Hypertension: well controlled        Dysrhythmias : atrial fibrillation  CAD    Exercise tolerance: <4 METS  Comments: Chronic SOB  Unable to climb 2 FOS  Denies recent changes in functional status  2018 ECHO with EF of 55%;  Bicuspid AV   GI/Hepatic/Renal     GERD: poorly controlled      PUD     Endo/Other    Diabetes: poorly controlled, type 2, using insulin    Morbid obesity     Other Findings   Comments: Gastroparesis   Hx/o necrotizing fasciitis            Physical Exam    Airway  Mallampati: II  TM Distance: 4 - 6 cm  Neck ROM: normal range of motion   Mouth opening: Normal     Cardiovascular    Rhythm: regular  Rate: normal         Dental    Dentition: Edentulous     Pulmonary  Breath sounds clear to auscultation               Abdominal  GI exam deferred       Other Findings            Anesthetic Plan    ASA: 3  Anesthesia type: general    Monitoring Plan: Arterial line      Induction: Intravenous  Anesthetic plan and risks discussed with: Patient and Spouse

## 2020-08-11 NOTE — PROGRESS NOTES
Care Management Interventions  Transition of Care Consult (CM Consult): Discharge Planning  Discharge Durable Medical Equipment: No  Physical Therapy Consult: No  Occupational Therapy Consult: No  Discharge Location  Discharge Placement: Home    Chart screened by  for discharge planning. No needs identified at this time. Please consult  if any new issues arise.

## 2020-08-11 NOTE — PROGRESS NOTES
Report received from [facility] for continued care following LAAO. Remains drowsy. Right groin site remains soft with no bleeding noted. No complaints voiced.

## 2020-08-11 NOTE — PROGRESS NOTES
Patient received to [address] [place] [address] room # 17  Ambulatory from [facility]. Patient scheduled for LAAO today with Dr Koki Crow. Procedure reviewed & questions answered, voiced good understanding consent obtained & placed on chart. All medications and medical history reviewed. Will prep patient per orders. Patient & family updated on plan of care. The patient has a fraility score of 3-MANAGING WELL, based on Patient A&Ox3, patient able to ambulate to room without difficulty.

## 2020-08-11 NOTE — H&P
[facility] CARDIOLOGY History & Physical                   Subjective:     Patient presents for left atrial appendage occlusion. He has a history of atrial flutter and atrial fibrillation. He underwent atrial flutter ablation in 2016 and atrial fibrillation ablation in 2018. He is currently maintained with a rhythm control strategy on sotalol. He underwent loop recorder to evaluate for residual atrial fibrillation burden as he is not on anticoagulation. This is shown recurrent episodes of atrial fibrillation. Patient states he was unable to take Eliquis and Xarelto due to allergies. He had excessive bruising and bleeding with Coumadin. He also has Crohn's disease which increases risk GI bleeding.       Atrial Fibrillation CHADSVASC2 Score Stroke Risk:   60 y.o. <65        + 0    male Male     [de-identified]   CHF HX: No    + 0   HTN HX: Yes    +1   Stroke/TIA/Thromboembolism No    +0   Vascular Disease HX: Yes    +1   Diabetes Mellitus Yes    +1   CHADSVASC 2 Score 3      Annual Stroke Risk 3.2% - moderate-high       HAS-BLED Score      HTN Hx [x]? 1 Point   Renal Disease  []? 1 Point   Liver Disease []? [address] Hx []? 1 Point   Prior Major Bleeding or Predisposition [x]? 1 Point   Labile INR []? 1 Point   Age > 72 Years Current: 60 y. o.    []? 1 Point   Rx Usage Predisposing to Bleeding [x]? 1 Point   Alcohol Use (> or = 8 Drinks/wk) []? 1 Point   Total: UCHASBLED: Score 3 Moderate Risk           Past Medical History:   Diagnosis Date    Acute renal failure (ARF) ([address] 06/2017    septic from necrotizing fascitis. was on dialysis short term.  Anticoagulated on Coumadin     has not been instructed to hold.  Atrial fibrillation ([address]     Chest pain, precordial 5/4/2016    Chronic kidney disease     Chronic pain     back    Chronic pancreatitis ([address]     Diabetes ([address] 2008    insulin reliant. bs: 120's .      Edema     Gastrointestinal disorder     Headache     Heart failure ([address]     History of peptic ulcer disease     years ago    History of sepsis 06/2017    Hypertension     Morbid obesity ([facility] 5/4/2016    35 bmi    Necrotizing fasciitis ([facility] 06/2017    left upper arm    Palpitations     PUD (peptic ulcer disease)     Restless leg syndrome 5/4/2016    Sleep apnea     cpap    Tobacco dependence 5/4/2016      Past Surgical History:   Procedure Laterality Date    HX APPENDECTOMY      HX CATARACT REMOVAL Right     with IOL    HX CHOLECYSTECTOMY      HX HEART CATHETERIZATION      HX LUMBAR FUSION  2012 and 2013    x 2    HX OTHER SURGICAL      cardioversion      Allergies   Allergen Reactions    Eliquis [Apixaban] Rash    Lisinopril Unknown (comments)    Metformin Shortness of Breath and Rash    Morphine Nausea and Vomiting    Xarelto [Rivaroxaban] Rash     Social History     Tobacco Use    Smoking status: Current Every Day Smoker     Packs/day: 0.25     Years: 40.00     Pack years: 10.00    Smokeless tobacco: Never Used    Tobacco comment: Down to 5 cigs daily. 6/24/20KH   Substance Use Topics    Alcohol use: No     Alcohol/week: 0.0 standard drinks      FH:   Family History   Problem Relation Age of Onset    Diabetes Mother     Heart Disease Mother     No Known Problems Father     Pulmonary Embolism Sister         Review of Systems   Constitutional: Negative for chills and fever. HENT: Negative for tinnitus. Eyes: Negative for blurred vision. Respiratory: Negative for cough and shortness of breath. Cardiovascular: Negative for orthopnea. Gastrointestinal: Negative for abdominal pain and nausea. Genitourinary: Negative for dysuria. Musculoskeletal: Positive for joint pain. Skin: Negative for rash. Neurological: Negative for tremors, sensory change and headaches. Endo/Heme/Allergies: Does not bruise/bleed easily. Psychiatric/Behavioral: Negative for depression and suicidal ideas.          Objective:       Visit Vitals  /74 (BP 1 Location: Left arm, BP Patient Position: At rest)   Pulse 70   Resp 18   Ht 6' (1.829 m)   Wt 230 lb (104.3 kg)   SpO2 98%   BMI 31.19 kg/m²       No intake/output data recorded. No intake/output data recorded. Physical Exam  HENT:      Head: Atraumatic. Eyes:      Conjunctiva/sclera: Conjunctivae normal.      Pupils: Pupils are equal, round, and reactive to light. Neck:      Thyroid: No thyromegaly. Vascular: No JVD. Cardiovascular:      Rate and Rhythm: Normal rate and regular rhythm. Heart sounds: No murmur. Pulmonary:      Effort: Pulmonary effort is normal.      Breath sounds: Normal breath sounds. Abdominal:      General: Bowel sounds are normal. There is no distension. Palpations: Abdomen is soft. Tenderness: There is no abdominal tenderness. Skin:     General: Skin is warm. Findings: No rash. Neurological:      Mental Status: He is alert and oriented to person, place, and time. Psychiatric:         Mood and Affect: Mood normal.             Data Review:   Labs:   Recent Labs     08/11/20  0826 08/10/20  1222    141   K 4.1 4.5   MG 2.2  --    BUN 15 13   CREA 1.50 1.49   * 176*   WBC  --  7.5   HGB  --  15.6   HCT  --  48.4   PLT  --  120*   INR 1.5 1.7      No results found for: PJ Jones        Assessment/Plan:   Active Problems:    Paroxysmal atrial fibrillation ([facility] (8/11/2020)  Patient is an appropriate candidate for consideration of left atrial appendage occlusion. The patient's [id number] score is  3 which indicates a 3.2% annual risk of stroke. Nyla Katie Has-BLED score is 3 which indicates a 3.72% annual risk of a major bleeding event. . I have discussed with the rationale for  left atrial appendage occlusion. We discussed the available data from randomized trials which demonstate left atrial appendage occlusion is as effective as long term anticoagulation at prevention of CVA or systemic embolism.  We also discussed that left atrial appendage closure reduces risk of CVA or sytemic embolization by 67-83% compared to no anticoagulation. Randomized data also showed signifcant reduction in the following endpoints compared to long term Coumadin administration (Rate per 100 PY):     - 80% reduction in hemorrhagic CVA (0.37 vs 0.94%),   - 59% reduction in disabling CVA (0.37% vs 0.94%),     - 41% reduction in CV/unexplained death (1.3% vs 2.2%)   - 27% reduction in all cause death (3.6% vs 4.9%)   - 72% risk reduction of bleeding. The risk of left atrial appendage were also discussed. Risk of major complication is approximately 1.5% based on available data. Risk include but not limited:   - Pericardial tamponade (1.28%) which can be treated percutaneously in 63% of cases but can require sugical therapy in  31% of cases (3 out of 1000 patients). - Procedural related CVA in 2 out of 1000 patients. - Device embolization in less than 3 out of 1000 patients   - Death related to procedure in approximately 6 out of 10,000 patients. Based on our discussion, it is felt benefits of left atrial appendage significantly outweigh risk. All questions answered to the best of my ability. Patient would like to proceed with procedure      HTN (hypertension) (3/1/2012)  Resume home medication post op and monitor BP with serial assessments. Diabetes mellitus ([address] (3/1/2012)  Resume home medications post op.   Diabetic diet      Dyslipidemia (3/1/2012)  Continue Pravastatin therapy                MICHAEL Cifuentes  8/11/2020  10:15 AM

## 2020-08-11 NOTE — ANESTHESIA POSTPROCEDURE EVALUATION
Procedure(s):  LEFT ATRIAL APPENDAGE CLOSURE.    general    Anesthesia Post Evaluation      Multimodal analgesia: multimodal analgesia used between 6 hours prior to anesthesia start to PACU discharge  Patient location during evaluation: bedside  Patient participation: complete - patient participated  Level of consciousness: awake and alert  Pain score: 1  Pain management: adequate  Airway patency: patent  Anesthetic complications: no  Cardiovascular status: acceptable  Respiratory status: acceptable  Hydration status: acceptable  Comments: Patient doing well. Continue care on floor. Post anesthesia nausea and vomiting:  controlled  Final Post Anesthesia Temperature Assessment:  Normothermia (36.0-37.5 degrees C)      INITIAL Post-op Vital signs:   Vitals Value Taken Time   /60 8/11/2020 12:55 PM   Temp     Pulse 76 8/11/2020 12:56 PM   Resp     SpO2 99 % 8/11/2020 12:56 PM   Vitals shown include unvalidated device data.

## 2020-08-11 NOTE — OP NOTES
PROCEDURE/OPERATIVE REPORT    Patient: Bautista Lamb MRN: [id number]  SSN: [id number]    YOB: 1965  Age: 60 y.o. Sex: male      DATE OF PROCEDURE: 8/11/2020     PROCEDURE: Left atrial appendage occlusion with Watchman device. Pre-procedural Diagnosis  1. Paroxysmal Atrial fibrillation  2. Intolerant to long term anticoagulation     Procedure Performed  1. Transesophageal echocardiogram  2. Transeptal puncture   3. Watchman implantation      Interventional Cardiologist: Kia Daley MD    Anesthesia: General     Estimated Blood Loss: Less than 10 mL     Specimens: * No specimens in log *    Procedure in Detail:  The patient was brought to the [facility] OR in the fasting state. The patient was intubated by anesthesiology, invasive arterial blood pressure monitoring obtained, a ball catheter inserted. A tranesophageal echocardiogram was performed directly prior to the procedure and was negative for a THERESA thrombus (see full report in chart). I obtained venous access and deployed a single Perclose in a \"preclose\" fashion prior to 16 Marshallese sheath placement. I then placed an SLO and performed the transseptal puncture. I prepped and draped the Watchman delivery sheath and exchanged for the SLO via an Amplatz super stiff wire located in the left upper pulmonary vein. A pigtail catheter was then inserted into the delivery sheath and used to guide the delivery sheath into the appendage. Depth measurements were performed with fluoroscopy and depth and size measurements determined via KSENIA. The sheath was then advanced over the pigtail catheter into position within the THERESA. The Watchman device was then prepped per protocol and placed into the delivery sheath via a wet to wet connection and advanced into the sheath. The sheath was then pulled back to allow delivery of the Watchman device within the left atrial appendage. Successful delivery of a 24 mm device revealed excellent PASS criteria.  A contrast appendogram was performed in 2 views revealing a adequate seal. After extensive evaluation including a excellent tug test, the device was deployed. Further measurements were taken post implant. The sheath was removed and I deployed Perclose with good hemostasis. The patient tolerated the procedure well with no acute complications recognized. Just prior to pulling shealths, the KSENIA was used to obtain ultrasound images and revealed no evidence of pericardial effusion. Complications: None    Summary:   1.  Successful Watchman implantation of a 24 device      Rolla Halsted, MD

## 2020-08-11 NOTE — PROGRESS NOTES
Warfarin dosing per pharmacist    Cristopher Vasquez is a 60 y.o. male. Height: 6' (182.9 cm)    Weight: 104.3 kg (230 lb)    Indication:  afib    Goal INR:  2-3    Home dose:  7.5 mg Mon, Fri; 5 mg all other days    Risk factors or significant drug interactions:  none    Other anticoagulants:  none    Daily Monitoring  Date  INR     Warfarin dose HGB              Notes  8/11  1.5  7.5 mg  ---        Pharmacy is consulted to manage warfarin for Mr. Morgan during this admission. He presents with subtherapeutic INR after holding doses prior to procedure. Will give a one time dose of 7.5 mg tonight and then resume his home regimen tomorrow. Daily INR. Pharmacy will continue to follow. Please call with any questions.     Thank you,  Nestor Clayton, PharmD, [facility]  Clinical Pharmacist  [phone]

## 2020-08-11 NOTE — ANESTHESIA PROCEDURE NOTES
Arterial Line Placement    Start time: 8/11/2020 10:43 AM  End time: 8/11/2020 10:47 AM  Performed by:  Lotus Rossi CRNA  Authorized by: Gabe Hsieh MD     Pre-Procedure  Indications:  Arterial pressure monitoring and blood sampling  Preanesthetic Checklist: patient identified, risks and benefits discussed, anesthesia consent, site marked, patient being monitored, timeout performed and patient being monitored    Timeout Time: 10:45        Procedure:   Prep:  ChloraPrep  Seldinger Technique?: Yes    Orientation:  Left  Location:  Radial artery  Catheter size:  20 G  Number of attempts:  2  Cont Cardiac Output Sensor: No      Assessment:   Post-procedure:  Sterile dressing applied  Patient Tolerance:  Patient tolerated the procedure well with no immediate complications

## 2020-08-12 VITALS
DIASTOLIC BLOOD PRESSURE: 66 MMHG | OXYGEN SATURATION: 99 % | RESPIRATION RATE: 17 BRPM | SYSTOLIC BLOOD PRESSURE: 137 MMHG | WEIGHT: 240.2 LBS | BODY MASS INDEX: 32.53 KG/M2 | HEIGHT: 72 IN | TEMPERATURE: 97.6 F | HEART RATE: 62 BPM

## 2020-08-12 LAB
ABO + RH BLD: NORMAL
ANION GAP SERPL CALC-SCNC: 9 MMOL/L (ref 7–16)
BASOPHILS # BLD: 0 K/UL (ref 0–0.2)
BASOPHILS NFR BLD: 0 % (ref 0–2)
BLD PROD TYP BPU: NORMAL
BLOOD GROUP ANTIBODIES SERPL: NORMAL
BPU ID: NORMAL
BUN SERPL-MCNC: 16 MG/DL (ref 6–23)
CALCIUM SERPL-MCNC: 8.8 MG/DL (ref 8.3–10.4)
CHLORIDE SERPL-SCNC: 101 MMOL/L (ref 98–107)
CO2 SERPL-SCNC: 28 MMOL/L (ref 21–32)
CREAT SERPL-MCNC: 1.5 MG/DL (ref 0.8–1.5)
CROSSMATCH RESULT,%XM: NORMAL
DIFFERENTIAL METHOD BLD: ABNORMAL
EOSINOPHIL # BLD: 0.1 K/UL (ref 0–0.8)
EOSINOPHIL NFR BLD: 1 % (ref 0.5–7.8)
ERYTHROCYTE [DISTWIDTH] IN BLOOD BY AUTOMATED COUNT: 13.7 % (ref 11.9–14.6)
GLUCOSE BLD STRIP.AUTO-MCNC: 217 MG/DL (ref 65–100)
GLUCOSE SERPL-MCNC: 230 MG/DL (ref 65–100)
HCT VFR BLD AUTO: 41.1 % (ref 41.1–50.3)
HGB BLD-MCNC: 13.5 G/DL (ref 13.6–17.2)
IMM GRANULOCYTES # BLD AUTO: 0 K/UL (ref 0–0.5)
IMM GRANULOCYTES NFR BLD AUTO: 0 % (ref 0–5)
INR PPP: 1.6
LYMPHOCYTES # BLD: 2.2 K/UL (ref 0.5–4.6)
LYMPHOCYTES NFR BLD: 20 % (ref 13–44)
MAGNESIUM SERPL-MCNC: 2 MG/DL (ref 1.8–2.4)
MCH RBC QN AUTO: 27.6 PG (ref 26.1–32.9)
MCHC RBC AUTO-ENTMCNC: 32.8 G/DL (ref 31.4–35)
MCV RBC AUTO: 83.9 FL (ref 79.6–97.8)
MONOCYTES # BLD: 0.5 K/UL (ref 0.1–1.3)
MONOCYTES NFR BLD: 5 % (ref 4–12)
NEUTS SEG # BLD: 8.2 K/UL (ref 1.7–8.2)
NEUTS SEG NFR BLD: 74 % (ref 43–78)
NRBC # BLD: 0 K/UL (ref 0–0.2)
PLATELET # BLD AUTO: 106 K/UL (ref 150–450)
PMV BLD AUTO: 12.2 FL (ref 9.4–12.3)
POTASSIUM SERPL-SCNC: 4.2 MMOL/L (ref 3.5–5.1)
PROTHROMBIN TIME: 19.9 SEC (ref 12–14.7)
RBC # BLD AUTO: 4.9 M/UL (ref 4.23–5.6)
SODIUM SERPL-SCNC: 138 MMOL/L (ref 136–145)
SPECIMEN EXP DATE BLD: NORMAL
STATUS OF UNIT,%ST: NORMAL
UNIT DIVISION, %UDIV: 0
WBC # BLD AUTO: 11 K/UL (ref 4.3–11.1)

## 2020-08-12 PROCEDURE — 99238 HOSP IP/OBS DSCHRG MGMT 30/<: CPT | Performed by: INTERNAL MEDICINE

## 2020-08-12 PROCEDURE — 82962 GLUCOSE BLOOD TEST: CPT

## 2020-08-12 PROCEDURE — 80048 BASIC METABOLIC PNL TOTAL CA: CPT

## 2020-08-12 PROCEDURE — 83735 ASSAY OF MAGNESIUM: CPT

## 2020-08-12 PROCEDURE — 74011250637 HC RX REV CODE- 250/637: Performed by: INTERNAL MEDICINE

## 2020-08-12 PROCEDURE — 85610 PROTHROMBIN TIME: CPT

## 2020-08-12 PROCEDURE — 74011636637 HC RX REV CODE- 636/637: Performed by: NURSE PRACTITIONER

## 2020-08-12 PROCEDURE — 94640 AIRWAY INHALATION TREATMENT: CPT

## 2020-08-12 PROCEDURE — 94760 N-INVAS EAR/PLS OXIMETRY 1: CPT

## 2020-08-12 PROCEDURE — 36415 COLL VENOUS BLD VENIPUNCTURE: CPT

## 2020-08-12 PROCEDURE — 85025 COMPLETE CBC W/AUTO DIFF WBC: CPT

## 2020-08-12 RX ADMIN — OXYCODONE 15 MG: 5 TABLET ORAL at 08:32

## 2020-08-12 RX ADMIN — BUDESONIDE AND FORMOTEROL FUMARATE DIHYDRATE 2 PUFF: 80; 4.5 AEROSOL RESPIRATORY (INHALATION) at 07:32

## 2020-08-12 RX ADMIN — FUROSEMIDE 40 MG: 40 TABLET ORAL at 08:32

## 2020-08-12 RX ADMIN — PANTOPRAZOLE SODIUM 40 MG: 40 TABLET, DELAYED RELEASE ORAL at 08:32

## 2020-08-12 RX ADMIN — HYDROCODONE BITARTRATE AND ACETAMINOPHEN 1 TABLET: 5; 325 TABLET ORAL at 02:46

## 2020-08-12 RX ADMIN — PREGABALIN 100 MG: 100 CAPSULE ORAL at 08:32

## 2020-08-12 RX ADMIN — INSULIN LISPRO 4 UNITS: 100 INJECTION, SOLUTION INTRAVENOUS; SUBCUTANEOUS at 08:32

## 2020-08-12 RX ADMIN — ASPIRIN 81 MG: 81 TABLET, CHEWABLE ORAL at 08:32

## 2020-08-12 RX ADMIN — PANCRELIPASE LIPASE, PANCRELIPASE PROTEASE, PANCRELIPASE AMYLASE 1 CAPSULE: 20000; 63000; 84000 CAPSULE, DELAYED RELEASE ORAL at 08:32

## 2020-08-12 RX ADMIN — SERTRALINE HYDROCHLORIDE 150 MG: 100 TABLET ORAL at 08:32

## 2020-08-12 RX ADMIN — Medication 10 ML: at 02:47

## 2020-08-12 NOTE — PROGRESS NOTES
Care Management Interventions  Transition of Care Consult (CM Consult): Discharge Planning  Discharge Durable Medical Equipment: No  Physical Therapy Consult: No  Occupational Therapy Consult: No  Discharge Location  Discharge Placement: Home

## 2020-08-12 NOTE — DISCHARGE SUMMARY
[address] [place] [address] Cardiology Discharge Summary     Patient ID:  Maria Teresa Mercedes  [id number]  1 y.o.  1965    Admit date: 8/11/2020    Discharge date:  08/12/20     Admitting Physician: Zahira Mccray MD     Discharge Physician: Dr. Trina Lamb MD    Admission Diagnoses: Paroxysmal atrial fibrillation ([facility] [I48.0]  A-fib [facility]) [I48.91]    Discharge Diagnoses:    Diagnosis    Paroxysmal atrial fibrillation ([facility]    Coronary artery disease involving native coronary artery of native heart    Type 2 diabetes with nephropathy ([facility]    Severe obesity (BMI 35.0-39. 9)    HTN (hypertension)       Cardiology Procedures this admission:  KSENIA, Implantation of Watchman device, Echocardiogram    Consults: None    Hospital Course: Patient was seen at the office of [address] [place] [address] Cardiology by Dr. Cierra Ordoñez in follow up for consideration for watchman device. The patient presented for procedure. KSENIA was performed directly prior to procedure that was negative for THERESA thrombus. The patient underwent successful implantation of a 24 mm Watchman device. Just prior to pulling shealths, the KSENIA was used to obtain ultrasound images and revealed no evidence of pericardial effusion. The patient was monitored closely in the CV stepdown. The morning of 8/12/2020, patient was up feeling well without any complaints of chest pain or shortness of breath. Patient's labs were stable. Patient was seen and examined by Dr. Trina Lamb and determined stable and ready for discharge. Patient was instructed on the importance of medication compliance. The patient will be discharged home on warfarin and ASA 81mg. ASA 81mg will be continued indefinitely. The warfarin will be continued for 45 days until THERESA seal is examined using KSENIA. Once THERESA is noted to be sealed, warfarin will be discontinued, and Plavix 75mg will be started and will be continued until 6 months post watchman implant.  This medication plan is due to patient being unable to take Eliquis and Xarelto due to allergies.  He had excessive bruising and bleeding with Coumadin.  He also has Crohn's disease which increases risk GI bleeding. The patient will remain on coumadin for at least 45 days. The patient's discharge INR was 1.6. The patient will have INR check on Friday 8/14 at 8:30 am at [facility]. The patient will have repeat KSENIA performed on 9/24/20 with Dr Yuliana Mayes at [facility] at 8:00 am.. The patient will follow up with [facility] cardiology Dr. Sadie Ojeda on 10/1/2020 at 2:45pm at [facility]. DISPOSITION: The patient is being discharged home in stable condition on a low saturated fat, low cholesterol and low salt diet. The patient is instructed to advance activities as tolerated to the limit of fatigue or shortness of breath. The patient is instructed to avoid lifting anything heavier than 10 lbs for 1 weeks. The patient is instructed to avoid any straining, stooping or squatting for 2 weeks. The patient is instructed not to drive for 1 week. The patient is instructed to watch the groin site for bleeding/oozing; if seen, the patient is instructed to apply firm pressure with a clean cloth and call [facility] Cardiology at [phone]. The patient is instructed to watch for signs of infection which include: increasing area of redness, fever/hot to touch or purulent drainage at the groin site. The patient is instructed not to soak in a bathtub for 7-10 days, but is cleared to shower. The patient is instructed to return to the ER immediately for any severe pain, color change, or temperature change in leg.      The patient is informed not to stop any medications without discussing with our office and to contact our office if any dental work or possible surgeries are expected    Discharge Exam:   Visit Vitals  /56 (BP 1 Location: Left arm, BP Patient Position: At rest)   Pulse 76   Temp 97.8 °F (36.6 °C)   Resp 16   Ht 6' (1.829 m)   Wt 109 kg (240 lb 3.2 oz)   SpO2 97%   BMI 32.58 kg/m² Patient has been seen by Dr. Joe Conte see his progress note for exam details. Recent Results (from the past 24 hour(s))   GLUCOSE, POC    Collection Time: 08/11/20  9:14 AM   Result Value Ref Range    Glucose (POC) 167 (H) 65 - 100 mg/dL   POC ACTIVATED CLOTTING TIME    Collection Time: 08/11/20 11:25 AM   Result Value Ref Range    Activated Clotting Time (POC) 312 (H) 70 - 128 SECS   GLUCOSE, POC    Collection Time: 08/11/20  4:07 PM   Result Value Ref Range    Glucose (POC) 298 (H) 65 - 100 mg/dL   EKG, 12 LEAD, INITIAL    Collection Time: 08/11/20  5:09 PM   Result Value Ref Range    Ventricular Rate 87 BPM    Atrial Rate 87 BPM    P-R Interval 210 ms    QRS Duration 84 ms    Q-T Interval 332 ms    QTC Calculation (Bezet) 399 ms    Calculated P Axis 26 degrees    Calculated R Axis 19 degrees    Calculated T Axis 72 degrees    Diagnosis       Sinus rhythm with sinus arrhythmia with 1st degree A-V block  Nonspecific ST and T wave abnormality  Abnormal ECG  When compared with ECG of 05-DEC-2018 14:21,  Vent.  rate has increased BY  32 BPM  Nonspecific T wave abnormality no longer evident in Inferior leads  Confirmed by ST ISABELL CARRILLO MD (), JONATHAN GONZALEZ ([id number]) on 8/11/2020 9:49:37 PM     GLUCOSE, POC    Collection Time: 08/11/20  8:22 PM   Result Value Ref Range    Glucose (POC) 355 (H) 65 - 100 mg/dL   GLUCOSE, POC    Collection Time: 08/11/20 11:43 PM   Result Value Ref Range    Glucose (POC) 266 (H) 65 - 557 mg/dL   METABOLIC PANEL, BASIC    Collection Time: 08/12/20  4:52 AM   Result Value Ref Range    Sodium 138 136 - 145 mmol/L    Potassium 4.2 3.5 - 5.1 mmol/L    Chloride 101 98 - 107 mmol/L    CO2 28 21 - 32 mmol/L    Anion gap 9 7 - 16 mmol/L    Glucose 230 (H) 65 - 100 mg/dL    BUN 16 6 - 23 MG/DL    Creatinine 1.50 0.8 - 1.5 MG/DL    GFR est AA >60 >60 ml/min/1.73m2    GFR est non-AA 51 (L) >60 ml/min/1.73m2    Calcium 8.8 8.3 - 10.4 MG/DL   MAGNESIUM    Collection Time: 08/12/20  4:52 AM   Result Value Ref Range    Magnesium 2.0 1.8 - 2.4 mg/dL   CBC WITH AUTOMATED DIFF    Collection Time: 08/12/20  4:52 AM   Result Value Ref Range    WBC 11.0 4.3 - 11.1 K/uL    RBC 4.90 4.23 - 5.6 M/uL    HGB 13.5 (L) 13.6 - 17.2 g/dL    HCT 41.1 41.1 - 50.3 %    MCV 83.9 79.6 - 97.8 FL    MCH 27.6 26.1 - 32.9 PG    MCHC 32.8 31.4 - 35.0 g/dL    RDW 13.7 11.9 - 14.6 %    PLATELET 759 (L) 853 - 450 K/uL    MPV 12.2 9.4 - 12.3 FL    ABSOLUTE NRBC 0.00 0.0 - 0.2 K/uL    DF AUTOMATED      NEUTROPHILS 74 43 - 78 %    LYMPHOCYTES 20 13 - 44 %    MONOCYTES 5 4.0 - 12.0 %    EOSINOPHILS 1 0.5 - 7.8 %    BASOPHILS 0 0.0 - 2.0 %    IMMATURE GRANULOCYTES 0 0.0 - 5.0 %    ABS. NEUTROPHILS 8.2 1.7 - 8.2 K/UL    ABS. LYMPHOCYTES 2.2 0.5 - 4.6 K/UL    ABS. MONOCYTES 0.5 0.1 - 1.3 K/UL    ABS. EOSINOPHILS 0.1 0.0 - 0.8 K/UL    ABS. BASOPHILS 0.0 0.0 - 0.2 K/UL    ABS. IMM. GRANS. 0.0 0.0 - 0.5 K/UL   PROTHROMBIN TIME + INR    Collection Time: 08/12/20  4:52 AM   Result Value Ref Range    Prothrombin time 19.9 (H) 12.0 - 14.7 sec    INR 1.6       Patient Instructions:   Current Discharge Medication List      CONTINUE these medications which have NOT CHANGED    Details   insulin glargine,hum.rec.anlog (TOUJEO SOLOSTAR U-300 INSULIN SC) 126 Units by SubCUTAneous route. warfarin (Coumadin) 7.5 mg tablet Take 7.5 mg by mouth daily. pantoprazole (Protonix) 40 mg tablet Take 40 mg by mouth daily. aspirin delayed-release 81 mg tablet Take 2 Tabs by mouth daily. Qty: 60 Tab, Refills: 0      insulin aspart U-100 (NOVOLOG FLEXPEN U-100 INSULIN) 100 unit/mL inpn by SubCUTAneous route Before breakfast, lunch, dinner and at bedtime. Sliding scale with meals      pregabalin (LYRICA) 100 mg capsule Take 100 mg by mouth three (3) times daily. Indications: Diabetic Peripheral Neuropathy      sertraline (ZOLOFT) 100 mg tablet Take 150 mg by mouth daily. oxyCODONE IR (OXY-IR) 15 mg immediate release tablet Take 15 mg by mouth three (3) times daily. amylase-lipase-protease (CREON) 24,000-76,000 -120,000 unit capsule Take 1 Cap by mouth three (3) times daily (with meals). Indications: exocrine pancreatic insufficiency      colestipol (COLESTID) 1 gram tablet Take 1 g by mouth two (2) times a day. Indications: hypercholesterolemia      furosemide (LASIX) 40 mg tablet Take 1 Tab by mouth daily. Qty: 90 Tab, Refills: 11    Associated Diagnoses: Diastolic CHF, acute on chronic (HCC)      atorvastatin (LIPITOR) 40 mg tablet Take 1 Tab by mouth nightly. Qty: 30 Tab, Refills: 3      cetirizine (ZYRTEC) 10 mg tablet Take 10 mg by mouth nightly. Indications: Allergic Rhinitis      budesonide-formoterol (SYMBICORT) 80-4.5 mcg/actuation HFAA inhaler Take 2 Puffs by inhalation two (2) times daily as needed. Indications: BRONCHOSPASM PREVENTION WITH COPD, bring on the dos      albuterol (PROVENTIL HFA, VENTOLIN HFA, PROAIR HFA) 90 mcg/actuation inhaler Take 2 Puffs by inhalation every six (6) hours as needed. Indications: BRONCHOSPASM PREVENTION, bring on the dos      ipratropium-albuterol (COMBIVENT)  mcg/actuation inhaler Take 1 Puff by inhalation every six (6) hours as needed.            Signed:  Seven Moralez PA-C  8/12/2020  7:20 AM

## 2020-08-12 NOTE — ROUTINE PROCESS
Bedside and Verbal shift change report to be given to self (oncoming nurse) by Chapito Faye RN (offgoing nurse). Report included the following information SBAR, Kardex and MAR.

## 2020-08-12 NOTE — PROGRESS NOTES
Discharge instructions reviewed with Patient and wife. Opportunity for questions provided. Patient and wife voiced understanding of all discharge instructions. All lines removed. Tele monitor removed and returned to the monitor room. Patient walked out with wife at the bedside. No other needs stated.

## 2020-08-12 NOTE — DISCHARGE INSTRUCTIONS
Watchman Discharge Instructions      Activity:     Follow your doctors recommendations   Return to normal activities gradually, pacing yourself as you feel better, resting when tired. · Activity should be limited for the next 48 hours. Climb stairs as little as possible and avoid any stooping, bending or strenuous activity for 48 hours. No heavy lifting (anything over 10 pounds) for three days. · Do not drive for 48 hoursHave a responsible person drive you home and stay with you for at least 24 hours after your heart catheterization/angiography. · Check the puncture site frequently for swelling or bleeding. If you see any bleeding, lie down and apply pressure over the area with a clean town or washcloth. Notify your doctor for any redness, swelling, drainage or oozing from the puncture site. Notify your doctor for any fever or chills. · You may resume your usual diet. Drink more fluids than usual.        Incision / Wound Care       Cleanse wounds with mild soap and water. Keep wound dry.  A small amount of bloody or clear drainage is normal.   Watch for redness, swelling, incision site hot to touch, foul or colored drainage from the incision site, these are all signs of infection and should be reported immediately to the physicians.  If there is site concern please notify your implanting physician.  You may remove the bandage from your Right and Groin in 24 hours. You may shower in 24 hours. No tub baths, hot tubs or swimming for one week. Do not place any lotions, creams, powders, ointments over the puncture site for one week. You may place a clean band-aid over the puncture site each day for 5 days. Change this daily. Continued        Medications:   DO NOT STOP TAKING YOUR WARFARIN OR ASPIRIN  (and/or PLAVIX) WITHOUT SPEAKING WITH YOUR CARDIOLOGIST FIRST!  Take your medications as ordered at the time of discharge.    Do NOT stop taking any medications without first discussing it with your doctor.  Notify all your doctors of current medication lists. Follow instructions on medication administration especially if blood thinning medications are prescribed. Your doctor will monitor your medications and advise you when or if you can stop taking them. Notify your doctor immediately if any of the following:   Sudden weight gain   Increasing shortness of breath   Pain, change in color, temperature or swelling in lower legs or feet.  Fevers greater than 101 degrees, redness, swelling, incision site hot to touch, foul or colored drainage from the incision site, these are all signs of infection and should be reported immediately to the physicians. Post Watchman Discharge Instructions Timeline     After a minimum of 45 days from date of procedure you will need to return to hospital to have an outpatient KSENIA performed to determine if the implant has closed the opening of the appendage. At this time you will see the [place] [id number]. Coordinator for a follow up. If the KSENIA reveals the appendage is not fully closed - you will require another KSENIA at a later date to reassess. Once the results from KSENIA have been reviewed the physicians will determine what medication changes need to be made. Your [facility] Coordinator can facilitate arranging these appointments.  6 months post implant you will need to see the [facility] Coordinator and visit the physician for a routine follow up and potentially EKG, and lab work. Your Coordinator can facilitate with setting up these appointments.  At 1 and 2 years post implant you will be contacted by your [place] [address]. Coordinator for a brief interview. This allows us to complete required patient monitoring.        Prior to any dental work or surgery notify your dentist or surgeon about your Watchman implant and the medications you are on.     Maintain regular follow up visits with your cardiologist     Keep all bloodwork appointments. Thank you for entrusting us with your care! The patient is being discharged home in stable condition on a low saturated fat, low cholesterol and low salt diet. The patient is instructed to advance activities as tolerated to the limit of fatigue or shortness of breath. The patient is instructed to avoid lifting anything heavier than 10 lbs for 1 weeks. The patient is instructed to avoid any straining, stooping or squatting for 2 weeks. The patient is instructed not to drive for 1 week. The patient is instructed to watch the groin site for bleeding/oozing; if seen, the patient is instructed to apply firm pressure with a clean cloth and call [facility]iology at [phone]. The patient is instructed to watch for signs of infection which include: increasing area of redness, fever/hot to touch or purulent drainage at the groin site. The patient is instructed not to soak in a bathtub for 7-10 days, but is cleared to shower. The patient is instructed to return to the ER immediately for any severe pain, color change, or temperature change in leg.      The patient is informed not to stop any medications without discussing with our office and to contact our office if any dental work or possible surgeries are expected    DISCHARGE SUMMARY from Nurse    PATIENT INSTRUCTIONS:    After general anesthesia or intravenous sedation, for 24 hours or while taking prescription Narcotics:  · Limit your activities  · Do not drive and operate hazardous machinery  · Do not make important personal or business decisions  · Do  not drink alcoholic beverages  · If you have not urinated within 8 hours after discharge, please contact your surgeon on call.     Report the following to your surgeon:  · Excessive pain, swelling, redness or odor of or around the surgical area  · Temperature over 100.5  · Nausea and vomiting lasting longer than 4 hours or if unable to take medications  · Any signs of decreased circulation or nerve impairment to extremity: change in color, persistent  numbness, tingling, coldness or increase pain  · Any questions    What to do at Home:  Recommended activity: Activity as directed. *  Please give a list of your current medications to your Primary Care Provider. *  Please update this list whenever your medications are discontinued, doses are      changed, or new medications (including over-the-counter products) are added. *  Please carry medication information at all times in case of emergency situations. These are general instructions for a healthy lifestyle:    No smoking/ No tobacco products/ Avoid exposure to second hand smoke  Surgeon General's Warning:  Quitting smoking now greatly reduces serious risk to your health. Obesity, smoking, and sedentary lifestyle greatly increases your risk for illness    A healthy diet, regular physical exercise & weight monitoring are important for maintaining a healthy lifestyle    You may be retaining fluid if you have a history of heart failure or if you experience any of the following symptoms:  Weight gain of 3 pounds or more overnight or 5 pounds in a week, increased swelling in our hands or feet or shortness of breath while lying flat in bed. Please call your doctor as soon as you notice any of these symptoms; do not wait until your next office visit. The discharge information has been reviewed with the patient. The patient verbalized understanding. Discharge medications reviewed with the patient and appropriate educational materials and side effects teaching were provided.   ___________________________________________________________________________________________________________________________________

## 2020-08-12 NOTE — PROGRESS NOTES
Warfarin dosing per pharmacist    Brenden Fonseca is a 60 y.o. male. Height: 6' (182.9 cm)    Weight: 109 kg (240 lb 3.2 oz)    Indication:  afib    Goal INR:  2-3    Home dose:  7.5 mg Mon, Fri; 5 mg all other days    Risk factors or significant drug interactions:  none    Other anticoagulants:  none    Daily Monitoring  Date  INR     Warfarin dose HGB              Notes  8/11  1.5  7.5 mg  ---  8/12  1.6  5 mg  13.5         Pharmacy is consulted to manage warfarin for Mr. Morgan during this admission. He presents with subtherapeutic INR after holding doses prior to procedure. INR is subtherapeutic today at 1.6 but trending up. Boosted with 7.5 mg on 8/11, will resume the home regimen and trend INR. Daily INR for now. Pharmacy will continue to follow. Please call with any questions.     Thank you,  Jose Arroyo, PharmD, [id number]  Clinical Pharmacy Specialist  [phone]

## 2020-09-04 ENCOUNTER — APPOINTMENT (RX ONLY)
Dept: URBAN - METROPOLITAN AREA CLINIC 349 | Facility: CLINIC | Age: 55
Setting detail: DERMATOLOGY
End: 2020-09-04

## 2020-09-04 DIAGNOSIS — Z85.828 PERSONAL HISTORY OF OTHER MALIGNANT NEOPLASM OF SKIN: ICD-10-CM

## 2020-09-04 PROBLEM — C44.219 BASAL CELL CARCINOMA OF SKIN OF LEFT EAR AND EXTERNAL AURICULAR CANAL: Status: ACTIVE | Noted: 2020-09-04

## 2020-09-04 PROCEDURE — ? PATHOLOGY BILLING

## 2020-09-04 PROCEDURE — 17282 DSTR MAL LS F/E/E/N/L/M1.1-2: CPT

## 2020-09-04 PROCEDURE — ? SHAVE REMOVAL AND DESTRUCTION

## 2020-09-04 PROCEDURE — 88305 TISSUE EXAM BY PATHOLOGIST: CPT

## 2020-09-04 PROCEDURE — 99213 OFFICE O/P EST LOW 20 MIN: CPT | Mod: 25

## 2020-09-04 PROCEDURE — A4550 SURGICAL TRAYS: HCPCS

## 2020-09-04 PROCEDURE — ? COUNSELING

## 2020-09-04 ASSESSMENT — LOCATION SIMPLE DESCRIPTION DERM
LOCATION SIMPLE: RIGHT EAR
LOCATION SIMPLE: LEFT EAR

## 2020-09-04 ASSESSMENT — LOCATION DETAILED DESCRIPTION DERM
LOCATION DETAILED: LEFT ANTIHELIX
LOCATION DETAILED: RIGHT ANTIHELIX

## 2020-09-04 ASSESSMENT — LOCATION ZONE DERM: LOCATION ZONE: EAR

## 2020-09-04 NOTE — PROCEDURE: SHAVE REMOVAL AND DESTRUCTION
Billing Type: Third-Party Bill
Hemostasis: Electrocautery
Accession #: dr [email or web address] read
Render Path Notes In Note?: No
Dressing: Band-Aid
Was Curettage Performed?: Yes
Post-Care Instructions: I reviewed with the patient in detail post-care instructions. Patient is to keep the biopsy site dry overnight, and then apply bacitracin twice daily until healed. Patient may apply hydrogen peroxide soaks to remove any crusting.  After the procedure, the patient was observed for 5-10 minutes and was oriented to,person, place and time and denied feeling dizzy, queasy and stated that they were not going to faint
Anesthesia Volume In Cc: 0.2
Size After Destruction (Required For Destruction Billing): 1.5
Anesthesia Type: 2% lidocaine with epinephrine
Consent: Written consent was obtained and risks were reviewed including but not limited to scarring, infection, bleeding, scabbing, incomplete removal, nerve damage and allergy to anesthesia.
Size Of Lesion In Cm: 0
Bill As?: Note: Bill Malignant Destruction If Path Confirms Malignant Lesion. Only Bill As Shave Removal If Path Comes Back Benign. Do Not Bill Shave Removal On Malignant Lesions.: Malignant Destruction
Wound Care: Vaseline
Cautery Type: electrodesiccation
Notification Instructions: Patient will be notified of biopsy results. However, patient instructed to call the office if not contacted within 2 weeks.
Number Of Curettages: 2
Detail Level: Detailed

## 2020-09-04 NOTE — PROCEDURE: PATHOLOGY BILLING
Immunohistochemistry ([id number] and [id number]) billing is not performed here. Please use the Immunohistochemistry Stain Billing plan to accomplish this. Immunohistochemistry ([id number] and [id number]) billing is not performed here. Please use the Immunohistochemistry Stain Billing plan to accomplish this.

## 2020-09-04 NOTE — HPI: SKIN LESION
How Severe Is Your Skin Lesion?: mild
Has Your Skin Lesion Been Treated?: been treated
Is This A New Presentation, Or A Follow-Up?: Skin Lesion
When Was It Treated?: 7/2020

## 2020-09-23 NOTE — PROGRESS NOTES
Patient pre-assessment complete for KSENIA with Dr Yuliana Mayes scheduled for 20 at [address] [place] [address], arrival time 8am. Patient verified using . Patient instructed to bring all home medications in labeled bottles on the day of procedure. NPO status reinforced. Patient instructed to HOLD insulin & lasix in am. Instructed they can take all other medications excluding vitamins & supplements. Patient verbalizes understanding of all instructions & denies any questions at this time.

## 2020-09-24 ENCOUNTER — HOSPITAL ENCOUNTER (OUTPATIENT)
Dept: CARDIAC CATH/INVASIVE PROCEDURES | Age: 55
Discharge: HOME | End: 2020-09-24
Attending: INTERNAL MEDICINE | Admitting: INTERNAL MEDICINE
Payer: MEDICARE

## 2020-09-24 VITALS
RESPIRATION RATE: 16 BRPM | OXYGEN SATURATION: 99 % | SYSTOLIC BLOOD PRESSURE: 128 MMHG | HEIGHT: 72 IN | WEIGHT: 230 LBS | BODY MASS INDEX: 31.15 KG/M2 | DIASTOLIC BLOOD PRESSURE: 80 MMHG | HEART RATE: 62 BPM

## 2020-09-24 LAB
ANION GAP SERPL CALC-SCNC: 6 MMOL/L (ref 7–16)
ATRIAL RATE: 71 BPM
BUN SERPL-MCNC: 15 MG/DL (ref 6–23)
CALCIUM SERPL-MCNC: 9.3 MG/DL (ref 8.3–10.4)
CALCULATED P AXIS, ECG09: 13 DEGREES
CALCULATED R AXIS, ECG10: 33 DEGREES
CALCULATED T AXIS, ECG11: 77 DEGREES
CHLORIDE SERPL-SCNC: 101 MMOL/L (ref 98–107)
CO2 SERPL-SCNC: 31 MMOL/L (ref 21–32)
CREAT SERPL-MCNC: 1.86 MG/DL (ref 0.8–1.5)
DIAGNOSIS, 93000: NORMAL
ERYTHROCYTE [DISTWIDTH] IN BLOOD BY AUTOMATED COUNT: 13.7 % (ref 11.9–14.6)
GLUCOSE SERPL-MCNC: 347 MG/DL (ref 65–100)
HCT VFR BLD AUTO: 43.5 % (ref 41.1–50.3)
HGB BLD-MCNC: 14 G/DL (ref 13.6–17.2)
INR PPP: 1.4
MAGNESIUM SERPL-MCNC: 1.9 MG/DL (ref 1.8–2.4)
MCH RBC QN AUTO: 27.2 PG (ref 26.1–32.9)
MCHC RBC AUTO-ENTMCNC: 32.2 G/DL (ref 31.4–35)
MCV RBC AUTO: 84.6 FL (ref 79.6–97.8)
NRBC # BLD: 0 K/UL (ref 0–0.2)
P-R INTERVAL, ECG05: 226 MS
PLATELET # BLD AUTO: 202 K/UL (ref 150–450)
PMV BLD AUTO: 11.4 FL (ref 9.4–12.3)
POTASSIUM SERPL-SCNC: 4.1 MMOL/L (ref 3.5–5.1)
PROTHROMBIN TIME: 17.1 SEC (ref 12–14.7)
Q-T INTERVAL, ECG07: 352 MS
QRS DURATION, ECG06: 84 MS
QTC CALCULATION (BEZET), ECG08: 382 MS
RBC # BLD AUTO: 5.14 M/UL (ref 4.23–5.6)
SODIUM SERPL-SCNC: 138 MMOL/L (ref 136–145)
VENTRICULAR RATE, ECG03: 71 BPM
WBC # BLD AUTO: 7.9 K/UL (ref 4.3–11.1)

## 2020-09-24 PROCEDURE — 99152 MOD SED SAME PHYS/QHP 5/>YRS: CPT

## 2020-09-24 PROCEDURE — 93312 ECHO TRANSESOPHAGEAL: CPT

## 2020-09-24 PROCEDURE — 80048 BASIC METABOLIC PNL TOTAL CA: CPT

## 2020-09-24 PROCEDURE — 74011250636 HC RX REV CODE- 250/636: Performed by: INTERNAL MEDICINE

## 2020-09-24 PROCEDURE — 93005 ELECTROCARDIOGRAM TRACING: CPT | Performed by: INTERNAL MEDICINE

## 2020-09-24 PROCEDURE — 83735 ASSAY OF MAGNESIUM: CPT

## 2020-09-24 PROCEDURE — 74011000250 HC RX REV CODE- 250: Performed by: INTERNAL MEDICINE

## 2020-09-24 PROCEDURE — 85610 PROTHROMBIN TIME: CPT

## 2020-09-24 PROCEDURE — 85027 COMPLETE CBC AUTOMATED: CPT

## 2020-09-24 PROCEDURE — 99153 MOD SED SAME PHYS/QHP EA: CPT

## 2020-09-24 RX ORDER — MIDAZOLAM HYDROCHLORIDE 1 MG/ML
.5-2 INJECTION, SOLUTION INTRAMUSCULAR; INTRAVENOUS
Status: DISCONTINUED | OUTPATIENT
Start: 2020-09-24 | End: 2020-09-24 | Stop reason: HOSPADM

## 2020-09-24 RX ORDER — FENTANYL CITRATE 50 UG/ML
25-50 INJECTION, SOLUTION INTRAMUSCULAR; INTRAVENOUS
Status: DISCONTINUED | OUTPATIENT
Start: 2020-09-24 | End: 2020-09-24 | Stop reason: HOSPADM

## 2020-09-24 RX ORDER — LIDOCAINE HYDROCHLORIDE 20 MG/ML
15 SOLUTION OROPHARYNGEAL AS NEEDED
Status: DISCONTINUED | OUTPATIENT
Start: 2020-09-24 | End: 2020-09-24 | Stop reason: HOSPADM

## 2020-09-24 RX ORDER — CLOPIDOGREL BISULFATE 75 MG/1
75 TABLET ORAL DAILY
Qty: 30 TAB | Refills: 6 | Status: SHIPPED | OUTPATIENT
Start: 2020-09-24 | End: 2020-10-01 | Stop reason: SDUPTHER

## 2020-09-24 RX ORDER — SODIUM CHLORIDE 9 MG/ML
75 INJECTION, SOLUTION INTRAVENOUS CONTINUOUS
Status: DISCONTINUED | OUTPATIENT
Start: 2020-09-24 | End: 2020-09-24 | Stop reason: HOSPADM

## 2020-09-24 RX ADMIN — MIDAZOLAM HYDROCHLORIDE 1 MG: 1 INJECTION, SOLUTION INTRAMUSCULAR; INTRAVENOUS at 10:04

## 2020-09-24 RX ADMIN — MIDAZOLAM HYDROCHLORIDE 2 MG: 1 INJECTION, SOLUTION INTRAMUSCULAR; INTRAVENOUS at 09:54

## 2020-09-24 RX ADMIN — MIDAZOLAM HYDROCHLORIDE 2 MG: 1 INJECTION, SOLUTION INTRAMUSCULAR; INTRAVENOUS at 10:00

## 2020-09-24 RX ADMIN — MIDAZOLAM HYDROCHLORIDE 2 MG: 1 INJECTION, SOLUTION INTRAMUSCULAR; INTRAVENOUS at 09:55

## 2020-09-24 RX ADMIN — FENTANYL CITRATE 50 MCG: 50 INJECTION INTRAMUSCULAR; INTRAVENOUS at 09:54

## 2020-09-24 RX ADMIN — LIDOCAINE HYDROCHLORIDE 15 ML: 20 SOLUTION ORAL; TOPICAL at 09:40

## 2020-09-24 RX ADMIN — MIDAZOLAM HYDROCHLORIDE 1 MG: 1 INJECTION, SOLUTION INTRAMUSCULAR; INTRAVENOUS at 09:57

## 2020-09-24 RX ADMIN — FENTANYL CITRATE 25 MCG: 50 INJECTION INTRAMUSCULAR; INTRAVENOUS at 09:58

## 2020-09-24 RX ADMIN — FENTANYL CITRATE 25 MCG: 50 INJECTION INTRAMUSCULAR; INTRAVENOUS at 09:57

## 2020-09-24 RX ADMIN — MIDAZOLAM HYDROCHLORIDE 2 MG: 1 INJECTION, SOLUTION INTRAMUSCULAR; INTRAVENOUS at 09:58

## 2020-09-24 RX ADMIN — SODIUM CHLORIDE 75 ML/HR: 900 INJECTION, SOLUTION INTRAVENOUS at 09:06

## 2020-09-24 NOTE — PROGRESS NOTES
Discharge instructions reviewed with patient including post KSENIA care. Patient is to start Plavix and ASA daily and discontinue Coumadin as part of Watchman Protocol.

## 2020-09-24 NOTE — PROGRESS NOTES
Patient received to [facility] room # 8  Ambulatory from [facility]. Patient scheduled for KSENIA today with Dr Yuliana Mayes. Procedure reviewed & questions answered, voiced good understanding consent obtained & placed on chart. All medications and medical history reviewed. Will prep patient per orders. Patient & family updated on plan of care. The patient has a fraility score of 3-MANAGING WELL, based on ability to perform ADLs independently.

## 2020-09-24 NOTE — PROGRESS NOTES
Patient's IV out and discharge instructions reviewed. Awaiting on physician for medication changes and to speak with family.

## 2020-09-24 NOTE — DISCHARGE INSTRUCTIONS
AFTER YOU TRANSESOPHAGEAL ECHOCARDIOGRAM    Be sure someone else drives you home. You may feel drowsy for several hours. Do not eat or drink for at least two hours after your procedure. Your throat will be numb and there is a risk you might have difficulty swallowing for a while. Be careful when you do eat or drink for the first time especially with hot fluids since you could easily burn your throat. Call your doctor if:    · You are bleeding from your throat or mouth. · You have trouble breathing all of a sudden. · You have chest pain or any pain that spreads to your neck, jaw, or arms. · You have questions or concerns. · You have a fever greater than 101°F.    Doctor: Pritesh Damian    Special Instructions:    No driving for 24 hours.

## 2020-09-24 NOTE — H&P
[facility] Cardiology History & Physical      Date of  Admission: 9/24/2020  7:58 AM     CC: Post watchman KSENIA    HPI:  Brenden Fonseca is a 60 y.o. male     Patient presents for KSENIA for post left atrial appendage occlusion.  He has a history of atrial flutter and atrial fibrillation.  He underwent atrial flutter ablation in 2016 and atrial fibrillation ablation in 2018. Laure Henderson is currently maintained with a rhythm control strategy on sotalol.  He underwent loop recorder to evaluate for residual atrial fibrillation burden as was not on anticoagulation.  This has shown recurrent episodes of atrial fibrillation.  Patient states he was unable to take Eliquis and Xarelto due to allergies.  He had excessive bruising and bleeding with Coumadin.  He also has Crohn's disease which increases risk GI bleeding. No issues post device placement; on coumadin currently     Past Medical History:   Diagnosis Date    Acute renal failure (ARF) ([address] 06/2017    septic from necrotizing fascitis. was on dialysis short term.  Anticoagulated on Coumadin     has not been instructed to hold.  Atrial fibrillation (HCC)     CAD (coronary artery disease)     Chest pain, precordial 5/4/2016    Chronic kidney disease     Chronic pain     back    Chronic pancreatitis ([address]     Diabetes ([address] 2008    insulin reliant. bs: 120's .      Edema     Gastrointestinal disorder     Headache     Heart failure ([address]     History of peptic ulcer disease     years ago    History of sepsis 06/2017    Hypertension     Morbid obesity ([address] 5/4/2016    35 bmi    Necrotizing fasciitis ([address] 06/2017    left upper arm    Palpitations     PUD (peptic ulcer disease)     Restless leg syndrome 5/4/2016    Sleep apnea     cpap    Tobacco dependence 5/4/2016      Past Surgical History:   Procedure Laterality Date    HX APPENDECTOMY      HX CATARACT REMOVAL Right     with IOL    HX CHOLECYSTECTOMY      HX HEART CATHETERIZATION      HX LUMBAR FUSION  2012 and 2013    x 2    HX OTHER SURGICAL      cardioversion       Allergies   Allergen Reactions    Eliquis [Apixaban] Rash    Lisinopril Unknown (comments)    Metformin Shortness of Breath and Rash    Morphine Nausea and Vomiting    Xarelto [Rivaroxaban] Rash      Social History     Socioeconomic History    Marital status:      Spouse name: Not on file    Number of children: Not on file    Years of education: Not on file    Highest education level: Not on file   Occupational History    Not on file   Social Needs    Financial resource strain: Not on file    Food insecurity     Worry: Not on file     Inability: Not on file    Transportation needs     Medical: Not on file     Non-medical: Not on file   Tobacco Use    Smoking status: Current Every Day Smoker     Packs/day: 0.25     Years: 40.00     Pack years: 10.00    Smokeless tobacco: Never Used    Tobacco comment: Down to 5 cigs daily.  6/24/20KH   Substance and Sexual Activity    Alcohol use: No     Alcohol/week: 0.0 standard drinks    Drug use: No    Sexual activity: Not on file   Lifestyle    Physical activity     Days per week: Not on file     Minutes per session: Not on file    Stress: Not on file   Relationships    Social connections     Talks on phone: Not on file     Gets together: Not on file     Attends [employer] service: Not on file     Active member of club or organization: Not on file     Attends meetings of clubs or organizations: Not on file     Relationship status: Not on file    Intimate partner violence     Fear of current or ex partner: Not on file     Emotionally abused: Not on file     Physically abused: Not on file     Forced sexual activity: Not on file   Other Topics Concern    Not on file   Social History Narrative    Not on file     Family History   Problem Relation Age of Onset    Diabetes Mother     Heart Disease Mother     No Known Problems Father     Pulmonary Embolism Sister Current Facility-Administered Medications   Medication Dose Route Frequency    0.9% sodium chloride infusion  75 mL/hr IntraVENous CONTINUOUS    fentaNYL citrate (PF) injection 25-50 mcg  25-50 mcg IntraVENous Multiple    midazolam (VERSED) injection 0.5-2 mg  0.5-2 mg IntraVENous Multiple    lidocaine (XYLOCAINE) 2 % viscous solution 15 mL  15 mL Mouth/Throat PRN       Review of Systems    ROS  Constitutional: Negative for chills and fever. HENT: Negative for tinnitus. Eyes: Negative for blurred vision. Respiratory: Negative for cough and shortness of breath. Cardiovascular: Negative for orthopnea. Gastrointestinal: Negative for abdominal pain and nausea. Genitourinary: Negative for dysuria. Musculoskeletal: Positive for joint pain. Skin: Negative for rash. Neurological: Negative for tremors, sensory change and headaches. Endo/Heme/Allergies: Does not bruise/bleed easily. Psychiatric/Behavioral: Negative for depression and suicidal ideas.         Subjective:     Visit Vitals  /80   Pulse 62   Resp 16   Ht 6' (1.829 m)   Wt 230 lb (104.3 kg)   SpO2 99%   BMI 31.19 kg/m²       No intake/output data recorded. No intake/output data recorded.     Physical Exam:  General: Well Developed, Well Nourished, No Acute Distress  HEENT: pupils equal and round, no abnormalities noted  Neck: supple, no JVD, no carotid bruits  Heart: S1S2 with RRR without murmurs or gallops  Lungs: Clear throughout auscultation bilaterally without adventitious sounds  Abd: soft, nontender, nondistended, with good bowel sounds  Ext: warm, no edema, calves supple/nontender, pulses 2+ bilaterally  Skin: warm and dry  Psychiatric: Normal mood and affect  Neurologic: Alert and oriented X 3    Labs:   Recent Results (from the past 24 hour(s))   CBC W/O DIFF    Collection Time: 09/24/20  8:59 AM   Result Value Ref Range    WBC 7.9 4.3 - 11.1 K/uL    RBC 5.14 4.23 - 5.6 M/uL    HGB 14.0 13.6 - 17.2 g/dL    HCT 43.5 41.1 - 50.3 %    MCV 84.6 79.6 - 97.8 FL    MCH 27.2 26.1 - 32.9 PG    MCHC 32.2 31.4 - 35.0 g/dL    RDW 13.7 11.9 - 14.6 %    PLATELET 512 059 - 271 K/uL    MPV 11.4 9.4 - 12.3 FL    ABSOLUTE NRBC 0.00 0.0 - 0.2 K/uL   MAGNESIUM    Collection Time: 09/24/20  8:59 AM   Result Value Ref Range    Magnesium 1.9 1.8 - 2.4 mg/dL   METABOLIC PANEL, BASIC    Collection Time: 09/24/20  8:59 AM   Result Value Ref Range    Sodium 138 136 - 145 mmol/L    Potassium 4.1 3.5 - 5.1 mmol/L    Chloride 101 98 - 107 mmol/L    CO2 31 21 - 32 mmol/L    Anion gap 6 (L) 7 - 16 mmol/L    Glucose 347 (H) 65 - 100 mg/dL    BUN 15 6 - 23 MG/DL    Creatinine 1.86 (H) 0.8 - 1.5 MG/DL    GFR est AA 48 (L) >60 ml/min/1.73m2    GFR est non-AA 40 (L) >60 ml/min/1.73m2    Calcium 9.3 8.3 - 10.4 MG/DL   PROTHROMBIN TIME + INR    Collection Time: 09/24/20  8:59 AM   Result Value Ref Range    Prothrombin time 17.1 (H) 12.0 - 14.7 sec    INR 1.4     EKG, 12 LEAD, INITIAL    Collection Time: 09/24/20  9:17 AM   Result Value Ref Range    Ventricular Rate 71 BPM    Atrial Rate 71 BPM    P-R Interval 226 ms    QRS Duration 84 ms    Q-T Interval 352 ms    QTC Calculation (Bezet) 382 ms    Calculated P Axis 13 degrees    Calculated R Axis 33 degrees    Calculated T Axis 77 degrees    Diagnosis       Sinus rhythm with 1st degree A-V block  Nonspecific ST and T wave abnormality  Abnormal ECG  When compared with ECG of 11-AUG-2020 17:09,  No significant change was found  Confirmed by Huntley Bosworth MD (), PAMELA MCKINNEY ([id number]) on 9/24/2020 10:33:58 AM          Assessment/Plan:        Paroxysmal atrial fibrillation ([address] (8/11/2020)  Patient is s/p left atrial appendage occlusion. The patient's [id number] score is  3 which indicates a 3.2% annual risk of stroke. [facility] Has-BLED score is 3 which indicates a 3.72% annual risk of a major bleeding event.   Plan for KSENIA; if no significant peridevice flow noted, will stop anticoagulation and plan aspirin/plavix for 6 months followed by aspirin indefinitely.        Michelle Iniguez MD  9/24/2020 8:12 AM

## 2020-09-24 NOTE — PROGRESS NOTES
TRANSFER - OUT REPORT:    Post watchman KSENIA with Dr Jose Lyn 10 mg  Fentanyl 100 mcg  Pt tolerated procedure well   Report given to  [facility]

## 2020-11-05 ENCOUNTER — APPOINTMENT (OUTPATIENT)
Dept: CT IMAGING | Age: 55
End: 2020-11-05
Attending: EMERGENCY MEDICINE
Payer: MEDICARE

## 2020-11-05 ENCOUNTER — APPOINTMENT (OUTPATIENT)
Dept: GENERAL RADIOLOGY | Age: 55
End: 2020-11-05
Attending: EMERGENCY MEDICINE
Payer: MEDICARE

## 2020-11-05 ENCOUNTER — HOSPITAL ENCOUNTER (OUTPATIENT)
Age: 55
Setting detail: OBSERVATION
Discharge: HOME | End: 2020-11-09
Attending: EMERGENCY MEDICINE | Admitting: INTERNAL MEDICINE
Payer: MEDICARE

## 2020-11-05 DIAGNOSIS — K29.00 ACUTE GASTRITIS, PRESENCE OF BLEEDING UNSPECIFIED, UNSPECIFIED GASTRITIS TYPE: ICD-10-CM

## 2020-11-05 DIAGNOSIS — K86.1 IDIOPATHIC CHRONIC PANCREATITIS (HCC): ICD-10-CM

## 2020-11-05 DIAGNOSIS — R11.14 BILIOUS VOMITING WITH NAUSEA: Primary | ICD-10-CM

## 2020-11-05 DIAGNOSIS — R10.9 INTRACTABLE ABDOMINAL PAIN: ICD-10-CM

## 2020-11-05 PROBLEM — N28.89 RENAL MASS OF UNKNOWN NATURE: Status: ACTIVE | Noted: 2020-11-05

## 2020-11-05 LAB
ALBUMIN SERPL-MCNC: 3.7 G/DL (ref 3.5–5)
ALBUMIN/GLOB SERPL: 1.1 {RATIO} (ref 1.2–3.5)
ALP SERPL-CCNC: 86 U/L (ref 50–136)
ALT SERPL-CCNC: 11 U/L (ref 12–65)
ANION GAP SERPL CALC-SCNC: 6 MMOL/L (ref 7–16)
AST SERPL-CCNC: 11 U/L (ref 15–37)
ATRIAL RATE: 88 BPM
BASOPHILS # BLD: 0 K/UL (ref 0–0.2)
BASOPHILS NFR BLD: 0 % (ref 0–2)
BILIRUB SERPL-MCNC: 0.5 MG/DL (ref 0.2–1.1)
BNP SERPL-MCNC: 96 PG/ML (ref 5–125)
BUN SERPL-MCNC: 15 MG/DL (ref 6–23)
CALCIUM SERPL-MCNC: 8.5 MG/DL (ref 8.3–10.4)
CALCULATED P AXIS, ECG09: 22 DEGREES
CALCULATED R AXIS, ECG10: 67 DEGREES
CALCULATED T AXIS, ECG11: 43 DEGREES
CHLORIDE SERPL-SCNC: 102 MMOL/L (ref 98–107)
CO2 SERPL-SCNC: 29 MMOL/L (ref 21–32)
CREAT SERPL-MCNC: 1.49 MG/DL (ref 0.8–1.5)
DIAGNOSIS, 93000: NORMAL
DIFFERENTIAL METHOD BLD: ABNORMAL
EOSINOPHIL # BLD: 0.2 K/UL (ref 0–0.8)
EOSINOPHIL NFR BLD: 2 % (ref 0.5–7.8)
ERYTHROCYTE [DISTWIDTH] IN BLOOD BY AUTOMATED COUNT: 14.1 % (ref 11.9–14.6)
GLOBULIN SER CALC-MCNC: 3.3 G/DL (ref 2.3–3.5)
GLUCOSE BLD STRIP.AUTO-MCNC: 187 MG/DL (ref 65–100)
GLUCOSE SERPL-MCNC: 283 MG/DL (ref 65–100)
HCT VFR BLD AUTO: 40.7 % (ref 41.1–50.3)
HGB BLD-MCNC: 13.2 G/DL (ref 13.6–17.2)
IMM GRANULOCYTES # BLD AUTO: 0.1 K/UL (ref 0–0.5)
IMM GRANULOCYTES NFR BLD AUTO: 0 % (ref 0–5)
INR PPP: 1
LACTATE SERPL-SCNC: 1.2 MMOL/L (ref 0.4–2)
LIPASE SERPL-CCNC: 37 U/L (ref 73–393)
LYMPHOCYTES # BLD: 2.5 K/UL (ref 0.5–4.6)
LYMPHOCYTES NFR BLD: 21 % (ref 13–44)
MCH RBC QN AUTO: 27.4 PG (ref 26.1–32.9)
MCHC RBC AUTO-ENTMCNC: 32.4 G/DL (ref 31.4–35)
MCV RBC AUTO: 84.6 FL (ref 79.6–97.8)
MONOCYTES # BLD: 0.5 K/UL (ref 0.1–1.3)
MONOCYTES NFR BLD: 4 % (ref 4–12)
NEUTS SEG # BLD: 8.6 K/UL (ref 1.7–8.2)
NEUTS SEG NFR BLD: 73 % (ref 43–78)
NRBC # BLD: 0 K/UL (ref 0–0.2)
P-R INTERVAL, ECG05: 196 MS
PLATELET # BLD AUTO: 130 K/UL (ref 150–450)
PMV BLD AUTO: 11.8 FL (ref 9.4–12.3)
POTASSIUM SERPL-SCNC: 3.9 MMOL/L (ref 3.5–5.1)
PROT SERPL-MCNC: 7 G/DL (ref 6.3–8.2)
PROTHROMBIN TIME: 13.1 SEC (ref 12.5–14.7)
Q-T INTERVAL, ECG07: 306 MS
QRS DURATION, ECG06: 80 MS
QTC CALCULATION (BEZET), ECG08: 370 MS
RBC # BLD AUTO: 4.81 M/UL (ref 4.23–5.6)
SODIUM SERPL-SCNC: 137 MMOL/L (ref 136–145)
TROPONIN-HIGH SENSITIVITY: 20.4 PG/ML (ref 0–14)
TROPONIN-HIGH SENSITIVITY: 21.6 PG/ML (ref 0–14)
TROPONIN-HIGH SENSITIVITY: 22.9 PG/ML (ref 0–14)
VENTRICULAR RATE, ECG03: 88 BPM
WBC # BLD AUTO: 11.9 K/UL (ref 4.3–11.1)

## 2020-11-05 PROCEDURE — 99218 HC RM OBSERVATION: CPT

## 2020-11-05 PROCEDURE — 74011000636 HC RX REV CODE- 636: Performed by: EMERGENCY MEDICINE

## 2020-11-05 PROCEDURE — 74011250636 HC RX REV CODE- 250/636: Performed by: INTERNAL MEDICINE

## 2020-11-05 PROCEDURE — 74011250637 HC RX REV CODE- 250/637: Performed by: EMERGENCY MEDICINE

## 2020-11-05 PROCEDURE — 74011000250 HC RX REV CODE- 250: Performed by: EMERGENCY MEDICINE

## 2020-11-05 PROCEDURE — 83880 ASSAY OF NATRIURETIC PEPTIDE: CPT

## 2020-11-05 PROCEDURE — 83690 ASSAY OF LIPASE: CPT

## 2020-11-05 PROCEDURE — 83605 ASSAY OF LACTIC ACID: CPT

## 2020-11-05 PROCEDURE — 36415 COLL VENOUS BLD VENIPUNCTURE: CPT

## 2020-11-05 PROCEDURE — 96375 TX/PRO/DX INJ NEW DRUG ADDON: CPT

## 2020-11-05 PROCEDURE — 74011250637 HC RX REV CODE- 250/637: Performed by: INTERNAL MEDICINE

## 2020-11-05 PROCEDURE — 96374 THER/PROPH/DIAG INJ IV PUSH: CPT

## 2020-11-05 PROCEDURE — 96376 TX/PRO/DX INJ SAME DRUG ADON: CPT

## 2020-11-05 PROCEDURE — 84484 ASSAY OF TROPONIN QUANT: CPT

## 2020-11-05 PROCEDURE — 74177 CT ABD & PELVIS W/CONTRAST: CPT

## 2020-11-05 PROCEDURE — 85025 COMPLETE CBC W/AUTO DIFF WBC: CPT

## 2020-11-05 PROCEDURE — 99284 EMERGENCY DEPT VISIT MOD MDM: CPT

## 2020-11-05 PROCEDURE — 71046 X-RAY EXAM CHEST 2 VIEWS: CPT

## 2020-11-05 PROCEDURE — 2709999900 HC NON-CHARGEABLE SUPPLY

## 2020-11-05 PROCEDURE — 80053 COMPREHEN METABOLIC PANEL: CPT

## 2020-11-05 PROCEDURE — 85610 PROTHROMBIN TIME: CPT

## 2020-11-05 PROCEDURE — 82962 GLUCOSE BLOOD TEST: CPT

## 2020-11-05 PROCEDURE — 74011000258 HC RX REV CODE- 258: Performed by: EMERGENCY MEDICINE

## 2020-11-05 PROCEDURE — 74011250636 HC RX REV CODE- 250/636: Performed by: EMERGENCY MEDICINE

## 2020-11-05 PROCEDURE — 74011636637 HC RX REV CODE- 636/637: Performed by: INTERNAL MEDICINE

## 2020-11-05 PROCEDURE — 93005 ELECTROCARDIOGRAM TRACING: CPT | Performed by: EMERGENCY MEDICINE

## 2020-11-05 PROCEDURE — C9113 INJ PANTOPRAZOLE SODIUM, VIA: HCPCS | Performed by: EMERGENCY MEDICINE

## 2020-11-05 RX ORDER — POLYETHYLENE GLYCOL 3350 17 G/17G
17 POWDER, FOR SOLUTION ORAL DAILY PRN
Status: DISCONTINUED | OUTPATIENT
Start: 2020-11-05 | End: 2020-11-08

## 2020-11-05 RX ORDER — SODIUM CHLORIDE 9 MG/ML
100 INJECTION, SOLUTION INTRAVENOUS CONTINUOUS
Status: DISCONTINUED | OUTPATIENT
Start: 2020-11-05 | End: 2020-11-06

## 2020-11-05 RX ORDER — HYDROMORPHONE HYDROCHLORIDE 1 MG/ML
1 INJECTION, SOLUTION INTRAMUSCULAR; INTRAVENOUS; SUBCUTANEOUS ONCE
Status: COMPLETED | OUTPATIENT
Start: 2020-11-05 | End: 2020-11-05

## 2020-11-05 RX ORDER — INSULIN LISPRO 100 [IU]/ML
INJECTION, SOLUTION INTRAVENOUS; SUBCUTANEOUS
Status: DISCONTINUED | OUTPATIENT
Start: 2020-11-05 | End: 2020-11-09 | Stop reason: HOSPADM

## 2020-11-05 RX ORDER — BUDESONIDE AND FORMOTEROL FUMARATE DIHYDRATE 80; 4.5 UG/1; UG/1
2 AEROSOL RESPIRATORY (INHALATION)
Status: DISCONTINUED | OUTPATIENT
Start: 2020-11-05 | End: 2020-11-09 | Stop reason: HOSPADM

## 2020-11-05 RX ORDER — HYDROMORPHONE HYDROCHLORIDE 2 MG/1
1 TABLET ORAL
Status: DISCONTINUED | OUTPATIENT
Start: 2020-11-05 | End: 2020-11-05

## 2020-11-05 RX ORDER — PROMETHAZINE HYDROCHLORIDE 25 MG/1
12.5 TABLET ORAL
Status: DISCONTINUED | OUTPATIENT
Start: 2020-11-05 | End: 2020-11-09 | Stop reason: HOSPADM

## 2020-11-05 RX ORDER — ASPIRIN 81 MG/1
162 TABLET ORAL DAILY
Status: DISCONTINUED | OUTPATIENT
Start: 2020-11-06 | End: 2020-11-09 | Stop reason: HOSPADM

## 2020-11-05 RX ORDER — SODIUM CHLORIDE 0.9 % (FLUSH) 0.9 %
5-40 SYRINGE (ML) INJECTION AS NEEDED
Status: DISCONTINUED | OUTPATIENT
Start: 2020-11-05 | End: 2020-11-09 | Stop reason: HOSPADM

## 2020-11-05 RX ORDER — SODIUM CHLORIDE 0.9 % (FLUSH) 0.9 %
10 SYRINGE (ML) INJECTION
Status: COMPLETED | OUTPATIENT
Start: 2020-11-05 | End: 2020-11-05

## 2020-11-05 RX ORDER — ALBUTEROL SULFATE 0.83 MG/ML
2.5 SOLUTION RESPIRATORY (INHALATION)
Status: DISCONTINUED | OUTPATIENT
Start: 2020-11-05 | End: 2020-11-09 | Stop reason: HOSPADM

## 2020-11-05 RX ORDER — ONDANSETRON 2 MG/ML
4 INJECTION INTRAMUSCULAR; INTRAVENOUS
Status: DISCONTINUED | OUTPATIENT
Start: 2020-11-05 | End: 2020-11-09 | Stop reason: HOSPADM

## 2020-11-05 RX ORDER — PANTOPRAZOLE SODIUM 40 MG/1
40 TABLET, DELAYED RELEASE ORAL DAILY
Status: DISCONTINUED | OUTPATIENT
Start: 2020-11-06 | End: 2020-11-07

## 2020-11-05 RX ORDER — ASPIRIN 325 MG
325 TABLET ORAL
Status: COMPLETED | OUTPATIENT
Start: 2020-11-05 | End: 2020-11-05

## 2020-11-05 RX ORDER — OXYCODONE HYDROCHLORIDE 5 MG/1
15 TABLET ORAL 3 TIMES DAILY
Status: DISCONTINUED | OUTPATIENT
Start: 2020-11-05 | End: 2020-11-09 | Stop reason: HOSPADM

## 2020-11-05 RX ORDER — INSULIN GLARGINE 100 [IU]/ML
50 INJECTION, SOLUTION SUBCUTANEOUS
Status: DISCONTINUED | OUTPATIENT
Start: 2020-11-05 | End: 2020-11-09 | Stop reason: HOSPADM

## 2020-11-05 RX ORDER — ACETAMINOPHEN 325 MG/1
650 TABLET ORAL
Status: DISCONTINUED | OUTPATIENT
Start: 2020-11-05 | End: 2020-11-09 | Stop reason: HOSPADM

## 2020-11-05 RX ORDER — ATORVASTATIN CALCIUM 40 MG/1
40 TABLET, FILM COATED ORAL
Status: DISCONTINUED | OUTPATIENT
Start: 2020-11-05 | End: 2020-11-09 | Stop reason: HOSPADM

## 2020-11-05 RX ORDER — SODIUM CHLORIDE 0.9 % (FLUSH) 0.9 %
5-40 SYRINGE (ML) INJECTION EVERY 8 HOURS
Status: DISCONTINUED | OUTPATIENT
Start: 2020-11-05 | End: 2020-11-09 | Stop reason: HOSPADM

## 2020-11-05 RX ORDER — PREGABALIN 50 MG/1
100 CAPSULE ORAL 3 TIMES DAILY
Status: DISCONTINUED | OUTPATIENT
Start: 2020-11-05 | End: 2020-11-09 | Stop reason: HOSPADM

## 2020-11-05 RX ORDER — HYDROMORPHONE HYDROCHLORIDE 1 MG/ML
0.5 INJECTION, SOLUTION INTRAMUSCULAR; INTRAVENOUS; SUBCUTANEOUS
Status: DISCONTINUED | OUTPATIENT
Start: 2020-11-05 | End: 2020-11-07

## 2020-11-05 RX ORDER — FUROSEMIDE 40 MG/1
40 TABLET ORAL DAILY
Status: DISCONTINUED | OUTPATIENT
Start: 2020-11-06 | End: 2020-11-09 | Stop reason: HOSPADM

## 2020-11-05 RX ORDER — SUCRALFATE 1 G/1
1 TABLET ORAL
Status: COMPLETED | OUTPATIENT
Start: 2020-11-05 | End: 2020-11-05

## 2020-11-05 RX ORDER — ACETAMINOPHEN 650 MG/1
650 SUPPOSITORY RECTAL
Status: DISCONTINUED | OUTPATIENT
Start: 2020-11-05 | End: 2020-11-09 | Stop reason: HOSPADM

## 2020-11-05 RX ADMIN — SODIUM CHLORIDE 100 ML: 900 INJECTION, SOLUTION INTRAVENOUS at 17:21

## 2020-11-05 RX ADMIN — SODIUM CHLORIDE 40 MG: 9 INJECTION, SOLUTION INTRAMUSCULAR; INTRAVENOUS; SUBCUTANEOUS at 18:31

## 2020-11-05 RX ADMIN — HYDROMORPHONE HYDROCHLORIDE 1 MG: 1 INJECTION, SOLUTION INTRAMUSCULAR; INTRAVENOUS; SUBCUTANEOUS at 16:00

## 2020-11-05 RX ADMIN — HYDROMORPHONE HYDROCHLORIDE 0.5 MG: 1 INJECTION, SOLUTION INTRAMUSCULAR; INTRAVENOUS; SUBCUTANEOUS at 21:42

## 2020-11-05 RX ADMIN — FAMOTIDINE 40 MG: 10 INJECTION INTRAVENOUS at 15:57

## 2020-11-05 RX ADMIN — ASPIRIN 325 MG ORAL TABLET 325 MG: 325 PILL ORAL at 15:56

## 2020-11-05 RX ADMIN — DIATRIZOATE MEGLUMINE AND DIATRIZOATE SODIUM 15 ML: 600; 100 SOLUTION ORAL; RECTAL at 16:01

## 2020-11-05 RX ADMIN — Medication 10 ML: at 21:34

## 2020-11-05 RX ADMIN — SUCRALFATE 1 G: 1 TABLET ORAL at 15:56

## 2020-11-05 RX ADMIN — ATORVASTATIN CALCIUM 40 MG: 40 TABLET, FILM COATED ORAL at 21:32

## 2020-11-05 RX ADMIN — SODIUM CHLORIDE 100 ML/HR: 900 INJECTION, SOLUTION INTRAVENOUS at 20:58

## 2020-11-05 RX ADMIN — INSULIN GLARGINE 50 UNITS: 100 INJECTION, SOLUTION SUBCUTANEOUS at 21:32

## 2020-11-05 RX ADMIN — PREGABALIN 100 MG: 50 CAPSULE ORAL at 21:32

## 2020-11-05 RX ADMIN — SODIUM CHLORIDE 1000 ML: 900 INJECTION, SOLUTION INTRAVENOUS at 15:58

## 2020-11-05 RX ADMIN — Medication 1 AMPULE: at 21:32

## 2020-11-05 RX ADMIN — HYDROMORPHONE HYDROCHLORIDE 1 MG: 1 INJECTION, SOLUTION INTRAMUSCULAR; INTRAVENOUS; SUBCUTANEOUS at 18:31

## 2020-11-05 RX ADMIN — IOPAMIDOL 100 ML: 755 INJECTION, SOLUTION INTRAVENOUS at 17:21

## 2020-11-05 RX ADMIN — Medication 10 ML: at 17:21

## 2020-11-05 RX ADMIN — OXYCODONE 15 MG: 5 TABLET ORAL at 21:32

## 2020-11-05 NOTE — ED PROVIDER NOTES
68-year-old male presenting for epigastric pain that radiates into his chest.    The history is provided by the patient. Chest Pain (Angina)    This is a recurrent problem. The current episode started more than 2 days ago. The problem has been gradually worsening. The problem occurs constantly. The pain is associated with normal activity. The pain is present in the epigastric region. The pain is at a severity of 9/10. The pain is severe. The quality of the pain is described as pressure-like, burning and vice-like. The pain radiates to the epigastrium and precordial region. The symptoms are aggravated by eating. Associated symptoms include abdominal pain, diaphoresis and malaise/fatigue. Pertinent negatives include no back pain, no claudication, no cough, no dizziness, no exertional chest pressure, no fever, no headaches, no hemoptysis, no irregular heartbeat, no leg pain, no lower extremity edema, no nausea, no near-syncope, no numbness, no orthopnea, no palpitations, no PND, no shortness of breath, no sputum production, no vomiting and no weakness. He has tried nothing for the symptoms. The treatment provided no relief. Risk factors include obesity, hypertension and male gender. Pertinent negatives include no cardiac catheterization and no cardiac stents. Past Medical History:   Diagnosis Date    Acute renal failure (ARF) ([address] 06/2017    septic from necrotizing fascitis. was on dialysis short term.  Anticoagulated on Coumadin     has not been instructed to hold.  Atrial fibrillation (HCC)     CAD (coronary artery disease)     Chest pain, precordial 5/4/2016    Chronic kidney disease     Chronic pain     back    Chronic pancreatitis ([address]     Diabetes ([address] 2008    insulin reliant. bs: 120's .      Edema     Gastrointestinal disorder     Headache     Heart failure (HCC)     History of peptic ulcer disease     years ago    History of sepsis 06/2017    Hypertension     Morbid obesity ([facility] 5/4/2016    35 bmi    Necrotizing fasciitis ([facility] 06/2017    left upper arm    Palpitations     PUD (peptic ulcer disease)     Restless leg syndrome 5/4/2016    Sleep apnea     cpap    Tobacco dependence 5/4/2016       Past Surgical History:   Procedure Laterality Date    HX APPENDECTOMY      HX CATARACT REMOVAL Right     with IOL    HX CHOLECYSTECTOMY      HX HEART CATHETERIZATION      HX LUMBAR FUSION  2012 and 2013    x 2    HX OTHER SURGICAL      cardioversion         Family History:   Problem Relation Age of Onset    Diabetes Mother     Heart Disease Mother     No Known Problems Father     Pulmonary Embolism Sister        Social History     Socioeconomic History    Marital status:      Spouse name: Not on file    Number of children: Not on file    Years of education: Not on file    Highest education level: Not on file   Occupational History    Not on file   Social Needs    Financial resource strain: Not on file    Food insecurity     Worry: Not on file     Inability: Not on file    Transportation needs     Medical: Not on file     Non-medical: Not on file   Tobacco Use    Smoking status: Current Every Day Smoker     Packs/day: 0.25     Years: 40.00     Pack years: 10.00    Smokeless tobacco: Never Used    Tobacco comment: Down to 5 cigs daily.  6/24/20KH   Substance and Sexual Activity    Alcohol use: No     Alcohol/week: 0.0 standard drinks    Drug use: No    Sexual activity: Not on file   Lifestyle    Physical activity     Days per week: Not on file     Minutes per session: Not on file    Stress: Not on file   Relationships    Social connections     Talks on phone: Not on file     Gets together: Not on file     Attends Mandaen service: Not on file     Active member of club or organization: Not on file     Attends meetings of clubs or organizations: Not on file     Relationship status: Not on file    Intimate partner violence     Fear of current or ex partner: Not on file     Emotionally abused: Not on file     Physically abused: Not on file     Forced sexual activity: Not on file   Other Topics Concern    Not on file   Social History Narrative    Not on file         ALLERGIES: Eliquis [apixaban]; Lisinopril; Metformin; Morphine; and Xarelto [rivaroxaban]    Review of Systems   Constitutional: Positive for diaphoresis and malaise/fatigue. Negative for fever. Respiratory: Negative for cough, hemoptysis, sputum production and shortness of breath. Cardiovascular: Positive for chest pain. Negative for palpitations, orthopnea, claudication, PND and near-syncope. Gastrointestinal: Positive for abdominal pain. Negative for nausea and vomiting. Musculoskeletal: Negative for back pain. Neurological: Negative for dizziness, weakness, numbness and headaches. All other systems reviewed and are negative. Vitals:    11/05/20 1430   BP: 114/73   Pulse: 87   Resp: 20   Temp: 97.4 °F (36.3 °C)   SpO2: 98%   Weight: 110.2 kg (243 lb)   Height: 6' (1.829 m)            Physical Exam  Vitals signs and nursing note reviewed. Constitutional:       Appearance: He is well-developed. He is obese. He is ill-appearing. HENT:      Head: Normocephalic and atraumatic. Eyes:      Conjunctiva/sclera: Conjunctivae normal.      Pupils: Pupils are equal, round, and reactive to light. Neck:      Musculoskeletal: Normal range of motion and neck supple. Cardiovascular:      Rate and Rhythm: Regular rhythm. Tachycardia present. Heart sounds: Normal heart sounds. Pulmonary:      Effort: Pulmonary effort is normal.      Breath sounds: Normal breath sounds. Chest:      Chest wall: Tenderness present. Abdominal:      General: Bowel sounds are normal.      Palpations: Abdomen is soft. Tenderness: There is abdominal tenderness. Comments: Mild epigastric tenderness without peritoneal signs or guarding   Musculoskeletal: Normal range of motion.          General: No deformity. Skin:     General: Skin is warm and dry. Neurological:      Mental Status: He is alert and oriented to person, place, and time. Cranial Nerves: No cranial nerve deficit.    Psychiatric:         Behavior: Behavior normal.              MDM  Number of Diagnoses or Management Options  Acute gastritis, presence of bleeding unspecified, unspecified gastritis type:   Bilious vomiting with nausea:   Diagnosis management comments: 57-year-old male presenting for epigastric pain radiating to his chest.       Amount and/or Complexity of Data Reviewed  Clinical lab tests: ordered and reviewed (Results for orders placed or performed during the hospital encounter of 11/05/20  -CBC WITH AUTOMATED DIFF       Result                      Value             Ref Range           WBC                         11.9 (H)          4.3 - 11.1 K*       RBC                         4.81              4.23 - 5.6 M*       HGB                         13.2 (L)          13.6 - 17.2 *       HCT                         40.7 (L)          41.1 - 50.3 %       MCV                         84.6              79.6 - 97.8 *       MCH                         27.4              26.1 - 32.9 *       MCHC                        32.4              31.4 - 35.0 *       RDW                         14.1              11.9 - 14.6 %       PLATELET                    130 (L)           150 - 450 K/*       MPV                         11.8              9.4 - 12.3 FL       ABSOLUTE NRBC               0.00              0.0 - 0.2 K/*       DF                          AUTOMATED                             NEUTROPHILS                 73                43 - 78 %           LYMPHOCYTES                 21                13 - 44 %           MONOCYTES                   4                 4.0 - 12.0 %        EOSINOPHILS                 2                 0.5 - 7.8 %         BASOPHILS                   0                 0.0 - 2.0 %         IMMATURE GRANULOCYTES       0 0.0 - 5.0 %         ABS. NEUTROPHILS            8.6 (H)           1.7 - 8.2 K/*       ABS. LYMPHOCYTES            2.5               0.5 - 4.6 K/*       ABS. MONOCYTES              0.5               0.1 - 1.3 K/*       ABS. EOSINOPHILS            0.2               0.0 - 0.8 K/*       ABS. BASOPHILS              0.0               0.0 - 0.2 K/*       ABS. IMM. GRANS.            0.1               0.0 - 0.5 K/*  -METABOLIC PANEL, COMPREHENSIVE       Result                      Value             Ref Range           Sodium                      137               136 - 145 mm*       Potassium                   3.9               3.5 - 5.1 mm*       Chloride                    102               98 - 107 mmo*       CO2                         29                21 - 32 mmol*       Anion gap                   6 (L)             7 - 16 mmol/L       Glucose                     283 (H)           65 - 100 mg/*       BUN                         15                6 - 23 MG/DL        Creatinine                  1.49              0.8 - 1.5 MG*       GFR est AA                  >60               >60 ml/min/1*       GFR est non-AA              52 (L)            >60 ml/min/1*       Calcium                     8.5               8.3 - 10.4 M*       Bilirubin, total            0.5               0.2 - 1.1 MG*       ALT (SGPT)                  11 (L)            12 - 65 U/L         AST (SGOT)                  11 (L)            15 - 37 U/L         Alk.  phosphatase            86                50 - 136 U/L        Protein, total              7.0               6.3 - 8.2 g/*       Albumin                     3.7               3.5 - 5.0 g/*       Globulin                    3.3               2.3 - 3.5 g/*       A-G Ratio                   1.1 (L)           1.2 - 3.5      -TROPONIN-HIGH SENSITIVITY       Result                      Value             Ref Range           Troponin-High Sensitiv*     21.6 (H)          0 - 14 pg/mL   -TROPONIN-HIGH SENSITIVITY       Result                      Value             Ref Range           Troponin-High Sensitiv*     20.4 (H)          0 - 14 pg/mL   -PROTHROMBIN TIME + INR       Result                      Value             Ref Range           Prothrombin time            13.1              12.5 - 14.7 *       INR                         1.0                              -NT-PRO BNP       Result                      Value             Ref Range           NT pro-BNP                  96                5 - 125 PG/ML  -LACTIC ACID       Result                      Value             Ref Range           Lactic acid                 1.2               0.4 - 2.0 MM*  -LIPASE       Result                      Value             Ref Range           Lipase                      37 (L)            73 - 393 U/L   -TROPONIN-HIGH SENSITIVITY       Result                      Value             Ref Range           Troponin-High Sensitiv*     22.9 (H)          0 - 14 pg/mL   -METABOLIC PANEL, COMPREHENSIVE       Result                      Value             Ref Range           Sodium                      139               136 - 145 mm*       Potassium                   3.9               3.5 - 5.1 mm*       Chloride                    104               98 - 107 mmo*       CO2                         30                21 - 32 mmol*       Anion gap                   5 (L)             7 - 16 mmol/L       Glucose                     237 (H)           65 - 100 mg/*       BUN                         11                6 - 23 MG/DL        Creatinine                  1.24              0.8 - 1.5 MG*       GFR est AA                  >60               >60 ml/min/1*       GFR est non-AA              >60               >60 ml/min/1*       Calcium                     8.1 (L)           8.3 - 10.4 M*       Bilirubin, total            0.3               0.2 - 1.1 MG*       ALT (SGPT)                  11 (L)            12 - 65 U/L         AST (SGOT) 16                15 - 37 U/L         Alk. phosphatase            70                50 - 136 U/L        Protein, total              6.0 (L)           6.3 - 8.2 g/*       Albumin                     2.9 (L)           3.5 - 5.0 g/*       Globulin                    3.1               2.3 - 3.5 g/*       A-G Ratio                   0.9 (L)           1.2 - 3.5      -CBC WITH AUTOMATED DIFF       Result                      Value             Ref Range           WBC                         6.4               4.3 - 11.1 K*       RBC                         4.28              4.23 - 5.6 M*       HGB                         11.7 (L)          13.6 - 17.2 *       HCT                         36.6 (L)          41.1 - 50.3 %       MCV                         85.5              79.6 - 97.8 *       MCH                         27.3              26.1 - 32.9 *       MCHC                        32.0              31.4 - 35.0 *       RDW                         14.1              11.9 - 14.6 %       PLATELET                    109 (L)           150 - 450 K/*       MPV                         11.9              9.4 - 12.3 FL       ABSOLUTE NRBC               0.00              0.0 - 0.2 K/*       DF                          AUTOMATED                             NEUTROPHILS                 59                43 - 78 %           LYMPHOCYTES                 32                13 - 44 %           MONOCYTES                   5                 4.0 - 12.0 %        EOSINOPHILS                 3                 0.5 - 7.8 %         BASOPHILS                   1                 0.0 - 2.0 %         IMMATURE GRANULOCYTES       0                 0.0 - 5.0 %         ABS. NEUTROPHILS            3.8               1.7 - 8.2 K/*       ABS. LYMPHOCYTES            2.1               0.5 - 4.6 K/*       ABS. MONOCYTES              0.3               0.1 - 1.3 K/*       ABS. EOSINOPHILS            0.2               0.0 - 0.8 K/*       ABS.  BASOPHILS              0.0 0.0 - 0.2 K/*       ABS. IMM.  GRANS.            0.0               0.0 - 0.5 K/*  -TROPONIN-HIGH SENSITIVITY       Result                      Value             Ref Range           Troponin-High Sensitiv*     23.9 (H)          0 - 14 pg/mL   -GLUCOSE, POC       Result                      Value             Ref Range           Glucose (POC)               187 (H)           65 - 100 mg/*  -GLUCOSE, POC       Result                      Value             Ref Range           Glucose (POC)               329 (H)           65 - 100 mg/*  -GLUCOSE, POC       Result                      Value             Ref Range           Glucose (POC)               283 (H)           65 - 100 mg/*  -GLUCOSE, POC       Result                      Value             Ref Range           Glucose (POC)               204 (H)           65 - 100 mg/*  -GLUCOSE, POC       Result                      Value             Ref Range           Glucose (POC)               88                65 - 100 mg/*  -GLUCOSE, POC       Result                      Value             Ref Range           Glucose (POC)               104 (H)           65 - 100 mg/*  -GLUCOSE, POC       Result                      Value             Ref Range           Glucose (POC)               272 (H)           65 - 100 mg/*  -GLUCOSE, POC       Result                      Value             Ref Range           Glucose (POC)               143 (H)           65 - 100 mg/*  -GLUCOSE, POC       Result                      Value             Ref Range           Glucose (POC)               124 (H)           65 - 100 mg/*  -GLUCOSE, POC       Result                      Value             Ref Range           Glucose (POC)               123 (H)           65 - 100 mg/*  -GLUCOSE, POC       Result                      Value             Ref Range           Glucose (POC)               181 (H)           65 - 100 mg/*  -GLUCOSE, POC       Result                      Value             Ref Range Glucose (POC)               173 (H)           65 - 100 mg/*  -GLUCOSE, POC       Result                      Value             Ref Range           Glucose (POC)               91                65 - 100 mg/*  -EKG, 12 LEAD, INITIAL       Result                      Value             Ref Range           Ventricular Rate            88                BPM                 Atrial Rate                 88                BPM                 P-R Interval                196               ms                  QRS Duration                80                ms                  Q-T Interval                306               ms                  QTC Calculation (Bezet)     370               ms                  Calculated P Axis           22                degrees             Calculated R Axis           67                degrees             Calculated T Axis           43                degrees             Diagnosis                                                     Normal sinus rhythm   Low voltage QRS   Borderline ECG   When compared with ECG of 24-SEP-2020 09:17,   PA interval has decreased   Nonspecific T wave abnormality now evident in Inferior leads   Confirmed by Heraclio Durbin MD (), YOVANA BERMEO ([id number]) on 11/5/2020 10:05:17 PM     )  Tests in the radiology section of CPT®: ordered and reviewed (Xr Chest Pa Lat    Result Date: 11/5/2020  EXAMINATION: XR CHEST PA LAT 11/5/2020 3:05 PM ACCESSION NUMBER: [id number] COMPARISON: 11/21/2018 INDICATION: chest pain TECHNIQUE: PA and lateral views of the chest were obtained. FINDINGS: Support devices: A loop recorder overlies the heart. Lungs: No focal airspace disease. Cardiac Silhouette: Within normal limits in size. Mediastinum: The aorta is normal. Upper Abdomen: Normal Miscellaneous: There are no lytic and blastic lesions. IMPRESSION: 1. No acute abnormality     Ct Abd Pelv W Cont    Result Date: 11/5/2020  CT ABDOMEN AND PELVIS WITH INTRAVENOUS CONTRAST DATED 11/5/2020.  History: Persistent abdominal pain and vomiting. Comparison: None. Technique:   Multiple contiguous helical CT images reconstructed at 5 mm intervals were obtained from above the diaphragms through the ischial tuberosities following oral and 100 cc Isovue-370 without acute complication. All CT scans performed at this facility use one or all of the following: Automated exposure control, adjustment of the mA and/or kVp according to patient's size, iterative reconstruction. Findings: CT ABDOMEN:  Limited evaluation of the lung bases and base of the mediastinum demonstrates no significant abnormalities. The Liver is homogeneous in attenuation. Left lobe pneumobilia is seen which is not clearly abnormal. The spleen is homogeneous in attenuation. No contour deforming or enhancing mass lesions are seen of the pancreas or adrenal glands. The gallbladder has been removed. The kidneys enhance symmetrically and no evidence of hydronephrosis is seen. A 1.3 cm exophytic lesion extends off the posterior mid pole cortex of the right kidney. This is hyperdense measuring 30 Hounsfield units in attenuation and therefore should be further characterized. Abnormal wall thickening is seen of the mid to distal body of the stomach to include the antrum. The fat muscle interface with adjacent fat is irregular. These findings are incompletely characterized. These can be seen with inflammation or neoplasm. The visualized loops of small bowel and colon are normal in caliber. The appendix is not seen. No free fluid, free air, or focal inflammatory changes are seen in the abdomen. Prominent retroperitoneal mesenteric lymph nodes are seen in the upper abdomen the largest of which is a left periaortic retroperitoneal lymph node seen on axial image 42 measuring 1.2 cm short axis. The abdominal aorta there is trace moderate atherosclerotic, and mild ectatic changes. CT PELVIS: No abnormal pelvic fluid collections or inflammatory changes are present. No pelvic adenopathy is seen. The urinary bladder is unremarkable. IMPRESSION:  1. Abnormal appearance of the stomach. This appears thick-walled and demonstrates an irregular interface with adjacent mesenteric fat. This appearance can be seen with inflammation or neoplasm with inflammation favored. Further evaluation is clinically indicated is recommended. 2. Prominent mesenteric and retroperitoneal lymph nodes in the upper abdomen likely related to the gastric process described above. 3. 1.3 cm hyperdense right renal cortical lesion. This would be best further assessed with a renal mass protocol CT although an ultrasound can also be considered. This report was made using voice transcription. Despite my best efforts to avoid any, transcription errors may persist. If there is any question about the accuracy of the report or need for clarification, then please call [phone], or text me through [employer] for clarification or correction. [employer]    Result Date: 11/6/2020  Medical history: Renal mass noted on CT from yesterday. TECHNIQUE: Grayscale renal ultrasound. COMPARISON: CT yesterday. FINDINGS: There is a 1.5 cm simple cyst in the lower pole of the right kidney. Right kidney measures 10.7 cm, without hydronephrosis. Left kidney is unremarkable without hydronephrosis and measures 11.5 cm. Urinary bladder is normal in contour. IMPRESSION: 1.  Approximately 1.5 cm simple cyst in the lower pole of the right kidney, corresponding to the abnormality noted on yesterday's CT.    )  Tests in the medicine section of CPT®: ordered and reviewed  Discuss the patient with other providers: yes (Discussed with hospitalist)  Independent visualization of images, tracings, or specimens: yes (Reviewed CT)    Risk of Complications, Morbidity, and/or Mortality  Presenting problems: high  Diagnostic procedures: high  Management options: high  General comments: Patient continues to peer greatly uncomfortable. Poor p.o. intake and multiple episodes of vomiting. Troponins are not concerning. CT shows inflammation of the gastric area with some lymphadenopathy in the abdomen. At this point given the patient's persistent vomiting and worsening pain feel he needs hospitalized for fluids, pain control and GI evaluation for possible endoscopy. Patient was maintained with fluids and pain control in the emergency department and the hospitalist was consulted who will assumed care    I personally reviewed the patient's vital signs, laboratory tests, and/or radiological findings. I discussed these findings with the patient and their significance. I answered all questions and explained that given these findings there is significant concern for increased morbidity and/or mortality without immediate intervention. As a result, I recommended admission to the hospital, consulted the appropriate service, and transitioned care to that service in improved condition      Patient Progress  Patient progress: improved    ED Course as of Nov 11 0221   Thu Nov 05, 2020   1814 Patient's troponin. Otherwise patient's work-up is unremarkable.     [JS]      ED Course User Index  [JS] Eliud Burrows MD       Procedures

## 2020-11-06 ENCOUNTER — APPOINTMENT (OUTPATIENT)
Dept: ULTRASOUND IMAGING | Age: 55
End: 2020-11-06
Attending: INTERNAL MEDICINE
Payer: MEDICARE

## 2020-11-06 PROBLEM — I25.10 CORONARY ARTERY DISEASE INVOLVING NATIVE CORONARY ARTERY OF NATIVE HEART: Chronic | Status: ACTIVE | Noted: 2020-06-23

## 2020-11-06 PROBLEM — I24.8 DEMAND ISCHEMIA (HCC): Status: RESOLVED | Noted: 2020-11-06 | Resolved: 2020-11-06

## 2020-11-06 LAB
ALBUMIN SERPL-MCNC: 2.9 G/DL (ref 3.5–5)
ALBUMIN/GLOB SERPL: 0.9 {RATIO} (ref 1.2–3.5)
ALP SERPL-CCNC: 70 U/L (ref 50–136)
ALT SERPL-CCNC: 11 U/L (ref 12–65)
ANION GAP SERPL CALC-SCNC: 5 MMOL/L (ref 7–16)
AST SERPL-CCNC: 16 U/L (ref 15–37)
BASOPHILS # BLD: 0 K/UL (ref 0–0.2)
BASOPHILS NFR BLD: 1 % (ref 0–2)
BILIRUB SERPL-MCNC: 0.3 MG/DL (ref 0.2–1.1)
BUN SERPL-MCNC: 11 MG/DL (ref 6–23)
CALCIUM SERPL-MCNC: 8.1 MG/DL (ref 8.3–10.4)
CHLORIDE SERPL-SCNC: 104 MMOL/L (ref 98–107)
CO2 SERPL-SCNC: 30 MMOL/L (ref 21–32)
CREAT SERPL-MCNC: 1.24 MG/DL (ref 0.8–1.5)
DIFFERENTIAL METHOD BLD: ABNORMAL
EOSINOPHIL # BLD: 0.2 K/UL (ref 0–0.8)
EOSINOPHIL NFR BLD: 3 % (ref 0.5–7.8)
ERYTHROCYTE [DISTWIDTH] IN BLOOD BY AUTOMATED COUNT: 14.1 % (ref 11.9–14.6)
GLOBULIN SER CALC-MCNC: 3.1 G/DL (ref 2.3–3.5)
GLUCOSE BLD STRIP.AUTO-MCNC: 204 MG/DL (ref 65–100)
GLUCOSE BLD STRIP.AUTO-MCNC: 283 MG/DL (ref 65–100)
GLUCOSE BLD STRIP.AUTO-MCNC: 329 MG/DL (ref 65–100)
GLUCOSE SERPL-MCNC: 237 MG/DL (ref 65–100)
HCT VFR BLD AUTO: 36.6 % (ref 41.1–50.3)
HGB BLD-MCNC: 11.7 G/DL (ref 13.6–17.2)
IMM GRANULOCYTES # BLD AUTO: 0 K/UL (ref 0–0.5)
IMM GRANULOCYTES NFR BLD AUTO: 0 % (ref 0–5)
LYMPHOCYTES # BLD: 2.1 K/UL (ref 0.5–4.6)
LYMPHOCYTES NFR BLD: 32 % (ref 13–44)
MCH RBC QN AUTO: 27.3 PG (ref 26.1–32.9)
MCHC RBC AUTO-ENTMCNC: 32 G/DL (ref 31.4–35)
MCV RBC AUTO: 85.5 FL (ref 79.6–97.8)
MONOCYTES # BLD: 0.3 K/UL (ref 0.1–1.3)
MONOCYTES NFR BLD: 5 % (ref 4–12)
NEUTS SEG # BLD: 3.8 K/UL (ref 1.7–8.2)
NEUTS SEG NFR BLD: 59 % (ref 43–78)
NRBC # BLD: 0 K/UL (ref 0–0.2)
PLATELET # BLD AUTO: 109 K/UL (ref 150–450)
PMV BLD AUTO: 11.9 FL (ref 9.4–12.3)
POTASSIUM SERPL-SCNC: 3.9 MMOL/L (ref 3.5–5.1)
PROT SERPL-MCNC: 6 G/DL (ref 6.3–8.2)
RBC # BLD AUTO: 4.28 M/UL (ref 4.23–5.6)
SODIUM SERPL-SCNC: 139 MMOL/L (ref 136–145)
TROPONIN-HIGH SENSITIVITY: 23.9 PG/ML (ref 0–14)
WBC # BLD AUTO: 6.4 K/UL (ref 4.3–11.1)

## 2020-11-06 PROCEDURE — 80053 COMPREHEN METABOLIC PANEL: CPT

## 2020-11-06 PROCEDURE — 76775 US EXAM ABDO BACK WALL LIM: CPT

## 2020-11-06 PROCEDURE — 74011250637 HC RX REV CODE- 250/637: Performed by: INTERNAL MEDICINE

## 2020-11-06 PROCEDURE — 96376 TX/PRO/DX INJ SAME DRUG ADON: CPT

## 2020-11-06 PROCEDURE — 36415 COLL VENOUS BLD VENIPUNCTURE: CPT

## 2020-11-06 PROCEDURE — 74011250636 HC RX REV CODE- 250/636: Performed by: INTERNAL MEDICINE

## 2020-11-06 PROCEDURE — 82962 GLUCOSE BLOOD TEST: CPT

## 2020-11-06 PROCEDURE — 77030040361 HC SLV COMPR DVT MDII -B

## 2020-11-06 PROCEDURE — 74011636637 HC RX REV CODE- 636/637: Performed by: INTERNAL MEDICINE

## 2020-11-06 PROCEDURE — 85025 COMPLETE CBC W/AUTO DIFF WBC: CPT

## 2020-11-06 PROCEDURE — 99218 HC RM OBSERVATION: CPT

## 2020-11-06 PROCEDURE — 84484 ASSAY OF TROPONIN QUANT: CPT

## 2020-11-06 PROCEDURE — 2709999900 HC NON-CHARGEABLE SUPPLY

## 2020-11-06 PROCEDURE — 94640 AIRWAY INHALATION TREATMENT: CPT

## 2020-11-06 RX ORDER — SODIUM CHLORIDE 9 MG/ML
50 INJECTION, SOLUTION INTRAVENOUS CONTINUOUS
Status: DISCONTINUED | OUTPATIENT
Start: 2020-11-06 | End: 2020-11-07

## 2020-11-06 RX ADMIN — PREGABALIN 100 MG: 50 CAPSULE ORAL at 08:50

## 2020-11-06 RX ADMIN — INSULIN LISPRO 6 UNITS: 100 INJECTION, SOLUTION INTRAVENOUS; SUBCUTANEOUS at 16:30

## 2020-11-06 RX ADMIN — Medication 10 ML: at 22:15

## 2020-11-06 RX ADMIN — PANCRELIPASE LIPASE, PANCRELIPASE PROTEASE, PANCRELIPASE AMYLASE 5 CAPSULE: 5000; 17000; 24000 CAPSULE, DELAYED RELEASE ORAL at 11:56

## 2020-11-06 RX ADMIN — INSULIN LISPRO 4 UNITS: 100 INJECTION, SOLUTION INTRAVENOUS; SUBCUTANEOUS at 22:15

## 2020-11-06 RX ADMIN — ACETAMINOPHEN 650 MG: 325 TABLET, FILM COATED ORAL at 11:56

## 2020-11-06 RX ADMIN — SODIUM CHLORIDE 100 ML/HR: 900 INJECTION, SOLUTION INTRAVENOUS at 04:59

## 2020-11-06 RX ADMIN — OXYCODONE 15 MG: 5 TABLET ORAL at 17:03

## 2020-11-06 RX ADMIN — ASPIRIN 162 MG: 81 TABLET, COATED ORAL at 08:44

## 2020-11-06 RX ADMIN — HYDROMORPHONE HYDROCHLORIDE 0.5 MG: 1 INJECTION, SOLUTION INTRAMUSCULAR; INTRAVENOUS; SUBCUTANEOUS at 05:30

## 2020-11-06 RX ADMIN — SODIUM CHLORIDE 50 ML/HR: 900 INJECTION, SOLUTION INTRAVENOUS at 10:00

## 2020-11-06 RX ADMIN — HYDROMORPHONE HYDROCHLORIDE 0.5 MG: 1 INJECTION, SOLUTION INTRAMUSCULAR; INTRAVENOUS; SUBCUTANEOUS at 10:11

## 2020-11-06 RX ADMIN — PROMETHAZINE HYDROCHLORIDE 12.5 MG: 25 TABLET ORAL at 13:58

## 2020-11-06 RX ADMIN — ONDANSETRON 4 MG: 2 INJECTION INTRAMUSCULAR; INTRAVENOUS at 08:51

## 2020-11-06 RX ADMIN — OXYCODONE 15 MG: 5 TABLET ORAL at 08:44

## 2020-11-06 RX ADMIN — INSULIN LISPRO 4 UNITS: 100 INJECTION, SOLUTION INTRAVENOUS; SUBCUTANEOUS at 07:30

## 2020-11-06 RX ADMIN — FUROSEMIDE 40 MG: 40 TABLET ORAL at 08:44

## 2020-11-06 RX ADMIN — Medication 1 AMPULE: at 21:45

## 2020-11-06 RX ADMIN — HYDROMORPHONE HYDROCHLORIDE 0.5 MG: 1 INJECTION, SOLUTION INTRAMUSCULAR; INTRAVENOUS; SUBCUTANEOUS at 13:57

## 2020-11-06 RX ADMIN — SODIUM CHLORIDE 50 ML/HR: 900 INJECTION, SOLUTION INTRAVENOUS at 17:05

## 2020-11-06 RX ADMIN — PANCRELIPASE LIPASE, PANCRELIPASE PROTEASE, PANCRELIPASE AMYLASE 5 CAPSULE: 5000; 17000; 24000 CAPSULE, DELAYED RELEASE ORAL at 08:44

## 2020-11-06 RX ADMIN — INSULIN GLARGINE 50 UNITS: 100 INJECTION, SOLUTION SUBCUTANEOUS at 22:15

## 2020-11-06 RX ADMIN — BUDESONIDE AND FORMOTEROL FUMARATE DIHYDRATE 2 PUFF: 80; 4.5 AEROSOL RESPIRATORY (INHALATION) at 21:31

## 2020-11-06 RX ADMIN — Medication 1 AMPULE: at 08:44

## 2020-11-06 RX ADMIN — Medication 10 ML: at 16:53

## 2020-11-06 RX ADMIN — PREGABALIN 100 MG: 50 CAPSULE ORAL at 17:03

## 2020-11-06 RX ADMIN — PANCRELIPASE LIPASE, PANCRELIPASE PROTEASE, PANCRELIPASE AMYLASE 5 CAPSULE: 5000; 17000; 24000 CAPSULE, DELAYED RELEASE ORAL at 17:03

## 2020-11-06 RX ADMIN — SERTRALINE HYDROCHLORIDE 150 MG: 100 TABLET ORAL at 08:50

## 2020-11-06 RX ADMIN — PREGABALIN 100 MG: 50 CAPSULE ORAL at 21:45

## 2020-11-06 RX ADMIN — PANTOPRAZOLE SODIUM 40 MG: 40 TABLET, DELAYED RELEASE ORAL at 05:00

## 2020-11-06 RX ADMIN — Medication 10 ML: at 05:00

## 2020-11-06 RX ADMIN — OXYCODONE 15 MG: 5 TABLET ORAL at 21:45

## 2020-11-06 RX ADMIN — ATORVASTATIN CALCIUM 40 MG: 40 TABLET, FILM COATED ORAL at 21:45

## 2020-11-06 RX ADMIN — BUDESONIDE AND FORMOTEROL FUMARATE DIHYDRATE 2 PUFF: 80; 4.5 AEROSOL RESPIRATORY (INHALATION) at 10:39

## 2020-11-06 RX ADMIN — HYDROMORPHONE HYDROCHLORIDE 0.5 MG: 1 INJECTION, SOLUTION INTRAMUSCULAR; INTRAVENOUS; SUBCUTANEOUS at 01:46

## 2020-11-06 NOTE — PROGRESS NOTES
Dual skin assessment done. No open wounds or pressure areas present. Has scars to left arm, abd, and back.

## 2020-11-06 NOTE — PROGRESS NOTES
Hospitalist Note     Admit Date:  2020  2:47 PM   Name:  Kevin Nguyen   Age:  60 y.o.  :  1965   MRN:  [id number]   PCP:  Shmuel Quarles MD  Treatment Team: Attending Provider: Frank Roberts MD; Consulting Provider: Roxy Yusuf MD; Staff Nurse: Lashay Salinas; Utilization Review: Shiv Jaramillo RN    HPI/Subjective:   59 yo CM with past history of Afib (s/p Watchman device and not on anticoagulation), chronic pancreatitis, HTN, necrotizing fasciitis of the left shoulder, PUD (prior history of H. Pylori infection), mesenteric artery insufficiency (s/p stent placement), CAD, and sleep apnea presents with a several month history of worsening right-sided abdominal pain. He says it feels like \"a knife and whenever I move it shoots upwards. \" Eating also makes pain worse. Such symptoms have brought him to the ED about 4-5 times. He has had this problem for about 3 months and it is getting worse to the point that he has difficulty tolerating food/liquids because of nausea/vomiting afterwards. He has a history of appendectomy and cholecystectomy in  without complication until he had SBO two years due to adhesions. He denies fevers/chills. He has some chest discomfort that he describes as \"pressure-like. \"  He also has had multiple episodes of watery diarrhea. His episodes of vomiting is \"coffee-ground\". He denies taking OTC ibuprofen/naproxen, Goody powder, Pepto bismul, [place]. Of note, patient with right renal mass. He is aware of it and was given a referral to urology but has not established care yet. ED Course: CT abd/pelvis showing gastric wall thickening with retroperitoneal lymphadenopathy. Tropon of 20 with repeat of 22. EKG shows NSR with no ST-T wave changes. WBC count of 11.9. Given ASA, Pepcid, pantoprazole, Dilaudid, and sucralfate. Hospitalist called for admission.  - still with moderate-severe epigatric pain. says dilaudid helps with pain.   His home PO oxy does not really help. Some nausea. He can drink liquids but has difficulty with solids including chicken noodle soup    No other complaints  Objective:     Patient Vitals for the past 24 hrs:   Temp Pulse Resp BP SpO2   11/06/20 0741 97.7 °F (36.5 °C) 66 20 124/68 97 %   11/06/20 0457 98 °F (36.7 °C) 64 22 111/63 99 %   11/05/20 2350 98.3 °F (36.8 °C) 68 22 (!) 100/59 94 %   11/05/20 2102 98.1 °F (36.7 °C) 73 20 132/69 98 %   11/05/20 1830 -- 70 20 (!) 148/81 100 %   11/05/20 1556 -- 79 24 110/61 98 %   11/05/20 1430 97.4 °F (36.3 °C) 87 20 114/73 98 %     Oxygen Therapy  O2 Sat (%): 97 % (11/06/20 0741)  Pulse via Oximetry: 70 beats per minute (11/05/20 1830)  O2 Device: Room air (11/05/20 1430)    Estimated body mass index is 32.96 kg/m² as calculated from the following:    Height as of this encounter: 6' (1.829 m). Weight as of this encounter: 110.2 kg (243 lb). Intake/Output Summary (Last 24 hours) at 11/6/2020 0926  Last data filed at 11/6/2020 0429  Gross per 24 hour   Intake 550 ml   Output --   Net 550 ml       *Note that automatically entered I/Os may not be accurate; dependent on patient compliance with collection and accurate  by [employer]. General:    Well nourished. No overt distress  CV:   RRR. No edema. No JVD  Lungs:   Even, Unlabored  Abdomen:   TTP epigastrium. Protuberant. soft  Extremities: Warm and dry. No cyanosis   Skin:     No rashes. No jaundice. Normal coloration  Neuro:  No gross focal deficits.   Alert    Data Reviewed:  I have reviewed all labs, meds, and studies from the last 24 hours:  Recent Results (from the past 24 hour(s))   EKG, 12 LEAD, INITIAL    Collection Time: 11/05/20  2:23 PM   Result Value Ref Range    Ventricular Rate 88 BPM    Atrial Rate 88 BPM    P-R Interval 196 ms    QRS Duration 80 ms    Q-T Interval 306 ms    QTC Calculation (Bezet) 370 ms    Calculated P Axis 22 degrees    Calculated R Axis 67 degrees    Calculated T Axis 43 degrees Diagnosis       Normal sinus rhythm  Low voltage QRS  Borderline ECG  When compared with ECG of 24-SEP-2020 09:17,  UT interval has decreased  Nonspecific T wave abnormality now evident in Inferior leads  Confirmed by Latia Michael MD (), YOVANA BERMEO ([id number]) on 11/5/2020 10:05:17 PM     CBC WITH AUTOMATED DIFF    Collection Time: 11/05/20  2:40 PM   Result Value Ref Range    WBC 11.9 (H) 4.3 - 11.1 K/uL    RBC 4.81 4.23 - 5.6 M/uL    HGB 13.2 (L) 13.6 - 17.2 g/dL    HCT 40.7 (L) 41.1 - 50.3 %    MCV 84.6 79.6 - 97.8 FL    MCH 27.4 26.1 - 32.9 PG    MCHC 32.4 31.4 - 35.0 g/dL    RDW 14.1 11.9 - 14.6 %    PLATELET 705 (L) 513 - 450 K/uL    MPV 11.8 9.4 - 12.3 FL    ABSOLUTE NRBC 0.00 0.0 - 0.2 K/uL    DF AUTOMATED      NEUTROPHILS 73 43 - 78 %    LYMPHOCYTES 21 13 - 44 %    MONOCYTES 4 4.0 - 12.0 %    EOSINOPHILS 2 0.5 - 7.8 %    BASOPHILS 0 0.0 - 2.0 %    IMMATURE GRANULOCYTES 0 0.0 - 5.0 %    ABS. NEUTROPHILS 8.6 (H) 1.7 - 8.2 K/UL    ABS. LYMPHOCYTES 2.5 0.5 - 4.6 K/UL    ABS. MONOCYTES 0.5 0.1 - 1.3 K/UL    ABS. EOSINOPHILS 0.2 0.0 - 0.8 K/UL    ABS. BASOPHILS 0.0 0.0 - 0.2 K/UL    ABS. IMM. GRANS. 0.1 0.0 - 0.5 K/UL   METABOLIC PANEL, COMPREHENSIVE    Collection Time: 11/05/20  2:40 PM   Result Value Ref Range    Sodium 137 136 - 145 mmol/L    Potassium 3.9 3.5 - 5.1 mmol/L    Chloride 102 98 - 107 mmol/L    CO2 29 21 - 32 mmol/L    Anion gap 6 (L) 7 - 16 mmol/L    Glucose 283 (H) 65 - 100 mg/dL    BUN 15 6 - 23 MG/DL    Creatinine 1.49 0.8 - 1.5 MG/DL    GFR est AA >60 >60 ml/min/1.73m2    GFR est non-AA 52 (L) >60 ml/min/1.73m2    Calcium 8.5 8.3 - 10.4 MG/DL    Bilirubin, total 0.5 0.2 - 1.1 MG/DL    ALT (SGPT) 11 (L) 12 - 65 U/L    AST (SGOT) 11 (L) 15 - 37 U/L    Alk.  phosphatase 86 50 - 136 U/L    Protein, total 7.0 6.3 - 8.2 g/dL    Albumin 3.7 3.5 - 5.0 g/dL    Globulin 3.3 2.3 - 3.5 g/dL    A-G Ratio 1.1 (L) 1.2 - 3.5     TROPONIN-HIGH SENSITIVITY    Collection Time: 11/05/20  2:40 PM   Result Value Ref Range Troponin-High Sensitivity 21.6 (H) 0 - 14 pg/mL   PROTHROMBIN TIME + INR    Collection Time: 11/05/20  2:40 PM   Result Value Ref Range    Prothrombin time 13.1 12.5 - 14.7 sec    INR 1.0     NT-PRO BNP    Collection Time: 11/05/20  2:40 PM   Result Value Ref Range    NT pro-BNP 96 5 - 125 PG/ML   LIPASE    Collection Time: 11/05/20  2:40 PM   Result Value Ref Range    Lipase 37 (L) 73 - 393 U/L   LACTIC ACID    Collection Time: 11/05/20  4:09 PM   Result Value Ref Range    Lactic acid 1.2 0.4 - 2.0 MMOL/L   TROPONIN-HIGH SENSITIVITY    Collection Time: 11/05/20  4:47 PM   Result Value Ref Range    Troponin-High Sensitivity 20.4 (H) 0 - 14 pg/mL   GLUCOSE, POC    Collection Time: 11/05/20  8:57 PM   Result Value Ref Range    Glucose (POC) 187 (H) 65 - 100 mg/dL   TROPONIN-HIGH SENSITIVITY    Collection Time: 11/05/20  9:35 PM   Result Value Ref Range    Troponin-High Sensitivity 22.9 (H) 0 - 14 pg/mL   METABOLIC PANEL, COMPREHENSIVE    Collection Time: 11/06/20  7:18 AM   Result Value Ref Range    Sodium 139 136 - 145 mmol/L    Potassium 3.9 3.5 - 5.1 mmol/L    Chloride 104 98 - 107 mmol/L    CO2 30 21 - 32 mmol/L    Anion gap 5 (L) 7 - 16 mmol/L    Glucose 237 (H) 65 - 100 mg/dL    BUN 11 6 - 23 MG/DL    Creatinine 1.24 0.8 - 1.5 MG/DL    GFR est AA >60 >60 ml/min/1.73m2    GFR est non-AA >60 >60 ml/min/1.73m2    Calcium 8.1 (L) 8.3 - 10.4 MG/DL    Bilirubin, total 0.3 0.2 - 1.1 MG/DL    ALT (SGPT) 11 (L) 12 - 65 U/L    AST (SGOT) 16 15 - 37 U/L    Alk.  phosphatase 70 50 - 136 U/L    Protein, total 6.0 (L) 6.3 - 8.2 g/dL    Albumin 2.9 (L) 3.5 - 5.0 g/dL    Globulin 3.1 2.3 - 3.5 g/dL    A-G Ratio 0.9 (L) 1.2 - 3.5     CBC WITH AUTOMATED DIFF    Collection Time: 11/06/20  7:18 AM   Result Value Ref Range    WBC 6.4 4.3 - 11.1 K/uL    RBC 4.28 4.23 - 5.6 M/uL    HGB 11.7 (L) 13.6 - 17.2 g/dL    HCT 36.6 (L) 41.1 - 50.3 %    MCV 85.5 79.6 - 97.8 FL    MCH 27.3 26.1 - 32.9 PG    MCHC 32.0 31.4 - 35.0 g/dL    RDW 14.1 11.9 - 14.6 %    PLATELET 133 (L) 854 - 450 K/uL    MPV 11.9 9.4 - 12.3 FL    ABSOLUTE NRBC 0.00 0.0 - 0.2 K/uL    DF AUTOMATED      NEUTROPHILS 59 43 - 78 %    LYMPHOCYTES 32 13 - 44 %    MONOCYTES 5 4.0 - 12.0 %    EOSINOPHILS 3 0.5 - 7.8 %    BASOPHILS 1 0.0 - 2.0 %    IMMATURE GRANULOCYTES 0 0.0 - 5.0 %    ABS. NEUTROPHILS 3.8 1.7 - 8.2 K/UL    ABS. LYMPHOCYTES 2.1 0.5 - 4.6 K/UL    ABS. MONOCYTES 0.3 0.1 - 1.3 K/UL    ABS. EOSINOPHILS 0.2 0.0 - 0.8 K/UL    ABS. BASOPHILS 0.0 0.0 - 0.2 K/UL    ABS. IMM.  GRANS. 0.0 0.0 - 0.5 K/UL   TROPONIN-HIGH SENSITIVITY    Collection Time: 11/06/20  7:18 AM   Result Value Ref Range    Troponin-High Sensitivity 23.9 (H) 0 - 14 pg/mL       Current Meds:  Current Facility-Administered Medications   Medication Dose Route Frequency    0.9% sodium chloride infusion  50 mL/hr IntraVENous CONTINUOUS    sodium chloride (NS) flush 5-40 mL  5-40 mL IntraVENous Q8H    sodium chloride (NS) flush 5-40 mL  5-40 mL IntraVENous PRN    acetaminophen (TYLENOL) tablet 650 mg  650 mg Oral Q6H PRN    Or    acetaminophen (TYLENOL) suppository 650 mg  650 mg Rectal Q6H PRN    polyethylene glycol (MIRALAX) packet 17 g  17 g Oral DAILY PRN    promethazine (PHENERGAN) tablet 12.5 mg  12.5 mg Oral Q6H PRN    Or    ondansetron (ZOFRAN) injection 4 mg  4 mg IntraVENous Q6H PRN    albuterol (PROVENTIL VENTOLIN) nebulizer solution 2.5 mg  2.5 mg Nebulization Q4H PRN    lipase-protease-amylase (ZENPEP 5,000) capsule 5 Cap  5 Cap Oral TIDAC    aspirin delayed-release tablet 162 mg  162 mg Oral DAILY    atorvastatin (LIPITOR) tablet 40 mg  40 mg Oral QHS    budesonide-formoterol (SYMBICORT) 80-4.5 mcg inhaler  2 Puff Inhalation BID RT    furosemide (LASIX) tablet 40 mg  40 mg Oral DAILY    insulin lispro (HUMALOG) injection   SubCUTAneous AC&HS    insulin glargine (LANTUS) injection 50 Units  50 Units SubCUTAneous QHS    oxyCODONE IR (ROXICODONE) tablet 15 mg  15 mg Oral TID    pantoprazole (PROTONIX) tablet 40 mg  40 mg Oral DAILY    pregabalin (LYRICA) capsule 100 mg  100 mg Oral TID    sertraline (ZOLOFT) tablet 150 mg  150 mg Oral DAILY    alcohol 62% (NOZIN) nasal  1 Ampule  1 Ampule Topical Q12H    HYDROmorphone (PF) (DILAUDID) injection 0.5 mg  0.5 mg IntraVENous Q4H PRN       Other Studies:  No results found for this visit on 11/05/20. Xr Chest Pa Lat    Result Date: 11/5/2020  EXAMINATION: XR CHEST PA LAT 11/5/2020 3:05 PM ACCESSION NUMBER: [id number] COMPARISON: 11/21/2018 INDICATION: chest pain TECHNIQUE: PA and lateral views of the chest were obtained. FINDINGS: Support devices: A loop recorder overlies the heart. Lungs: No focal airspace disease. Cardiac Silhouette: Within normal limits in size. Mediastinum: The aorta is normal. Upper Abdomen: Normal Miscellaneous: There are no lytic and blastic lesions. IMPRESSION: 1. No acute abnormality     Ct Abd Pelv W Cont    Result Date: 11/5/2020  CT ABDOMEN AND PELVIS WITH INTRAVENOUS CONTRAST DATED 11/5/2020. History: Persistent abdominal pain and vomiting. Comparison: None. Technique:   Multiple contiguous helical CT images reconstructed at 5 mm intervals were obtained from above the diaphragms through the ischial tuberosities following oral and 100 cc Isovue-370 without acute complication. All CT scans performed at this facility use one or all of the following: Automated exposure control, adjustment of the mA and/or kVp according to patient's size, iterative reconstruction. Findings: CT ABDOMEN:  Limited evaluation of the lung bases and base of the mediastinum demonstrates no significant abnormalities. The Liver is homogeneous in attenuation. Left lobe pneumobilia is seen which is not clearly abnormal. The spleen is homogeneous in attenuation. No contour deforming or enhancing mass lesions are seen of the pancreas or adrenal glands. The gallbladder has been removed.   The kidneys enhance symmetrically and no evidence of hydronephrosis is seen. A 1.3 cm exophytic lesion extends off the posterior mid pole cortex of the right kidney. This is hyperdense measuring 30 Hounsfield units in attenuation and therefore should be further characterized. Abnormal wall thickening is seen of the mid to distal body of the stomach to include the antrum. The fat muscle interface with adjacent fat is irregular. These findings are incompletely characterized. These can be seen with inflammation or neoplasm. The visualized loops of small bowel and colon are normal in caliber. The appendix is not seen. No free fluid, free air, or focal inflammatory changes are seen in the abdomen. Prominent retroperitoneal mesenteric lymph nodes are seen in the upper abdomen the largest of which is a left periaortic retroperitoneal lymph node seen on axial image 42 measuring 1.2 cm short axis. The abdominal aorta there is trace moderate atherosclerotic, and mild ectatic changes. CT PELVIS: No abnormal pelvic fluid collections or inflammatory changes are present. No pelvic adenopathy is seen. The urinary bladder is unremarkable. IMPRESSION:  1. Abnormal appearance of the stomach. This appears thick-walled and demonstrates an irregular interface with adjacent mesenteric fat. This appearance can be seen with inflammation or neoplasm with inflammation favored. Further evaluation is clinically indicated is recommended. 2. Prominent mesenteric and retroperitoneal lymph nodes in the upper abdomen likely related to the gastric process described above. 3. 1.3 cm hyperdense right renal cortical lesion. This would be best further assessed with a renal mass protocol CT although an ultrasound can also be considered. This report was made using voice transcription.  Despite my best efforts to avoid any, transcription errors may persist. If there is any question about the accuracy of the report or need for clarification, then please call [phone], or text me through [employer] for clarification or correction. [facility]    Result Date: 11/6/2020  Medical history: Renal mass noted on CT from yesterday. TECHNIQUE: Grayscale renal ultrasound. COMPARISON: CT yesterday. FINDINGS: There is a 1.5 cm simple cyst in the lower pole of the right kidney. Right kidney measures 10.7 cm, without hydronephrosis. Left kidney is unremarkable without hydronephrosis and measures 11.5 cm. Urinary bladder is normal in contour. IMPRESSION: 1. Approximately 1.5 cm simple cyst in the lower pole of the right kidney, corresponding to the abnormality noted on yesterday's CT. All Micro Results     None          SARS-CoV-2 Lab Results  \"Novel Coronavirus\" Test: No results found for: COV2NT   \"Emergent Disease\" Test: No results found for: EDPR  \"SARS-COV-2\" Test: No results found for: XGCOVT  Rapid Test: No results found for: COVR         Assessment and Plan:     Hospital Problems as of 11/6/2020 Date Reviewed: 8/11/2020          Codes Class Noted - Resolved POA    * (Principal) Intractable abdominal pain ICD-10-CM: R10.9  ICD-9-CM: 789.00  11/5/2020 - Present Yes        Renal mass of unknown nature ICD-10-CM: N28.89  ICD-9-CM: 593.9  11/5/2020 - Present Yes        Coronary artery disease involving native coronary artery of native heart (Chronic) ICD-10-CM: I25.10  ICD-9-CM: 414.01  6/23/2020 - Present Yes    Overview Signed 6/23/2020  9:03 PM by Leonardo Suggs MD     1. Cardiac CT (10/3/18): Mild to moderate disease.                Chronic diastolic congestive heart failure (HCC) (Chronic) ICD-10-CM: I50.32  ICD-9-CM: 428.32, 428.0  5/5/2016 - Present Yes    Overview Signed 5/5/2016 11:55 AM by Elvia English MD     new onset with aflutter              HTN (hypertension) (Chronic) ICD-10-CM: I10  ICD-9-CM: 401.9  3/1/2012 - Present Yes        Atrial fibrillation ([address] (Chronic) ICD-10-CM: I48.91  ICD-9-CM: 427.31  3/1/2012 - Present Yes    Overview Addendum 10/1/2020  3:03 PM by Leonardo Suggs MD     Left atrial appendage occlusion (8/11/20):  24 mm Watchman device. Diabetes mellitus ([facility] (Chronic) ICD-10-CM: E11.9  ICD-9-CM: 250.00  3/1/2012 - Present Yes        WILBERTO on CPAP (Chronic) ICD-10-CM: G47.33, Z99.89  ICD-9-CM: 327.23, V46.8  3/1/2012 - Present Yes        Dyslipidemia (Chronic) ICD-10-CM: E78.5  ICD-9-CM: 272.4  3/1/2012 - Present Yes        RESOLVED: Demand ischemia ([facility] ICD-10-CM: I24.8  ICD-9-CM: 411.89  11/6/2020 - 11/6/2020 Yes    Overview Signed 5/25/2016 10:56 AM by Elvia English MD     Normal Cors cath 1/2016                    Plan:  · Appreciate GI and urology consults  · Reduce IVF due to CHF hx  · NPO for possible intervention. Defer diet to GI  · Pain control with PRNs  · Monitor BG. On lantus, ISS    DC planning/Dispo:    · Home when improved. No placement anticipated    Diet:  DIET NUTRITIONAL SUPPLEMENTS  DIET NPO  DVT ppx:  SCDs    Other listed chronic conditions stable, cont current management.     Signed:  Eric Castañeda MD

## 2020-11-06 NOTE — PROGRESS NOTES
TRANSFER - IN REPORT:    Verbal report received from Agustina Almeida RN(name) on [address]  being received from ED(unit) for routine progression of care      Report consisted of patients Situation, Background, Assessment and   Recommendations(SBAR). Information from the following report(s) SBAR was reviewed with the receiving nurse. Opportunity for questions and clarification was provided. Assessment completed upon patients arrival to unit and care assumed.

## 2020-11-06 NOTE — CONSULTS
Urology Consult    Patient: Alex Nino MRN: [id number]  SSN: [id number]    YOB: 1965  Age: 60 y.o. Sex: male      Subjective:      Alex Nino is a 60 y.o. male with past history of Afib (s/p Watchman device and not on anticoagulation), chronic pancreatitis, HTN, necrotizing fasciitis of the left shoulder, PUD (prior history of H. Pylori infection), mesenteric artery insufficiency (s/p stent placement), CAD, and sleep apnea. We are consulted d/t 1.5 cm right renal mass found on CT. Apparently pt has upcoming appt with Dr. Wale Mathis to address this, as it was found previously on imaging at [facility]. Follow up renal US here shows it to be a 1.5 cm simple renal cyst.      Pt presents with a several month history of worsening right-sided abdominal pain. He has difficulty tolerating food/liquids because of nausea/vomiting afterwards. He has some chest discomfort that he describes as \"pressure-like. \" He also has had multiple episodes of watery diarrhea. GI following. Pt voiding well. Denies hematuria. Past Medical History:   Diagnosis Date    Acute renal failure (ARF) ([address] 06/2017    septic from necrotizing fascitis. was on dialysis short term.  Anticoagulated on Coumadin     has not been instructed to hold.  Atrial fibrillation (HCC)     CAD (coronary artery disease)     Chest pain, precordial 5/4/2016    Chronic kidney disease     Chronic pain     back    Chronic pancreatitis ([address]     Diabetes ([address] 2008    insulin reliant. bs: 120's .      Edema     Gastrointestinal disorder     Headache     Heart failure (HCC)     History of peptic ulcer disease     years ago    History of sepsis 06/2017    Hypertension     Morbid obesity ([address] 5/4/2016    35 bmi    Necrotizing fasciitis ([address] 06/2017    left upper arm    Palpitations     PUD (peptic ulcer disease)     Restless leg syndrome 5/4/2016    Sleep apnea     cpap    Tobacco dependence 5/4/2016     Past Surgical History:   Procedure Laterality Date    HX APPENDECTOMY      HX CATARACT REMOVAL Right     with IOL    HX CHOLECYSTECTOMY      HX HEART CATHETERIZATION      HX LUMBAR FUSION  2012 and 2013    x 2    HX OTHER SURGICAL      cardioversion      Family History   Problem Relation Age of Onset    Diabetes Mother     Heart Disease Mother     No Known Problems Father     Pulmonary Embolism Sister      Social History     Tobacco Use    Smoking status: Current Every Day Smoker     Packs/day: 0.25     Years: 40.00     Pack years: 10.00    Smokeless tobacco: Never Used    Tobacco comment: Down to 5 cigs daily. 6/24/20KH   Substance Use Topics    Alcohol use: No     Alcohol/week: 0.0 standard drinks      Prior to Admission medications    Medication Sig Start Date End Date Taking? Authorizing Provider   clopidogreL (PLAVIX) 75 mg tab Take 1 Tab by mouth daily. 10/1/20  Yes Raul Gonzalez MD   insulin glargine,hum.rec.anlog (TOUJEO SOLOSTAR U-300 INSULIN SC) 126 Units by SubCUTAneous route daily. Yes Provider, Historical   pantoprazole (Protonix) 40 mg tablet Take 40 mg by mouth daily. Yes Provider, Historical   aspirin delayed-release 81 mg tablet Take 2 Tabs by mouth daily. Patient taking differently: Take 81 mg by mouth daily. 5/8/20  Yes Deysi Baird MD   insulin aspart U-100 (NOVOLOG FLEXPEN U-100 INSULIN) 100 unit/mL inpn 20 Units by SubCUTAneous route Before breakfast, lunch, dinner and at bedtime. Sliding scale with meals    Yes Provider, Historical   pregabalin (LYRICA) 100 mg capsule Take 100 mg by mouth three (3) times daily. Indications: Diabetic Peripheral Neuropathy   Yes Provider, Historical   sertraline (ZOLOFT) 100 mg tablet Take 150 mg by mouth daily. Yes Provider, Historical   oxyCODONE IR (OXY-IR) 15 mg immediate release tablet Take 15 mg by mouth three (3) times daily.    Yes Provider, Historical   amylase-lipase-protease (CREON) [phone] -120,000 unit capsule Take 1 Cap by mouth three (3) times daily (with meals). Indications: exocrine pancreatic insufficiency   Yes Provider, Historical   budesonide-formoterol (SYMBICORT) 80-4.5 mcg/actuation HFAA inhaler Take 2 Puffs by inhalation two (2) times daily as needed. Indications: BRONCHOSPASM PREVENTION WITH COPD, bring on the dos   Yes Provider, Historical   albuterol (PROVENTIL HFA, VENTOLIN HFA, PROAIR HFA) 90 mcg/actuation inhaler Take 2 Puffs by inhalation every six (6) hours as needed. Indications: BRONCHOSPASM PREVENTION, bring on the dos   Yes Provider, Historical   furosemide (LASIX) 40 mg tablet Take 1 Tab by mouth daily. Patient taking differently: Take 40 mg by mouth daily. Indications: Edema, am 5/5/16  Yes Maritza Ruiz MD   atorvastatin (LIPITOR) 40 mg tablet Take 1 Tab by mouth nightly. Patient taking differently: Take 40 mg by mouth daily. 1/11/16  Yes Lorri Amezcua PA-C   cetirizine (ZYRTEC) 10 mg tablet Take 10 mg by mouth nightly. Indications: Allergic Rhinitis   Yes Provider, Historical   colestipol (COLESTID) 1 gram tablet Take 1 g by mouth two (2) times a day. Indications: hypercholesterolemia    Provider, Historical        Allergies   Allergen Reactions    Eliquis [Apixaban] Rash    Lisinopril Unknown (comments)    Metformin Shortness of Breath and Rash    Morphine Nausea and Vomiting    Xarelto [Rivaroxaban] Rash       Review of Systems:  A comprehensive review of systems was negative except for that written in the History of Present Illness.     Objective:     Vitals:    11/06/20 0741 11/06/20 1040 11/06/20 1041 11/06/20 1042   BP: 124/68      Pulse: 66      Resp: 20      Temp: 97.7 °F (36.5 °C)      SpO2: 97% 95%     Weight:   229 lb 3.2 oz (104 kg)    Height:    6' (1.829 m)        Physical Exam:  GENERAL: alert, cooperative, no distress  EYE: PERRLA  THROAT & NECK:neck supple  LUNG: clear to auscultation bilaterally  HEART: regular rate and rhythm, S1, S2   ABDOMEN: soft, right abd tenderness  EXTREMITIES:  extremities normal, atraumatic, no cyanosis or edema  SKIN: no rash or abnormalities  NEUROLOGIC: AOx3    Assessment:     Hospital Problems  Date Reviewed: 8/11/2020          Codes Class Noted POA    * (Principal) Intractable abdominal pain ICD-10-CM: R10.9  ICD-9-CM: 789.00  11/5/2020 Yes        Renal mass of unknown nature ICD-10-CM: N28.89  ICD-9-CM: 593.9  11/5/2020 Yes        Coronary artery disease involving native coronary artery of native heart (Chronic) ICD-10-CM: I25.10  ICD-9-CM: 414.01  6/23/2020 Yes    Overview Signed 6/23/2020  9:03 PM by Anette Krabbe, MD     1. Cardiac CT (10/3/18): Mild to moderate disease. Chronic diastolic congestive heart failure (HCC) (Chronic) ICD-10-CM: I50.32  ICD-9-CM: 428.32, 428.0  5/5/2016 Yes    Overview Signed 5/5/2016 11:55 AM by Cristina Johnson MD     new onset with aflutter              HTN (hypertension) (Chronic) ICD-10-CM: I10  ICD-9-CM: 401.9  3/1/2012 Yes        Atrial fibrillation (HCC) (Chronic) ICD-10-CM: I48.91  ICD-9-CM: 427.31  3/1/2012 Yes    Overview Addendum 10/1/2020  3:03 PM by Anette Krabbe, MD     Left atrial appendage occlusion (8/11/20):  24 mm Watchman device. Diabetes mellitus ([facility] (Chronic) ICD-10-CM: E11.9  ICD-9-CM: 250.00  3/1/2012 Yes        WILBERTO on CPAP (Chronic) ICD-10-CM: G47.33, Z99.89  ICD-9-CM: 327.23, V46.8  3/1/2012 Yes        Dyslipidemia (Chronic) ICD-10-CM: E78.5  ICD-9-CM: 272.4  3/1/2012 Yes              Plan:     Renal US shows 1.5 cm simple cyst in lower pole of right kidney. Pt will follow up in 6 mo with Dr. Miguelito Schmitz with renal US prior to re-evaluate cyst.   Call with questions/concerns. Thank you for the opportunity to assist in the care of this patient.        Signed By: Juany Ervin NP     November 6, 2020

## 2020-11-06 NOTE — PROGRESS NOTES
Hourly rounds completed. Resting in bed. Medicated for right to mid abd pain. NPO after MN per order. Denies needs at this time. Bed in low locked position. Call light and personal items within reach. Will continue to monitor and give report to oncoming day shift nurse.

## 2020-11-06 NOTE — CONSULTS
[facility] Consult Note       Primary GI Physician: Dr. Mayra Torres, [facility]    Referring Provider:  Dr. Scott Bazan Date:  11/6/2020    Admit Date:  11/5/2020    Chief Complaint:  Abnormal CT scan     Subjective:     History of Present Illness:  Patient is a 60 y.o. male with PMH of (but not limited to) chronic pancreatitis secondary to pancreatic divisum followed at [facility]- [address] [place] [address] previously and now [facility] on Creon, DM, CHF with EF 50% 8/2028m A fib with Watchman device on Plavix therapy (last dose 11/5), CKD, Right renal mass--awaiting evaluation, mesenteric stenosis with stent placement followed by vascular, Recent SIBO treated, gastroparesis, history of PUD, necrotizing fascitiis of the left shoulder, and WILBERTO , who is seen in consultation at the request of Dr. Abelardo Cobos for abnormal finding on CT scan. Mr. Sohan Siddiqi has a 1 mos history of RUQ abdominal pain radiating into his right back. He denies any participating or relieving factors. He has associated intermittent N&V. He denies any melena, hematochezia. He has alternating constipation and diarrhea. He denies any weight loss. He has had extensive workup by his current GI at [facility] including an EGD and colonoscopy last week per patient report where \"they told me I was good. I didn't have ulcers and don't have to have another colonoscopy for 10 years. \" He underwent a CTA on 10/26 at [facility] which revealed stent in place at the superior mesenteric artery without any stenosis. It also revealed a 1.5cm complex right renal mass. He saw vascular on 11/5. He had a recent positive lactulose breath test and was treated for small bowel bacterial overgrowth. He has a history of a small bowel obstruction with surgical resection in 2019. He takes Creon, Pantoprazole 40mg daily, and Colestid. He takes bentyl PRN. He continues to smoke 5 cigarettes a day, but is trying to quit. He denies any ETOH or NSAIDS.      EGD 3/1/2012 by Dr. Falguni Fuentes revealed healed gastric ulcer and duodenitis. He more recently had an EGD and colonoscopy at [facility] last week. CT ABDOMEN AND PELVIS WITH INTRAVENOUS CONTRAST DATED 11/5/2020.     History: Persistent abdominal pain and vomiting.      Comparison: None.      Technique:   Multiple contiguous helical CT images reconstructed at 5 mm  intervals were obtained from above the diaphragms through the ischial  tuberosities following oral and 100 cc Isovue-370 without acute complication. All CT scans performed at this facility use one or all of the following:  Automated exposure control, adjustment of the mA and/or kVp according to  patient's size, iterative reconstruction.     Findings:  CT ABDOMEN:    Limited evaluation of the lung bases and base of the mediastinum demonstrates no  significant abnormalities.      The Liver is homogeneous in attenuation. Left lobe pneumobilia is seen which is  not clearly abnormal. The spleen is homogeneous in attenuation. No contour  deforming or enhancing mass lesions are seen of the pancreas or adrenal glands. The gallbladder has been removed. The kidneys enhance symmetrically and no  evidence of hydronephrosis is seen. A 1.3 cm exophytic lesion extends off the  posterior mid pole cortex of the right kidney. This is hyperdense measuring 30  Hounsfield units in attenuation and therefore should be further characterized.     Abnormal wall thickening is seen of the mid to distal body of the stomach to  include the antrum. The fat muscle interface with adjacent fat is irregular. These findings are incompletely characterized. These can be seen with  inflammation or neoplasm. The visualized loops of small bowel and colon are  normal in caliber. The appendix is not seen. No free fluid, free air, or focal  inflammatory changes are seen in the abdomen.   Prominent retroperitoneal  mesenteric lymph nodes are seen in the upper abdomen the largest of which is a  left periaortic retroperitoneal lymph node seen on axial image 42 measuring 1.2  cm short axis. The abdominal aorta there is trace moderate atherosclerotic, and  mild ectatic changes.     CT PELVIS:  No abnormal pelvic fluid collections or inflammatory changes are present. No  pelvic adenopathy is seen. The urinary bladder is unremarkable.     IMPRESSION  IMPRESSION:    1. Abnormal appearance of the stomach. This appears thick-walled and  demonstrates an irregular interface with adjacent mesenteric fat. This  appearance can be seen with inflammation or neoplasm with inflammation favored. Further evaluation is clinically indicated is recommended.      2. Prominent mesenteric and retroperitoneal lymph nodes in the upper abdomen  likely related to the gastric process described above.     3. 1.3 cm hyperdense right renal cortical lesion. This would be best further  assessed with a renal mass protocol CT although an ultrasound can also be  considered.     This report was made using voice transcription. Despite my best efforts to avoid  any, transcription errors may persist. If there is any question about the  accuracy of the report or need for clarification, then please call [phone], or text me through [employer] for clarification or correction. PMH:  Past Medical History:   Diagnosis Date    Acute renal failure (ARF) ([address] 06/2017    septic from necrotizing fascitis. was on dialysis short term.  Anticoagulated on Coumadin     has not been instructed to hold.  Atrial fibrillation (HCC)     CAD (coronary artery disease)     Chest pain, precordial 5/4/2016    Chronic kidney disease     Chronic pain     back    Chronic pancreatitis ([address]     Diabetes ([address] 2008    insulin reliant. bs: 120's .      Edema     Gastrointestinal disorder     Headache     Heart failure (HCC)     History of peptic ulcer disease     years ago    History of sepsis 06/2017    Hypertension     Morbid obesity ([address] 5/4/2016    35 bmi    Necrotizing fasciitis ([address] 06/2017    left upper arm    Palpitations     PUD (peptic ulcer disease)     Restless leg syndrome 5/4/2016    Sleep apnea     cpap    Tobacco dependence 5/4/2016       PSH:  Past Surgical History:   Procedure Laterality Date    HX APPENDECTOMY      HX CATARACT REMOVAL Right     with IOL    HX CHOLECYSTECTOMY      HX HEART CATHETERIZATION      HX LUMBAR FUSION  2012 and 2013    x 2    HX OTHER SURGICAL      cardioversion       Allergies: Allergies   Allergen Reactions    Eliquis [Apixaban] Rash    Lisinopril Unknown (comments)    Metformin Shortness of Breath and Rash    Morphine Nausea and Vomiting    Xarelto [Rivaroxaban] Rash       Home Medications:  Prior to Admission medications    Medication Sig Start Date End Date Taking? Authorizing Provider   clopidogreL (PLAVIX) 75 mg tab Take 1 Tab by mouth daily. 10/1/20  Yes Erin Gonzalez MD   insulin glargine,hum.rec.anlog (TOUJEO SOLOSTAR U-300 INSULIN SC) 126 Units by SubCUTAneous route daily. Yes Provider, Historical   pantoprazole (Protonix) 40 mg tablet Take 40 mg by mouth daily. Yes Provider, Historical   aspirin delayed-release 81 mg tablet Take 2 Tabs by mouth daily. Patient taking differently: Take 81 mg by mouth daily. 5/8/20  Yes Heavenly Bauer MD   insulin aspart U-100 (NOVOLOG FLEXPEN U-100 INSULIN) 100 unit/mL inpn 20 Units by SubCUTAneous route Before breakfast, lunch, dinner and at bedtime. Sliding scale with meals    Yes Provider, Historical   pregabalin (LYRICA) 100 mg capsule Take 100 mg by mouth three (3) times daily. Indications: Diabetic Peripheral Neuropathy   Yes Provider, Historical   sertraline (ZOLOFT) 100 mg tablet Take 150 mg by mouth daily. Yes Provider, Historical   oxyCODONE IR (OXY-IR) 15 mg immediate release tablet Take 15 mg by mouth three (3) times daily.    Yes Provider, Historical   amylase-lipase-protease (CREON) 24,000-76,000 -120,000 unit capsule Take 1 Cap by mouth three (3) times daily (with meals). Indications: exocrine pancreatic insufficiency   Yes Provider, Historical   budesonide-formoterol (SYMBICORT) 80-4.5 mcg/actuation HFAA inhaler Take 2 Puffs by inhalation two (2) times daily as needed. Indications: BRONCHOSPASM PREVENTION WITH COPD, bring on the dos   Yes Provider, Historical   albuterol (PROVENTIL HFA, VENTOLIN HFA, PROAIR HFA) 90 mcg/actuation inhaler Take 2 Puffs by inhalation every six (6) hours as needed. Indications: BRONCHOSPASM PREVENTION, bring on the dos   Yes Provider, Historical   furosemide (LASIX) 40 mg tablet Take 1 Tab by mouth daily. Patient taking differently: Take 40 mg by mouth daily. Indications: Edema, am 5/5/16  Yes Aubrie Ruiz MD   atorvastatin (LIPITOR) 40 mg tablet Take 1 Tab by mouth nightly. Patient taking differently: Take 40 mg by mouth daily. 1/11/16  Yes Lorri Amezcua PA-C   cetirizine (ZYRTEC) 10 mg tablet Take 10 mg by mouth nightly. Indications: Allergic Rhinitis   Yes Provider, Historical   colestipol (COLESTID) 1 gram tablet Take 1 g by mouth two (2) times a day.  Indications: hypercholesterolemia    Provider, Historical       Hospital Medications:  Current Facility-Administered Medications   Medication Dose Route Frequency    0.9% sodium chloride infusion  50 mL/hr IntraVENous CONTINUOUS    sodium chloride (NS) flush 5-40 mL  5-40 mL IntraVENous Q8H    sodium chloride (NS) flush 5-40 mL  5-40 mL IntraVENous PRN    acetaminophen (TYLENOL) tablet 650 mg  650 mg Oral Q6H PRN    Or    acetaminophen (TYLENOL) suppository 650 mg  650 mg Rectal Q6H PRN    polyethylene glycol (MIRALAX) packet 17 g  17 g Oral DAILY PRN    promethazine (PHENERGAN) tablet 12.5 mg  12.5 mg Oral Q6H PRN    Or    ondansetron (ZOFRAN) injection 4 mg  4 mg IntraVENous Q6H PRN    albuterol (PROVENTIL VENTOLIN) nebulizer solution 2.5 mg  2.5 mg Nebulization Q4H PRN    lipase-protease-amylase (ZENPEP 5,000) capsule 5 Cap  5 Cap Oral TIDAC    aspirin delayed-release tablet 162 mg  162 mg Oral DAILY    atorvastatin (LIPITOR) tablet 40 mg  40 mg Oral QHS    budesonide-formoterol (SYMBICORT) 80-4.5 mcg inhaler  2 Puff Inhalation BID RT    furosemide (LASIX) tablet 40 mg  40 mg Oral DAILY    insulin lispro (HUMALOG) injection   SubCUTAneous AC&HS    insulin glargine (LANTUS) injection 50 Units  50 Units SubCUTAneous QHS    oxyCODONE IR (ROXICODONE) tablet 15 mg  15 mg Oral TID    pantoprazole (PROTONIX) tablet 40 mg  40 mg Oral DAILY    pregabalin (LYRICA) capsule 100 mg  100 mg Oral TID    sertraline (ZOLOFT) tablet 150 mg  150 mg Oral DAILY    alcohol 62% (NOZIN) nasal  1 Ampule  1 Ampule Topical Q12H    HYDROmorphone (PF) (DILAUDID) injection 0.5 mg  0.5 mg IntraVENous Q4H PRN       Social History:  Social History     Tobacco Use    Smoking status: Current Every Day Smoker     Packs/day: 0.25     Years: 40.00     Pack years: 10.00    Smokeless tobacco: Never Used    Tobacco comment: Down to 5 cigs daily. 6/24/20KH   Substance Use Topics    Alcohol use: No     Alcohol/week: 0.0 standard drinks         Family History:  Family History   Problem Relation Age of Onset    Diabetes Mother     Heart Disease Mother     No Known Problems Father     Pulmonary Embolism Sister        Review of Systems:  A detailed 10 system ROS is obtained, with pertinent positives as listed above. All others are negative. Diet:  NPO    Objective:     Physical Exam:  Vitals:  Visit Vitals  /68 (BP 1 Location: Right arm, BP Patient Position: At rest)   Pulse 66   Temp 97.7 °F (36.5 °C)   Resp 20   Ht 6' (1.829 m)   Wt 110.2 kg (243 lb)   SpO2 97%   BMI 32.96 kg/m²     Gen:  Pt is alert, cooperative, no acute distress   Skin:  Extremities and face reveal no rashes. HEENT: Sclerae anicteric. Extra-occular muscles are intact. No oral ulcers. No abnormal pigmentation of the lips. The neck is supple. Cardiovascular: Regular rate and rhythm.  No murmurs, gallops, or rubs.  Respiratory:  Comfortable breathing with no accessory muscle use. Clear breath sounds anteriorly with no wheezes, rales, or rhonchi. GI:  Abdomen nondistended, soft, and GENERALIZED TENDERNESS  Normal active bowel sounds. No enlargement of the liver or spleen. No masses palpable. MIDLLINE SCARRING  Musculoskeletal:  No pitting edema of the lower legs. Neurological:  Gross memory appears intact. Patient is alert and oriented. Psychiatric:  Mood appears appropriate with judgement intact. Lymphatic:  No cervical or supraclavicular adenopathy. Laboratory:    Recent Labs     11/06/20  0718 11/05/20  1440   WBC 6.4 11.9*   HGB 11.7* 13.2*   HCT 36.6* 40.7*   * 130*   MCV 85.5 84.6    137   K 3.9 3.9    102   CO2 30 29   BUN 11 15   CREA 1.24 1.49   CA 8.1* 8.5   * 283*   AP 70 86   ALT 11* 11*   TBILI 0.3 0.5   ALB 2.9* 3.7   TP 6.0* 7.0   LPSE  --  37*   PTP  --  13.1   INR  --  1.0          Assessment:     Principal Problem:    Intractable abdominal pain (11/5/2020)    Active Problems:    HTN (hypertension) (3/1/2012)      Atrial fibrillation ([address] (3/1/2012)      Overview: Left atrial appendage occlusion (8/11/20):  24 mm Watchman device. Diabetes mellitus ([address] (3/1/2012)      WILBERTO on CPAP (3/1/2012)      Dyslipidemia (3/1/2012)      Chronic diastolic congestive heart failure ([address] (5/5/2016)      Overview: new onset with aflutter       Coronary artery disease involving native coronary artery of native heart (6/23/2020)      Overview: 1. Cardiac CT (10/3/18): Mild to moderate disease.         Renal mass of unknown nature (11/5/2020)    60 y.o. male with PMH of (but not limited to) chronic pancreatitis secondary to pancreatic divisum followed at [address] previously and now [facility] on Creon, DM, CHF with EF 50% 8/2028m A fib with Watchman device on Plavix therapy (last dose 11/5), CKD, Right renal mass--awaiting evaluation, mesenteric stenosis with stent placement followed by vascular, Recent SIBO treated, gastroparesis, history of PUD, necrotizing fascitiis of the left shoulder, and WILBERTO ,who is seen in consultation at the request of Dr. Supa Rosales for abnormal finding on CT scan of the abdomen/pelvis on 11/5/2020 revealing abnormal appearance of the stomach. This appears thick-walled and demonstrates an irregular interface with adjacent mesenteric fat. This appearance can be seen with inflammation or neoplasm with inflammation favored. Patient underwent EGD and colonoscopy last week at [facility] without acute findings. He has been followed by Dr. Celestino Arellano GI at [facility] for his chronic GI issues and has had recent extensive workup. Plan:     -No plans for repeat EGD at this time. We will obtain recent EGD from [facility].  -Agree with pantoprazole, but will increase to bid  -Clear liquid diet  -supportive care  -continue pancreatic enzymes    Patient is seen and examined in collaboration with Dr. Ana Perez. Assessment and plan as per Dr.A. Bowen. Martha Cook. Dianne Tran, [place] [address]  [facility]    Patient seen and examined. Agree with above. He is mostly concerned about pain management. Discussed assessment and plans with patient. Await reports from recent work up at [facility]. Doubt benefit of repeating EGD for abnormal CT findings. May consider outpatient EUS if indicated based on review of prior records or he may follow up with Dr. Lupillo Byrd, [facility] . Trish Pineda MD

## 2020-11-06 NOTE — PROGRESS NOTES
Comprehensive Nutrition Assessment    Type and Reason for Visit: Initial, Positive nutrition screen  Best Practice Alert for Malnutrition Screening Tool: Recently Lost Weight Without Trying: Yes, If Yes, How Much Weight Loss: >33 lbs, Eating Poorly Due to Decreased Appetite: Yes     Nutrition Recommendations/Plan:   Continue Ensure Clear TID  Advance diet as medically appropriate. If pt to be NPO/CLD for prolonged period of time (7-10 days) consider nutrition support for primary needs. Please consult RD if nutrition support is pursued. Nutrition Assessment:  PMH: Afib, chronic pancreatitis, small bowel obstruction in 2019, HTN, necrotizing fasciitis of the left shoulder, PUD, CAD, and sleep apnea. Pt admitted for chronic abdominal pain on left side for past 3 months. Pt denies use of ETOH / NSAIDS. Pt s/p stent placement at [facility] 1.5 weeks ago, EGD conducted during this admission on 10/26. Results of EGD via [facility] pending. KUB and GI consult pending. Upon assessment pt was alert and oriented, however reported severe abdominal pain making it difficult to speak. Pt stated being unable to eat solid foods for ~3 months, as it resulted in emesis 20 minutes after consumption of food. Pt did note ability to eat potato chips as only solid food source without emesis for last 3 months. Pt reported eating potato chips multiple times per day. Pt also reported being able to tolerate liquids, primarily Sprite Zero. Pt reported ~40lb weight loss in last 3 months, and stated UBW is ~250lb. Pt stated he was at baseline weight and eating \"normal\" prior to onset of abdominal pain. Pt stated his is moving his bowels regularly, last BM 11/5. Malnutrition Assessment:  Malnutrition Status:   At risk for malnutrition (specify)    Context:  Chronic illness     Findings of the 6 clinical characteristics of malnutrition:   Energy Intake:  Unable to assess(Pt reported poor PO intake, unable to verify.)  Weight Loss:  Mild weight loss (specify amount and time period)(6% weight loss in 6 weeks.)     Body Fat Loss:  No significant body fat loss,     Muscle Mass Loss:  No significant muscle mass loss,     Estimated Daily Nutrient Needs:  Energy (kcal): 0063-7898(53-22HJNE/CR CBW: 104kg)   Protein (g): (20% of estimated needs)     Current Nutrition Therapies:  DIET NUTRITIONAL SUPPLEMENTS All Meals; Ensure Clear  DIET CLEAR LIQUID    Anthropometric Measures:  · Height:  6' (182.9 cm)  · Current Body Wt:  104 kg (229 lb 4.5 oz)(Standing scale 11/6)   Body mass index is 31.09 kg/m². · BMI Category:  Obese class 1 (BMI 30.0-34. 9)     WT / BMI WEIGHT BODY MASS INDEX   11/6/2020 229 lb 3.2 oz 31.09 kg/m2   10/1/2020 243 lb 32.96 kg/m2   9/24/2020 230 lb 31.19 kg/m2   8/12/2020 240 lb 3.2 oz 32.58 kg/m2   6/24/2020 229 lb 31.06 kg/m2   9/12/2019 240 lb 32.55 kg/m2   8/16/2019 250 lb 32.98 kg/m2   Weight hx per EMR shows weight loss of 14lb (6% of body weight) in >1 month. This weight loss does not fit ASPEN criteria for malnutrition, but is notable. Nutrition Diagnosis:   · Inadequate oral intake related to altered GI function as evidenced by vomiting, weight loss as shown above, pt reported barriers to intake. Nutrition Interventions:   Food and/or Nutrient Delivery: Continue oral nutrition supplement(Advance diet as medically appropriate)  Coordination of Nutrition Care: (Discussed with Sravan Borden RN)    Goals:  Advance diet as medically appropritate to meet >75% of estimated needs by next follow up. Nutrition Monitoring and Evaluation:   Food/Nutrient Intake Outcomes: Supplement intake, Diet advancement/tolerance  Physical Signs/Symptoms Outcomes: Nausea/vomiting    Discharge Planning:     Too soon to determine     Electronically signed by Leida Shannon on 11/6/2020 at 10:52 AM    Contact: [phone]

## 2020-11-06 NOTE — PROGRESS NOTES
Chart screened by  for discharge planning. No needs identified at this time. Please consult  if any new issues arise. Pt pending GI/Urology consults today. CM will remain available for possible DC needs. Care Management Interventions  PCP Verified by CM:  Yes  Discharge Durable Medical Equipment: No  Physical Therapy Consult: No  Occupational Therapy Consult: No  Speech Therapy Consult: No  Current Support Network: Own Home, Lives with Spouse  Confirm Follow Up Transport: Family  Discharge Location  Discharge Placement: Home

## 2020-11-06 NOTE — H&P
HOSPITALIST H&P/CONSULT  NAME:  Ayla Enriquez   Age:  60 y.o.  :   1965   MRN:   [id number]  PCP: Addy Middleton MD  Consulting MD:  Treatment Team: Attending Provider: Francheska Bell DO; Consulting Provider: Yared Mas MD; Staff Nurse: Kate Bautista  HPI:   59 yo CM with past history of Afib (s/p Watchman device and not on anticoagulation), chronic pancreatitis, HTN, necrotizing fasciitis of the left shoulder, PUD (prior history of H. Pylori infection), mesenteric artery insufficiency (s/p stent placement), CAD, and sleep apnea presents with a several month history of worsening right-sided abdominal pain. He says it feels like \"a knife and whenever I move it shoots upwards. \" Eating also makes pain worse. Such symptoms have brought him to the ED about 4-5 times. He has had this problem for about 3 months and it is getting worse to the point that he has difficulty tolerating food/liquids because of nausea/vomiting afterwards. He has a history of appendectomy and cholecystectomy in 0 without complication until he had SBO two years due to adhesions. He denies fevers/chills. He has some chest discomfort that he describes as \"pressure-like. \" He also has had multiple episodes of watery diarrhea. His episodes of vomiting is \"coffee-ground\". He denies taking OTC ibuprofen/naproxen, Goody powder, Pepto bismul, Irene [place]. Of note, patient with right renal mass. He is aware of it and was given a referral to urology but has not established care yet. ED Course: CT abd/pelvis showing gastric wall thickening with retroperitoneal lymphadenopathy. Tropon of 20 with repeat of 22. EKG shows NSR with no ST-T wave changes. WBC count of 11.9. Given ASA, Pepcid, pantoprazole, Dilaudid, and sucralfate. Hospitalist called for admission.     Complete ROS done and is as stated in HPI or otherwise negative  Past Medical History:   Diagnosis Date    Acute renal failure (ARF) ([facility] 2017    septic from necrotizing fascitis. was on dialysis short term.  Anticoagulated on Coumadin     has not been instructed to hold.  Atrial fibrillation (HCC)     CAD (coronary artery disease)     Chest pain, precordial 5/4/2016    Chronic kidney disease     Chronic pain     back    Chronic pancreatitis ([address]     Diabetes ([address] 2008    insulin reliant. bs: 120's .  Edema     Gastrointestinal disorder     Headache     Heart failure ([address]     History of peptic ulcer disease     years ago    History of sepsis 06/2017    Hypertension     Morbid obesity ([address] 5/4/2016    35 bmi    Necrotizing fasciitis ([address] 06/2017    left upper arm    Palpitations     PUD (peptic ulcer disease)     Restless leg syndrome 5/4/2016    Sleep apnea     cpap    Tobacco dependence 5/4/2016      Past Surgical History:   Procedure Laterality Date    HX APPENDECTOMY      HX CATARACT REMOVAL Right     with IOL    HX CHOLECYSTECTOMY      HX HEART CATHETERIZATION      HX LUMBAR FUSION  2012 and 2013    x 2    HX OTHER SURGICAL      cardioversion      Prior to Admission Medications   Prescriptions Last Dose Informant Patient Reported? Taking? albuterol (PROVENTIL HFA, VENTOLIN HFA, PROAIR HFA) 90 mcg/actuation inhaler 10/29/2020 at Unknown time  Yes Yes   Sig: Take 2 Puffs by inhalation every six (6) hours as needed. Indications: BRONCHOSPASM PREVENTION, bring on the dos   amylase-lipase-protease (CREON) 24,000-76,000 -120,000 unit capsule 11/5/2020 at Unknown time  Yes Yes   Sig: Take 1 Cap by mouth three (3) times daily (with meals). Indications: exocrine pancreatic insufficiency   aspirin delayed-release 81 mg tablet 11/5/2020 at Unknown time  No Yes   Sig: Take 2 Tabs by mouth daily. Patient taking differently: Take 81 mg by mouth daily. atorvastatin (LIPITOR) 40 mg tablet 11/5/2020 at Unknown time  No Yes   Sig: Take 1 Tab by mouth nightly. Patient taking differently: Take 40 mg by mouth daily. budesonide-formoterol (SYMBICORT) 80-4.5 mcg/actuation HFAA inhaler 10/29/2020 at Unknown time  Yes Yes   Sig: Take 2 Puffs by inhalation two (2) times daily as needed. Indications: BRONCHOSPASM PREVENTION WITH COPD, bring on the dos   cetirizine (ZYRTEC) 10 mg tablet 2020 at Unknown time  Yes Yes   Sig: Take 10 mg by mouth nightly. Indications: Allergic Rhinitis   clopidogreL (PLAVIX) 75 mg tab 2020 at Unknown time  No Yes   Sig: Take 1 Tab by mouth daily. colestipol (COLESTID) 1 gram tablet Not Taking at Unknown time  Yes No   Sig: Take 1 g by mouth two (2) times a day. Indications: hypercholesterolemia   furosemide (LASIX) 40 mg tablet 2020 at Unknown time  No Yes   Sig: Take 1 Tab by mouth daily. Patient taking differently: Take 40 mg by mouth daily. Indications: Edema, am   insulin aspart U-100 (NOVOLOG FLEXPEN U-100 INSULIN) 100 unit/mL inpn 2020 at Unknown time  Yes Yes   Si Units by SubCUTAneous route Before breakfast, lunch, dinner and at bedtime. Sliding scale with meals    insulin glargine,hum.rec.anlog (TOUJEO SOLOSTAR U-300 INSULIN SC) 2020 at Unknown time  Yes Yes   Si Units by SubCUTAneous route daily. oxyCODONE IR (OXY-IR) 15 mg immediate release tablet 2020 at Unknown time  Yes Yes   Sig: Take 15 mg by mouth three (3) times daily. pantoprazole (Protonix) 40 mg tablet 2020 at Unknown time  Yes Yes   Sig: Take 40 mg by mouth daily. pregabalin (LYRICA) 100 mg capsule 2020 at Unknown time  Yes Yes   Sig: Take 100 mg by mouth three (3) times daily. Indications: Diabetic Peripheral Neuropathy   sertraline (ZOLOFT) 100 mg tablet 2020 at Unknown time  Yes Yes   Sig: Take 150 mg by mouth daily.       Facility-Administered Medications: None     Allergies   Allergen Reactions    Eliquis [Apixaban] Rash    Lisinopril Unknown (comments)    Metformin Shortness of Breath and Rash    Morphine Nausea and Vomiting    Xarelto [Rivaroxaban] Rash Social History     Tobacco Use    Smoking status: Current Every Day Smoker     Packs/day: 0.25     Years: 40.00     Pack years: 10.00    Smokeless tobacco: Never Used    Tobacco comment: Down to 5 cigs daily. 20KH   Substance Use Topics    Alcohol use: No     Alcohol/week: 0.0 standard drinks      Family History   Problem Relation Age of Onset    Diabetes Mother     Heart Disease Mother     No Known Problems Father     Pulmonary Embolism Sister       Objective:     Visit Vitals  /69 (BP 1 Location: Right arm, BP Patient Position: Head of bed elevated (Comment degrees))   Pulse 73   Temp 98.1 °F (36.7 °C)   Resp 20   Ht 6' (1.829 m)   Wt 110.2 kg (243 lb)   SpO2 98%   BMI 32.96 kg/m²      Temp (24hrs), Av.8 °F (36.6 °C), Min:97.4 °F (36.3 °C), Max:98.1 °F (36.7 °C)    Oxygen Therapy  O2 Sat (%): 98 % (20 2102)  Pulse via Oximetry: 70 beats per minute (20 1830)  O2 Device: Room air (20 1430)  Physical Exam:  General:    Alert, cooperative, no distress, appears stated age. Head:   Normocephalic, without obvious abnormality, atraumatic. Nose:  Nares normal. No drainage or sinus tenderness. Lungs:   Clear to auscultation bilaterally. No Wheezing or Rhonchi. No rales. Heart:   Regular rate and rhythm,  no murmur, rub or gallop. Abdomen:   Exquisite RUQ/epigastric discomfort to palpation, bowel sounds present, no rebound tenderness, no fluid wave  Extremities: No cyanosis. No edema. No clubbing  Skin:     Texture, turgor normal. No rashes or lesions.   Not Jaundiced  Neurologic: Alert and oriented x 3, no focal deficits   Data Review:   Recent Results (from the past 24 hour(s))   EKG, 12 LEAD, INITIAL    Collection Time: 20  2:23 PM   Result Value Ref Range    Ventricular Rate 88 BPM    Atrial Rate 88 BPM    P-R Interval 196 ms    QRS Duration 80 ms    Q-T Interval 306 ms    QTC Calculation (Bezet) 370 ms    Calculated P Axis 22 degrees    Calculated R Axis 67 degrees    Calculated T Axis 43 degrees    Diagnosis       Normal sinus rhythm  Low voltage QRS  Borderline ECG  When compared with ECG of 24-SEP-2020 09:17,  MS interval has decreased  Nonspecific T wave abnormality now evident in Inferior leads  Confirmed by Bakari Suggs MD (), YOVANA BERMEO ([id number]) on 11/5/2020 10:05:17 PM     CBC WITH AUTOMATED DIFF    Collection Time: 11/05/20  2:40 PM   Result Value Ref Range    WBC 11.9 (H) 4.3 - 11.1 K/uL    RBC 4.81 4.23 - 5.6 M/uL    HGB 13.2 (L) 13.6 - 17.2 g/dL    HCT 40.7 (L) 41.1 - 50.3 %    MCV 84.6 79.6 - 97.8 FL    MCH 27.4 26.1 - 32.9 PG    MCHC 32.4 31.4 - 35.0 g/dL    RDW 14.1 11.9 - 14.6 %    PLATELET 866 (L) 040 - 450 K/uL    MPV 11.8 9.4 - 12.3 FL    ABSOLUTE NRBC 0.00 0.0 - 0.2 K/uL    DF AUTOMATED      NEUTROPHILS 73 43 - 78 %    LYMPHOCYTES 21 13 - 44 %    MONOCYTES 4 4.0 - 12.0 %    EOSINOPHILS 2 0.5 - 7.8 %    BASOPHILS 0 0.0 - 2.0 %    IMMATURE GRANULOCYTES 0 0.0 - 5.0 %    ABS. NEUTROPHILS 8.6 (H) 1.7 - 8.2 K/UL    ABS. LYMPHOCYTES 2.5 0.5 - 4.6 K/UL    ABS. MONOCYTES 0.5 0.1 - 1.3 K/UL    ABS. EOSINOPHILS 0.2 0.0 - 0.8 K/UL    ABS. BASOPHILS 0.0 0.0 - 0.2 K/UL    ABS. IMM. GRANS. 0.1 0.0 - 0.5 K/UL   METABOLIC PANEL, COMPREHENSIVE    Collection Time: 11/05/20  2:40 PM   Result Value Ref Range    Sodium 137 136 - 145 mmol/L    Potassium 3.9 3.5 - 5.1 mmol/L    Chloride 102 98 - 107 mmol/L    CO2 29 21 - 32 mmol/L    Anion gap 6 (L) 7 - 16 mmol/L    Glucose 283 (H) 65 - 100 mg/dL    BUN 15 6 - 23 MG/DL    Creatinine 1.49 0.8 - 1.5 MG/DL    GFR est AA >60 >60 ml/min/1.73m2    GFR est non-AA 52 (L) >60 ml/min/1.73m2    Calcium 8.5 8.3 - 10.4 MG/DL    Bilirubin, total 0.5 0.2 - 1.1 MG/DL    ALT (SGPT) 11 (L) 12 - 65 U/L    AST (SGOT) 11 (L) 15 - 37 U/L    Alk.  phosphatase 86 50 - 136 U/L    Protein, total 7.0 6.3 - 8.2 g/dL    Albumin 3.7 3.5 - 5.0 g/dL    Globulin 3.3 2.3 - 3.5 g/dL    A-G Ratio 1.1 (L) 1.2 - 3.5     TROPONIN-HIGH SENSITIVITY    Collection Time: 11/05/20  2:40 PM   Result Value Ref Range    Troponin-High Sensitivity 21.6 (H) 0 - 14 pg/mL   PROTHROMBIN TIME + INR    Collection Time: 11/05/20  2:40 PM   Result Value Ref Range    Prothrombin time 13.1 12.5 - 14.7 sec    INR 1.0     NT-PRO BNP    Collection Time: 11/05/20  2:40 PM   Result Value Ref Range    NT pro-BNP 96 5 - 125 PG/ML   LIPASE    Collection Time: 11/05/20  2:40 PM   Result Value Ref Range    Lipase 37 (L) 73 - 393 U/L   LACTIC ACID    Collection Time: 11/05/20  4:09 PM   Result Value Ref Range    Lactic acid 1.2 0.4 - 2.0 MMOL/L   TROPONIN-HIGH SENSITIVITY    Collection Time: 11/05/20  4:47 PM   Result Value Ref Range    Troponin-High Sensitivity 20.4 (H) 0 - 14 pg/mL   GLUCOSE, POC    Collection Time: 11/05/20  8:57 PM   Result Value Ref Range    Glucose (POC) 187 (H) 65 - 100 mg/dL   TROPONIN-HIGH SENSITIVITY    Collection Time: 11/05/20  9:35 PM   Result Value Ref Range    Troponin-High Sensitivity 22.9 (H) 0 - 14 pg/mL     Imaging /Procedures /Studies     Assessment and Plan: Active Hospital Problems    Diagnosis Date Noted    Intractable abdominal pain 11/05/2020    Renal mass of unknown nature 11/05/2020    Coronary artery disease involving native coronary artery of native heart 06/23/2020     1. Cardiac CT (10/3/18): Mild to moderate disease.  Chest pain, precordial 05/04/2016     Normal Cors cath 1/2016       Atrial fibrillation ([address] 03/01/2012     Left atrial appendage occlusion (8/11/20):  24 mm Watchman device.        Diabetes mellitus ([address] 03/01/2012    Dyslipidemia 03/01/2012    HTN (hypertension) 03/01/2012       PLAN    # Intractable abdominal pain, patient s/p appendectomy/cholecystectomy in the past complicated by SBO  - ?chronic pancreatitis vs PUD/H.pylori (?GIPoma) vs metastatic malignancy vs gastritis vs chronic ischemic bowel  - per chart review patient recently had CTA that showed patent SMA stent with patent JD and celiac axis, was seen at [facility] ED by vascular surgery about 1.5 week ago with no indication for intervention for similar clinical presentation  - continue PPI  - renal ultrasound ordered  - consult GI in the morning  - pain management and supportive measures  - avoid NSAIDs  - lipase of 37  - continue home Zenpep  - hold home Plavix  - LA of 1.2, unlikely to be ischemic bowel    # Right renal mass  - renal ultrasound ordered  - consult urology in the morning    # Atypical chest pain  - EKG showing NSR  - troponin mildly elevated, repeat value with AM labs    # DM type II  - Lantus 15 units qdaily  - Humalog SSI and serial CBGs  - hold home regimen    # History of CAD  - continue ASA  - hold Plavix  - continue statin    F/E/N: IVF resuscitation, replete electrolytes as needed, clear liquid diet    Ppx: SCDs for VTE    Code Status: FULL CODE    Disposition: Admit to OBS with plan as above. Discussed with patient at bedside. All questions answered.      Signed By: Latrice Clayton DO     November 5, 2020

## 2020-11-06 NOTE — ED NOTES
TRANSFER - OUT REPORT:    Verbal report given to maddie gresham(name) on Adan Mata  being transferred to 6th floor(unit) for routine progression of care       Report consisted of patients Situation, Background, Assessment and   Recommendations(SBAR). Information from the following report(s) SBAR, Kardex, ED Summary, Intake/Output, MAR and Recent Results was reviewed with the receiving nurse. Lines:   Peripheral IV 11/05/20 Left Forearm (Active)   Site Assessment Clean, dry, & intact 11/05/20 1439   Phlebitis Assessment 0 11/05/20 1439   Infiltration Assessment 0 11/05/20 1439   Dressing Status Clean, dry, & intact 11/05/20 1439   Dressing Type 4 X 4 11/05/20 1439   Hub Color/Line Status Pink 11/05/20 1439        Opportunity for questions and clarification was provided.       Patient transported with:  transport

## 2020-11-06 NOTE — PROGRESS NOTES
Hourly rounds completed this shift, all needs have been met. Pain medication administered this shift. Pt states that the oxycodone 15mg does not work. Will continue to manage care.

## 2020-11-07 LAB
GLUCOSE BLD STRIP.AUTO-MCNC: 104 MG/DL (ref 65–100)
GLUCOSE BLD STRIP.AUTO-MCNC: 143 MG/DL (ref 65–100)
GLUCOSE BLD STRIP.AUTO-MCNC: 272 MG/DL (ref 65–100)
GLUCOSE BLD STRIP.AUTO-MCNC: 88 MG/DL (ref 65–100)

## 2020-11-07 PROCEDURE — 74011250637 HC RX REV CODE- 250/637: Performed by: INTERNAL MEDICINE

## 2020-11-07 PROCEDURE — 74011250636 HC RX REV CODE- 250/636: Performed by: INTERNAL MEDICINE

## 2020-11-07 PROCEDURE — 94640 AIRWAY INHALATION TREATMENT: CPT

## 2020-11-07 PROCEDURE — 94760 N-INVAS EAR/PLS OXIMETRY 1: CPT

## 2020-11-07 PROCEDURE — 96376 TX/PRO/DX INJ SAME DRUG ADON: CPT

## 2020-11-07 PROCEDURE — 99218 HC RM OBSERVATION: CPT

## 2020-11-07 PROCEDURE — 74011636637 HC RX REV CODE- 636/637: Performed by: INTERNAL MEDICINE

## 2020-11-07 PROCEDURE — 82962 GLUCOSE BLOOD TEST: CPT

## 2020-11-07 RX ORDER — HYDROMORPHONE HYDROCHLORIDE 2 MG/1
1 TABLET ORAL
Status: DISCONTINUED | OUTPATIENT
Start: 2020-11-07 | End: 2020-11-09 | Stop reason: HOSPADM

## 2020-11-07 RX ORDER — PANTOPRAZOLE SODIUM 40 MG/1
40 TABLET, DELAYED RELEASE ORAL
Status: DISCONTINUED | OUTPATIENT
Start: 2020-11-07 | End: 2020-11-09 | Stop reason: HOSPADM

## 2020-11-07 RX ADMIN — PREGABALIN 100 MG: 50 CAPSULE ORAL at 09:42

## 2020-11-07 RX ADMIN — BUDESONIDE AND FORMOTEROL FUMARATE DIHYDRATE 2 PUFF: 80; 4.5 AEROSOL RESPIRATORY (INHALATION) at 09:07

## 2020-11-07 RX ADMIN — HYDROMORPHONE HYDROCHLORIDE 1 MG: 2 TABLET ORAL at 23:25

## 2020-11-07 RX ADMIN — ONDANSETRON 4 MG: 2 INJECTION INTRAMUSCULAR; INTRAVENOUS at 05:43

## 2020-11-07 RX ADMIN — ATORVASTATIN CALCIUM 40 MG: 40 TABLET, FILM COATED ORAL at 21:57

## 2020-11-07 RX ADMIN — PANTOPRAZOLE SODIUM 40 MG: 40 TABLET, DELAYED RELEASE ORAL at 05:31

## 2020-11-07 RX ADMIN — HYDROMORPHONE HYDROCHLORIDE 0.5 MG: 1 INJECTION, SOLUTION INTRAMUSCULAR; INTRAVENOUS; SUBCUTANEOUS at 05:43

## 2020-11-07 RX ADMIN — PANTOPRAZOLE SODIUM 40 MG: 40 TABLET, DELAYED RELEASE ORAL at 17:33

## 2020-11-07 RX ADMIN — HYDROMORPHONE HYDROCHLORIDE 0.5 MG: 1 INJECTION, SOLUTION INTRAMUSCULAR; INTRAVENOUS; SUBCUTANEOUS at 01:20

## 2020-11-07 RX ADMIN — Medication 10 ML: at 14:00

## 2020-11-07 RX ADMIN — OXYCODONE 15 MG: 5 TABLET ORAL at 09:43

## 2020-11-07 RX ADMIN — ONDANSETRON 4 MG: 2 INJECTION INTRAMUSCULAR; INTRAVENOUS at 10:47

## 2020-11-07 RX ADMIN — BUDESONIDE AND FORMOTEROL FUMARATE DIHYDRATE 2 PUFF: 80; 4.5 AEROSOL RESPIRATORY (INHALATION) at 19:50

## 2020-11-07 RX ADMIN — HYDROMORPHONE HYDROCHLORIDE 1 MG: 2 TABLET ORAL at 18:39

## 2020-11-07 RX ADMIN — OXYCODONE 15 MG: 5 TABLET ORAL at 17:33

## 2020-11-07 RX ADMIN — PROMETHAZINE HYDROCHLORIDE 12.5 MG: 25 TABLET ORAL at 17:39

## 2020-11-07 RX ADMIN — ACETAMINOPHEN 650 MG: 325 TABLET, FILM COATED ORAL at 12:46

## 2020-11-07 RX ADMIN — INSULIN GLARGINE 50 UNITS: 100 INJECTION, SOLUTION SUBCUTANEOUS at 22:00

## 2020-11-07 RX ADMIN — INSULIN LISPRO 6 UNITS: 100 INJECTION, SOLUTION INTRAVENOUS; SUBCUTANEOUS at 17:33

## 2020-11-07 RX ADMIN — OXYCODONE 15 MG: 5 TABLET ORAL at 21:57

## 2020-11-07 RX ADMIN — PREGABALIN 100 MG: 50 CAPSULE ORAL at 17:33

## 2020-11-07 RX ADMIN — Medication 1 AMPULE: at 09:43

## 2020-11-07 RX ADMIN — PREGABALIN 100 MG: 50 CAPSULE ORAL at 21:57

## 2020-11-07 RX ADMIN — PANCRELIPASE LIPASE, PANCRELIPASE PROTEASE, PANCRELIPASE AMYLASE 5 CAPSULE: 5000; 17000; 24000 CAPSULE, DELAYED RELEASE ORAL at 05:43

## 2020-11-07 RX ADMIN — ONDANSETRON 4 MG: 2 INJECTION INTRAMUSCULAR; INTRAVENOUS at 22:02

## 2020-11-07 RX ADMIN — Medication 1 AMPULE: at 21:58

## 2020-11-07 RX ADMIN — SERTRALINE HYDROCHLORIDE 150 MG: 100 TABLET ORAL at 09:43

## 2020-11-07 RX ADMIN — Medication 10 ML: at 22:09

## 2020-11-07 RX ADMIN — PANCRELIPASE LIPASE, PANCRELIPASE PROTEASE, PANCRELIPASE AMYLASE 5 CAPSULE: 5000; 17000; 24000 CAPSULE, DELAYED RELEASE ORAL at 11:47

## 2020-11-07 RX ADMIN — PANCRELIPASE LIPASE, PANCRELIPASE PROTEASE, PANCRELIPASE AMYLASE 5 CAPSULE: 5000; 17000; 24000 CAPSULE, DELAYED RELEASE ORAL at 17:33

## 2020-11-07 RX ADMIN — Medication 10 ML: at 05:31

## 2020-11-07 RX ADMIN — ASPIRIN 162 MG: 81 TABLET, COATED ORAL at 09:43

## 2020-11-07 RX ADMIN — FUROSEMIDE 40 MG: 40 TABLET ORAL at 09:42

## 2020-11-07 RX ADMIN — HYDROMORPHONE HYDROCHLORIDE 1 MG: 2 TABLET ORAL at 10:47

## 2020-11-07 NOTE — PROGRESS NOTES
Problem: Pain  Goal: *Control of Pain  Outcome: Progressing Towards Goal  Goal: *PALLIATIVE CARE:  Alleviation of Pain  Outcome: Progressing Towards Goal     Problem: Patient Education: Go to Patient Education Activity  Goal: Patient/Family Education  Outcome: Progressing Towards Goal     Problem: Falls - Risk of  Goal: *Absence of Falls  Description: Document Ivin Duncan Fall Risk and appropriate interventions in the flowsheet.   Outcome: Progressing Towards Goal  Note: Fall Risk Interventions:            Medication Interventions: Teach patient to arise slowly         History of Falls Interventions: Consult care management for discharge planning         Problem: Patient Education: Go to Patient Education Activity  Goal: Patient/Family Education  Outcome: Progressing Towards Goal

## 2020-11-07 NOTE — PROGRESS NOTES
Hospitalist Note     Admit Date:  2020  2:47 PM   Name:  Palma Abebe   Age:  60 y.o.  :  1965   MRN:  [id number]   PCP:  Leola Hammans, MD  Treatment Team: Attending Provider: Chiki Sauer MD; Consulting Provider: Genesis Rodriguez MD; Consulting Provider: Chante Hernandez MD; Utilization Review: Tyler Lerma RN; Care Manager: Светлана Johnson RN; Utilization Review: Karly Carmona RN    HPI/Subjective:   59 yo CM with past history of Afib (s/p Watchman device and not on anticoagulation), chronic pancreatitis, HTN, necrotizing fasciitis of the left shoulder, PUD (prior history of H. Pylori infection), mesenteric artery insufficiency (s/p stent placement), CAD, and sleep apnea presents with a several month history of worsening right-sided abdominal pain. He says it feels like \"a knife and whenever I move it shoots upwards. \" Eating also makes pain worse. Such symptoms have brought him to the ED about 4-5 times. He has had this problem for about 3 months and it is getting worse to the point that he has difficulty tolerating food/liquids because of nausea/vomiting afterwards. He has a history of appendectomy and cholecystectomy in  without complication until he had SBO two years due to adhesions. He denies fevers/chills. He has some chest discomfort that he describes as \"pressure-like. \"  He also has had multiple episodes of watery diarrhea. His episodes of vomiting is \"coffee-ground\". He denies taking OTC ibuprofen/naproxen, Goody powder, Pepto bismul, Irene Pomaria. Of note, patient with right renal mass. He is aware of it and was given a referral to urology but has not established care yet. ED Course: CT abd/pelvis showing gastric wall thickening with retroperitoneal lymphadenopathy. Tropon of 20 with repeat of 22. EKG shows NSR with no ST-T wave changes. WBC count of 11.9. Given ASA, Pepcid, pantoprazole, Dilaudid, and sucralfate. Hospitalist called for admission.    - still with moderate-severe epigatric pain. says dilaudid helps with pain. His home PO oxy does not really help. Some nausea. He can drink liquids but has difficulty with solids including chicken noodle soup    11/7 - says he is having continued abd pain this morning. Says he has no problem with liquids but struggles with solid food. Some nausea. No fevers, diarrhea. No other complaints  Objective:     Patient Vitals for the past 24 hrs:   Temp Pulse Resp BP SpO2   11/07/20 0908 -- -- -- -- 98 %   11/07/20 0759 97.8 °F (36.6 °C) 68 20 113/65 96 %   11/07/20 0528 98 °F (36.7 °C) 64 20 (!) 101/40 95 %   11/06/20 2325 97.6 °F (36.4 °C) 68 20 (!) 107/54 96 %   11/06/20 2132 -- -- -- -- 99 %   11/06/20 2007 97.9 °F (36.6 °C) 66 20 134/65 96 %   11/06/20 1502 97.7 °F (36.5 °C) 68 20 (!) 102/52 96 %   11/06/20 1117 97.4 °F (36.3 °C) 82 20 137/66 93 %   11/06/20 1040 -- -- -- -- 95 %     Oxygen Therapy  O2 Sat (%): 98 % (11/07/20 0908)  Pulse via Oximetry: 72 beats per minute (11/07/20 0908)  O2 Device: Room air (11/07/20 0908)    Estimated body mass index is 31.09 kg/m² as calculated from the following:    Height as of this encounter: 6' (1.829 m). Weight as of this encounter: 104 kg (229 lb 3.2 oz). Intake/Output Summary (Last 24 hours) at 11/7/2020 0934  Last data filed at 11/7/2020 0300  Gross per 24 hour   Intake 2608.73 ml   Output --   Net 2608.73 ml       *Note that automatically entered I/Os may not be accurate; dependent on patient compliance with collection and accurate  by techs. General:    Well nourished. No overt distress  CV:   RRR. No edema. No JVD  Lungs:   Even, Unlabored  Abdomen:   TTP epigastrium. Protuberant. soft  Extremities: Warm and dry. No cyanosis   Skin:     No rashes. No jaundice. Normal coloration  Neuro:  No gross focal deficits.   Alert    Data Reviewed:  I have reviewed all labs, meds, and studies from the last 24 hours:  Recent Results (from the past 24 hour(s))   GLUCOSE, POC    Collection Time: 11/06/20  2:02 PM   Result Value Ref Range    Glucose (POC) 329 (H) 65 - 100 mg/dL   GLUCOSE, POC    Collection Time: 11/06/20  4:13 PM   Result Value Ref Range    Glucose (POC) 283 (H) 65 - 100 mg/dL   GLUCOSE, POC    Collection Time: 11/06/20  8:53 PM   Result Value Ref Range    Glucose (POC) 204 (H) 65 - 100 mg/dL   GLUCOSE, POC    Collection Time: 11/07/20  7:19 AM   Result Value Ref Range    Glucose (POC) 88 65 - 100 mg/dL       Current Meds:  Current Facility-Administered Medications   Medication Dose Route Frequency    pantoprazole (PROTONIX) tablet 40 mg  40 mg Oral ACB&D    HYDROmorphone (DILAUDID) tablet 1 mg  1 mg Oral Q4H PRN    0.9% sodium chloride infusion  50 mL/hr IntraVENous CONTINUOUS    sodium chloride (NS) flush 5-40 mL  5-40 mL IntraVENous Q8H    sodium chloride (NS) flush 5-40 mL  5-40 mL IntraVENous PRN    acetaminophen (TYLENOL) tablet 650 mg  650 mg Oral Q6H PRN    Or    acetaminophen (TYLENOL) suppository 650 mg  650 mg Rectal Q6H PRN    polyethylene glycol (MIRALAX) packet 17 g  17 g Oral DAILY PRN    promethazine (PHENERGAN) tablet 12.5 mg  12.5 mg Oral Q6H PRN    Or    ondansetron (ZOFRAN) injection 4 mg  4 mg IntraVENous Q6H PRN    albuterol (PROVENTIL VENTOLIN) nebulizer solution 2.5 mg  2.5 mg Nebulization Q4H PRN    lipase-protease-amylase (ZENPEP 5,000) capsule 5 Cap  5 Cap Oral TIDAC    aspirin delayed-release tablet 162 mg  162 mg Oral DAILY    atorvastatin (LIPITOR) tablet 40 mg  40 mg Oral QHS    budesonide-formoterol (SYMBICORT) 80-4.5 mcg inhaler  2 Puff Inhalation BID RT    furosemide (LASIX) tablet 40 mg  40 mg Oral DAILY    insulin lispro (HUMALOG) injection   SubCUTAneous AC&HS    insulin glargine (LANTUS) injection 50 Units  50 Units SubCUTAneous QHS    oxyCODONE IR (ROXICODONE) tablet 15 mg  15 mg Oral TID    pregabalin (LYRICA) capsule 100 mg  100 mg Oral TID    sertraline (ZOLOFT) tablet 150 mg  150 mg Oral DAILY    alcohol 62% (NOZIN) nasal  1 Ampule  1 Ampule Topical Q12H       Other Studies:  No results found for this visit on 11/05/20. No results found. All Micro Results     None          SARS-CoV-2 Lab Results  \"Novel Coronavirus\" Test: No results found for: COV2NT   \"Emergent Disease\" Test: No results found for: EDPR  \"SARS-COV-2\" Test: No results found for: XGCOVT  Rapid Test: No results found for: COVR         Assessment and Plan:     Hospital Problems as of 11/7/2020 Date Reviewed: 8/11/2020          Codes Class Noted - Resolved POA    * (Principal) Intractable abdominal pain ICD-10-CM: R10.9  ICD-9-CM: 789.00  11/5/2020 - Present Yes        Renal mass of unknown nature ICD-10-CM: N28.89  ICD-9-CM: 593.9  11/5/2020 - Present Yes        Coronary artery disease involving native coronary artery of native heart (Chronic) ICD-10-CM: I25.10  ICD-9-CM: 414.01  6/23/2020 - Present Yes    Overview Signed 6/23/2020  9:03 PM by Dana Martinez MD     1. Cardiac CT (10/3/18): Mild to moderate disease. Chronic diastolic congestive heart failure (HCC) (Chronic) ICD-10-CM: I50.32  ICD-9-CM: 428.32, 428.0  5/5/2016 - Present Yes    Overview Signed 5/5/2016 11:55 AM by Chikis Curtis MD     new onset with aflutter              HTN (hypertension) (Chronic) ICD-10-CM: I10  ICD-9-CM: 401.9  3/1/2012 - Present Yes        Atrial fibrillation (HCC) (Chronic) ICD-10-CM: I48.91  ICD-9-CM: 427.31  3/1/2012 - Present Yes    Overview Addendum 10/1/2020  3:03 PM by Dana Martinez MD     Left atrial appendage occlusion (8/11/20):  24 mm Watchman device.               Diabetes mellitus ([address] (Chronic) ICD-10-CM: E11.9  ICD-9-CM: 250.00  3/1/2012 - Present Yes        WILBERTO on CPAP (Chronic) ICD-10-CM: G47.33, Z99.89  ICD-9-CM: 327.23, V46.8  3/1/2012 - Present Yes        Dyslipidemia (Chronic) ICD-10-CM: E78.5  ICD-9-CM: 272.4  3/1/2012 - Present Yes        RESOLVED: Demand ischemia ([facility] [address] ICD-10-CM: I24.8  ICD-9-CM: 411.89  11/6/2020 - 11/6/2020 Yes    Overview Signed 5/25/2016 10:56 AM by Linda Ramirez MD     Normal Cors cath 1/2016                    Plan:  · Appreciate GI and urology recs  · Will stop IVF as pt says he is drinking fine  · Advance diet to fulls  · PPI BID  · Switch IV dilaudid to PO. Told pt I could give him a short supply for breakthrough pain as outpatient but he will need to follow up with Stacie Merlos in order to get changes to pain meds long term. He told me his narcotic contract with [facility] does not prohibit this  · Monitor BG. On lantus, ISS    DC planning/Dispo:    · Home tomorrow most likely    Diet:  DIET NUTRITIONAL SUPPLEMENTS  DIET FULL LIQUID  DVT ppx:  SCDs    Other listed chronic conditions stable, cont current management.     Signed:  Charlene Raines MD

## 2020-11-07 NOTE — PROGRESS NOTES
[facility] Progress Note         Admit Date:  11/5/2020    Today's Date:  11/7/2020    CC: Abnormal stomach on CT    Subjective:     Patient with increased right sided abdominal pain today. Report right mid abdominal pain with radiation to the RUQ under his rib. This is associated with Nausea. Denies vomiting. Had EGD and colonoscopy last week at [facility] and was told everything looked fine. Still awaiting reports. Also has 1.5 cm right renal mass which could be contributing. Found under Chart Review Encounters:    EGD/colo 10/12/2020 @ [facility]:     Anesthesia  IV general     Estimated blood loss  Less than 10 cc's     Indications/clinical history   Diarrhea; weight loss abdominal pain; history of surgery for small bowel obstruction     Findings  Normal upper and lower endoscopy    Procedure Details  The patient was brought to the endoscopy suite at [facility], placed in the left lower decubitus position. Sedation was administered per Anesthesia. The EGD scope was passed into the esophagus and maneuvered to the 2nd/3rd portion of the duodenum. The bulb, stomach, pylorus, antrum, GE junction and esophagus were normal. There was some bile in the stomach. There was no hiatal hernia. There was no esophagitis. There was no ulcer or gastritis. The colonoscope was then placed into the rectum and maneuvered to the cecum with minimal difficulty. The ileocecal valve and appendiceal orifice were visualized and photographed. We slowly withdrew the scope, inspecting the mucosa circumferentially. The prep was good. There were no polyps or masses. There were no diverticulum. There were no other findings. He tolerated the procedure well.      Plan: refer to vascular surgery for abnormal mesenteric duplex     Specimens  none    Complication  None      Medications:   Current Facility-Administered Medications   Medication Dose Route Frequency    pantoprazole (PROTONIX) tablet 40 mg  40 mg Oral ACB&D    HYDROmorphone (DILAUDID) tablet 1 mg  1 mg Oral Q4H PRN    sodium chloride (NS) flush 5-40 mL  5-40 mL IntraVENous Q8H    sodium chloride (NS) flush 5-40 mL  5-40 mL IntraVENous PRN    acetaminophen (TYLENOL) tablet 650 mg  650 mg Oral Q6H PRN    Or    acetaminophen (TYLENOL) suppository 650 mg  650 mg Rectal Q6H PRN    polyethylene glycol (MIRALAX) packet 17 g  17 g Oral DAILY PRN    promethazine (PHENERGAN) tablet 12.5 mg  12.5 mg Oral Q6H PRN    Or    ondansetron (ZOFRAN) injection 4 mg  4 mg IntraVENous Q6H PRN    albuterol (PROVENTIL VENTOLIN) nebulizer solution 2.5 mg  2.5 mg Nebulization Q4H PRN    lipase-protease-amylase (ZENPEP 5,000) capsule 5 Cap  5 Cap Oral TIDAC    aspirin delayed-release tablet 162 mg  162 mg Oral DAILY    atorvastatin (LIPITOR) tablet 40 mg  40 mg Oral QHS    budesonide-formoterol (SYMBICORT) 80-4.5 mcg inhaler  2 Puff Inhalation BID RT    furosemide (LASIX) tablet 40 mg  40 mg Oral DAILY    insulin lispro (HUMALOG) injection   SubCUTAneous AC&HS    insulin glargine (LANTUS) injection 50 Units  50 Units SubCUTAneous QHS    oxyCODONE IR (ROXICODONE) tablet 15 mg  15 mg Oral TID    pregabalin (LYRICA) capsule 100 mg  100 mg Oral TID    sertraline (ZOLOFT) tablet 150 mg  150 mg Oral DAILY    alcohol 62% (NOZIN) nasal  1 Ampule  1 Ampule Topical Q12H       Review of Systems:  ROS was obtained, with pertinent positives as listed above. No chest pain or SOB. Diet:  CLD    Objective:   Vitals:  Visit Vitals  /77 (BP 1 Location: Left arm, BP Patient Position: At rest)   Pulse 70   Temp 97.8 °F (36.6 °C)   Resp 20   Ht 6' (1.829 m)   Wt 104 kg (229 lb 3.2 oz)   SpO2 93%   BMI 31.09 kg/m²     Intake/Output:  No intake/output data recorded.   11/05 1901 - 11/07 0700  In: 3158.7 [P.O.:910; I.V.:2248.7]  Out: -   Exam:  General appearance: alert, cooperative,   Lungs: clear to auscultation bilaterally anteriorly  Heart: regular rate and rhythm  Abdomen: soft, TTP right abdomen with guarding  But no rebound. Bowel sounds normal. No masses, no organomegaly  Extremities: extremities normal, atraumatic, no cyanosis or edema  Neuro:  alert and oriented    Data Review (Labs):    Recent Labs     11/06/20  0718 11/05/20  1440   WBC 6.4 11.9*   HGB 11.7* 13.2*   HCT 36.6* 40.7*   * 130*   MCV 85.5 84.6    137   K 3.9 3.9    102   CO2 30 29   BUN 11 15   CREA 1.24 1.49   CA 8.1* 8.5   * 283*   AP 70 86   ALT 11* 11*   TBILI 0.3 0.5   ALB 2.9* 3.7   TP 6.0* 7.0   LPSE  --  37*   PTP  --  13.1   INR  --  1.0     CT A/P 11/5/20: IMPRESSION:    1.  Abnormal appearance of the stomach. This appears thick-walled and demonstrates an irregular interface with adjacent mesenteric fat. This appearance can be seen with inflammation or neoplasm with inflammation favored. Further evaluation is clinically indicated is recommended.     2. Prominent mesenteric and retroperitoneal lymph nodes in the upper abdomen likely related to the gastric process described above. 3. 1.3 cm hyperdense right renal cortical lesion. This would be best further assessed with a renal mass protocol CT although an ultrasound can also be considered. CTA on 10/26 at [facility] which revealed stent in place at the superior mesenteric artery without any stenosis. It also revealed a 1.5cm complex right renal mass. EGD 3/1/2012 by Dr. Feng Ortega revealed healed gastric ulcer and duodenitis. He more recently had an EGD and colonoscopy at [facility] last week. Assessment:     Principal Problem:    Intractable abdominal pain (11/5/2020)    Active Problems:    HTN (hypertension) (3/1/2012)      Atrial fibrillation ([address] (3/1/2012)      Overview: Left atrial appendage occlusion (8/11/20):  24 mm Watchman device.        Diabetes mellitus ([address] (3/1/2012)      WILBERTO on CPAP (3/1/2012)      Dyslipidemia (3/1/2012)      Chronic diastolic congestive heart failure ([address] (5/5/2016)      Overview: new onset with aflutter       Coronary artery disease involving native coronary artery of native heart (6/23/2020)      Overview: 1. Cardiac CT (10/3/18): Mild to moderate disease. Renal mass of unknown nature (11/5/2020)    60 y.o. male with PMH of (but not limited to) chronic pancreatitis secondary to pancreatic divisum followed at [facility]- [address] [place] [address] previously and now [facility] on [place], DM, CHF with EF 50% 8/2028, A fib with Watchman device on Plavix therapy (last dose 11/5), CKD, Right renal mass--awaiting evaluation, mesenteric stenosis with stent placement followed by vascular, Recent SIBO treated, gastroparesis, history of PUD, necrotizing fascitiis of the left shoulder, and WILBERTO ,who is seen in consultation at the request of Dr. Aby Sahni for abnormal finding on CT scan of the abdomen/pelvis on 11/5/2020 revealing abnormal appearance of the stomach. This appears thick-walled and demonstrates an irregular interface with adjacent mesenteric fat. This appearance can be seen with inflammation or neoplasm with inflammation favored. Patient underwent EGD and colonoscopy as above at [facility] 10/12/20 without acute findings. He has been followed by Dr. Gisela Gonzalez GI at [facility] for his chronic GI issues. Plan:     - continue pantoprazole BID  - Continue clear liquid diet- could slowly increase if patient remains stable as endoscopies are not planned at this time. - Continue pancreatic enzymes with chronic pancreatitis   - continue supportive care  - he can follow up with his GI MD at [facility] after discharge. Would consider EUS as outpatient. If needed, we can see in follow up to arrange this. Irais Day      Patient is seen and examined in collaboration with Dr. Azeem Alcantar. Assessment and plan as per Dr. Azeem lAcantar.

## 2020-11-07 NOTE — PROGRESS NOTES
Hourly rounds done. Pain and nausea medication administered this shift. All needs met at this time, will continue to monitor and give report to oncoming RN.

## 2020-11-07 NOTE — PROGRESS NOTES
Hourly rounding completed during shift. All needs met. Bed low and locked with call bell in reach. Will give report to oncoming nurse.

## 2020-11-08 LAB
GLUCOSE BLD STRIP.AUTO-MCNC: 123 MG/DL (ref 65–100)
GLUCOSE BLD STRIP.AUTO-MCNC: 124 MG/DL (ref 65–100)
GLUCOSE BLD STRIP.AUTO-MCNC: 173 MG/DL (ref 65–100)
GLUCOSE BLD STRIP.AUTO-MCNC: 181 MG/DL (ref 65–100)

## 2020-11-08 PROCEDURE — 96376 TX/PRO/DX INJ SAME DRUG ADON: CPT

## 2020-11-08 PROCEDURE — 74011250637 HC RX REV CODE- 250/637: Performed by: INTERNAL MEDICINE

## 2020-11-08 PROCEDURE — 99218 HC RM OBSERVATION: CPT

## 2020-11-08 PROCEDURE — 74011636637 HC RX REV CODE- 636/637: Performed by: INTERNAL MEDICINE

## 2020-11-08 PROCEDURE — 94760 N-INVAS EAR/PLS OXIMETRY 1: CPT

## 2020-11-08 PROCEDURE — 94640 AIRWAY INHALATION TREATMENT: CPT

## 2020-11-08 PROCEDURE — 82962 GLUCOSE BLOOD TEST: CPT

## 2020-11-08 PROCEDURE — 74011250636 HC RX REV CODE- 250/636: Performed by: INTERNAL MEDICINE

## 2020-11-08 RX ORDER — FACIAL-BODY WIPES
10 EACH TOPICAL DAILY PRN
Status: DISCONTINUED | OUTPATIENT
Start: 2020-11-08 | End: 2020-11-09 | Stop reason: HOSPADM

## 2020-11-08 RX ORDER — POLYETHYLENE GLYCOL 3350 17 G/17G
17 POWDER, FOR SOLUTION ORAL
Status: DISCONTINUED | OUTPATIENT
Start: 2020-11-08 | End: 2020-11-09 | Stop reason: HOSPADM

## 2020-11-08 RX ADMIN — HYDROMORPHONE HYDROCHLORIDE 1 MG: 2 TABLET ORAL at 17:08

## 2020-11-08 RX ADMIN — INSULIN GLARGINE 50 UNITS: 100 INJECTION, SOLUTION SUBCUTANEOUS at 23:09

## 2020-11-08 RX ADMIN — OXYCODONE 15 MG: 5 TABLET ORAL at 15:53

## 2020-11-08 RX ADMIN — PREGABALIN 100 MG: 50 CAPSULE ORAL at 08:44

## 2020-11-08 RX ADMIN — Medication 1 AMPULE: at 21:56

## 2020-11-08 RX ADMIN — PANTOPRAZOLE SODIUM 40 MG: 40 TABLET, DELAYED RELEASE ORAL at 05:18

## 2020-11-08 RX ADMIN — PANCRELIPASE LIPASE, PANCRELIPASE PROTEASE, PANCRELIPASE AMYLASE 5 CAPSULE: 5000; 17000; 24000 CAPSULE, DELAYED RELEASE ORAL at 12:25

## 2020-11-08 RX ADMIN — Medication 1 AMPULE: at 08:45

## 2020-11-08 RX ADMIN — HYDROMORPHONE HYDROCHLORIDE 1 MG: 2 TABLET ORAL at 05:26

## 2020-11-08 RX ADMIN — BUDESONIDE AND FORMOTEROL FUMARATE DIHYDRATE 2 PUFF: 80; 4.5 AEROSOL RESPIRATORY (INHALATION) at 08:50

## 2020-11-08 RX ADMIN — BUDESONIDE AND FORMOTEROL FUMARATE DIHYDRATE 2 PUFF: 80; 4.5 AEROSOL RESPIRATORY (INHALATION) at 20:04

## 2020-11-08 RX ADMIN — INSULIN LISPRO 2 UNITS: 100 INJECTION, SOLUTION INTRAVENOUS; SUBCUTANEOUS at 21:57

## 2020-11-08 RX ADMIN — HYDROMORPHONE HYDROCHLORIDE 1 MG: 2 TABLET ORAL at 23:09

## 2020-11-08 RX ADMIN — Medication 10 ML: at 05:27

## 2020-11-08 RX ADMIN — ASPIRIN 162 MG: 81 TABLET, COATED ORAL at 08:44

## 2020-11-08 RX ADMIN — SERTRALINE HYDROCHLORIDE 150 MG: 100 TABLET ORAL at 08:44

## 2020-11-08 RX ADMIN — INSULIN LISPRO 2 UNITS: 100 INJECTION, SOLUTION INTRAVENOUS; SUBCUTANEOUS at 17:08

## 2020-11-08 RX ADMIN — ATORVASTATIN CALCIUM 40 MG: 40 TABLET, FILM COATED ORAL at 21:57

## 2020-11-08 RX ADMIN — PREGABALIN 100 MG: 50 CAPSULE ORAL at 21:57

## 2020-11-08 RX ADMIN — PREGABALIN 100 MG: 50 CAPSULE ORAL at 15:52

## 2020-11-08 RX ADMIN — OXYCODONE 15 MG: 5 TABLET ORAL at 21:56

## 2020-11-08 RX ADMIN — Medication 10 ML: at 14:25

## 2020-11-08 RX ADMIN — PANCRELIPASE LIPASE, PANCRELIPASE PROTEASE, PANCRELIPASE AMYLASE 5 CAPSULE: 5000; 17000; 24000 CAPSULE, DELAYED RELEASE ORAL at 05:17

## 2020-11-08 RX ADMIN — PANCRELIPASE LIPASE, PANCRELIPASE PROTEASE, PANCRELIPASE AMYLASE 5 CAPSULE: 5000; 17000; 24000 CAPSULE, DELAYED RELEASE ORAL at 15:52

## 2020-11-08 RX ADMIN — ONDANSETRON 4 MG: 2 INJECTION INTRAMUSCULAR; INTRAVENOUS at 21:56

## 2020-11-08 RX ADMIN — PANTOPRAZOLE SODIUM 40 MG: 40 TABLET, DELAYED RELEASE ORAL at 15:53

## 2020-11-08 RX ADMIN — HYDROMORPHONE HYDROCHLORIDE 1 MG: 2 TABLET ORAL at 10:07

## 2020-11-08 RX ADMIN — Medication 10 ML: at 21:57

## 2020-11-08 RX ADMIN — OXYCODONE 15 MG: 5 TABLET ORAL at 08:44

## 2020-11-08 RX ADMIN — ONDANSETRON 4 MG: 2 INJECTION INTRAMUSCULAR; INTRAVENOUS at 15:53

## 2020-11-08 RX ADMIN — FUROSEMIDE 40 MG: 40 TABLET ORAL at 08:44

## 2020-11-08 NOTE — PROGRESS NOTES
Hourly rounds completed during the shift. All needs met. Bed low and locked with call bell in reach. Will give report to oncoming nurse.

## 2020-11-08 NOTE — PROGRESS NOTES
Hospitalist Note     Admit Date:  2020  2:47 PM   Name:  Shayla Gamble   Age:  60 y.o.  :  1965   MRN:  [id number]   PCP:  Kilo Higuera MD  Treatment Team: Attending Provider: Krishna Martinez MD; Consulting Provider: Vonnie Hogan MD; Consulting Provider: Romelia Hudson MD; Utilization Review: Mariaelena Saldivar RN; Care Manager: Primitivo Mantilla RN; Utilization Review: Nigel Menjivar RN    HPI/Subjective:   61 yo CM with past history of Afib (s/p Watchman device and not on anticoagulation), chronic pancreatitis, HTN, necrotizing fasciitis of the left shoulder, PUD (prior history of H. Pylori infection), mesenteric artery insufficiency (s/p stent placement), CAD, and sleep apnea presents with a several month history of worsening right-sided abdominal pain. He says it feels like \"a knife and whenever I move it shoots upwards. \" Eating also makes pain worse. Such symptoms have brought him to the ED about 4-5 times. He has had this problem for about 3 months and it is getting worse to the point that he has difficulty tolerating food/liquids because of nausea/vomiting afterwards. He has a history of appendectomy and cholecystectomy in 4634 without complication until he had SBO two years due to adhesions. He denies fevers/chills. He has some chest discomfort that he describes as \"pressure-like. \"  He also has had multiple episodes of watery diarrhea. His episodes of vomiting is \"coffee-ground\". He denies taking OTC ibuprofen/naproxen, Goody powder, Pepto bismul, [place]. Of note, patient with right renal mass. He is aware of it and was given a referral to urology but has not established care yet. ED Course: CT abd/pelvis showing gastric wall thickening with retroperitoneal lymphadenopathy. Tropon of 20 with repeat of 22. EKG shows NSR with no ST-T wave changes. WBC count of 11.9. Given ASA, Pepcid, pantoprazole, Dilaudid, and sucralfate. Hospitalist called for admission.    - still with moderate-severe epigatric pain. says dilaudid helps with pain. His home PO oxy does not really help. Some nausea. He can drink liquids but has difficulty with solids including chicken noodle soup  11/7 - says he is having continued abd pain this morning. Says he has no problem with liquids but struggles with solid food. Some nausea. No fevers, diarrhea. 11/8 - pt says abd pain 6/10, PO dilaudid helped. Is tolerating clears, wants to try advancing to fulls again; said yest he got a high fat cheese soup which he thinks made him nauseated. No other complaints  Objective:     Patient Vitals for the past 24 hrs:   Temp Pulse Resp BP SpO2   11/08/20 0621 98.1 °F (36.7 °C) 62 18 122/62 96 %   11/08/20 0408 97.8 °F (36.6 °C) 67 18 119/87 98 %   11/07/20 2317 97.9 °F (36.6 °C) 64 18 (!) 119/54 97 %   11/07/20 1951 -- -- -- -- 98 %   11/07/20 1902 98.7 °F (37.1 °C) 68 19 130/71 96 %   11/07/20 1558 97.5 °F (36.4 °C) 78 20 119/76 93 %   11/07/20 1212 97.8 °F (36.6 °C) 70 20 132/77 93 %     Oxygen Therapy  O2 Sat (%): 96 % (11/08/20 0621)  Pulse via Oximetry: 65 beats per minute (11/07/20 1951)  O2 Device: Room air (11/07/20 1951)    Estimated body mass index is 30.83 kg/m² as calculated from the following:    Height as of this encounter: 6' (1.829 m). Weight as of this encounter: 103.1 kg (227 lb 4.8 oz). No intake or output data in the 24 hours ending 11/08/20 1045    *Note that automatically entered I/Os may not be accurate; dependent on patient compliance with collection and accurate  by techs. General:    Well nourished. No overt distress  CV:   RRR. No edema. No JVD  Lungs:   Even, Unlabored  Abdomen:   TTP epigastrium. Protuberant. soft  Extremities: Warm and dry. No cyanosis   Skin:     No rashes. No jaundice. Normal coloration  Neuro:  No gross focal deficits.   Alert    Data Reviewed:  I have reviewed all labs, meds, and studies from the last 24 hours:  Recent Results (from the past 24 hour(s))   GLUCOSE, POC    Collection Time: 11/07/20 11:23 AM   Result Value Ref Range    Glucose (POC) 104 (H) 65 - 100 mg/dL   GLUCOSE, POC    Collection Time: 11/07/20  3:18 PM   Result Value Ref Range    Glucose (POC) 272 (H) 65 - 100 mg/dL   GLUCOSE, POC    Collection Time: 11/07/20  8:45 PM   Result Value Ref Range    Glucose (POC) 143 (H) 65 - 100 mg/dL   GLUCOSE, POC    Collection Time: 11/08/20  6:23 AM   Result Value Ref Range    Glucose (POC) 124 (H) 65 - 100 mg/dL       Current Meds:  Current Facility-Administered Medications   Medication Dose Route Frequency    pantoprazole (PROTONIX) tablet 40 mg  40 mg Oral ACB&D    HYDROmorphone (DILAUDID) tablet 1 mg  1 mg Oral Q4H PRN    sodium chloride (NS) flush 5-40 mL  5-40 mL IntraVENous Q8H    sodium chloride (NS) flush 5-40 mL  5-40 mL IntraVENous PRN    acetaminophen (TYLENOL) tablet 650 mg  650 mg Oral Q6H PRN    Or    acetaminophen (TYLENOL) suppository 650 mg  650 mg Rectal Q6H PRN    polyethylene glycol (MIRALAX) packet 17 g  17 g Oral DAILY PRN    promethazine (PHENERGAN) tablet 12.5 mg  12.5 mg Oral Q6H PRN    Or    ondansetron (ZOFRAN) injection 4 mg  4 mg IntraVENous Q6H PRN    albuterol (PROVENTIL VENTOLIN) nebulizer solution 2.5 mg  2.5 mg Nebulization Q4H PRN    lipase-protease-amylase (ZENPEP 5,000) capsule 5 Cap  5 Cap Oral TIDAC    aspirin delayed-release tablet 162 mg  162 mg Oral DAILY    atorvastatin (LIPITOR) tablet 40 mg  40 mg Oral QHS    budesonide-formoterol (SYMBICORT) 80-4.5 mcg inhaler  2 Puff Inhalation BID RT    furosemide (LASIX) tablet 40 mg  40 mg Oral DAILY    insulin lispro (HUMALOG) injection   SubCUTAneous AC&HS    insulin glargine (LANTUS) injection 50 Units  50 Units SubCUTAneous QHS    oxyCODONE IR (ROXICODONE) tablet 15 mg  15 mg Oral TID    pregabalin (LYRICA) capsule 100 mg  100 mg Oral TID    sertraline (ZOLOFT) tablet 150 mg  150 mg Oral DAILY    alcohol 62% (NOZIN) nasal  1 Ampule  1 Ampule Topical Q12H       Other Studies:  No results found for this visit on 11/05/20. No results found. All Micro Results     None          SARS-CoV-2 Lab Results  \"Novel Coronavirus\" Test: No results found for: COV2NT   \"Emergent Disease\" Test: No results found for: EDPR  \"SARS-COV-2\" Test: No results found for: XGCOVT  Rapid Test: No results found for: COVR         Assessment and Plan:     Hospital Problems as of 11/8/2020 Date Reviewed: 8/11/2020          Codes Class Noted - Resolved POA    * (Principal) Intractable abdominal pain ICD-10-CM: R10.9  ICD-9-CM: 789.00  11/5/2020 - Present Yes        Renal mass of unknown nature ICD-10-CM: N28.89  ICD-9-CM: 593.9  11/5/2020 - Present Yes        Coronary artery disease involving native coronary artery of native heart (Chronic) ICD-10-CM: I25.10  ICD-9-CM: 414.01  6/23/2020 - Present Yes    Overview Signed 6/23/2020  9:03 PM by Yisel Shah MD     1. Cardiac CT (10/3/18): Mild to moderate disease. Chronic diastolic congestive heart failure (HCC) (Chronic) ICD-10-CM: I50.32  ICD-9-CM: 428.32, 428.0  5/5/2016 - Present Yes    Overview Signed 5/5/2016 11:55 AM by Miles Odonnell MD     new onset with aflutter              HTN (hypertension) (Chronic) ICD-10-CM: I10  ICD-9-CM: 401.9  3/1/2012 - Present Yes        Atrial fibrillation (HCC) (Chronic) ICD-10-CM: I48.91  ICD-9-CM: 427.31  3/1/2012 - Present Yes    Overview Addendum 10/1/2020  3:03 PM by Yisel Shah MD     Left atrial appendage occlusion (8/11/20):  24 mm Watchman device.               Diabetes mellitus ([facility] [address] (Chronic) ICD-10-CM: E11.9  ICD-9-CM: 250.00  3/1/2012 - Present Yes        WILBERTO on CPAP (Chronic) ICD-10-CM: G47.33, Z99.89  ICD-9-CM: 327.23, V46.8  3/1/2012 - Present Yes        Dyslipidemia (Chronic) ICD-10-CM: E78.5  ICD-9-CM: 272.4  3/1/2012 - Present Yes        RESOLVED: Demand ischemia ([facility] ICD-10-CM: I24.8  ICD-9-CM: 411.89  11/6/2020 - 11/6/2020 Yes    Overview Signed 5/25/2016 10:56 AM by Vicki Duggan MD     Normal Cors cath 1/2016                    Plan:  · Appreciate GI and urology recs  · Pt wants to advance to fulls again  · PPI BID  · dilaudid PO PRN breakthrough pain. Takes oxy scheduled. he will need to follow up with Stacie Merlos in order to get changes to pain meds long term. He told me his narcotic contract with [facility] does not prohibit this  · Monitor BG. On lantus, ISS    DC planning/Dispo:    · Home when tolerating diet better    Diet:  DIET NUTRITIONAL SUPPLEMENTS  DIET CLEAR LIQUID  DVT ppx:  SCDs    Other listed chronic conditions stable, cont current management.     Signed:  Zander Harmon MD

## 2020-11-08 NOTE — PROGRESS NOTES
[facility] Progress / Sign off Note         Admit Date:  11/5/2020    Today's Date:  11/8/2020    CC: Abnormal stomach on CT    Subjective:     Patient with improved abdominal pain after Dilaudid. Nausea is better. Denies vomiting. Had EGD and colonoscopy last week at [facility] and was told everything looked fine. Found under Chart Review Encounters:  EGD/colo 10/12/2020 @ [facility]:   Indications/clinical history   Diarrhea; weight loss abdominal pain; history of surgery for small bowel obstruction   Findings  Normal upper and lower endoscopy  Plan: refer to vascular surgery for abnormal mesenteric duplex   Dr. Pola Youssef in [place].     Medications:   Current Facility-Administered Medications   Medication Dose Route Frequency    bisacodyL (DULCOLAX) suppository 10 mg  10 mg Rectal DAILY PRN    polyethylene glycol (MIRALAX) packet 17 g  17 g Oral BID PRN    pantoprazole (PROTONIX) tablet 40 mg  40 mg Oral ACB&D    HYDROmorphone (DILAUDID) tablet 1 mg  1 mg Oral Q4H PRN    sodium chloride (NS) flush 5-40 mL  5-40 mL IntraVENous Q8H    sodium chloride (NS) flush 5-40 mL  5-40 mL IntraVENous PRN    acetaminophen (TYLENOL) tablet 650 mg  650 mg Oral Q6H PRN    Or    acetaminophen (TYLENOL) suppository 650 mg  650 mg Rectal Q6H PRN    promethazine (PHENERGAN) tablet 12.5 mg  12.5 mg Oral Q6H PRN    Or    ondansetron (ZOFRAN) injection 4 mg  4 mg IntraVENous Q6H PRN    albuterol (PROVENTIL VENTOLIN) nebulizer solution 2.5 mg  2.5 mg Nebulization Q4H PRN    lipase-protease-amylase (ZENPEP 5,000) capsule 5 Cap  5 Cap Oral TIDAC    aspirin delayed-release tablet 162 mg  162 mg Oral DAILY    atorvastatin (LIPITOR) tablet 40 mg  40 mg Oral QHS    budesonide-formoterol (SYMBICORT) 80-4.5 mcg inhaler  2 Puff Inhalation BID RT    furosemide (LASIX) tablet 40 mg  40 mg Oral DAILY    insulin lispro (HUMALOG) injection   SubCUTAneous AC&HS    insulin glargine (LANTUS) injection 50 Units  50 Units SubCUTAneous QHS    oxyCODONE IR (ROXICODONE) tablet 15 mg  15 mg Oral TID    pregabalin (LYRICA) capsule 100 mg  100 mg Oral TID    sertraline (ZOLOFT) tablet 150 mg  150 mg Oral DAILY    alcohol 62% (NOZIN) nasal  1 Ampule  1 Ampule Topical Q12H       Review of Systems:  ROS was obtained, with pertinent positives as listed above. No chest pain or SOB. Diet:  CLD    Objective:   Vitals:  Visit Vitals  /62 (BP 1 Location: Left arm, BP Patient Position: At rest)   Pulse 62   Temp 98.1 °F (36.7 °C)   Resp 18   Ht 6' (1.829 m)   Wt 103.1 kg (227 lb 4.8 oz)   SpO2 95%   BMI 30.83 kg/m²     Intake/Output:  No intake/output data recorded. 11/06 1901 - 11/08 0700  In: 2608.7 [P.O.:360; I.V.:2248.7]  Out: -   Exam:  General appearance: alert, cooperative,   Lungs: clear to auscultation bilaterally anteriorly  Heart: regular rate and rhythm  Abdomen: soft, TTP upper abdomen with no guarding or rebound. Bowel sounds normal. No masses, no organomegaly  Extremities: no cyanosis or edema  Neuro:  alert and oriented    Data Review (Labs):    Recent Labs     11/06/20  0718 11/05/20  1440   WBC 6.4 11.9*   HGB 11.7* 13.2*   HCT 36.6* 40.7*   * 130*   MCV 85.5 84.6    137   K 3.9 3.9    102   CO2 30 29   BUN 11 15   CREA 1.24 1.49   CA 8.1* 8.5   * 283*   AP 70 86   ALT 11* 11*   TBILI 0.3 0.5   ALB 2.9* 3.7   TP 6.0* 7.0   LPSE  --  37*   PTP  --  13.1   INR  --  1.0     CT A/P 11/5/20: IMPRESSION:    1.  Abnormal appearance of the stomach. This appears thick-walled and demonstrates an irregular interface with adjacent mesenteric fat. This appearance can be seen with inflammation or neoplasm with inflammation favored. Further evaluation is clinically indicated is recommended.     2. Prominent mesenteric and retroperitoneal lymph nodes in the upper abdomen likely related to the gastric process described above.   3. 1.3 cm hyperdense right renal cortical lesion. This would be best further assessed with a renal mass protocol CT although an ultrasound can also be considered. CTA on 10/26 at [facility] which revealed stent in place at the superior mesenteric artery without any stenosis. It also revealed a 1.5cm complex right renal mass. EGD 3/1/2012 by Dr. Nakia Romero revealed healed gastric ulcer and duodenitis. He more recently had an EGD and colonoscopy at [facility] last week. Assessment:     Principal Problem:    Intractable abdominal pain (11/5/2020)    Active Problems:    HTN (hypertension) (3/1/2012)      Atrial fibrillation ([address] (3/1/2012)      Overview: Left atrial appendage occlusion (8/11/20):  24 mm Watchman device. Diabetes mellitus ([facility] (3/1/2012)      WILBERTO on CPAP (3/1/2012)      Dyslipidemia (3/1/2012)      Chronic diastolic congestive heart failure ([facility] (5/5/2016)      Overview: new onset with aflutter       Coronary artery disease involving native coronary artery of native heart (6/23/2020)      Overview: 1. Cardiac CT (10/3/18): Mild to moderate disease. Renal mass of unknown nature (11/5/2020)    60 y.o. male with PMH of (but not limited to) chronic pancreatitis secondary to pancreatic divisum followed at [address] previously and now [facility] on [place], DM, CHF with EF 50% 8/2028, A fib with Watchman device on Plavix therapy (last dose 11/5), CKD, Right renal mass- simple cyst per Urology evaluation, mesenteric stenosis with stent placement followed by vascular, Recent SIBO treated, gastroparesis, history of PUD, necrotizing fascitiis of the left shoulder, and WILBERTO ,who is seen in consultation at the request of Dr. Rula Joshi for abnormal finding on CT scan of the abdomen/pelvis on 11/5/2020 revealing abnormal appearance of the stomach. This appears thick-walled and demonstrates an irregular interface with adjacent mesenteric fat. This appearance can be seen with inflammation or neoplasm with inflammation favored.  Patient underwent EGD and colonoscopy as above at [facility] 10/12/20 without acute findings. He has been followed by Dr. Teresa Ghotra, Surgeon at [facility] for his chronic GI issues. Plan:     - continue pantoprazole BID  - Advance diet as tolerated. Home soon  - Continue pancreatic enzymes with chronic pancreatitis   - He can follow up with Dr. Teresa Ghotra at [facility] after discharge. - Will schedule him a FU with or Office and consider scheduling EUS as outpatient. - Alternately he can get referral to GI MD in [facility] for possible EUS. Will sign off for now. Discussed with patient and Dr. Deane Simmonds.     Radha Silverio MD

## 2020-11-08 NOTE — PROGRESS NOTES
Problem: Pain  Goal: *Control of Pain  Outcome: Progressing Towards Goal  Goal: *PALLIATIVE CARE:  Alleviation of Pain  Outcome: Progressing Towards Goal     Problem: Patient Education: Go to Patient Education Activity  Goal: Patient/Family Education  Outcome: Progressing Towards Goal     Problem: Falls - Risk of  Goal: *Absence of Falls  Description: Document Lissadianaomayra Corie Fall Risk and appropriate interventions in the flowsheet.   Outcome: Progressing Towards Goal  Note: Fall Risk Interventions:            Medication Interventions: Patient to call before getting OOB         History of Falls Interventions: Consult care management for discharge planning         Problem: Patient Education: Go to Patient Education Activity  Goal: Patient/Family Education  Outcome: Progressing Towards Goal     Problem: Breathing Pattern - Ineffective  Goal: *Absence of hypoxia  Outcome: Progressing Towards Goal  Goal: *Use of effective breathing techniques  Outcome: Progressing Towards Goal  Goal: *PALLIATIVE CARE:  Alleviation of Dyspnea  Outcome: Progressing Towards Goal     Problem: Patient Education: Go to Patient Education Activity  Goal: Patient/Family Education  Outcome: Progressing Towards Goal

## 2020-11-09 VITALS
RESPIRATION RATE: 18 BRPM | OXYGEN SATURATION: 99 % | DIASTOLIC BLOOD PRESSURE: 66 MMHG | WEIGHT: 227.3 LBS | SYSTOLIC BLOOD PRESSURE: 125 MMHG | HEIGHT: 72 IN | TEMPERATURE: 97.6 F | HEART RATE: 61 BPM | BODY MASS INDEX: 30.79 KG/M2

## 2020-11-09 PROBLEM — K29.00 ACUTE GASTRITIS WITHOUT HEMORRHAGE: Status: ACTIVE | Noted: 2020-11-05

## 2020-11-09 LAB — GLUCOSE BLD STRIP.AUTO-MCNC: 91 MG/DL (ref 65–100)

## 2020-11-09 PROCEDURE — 74011250637 HC RX REV CODE- 250/637: Performed by: INTERNAL MEDICINE

## 2020-11-09 PROCEDURE — 74011250636 HC RX REV CODE- 250/636: Performed by: INTERNAL MEDICINE

## 2020-11-09 PROCEDURE — 94760 N-INVAS EAR/PLS OXIMETRY 1: CPT

## 2020-11-09 PROCEDURE — 96376 TX/PRO/DX INJ SAME DRUG ADON: CPT

## 2020-11-09 PROCEDURE — 99218 HC RM OBSERVATION: CPT

## 2020-11-09 PROCEDURE — 82962 GLUCOSE BLOOD TEST: CPT

## 2020-11-09 PROCEDURE — 94640 AIRWAY INHALATION TREATMENT: CPT

## 2020-11-09 RX ORDER — HYDROMORPHONE HYDROCHLORIDE 2 MG/1
2 TABLET ORAL
Qty: 20 TAB | Refills: 0 | Status: SHIPPED | OUTPATIENT
Start: 2020-11-09 | End: 2020-11-19

## 2020-11-09 RX ORDER — PANTOPRAZOLE SODIUM 40 MG/1
40 TABLET, DELAYED RELEASE ORAL 2 TIMES DAILY
Qty: 60 TAB | Refills: 2 | Status: SHIPPED | OUTPATIENT
Start: 2020-11-09

## 2020-11-09 RX ADMIN — SERTRALINE HYDROCHLORIDE 150 MG: 100 TABLET ORAL at 09:10

## 2020-11-09 RX ADMIN — PREGABALIN 100 MG: 50 CAPSULE ORAL at 09:10

## 2020-11-09 RX ADMIN — PANTOPRAZOLE SODIUM 40 MG: 40 TABLET, DELAYED RELEASE ORAL at 09:10

## 2020-11-09 RX ADMIN — OXYCODONE 15 MG: 5 TABLET ORAL at 09:10

## 2020-11-09 RX ADMIN — PANCRELIPASE LIPASE, PANCRELIPASE PROTEASE, PANCRELIPASE AMYLASE 5 CAPSULE: 5000; 17000; 24000 CAPSULE, DELAYED RELEASE ORAL at 09:09

## 2020-11-09 RX ADMIN — BUDESONIDE AND FORMOTEROL FUMARATE DIHYDRATE 2 PUFF: 80; 4.5 AEROSOL RESPIRATORY (INHALATION) at 08:41

## 2020-11-09 RX ADMIN — Medication 1 AMPULE: at 09:09

## 2020-11-09 RX ADMIN — ONDANSETRON 4 MG: 2 INJECTION INTRAMUSCULAR; INTRAVENOUS at 06:29

## 2020-11-09 RX ADMIN — FUROSEMIDE 40 MG: 40 TABLET ORAL at 09:10

## 2020-11-09 RX ADMIN — Medication 10 ML: at 05:14

## 2020-11-09 RX ADMIN — ASPIRIN 162 MG: 81 TABLET, COATED ORAL at 09:10

## 2020-11-09 RX ADMIN — HYDROMORPHONE HYDROCHLORIDE 1 MG: 2 TABLET ORAL at 06:28

## 2020-11-09 NOTE — PROGRESS NOTES
Transfer of care. Report received from off going RN at bedside. Patient with awake and sitting on the side of the bed. Oriented to staff and plan of care. Patient is alert and oriented x 4, S/P ABD pain with even and unlabored respirations noted on RA. No needs voiced at this moment. Assessment to follow, see for details.

## 2020-11-09 NOTE — DISCHARGE SUMMARY
Hospitalist Discharge Summary     Admit Date:  2020  2:47 PM   DC note date: 2020  Name:  Rosie Burger   Age:  60 y.o.  :  1965   MRN:  [id number]   PCP:  Emma Lopez MD  Treatment Team: Attending Provider: Isela Jacobson MD; Consulting Provider: Cody Marquez MD; Consulting Provider: Tunde Cohen MD; Utilization Review: Gio Sandoval RN; Care Manager: Becky Cowden, RN; Utilization Review: Lottie Fam RN    Problem List for this Hospitalization:  Hospital Problems as of 2020 Date Reviewed: 2020          Codes Class Noted - Resolved POA    * (Principal) Acute gastritis without hemorrhage ICD-10-CM: K29.00  ICD-9-CM: 535.00  2020 - Present Yes        Renal mass of unknown nature ICD-10-CM: N28.89  ICD-9-CM: 593.9  2020 - Present Yes        Coronary artery disease involving native coronary artery of native heart (Chronic) ICD-10-CM: I25.10  ICD-9-CM: 414.01  2020 - Present Yes    Overview Signed 2020  9:03 PM by Marti Cota MD     1. Cardiac CT (10/3/18): Mild to moderate disease. Chronic diastolic congestive heart failure (HCC) (Chronic) ICD-10-CM: I50.32  ICD-9-CM: 428.32, 428.0  2016 - Present Yes    Overview Signed 2016 11:55 AM by Brina Penny MD     new onset with aflutter              HTN (hypertension) (Chronic) ICD-10-CM: I10  ICD-9-CM: 401.9  3/1/2012 - Present Yes        Atrial fibrillation (HCC) (Chronic) ICD-10-CM: I48.91  ICD-9-CM: 427.31  3/1/2012 - Present Yes    Overview Addendum 10/1/2020  3:03 PM by Marti Cota MD     Left atrial appendage occlusion (20):  24 mm Watchman device.               Diabetes mellitus ([address] (Chronic) ICD-10-CM: E11.9  ICD-9-CM: 250.00  3/1/2012 - Present Yes        WILBERTO on CPAP (Chronic) ICD-10-CM: G47.33, Z99.89  ICD-9-CM: 327.23, V46.8  3/1/2012 - Present Yes        Dyslipidemia (Chronic) ICD-10-CM: E78.5  ICD-9-CM: 272.4  3/1/2012 - Present Yes        RESOLVED: Demand ischemia ([facility] [address] ICD-10-CM: I24.8  ICD-9-CM: 411.89  11/6/2020 - 11/6/2020 Yes    Overview Signed 5/25/2016 10:56 AM by Rm Villatla MD     Normal Cors cath 1/2016                      Admission HPI from 11/5/2020:    \"63 yo CM with past history of Afib (s/p Watchman device and not on anticoagulation), chronic pancreatitis, HTN, necrotizing fasciitis of the left shoulder, PUD (prior history of H. Pylori infection), mesenteric artery insufficiency (s/p stent placement), CAD, and sleep apnea presents with a several month history of worsening right-sided abdominal pain. He says it feels like \"a knife and whenever I move it shoots upwards. \" Eating also makes pain worse. Such symptoms have brought him to the ED about 4-5 times. He has had this problem for about 3 months and it is getting worse to the point that he has difficulty tolerating food/liquids because of nausea/vomiting afterwards. He has a history of appendectomy and cholecystectomy in 0144 without complication until he had SBO two years due to adhesions. He denies fevers/chills. He has some chest discomfort that he describes as \"pressure-like. \" He also has had multiple episodes of watery diarrhea. His episodes of vomiting is \"coffee-ground\". He denies taking OTC ibuprofen/naproxen, Goody powder, Pepto bismul, Irene [place].      Of note, patient with right renal mass. He is aware of it and was given a referral to urology but has not established care yet.      ED Course: CT abd/pelvis showing gastric wall thickening with retroperitoneal lymphadenopathy. Tropon of 20 with repeat of 22. EKG shows NSR with no ST-T wave changes. WBC count of 11.9. Given ASA, Pepcid, pantoprazole, Dilaudid, and sucralfate. Hospitalist called for admission. \"    Hospital Course:  Pt admitted for pain management. GI was consulted. Had EGD and colonoscopy last week at [facility] and was told everything looked fine. No need to repeat.   PPI was increased to BID.  Most of stay focused on pain control. He did have some success with PO dilaudid but I told him this is short term and he would need to follow up with pain management with the [address] [place] [address] Harika Merlos to get any more pain medications. GI signed off. Pt is agreeable to discharge today    Urology to arrange follow up US of renal mass for monitoring. Disposition: Home or Self Care  Activity: Activity as tolerated  Diet: DIET NUTRITIONAL SUPPLEMENTS All Meals; Ensure Clear  DIET FULL LIQUID [place]  Code Status: Full Code    Follow Up Orders:  No orders of the defined types were placed in this encounter. Follow-up Information     Follow up With Specialties Details Why Contact Info    Chilo Pompa MD Internal Medicine Call As needed [id number] [facility]  [phone]            Discharge meds at bottom of this note. Plan was discussed with pt. All questions answered. Patient was stable at time of discharge. Given instructions to call a physician or return if any concerns. Discharge summary and encounter summary was sent to PCP electronically via \"Comm Mgt\" link in [facility], if possible. Diagnostic Imaging/Tests:   Xr Chest Pa Lat    Result Date: 11/5/2020  EXAMINATION: XR CHEST PA LAT 11/5/2020 3:05 PM ACCESSION NUMBER: [id number] COMPARISON: 11/21/2018 INDICATION: chest pain TECHNIQUE: PA and lateral views of the chest were obtained. FINDINGS: Support devices: A loop recorder overlies the heart. Lungs: No focal airspace disease. Cardiac Silhouette: Within normal limits in size. Mediastinum: The aorta is normal. Upper Abdomen: Normal Miscellaneous: There are no lytic and blastic lesions. IMPRESSION: 1. No acute abnormality     Ct Abd Pelv W Cont    Result Date: 11/5/2020  CT ABDOMEN AND PELVIS WITH INTRAVENOUS CONTRAST DATED 11/5/2020. History: Persistent abdominal pain and vomiting. Comparison: None.  Technique:   Multiple contiguous helical CT images reconstructed at 5 mm intervals were obtained from above the diaphragms through the ischial tuberosities following oral and 100 cc Isovue-370 without acute complication. All CT scans performed at this facility use one or all of the following: Automated exposure control, adjustment of the mA and/or kVp according to patient's size, iterative reconstruction. Findings: CT ABDOMEN:  Limited evaluation of the lung bases and base of the mediastinum demonstrates no significant abnormalities. The Liver is homogeneous in attenuation. Left lobe pneumobilia is seen which is not clearly abnormal. The spleen is homogeneous in attenuation. No contour deforming or enhancing mass lesions are seen of the pancreas or adrenal glands. The gallbladder has been removed. The kidneys enhance symmetrically and no evidence of hydronephrosis is seen. A 1.3 cm exophytic lesion extends off the posterior mid pole cortex of the right kidney. This is hyperdense measuring 30 Hounsfield units in attenuation and therefore should be further characterized. Abnormal wall thickening is seen of the mid to distal body of the stomach to include the antrum. The fat muscle interface with adjacent fat is irregular. These findings are incompletely characterized. These can be seen with inflammation or neoplasm. The visualized loops of small bowel and colon are normal in caliber. The appendix is not seen. No free fluid, free air, or focal inflammatory changes are seen in the abdomen. Prominent retroperitoneal mesenteric lymph nodes are seen in the upper abdomen the largest of which is a left periaortic retroperitoneal lymph node seen on axial image 42 measuring 1.2 cm short axis. The abdominal aorta there is trace moderate atherosclerotic, and mild ectatic changes. CT PELVIS: No abnormal pelvic fluid collections or inflammatory changes are present. No pelvic adenopathy is seen. The urinary bladder is unremarkable. IMPRESSION:  1. Abnormal appearance of the stomach.  This appears thick-walled and demonstrates an irregular interface with adjacent mesenteric fat. This appearance can be seen with inflammation or neoplasm with inflammation favored. Further evaluation is clinically indicated is recommended. 2. Prominent mesenteric and retroperitoneal lymph nodes in the upper abdomen likely related to the gastric process described above. 3. 1.3 cm hyperdense right renal cortical lesion. This would be best further assessed with a renal mass protocol CT although an ultrasound can also be considered. This report was made using voice transcription. Despite my best efforts to avoid any, transcription errors may persist. If there is any question about the accuracy of the report or need for clarification, then please call [phone], or text me through perfectserv for clarification or correction. [employer]    Result Date: 11/6/2020  Medical history: Renal mass noted on CT from yesterday. TECHNIQUE: Grayscale renal ultrasound. COMPARISON: CT yesterday. FINDINGS: There is a 1.5 cm simple cyst in the lower pole of the right kidney. Right kidney measures 10.7 cm, without hydronephrosis. Left kidney is unremarkable without hydronephrosis and measures 11.5 cm. Urinary bladder is normal in contour. IMPRESSION: 1. Approximately 1.5 cm simple cyst in the lower pole of the right kidney, corresponding to the abnormality noted on yesterday's CT. Echocardiogram/EKG results:  No results found for this visit on 11/05/20.     Results for orders placed or performed during the hospital encounter of 11/05/20   EKG, 12 LEAD, INITIAL   Result Value Ref Range    Ventricular Rate 88 BPM    Atrial Rate 88 BPM    P-R Interval 196 ms    QRS Duration 80 ms    Q-T Interval 306 ms    QTC Calculation (Bezet) 370 ms    Calculated P Axis 22 degrees    Calculated R Axis 67 degrees    Calculated T Axis 43 degrees    Diagnosis       Normal sinus rhythm  Low voltage QRS  Borderline ECG  When compared with ECG of 24-SEP-2020 09:17,  VT interval has decreased  Nonspecific T wave abnormality now evident in Inferior leads  Confirmed by Miller Norman MD (), YOVANA BERMEO ([id number]) on 11/5/2020 10:05:17 PM         Procedures done this admission:  * No surgery found *    All Micro Results     None          SARS-CoV-2 Lab Results  \"Novel Coronavirus\" Test: No results found for: COV2NT   \"Emergent Disease\" Test: No results found for: EDPR  \"SARS-COV-2\" Test: No results found for: XGCOVT  Rapid Test: No results found for: COVR         Labs: Results:       BMP, Mg, Phos No results for input(s): NA, K, CL, CO2, AGAP, BUN, CREA, CA, GLU, MG, PHOS in the last 72 hours. CBC No results for input(s): WBC, RBC, HGB, HCT, PLT, GRANS, LYMPH, EOS, MONOS, BASOS, IG, ANEU, ABL, COLT, ABM, ABB, AIG, HGBEXT, HCTEXT, PLTEXT, HGBEXT, HCTEXT, PLTEXT in the last 72 hours. LFT No results for input(s): ALT, TBIL, AP, TP, ALB, GLOB, AGRAT in the last 72 hours.     No lab exists for component: SGOT, GPT   Cardiac Testing Lab Results   Component Value Date/Time    BNP 56 09/22/2018 02:10 PM    BNP 74 08/17/2018 11:51 AM    BNP 81 09/25/2015 12:00 PM    BNP 24 03/01/2012 11:12 AM    Troponin-I <0.05 03/01/2012 11:12 AM    Troponin-I, Qt. 0.02 08/18/2018 09:27 AM    Troponin-I, Qt. 0.02 08/17/2018 10:06 PM    Troponin-I, Qt. 0.02 08/17/2018 05:06 PM      Coagulation Tests Lab Results   Component Value Date/Time    Prothrombin time 13.1 11/05/2020 02:40 PM    Prothrombin time 17.1 (H) 09/24/2020 08:59 AM    Prothrombin time 19.9 (H) 08/12/2020 04:52 AM    INR 1.0 11/05/2020 02:40 PM    INR 1.4 09/24/2020 08:59 AM    INR 1.6 08/12/2020 04:52 AM    INR, External 5.8 09/18/2020    INR POC 2.0 09/21/2020 10:43 AM    aPTT 51.5 (H) 08/19/2018 10:42 AM    aPTT 67.1 (H) 08/19/2018 03:29 AM    aPTT 155.5 (HH) 08/18/2018 07:31 PM      A1c Lab Results   Component Value Date/Time    Hemoglobin A1c 9.8 (H) 12/05/2018 06:41 AM    Hemoglobin A1c 8.3 (H) 01/11/2016 05:12 AM Lipid Panel Lab Results   Component Value Date/Time    Cholesterol, total 84 08/18/2018 04:53 AM    HDL Cholesterol 26 (L) 08/18/2018 04:53 AM    LDL, calculated 16.8 08/18/2018 04:53 AM    VLDL, calculated 41.2 (H) 08/18/2018 04:53 AM    Triglyceride 206 (H) 08/18/2018 04:53 AM    CHOL/HDL Ratio 3.2 08/18/2018 04:53 AM      Thyroid Panel Lab Results   Component Value Date/Time    TSH 2.860 09/22/2018 02:10 PM    TSH 2.190 08/17/2018 05:06 PM        Most Recent UA No results found for: COLOR, APPRN, REFSG, NATALIE, PROTU, GLUCU, KETU, BILU, BLDU, UROU, ARIAN, LEUKU, WBCU, RBCU, UEPI, BACTU, CASTS, UCRY, MUCUS, UCOM     Allergies   Allergen Reactions    Eliquis [Apixaban] Rash    Lisinopril Unknown (comments)    Metformin Shortness of Breath and Rash    Morphine Nausea and Vomiting    Xarelto [Rivaroxaban] Rash       There is no immunization history on file for this patient.     All Labs from Last 24 Hrs:  Recent Results (from the past 24 hour(s))   GLUCOSE, POC    Collection Time: 11/08/20 10:55 AM   Result Value Ref Range    Glucose (POC) 123 (H) 65 - 100 mg/dL   GLUCOSE, POC    Collection Time: 11/08/20  3:55 PM   Result Value Ref Range    Glucose (POC) 181 (H) 65 - 100 mg/dL   GLUCOSE, POC    Collection Time: 11/08/20  9:14 PM   Result Value Ref Range    Glucose (POC) 173 (H) 65 - 100 mg/dL   GLUCOSE, POC    Collection Time: 11/09/20  7:17 AM   Result Value Ref Range    Glucose (POC) 91 65 - 100 mg/dL       Discharge Exam:  Patient Vitals for the past 24 hrs:   Temp Pulse Resp BP SpO2   11/09/20 0746 97.6 °F (36.4 °C) 61 18 125/66 98 %   11/09/20 0443 98.2 °F (36.8 °C) 60 18 113/65 95 %   11/09/20 0134 98 °F (36.7 °C) 65 18 117/69 98 %   11/08/20 2006 -- -- -- -- 98 %   11/08/20 2003 98 °F (36.7 °C) 70 18 130/79 95 %   11/08/20 1502 97.7 °F (36.5 °C) 63 17 133/75 99 %   11/08/20 1135 97.5 °F (36.4 °C) 63 18 (!) 146/88 94 %   11/08/20 0850 -- -- -- -- 95 %     Oxygen Therapy  O2 Sat (%): 98 % (11/09/20 2102)  Pulse via Oximetry: 66 beats per minute (11/08/20 2006)  O2 Device: Room air (11/08/20 2006)    Estimated body mass index is 30.83 kg/m² as calculated from the following:    Height as of this encounter: 6' (1.829 m). Weight as of this encounter: 103.1 kg (227 lb 4.8 oz). Intake/Output Summary (Last 24 hours) at 11/9/2020 0831  Last data filed at 11/8/2020 2309  Gross per 24 hour   Intake 1430 ml   Output --   Net 1430 ml       *Note that automatically entered I/Os may not be accurate; dependent on patient compliance with collection and accurate  by assistants. General:    Well nourished. No overt distress  Eyes:   Normal sclerae. Extraocular movements intact. ENT:  Normocephalic, atraumatic. Moist mucous membranes  CV:   Regular rate and rhythm. No m/r/g. No edema. Lungs:  CTAB. No wheezing, rhonchi, or rales. Unlabored  Abdomen: Soft, TTP diffuse, more epigastric, nondistended. Extremities: Warm and dry. No cyanosis or clubbing  Neurologic: CN II-XII grossly intact. No gross focal deficits. Alert. Skin:     No rashes. No jaundice. Psych:  Normal mood and affect.     Current Med List in Hospital:   Current Facility-Administered Medications   Medication Dose Route Frequency    bisacodyL (DULCOLAX) suppository 10 mg  10 mg Rectal DAILY PRN    polyethylene glycol (MIRALAX) packet 17 g  17 g Oral BID PRN    pantoprazole (PROTONIX) tablet 40 mg  40 mg Oral ACB&D    HYDROmorphone (DILAUDID) tablet 1 mg  1 mg Oral Q4H PRN    sodium chloride (NS) flush 5-40 mL  5-40 mL IntraVENous Q8H    sodium chloride (NS) flush 5-40 mL  5-40 mL IntraVENous PRN    acetaminophen (TYLENOL) tablet 650 mg  650 mg Oral Q6H PRN    Or    acetaminophen (TYLENOL) suppository 650 mg  650 mg Rectal Q6H PRN    promethazine (PHENERGAN) tablet 12.5 mg  12.5 mg Oral Q6H PRN    Or    ondansetron (ZOFRAN) injection 4 mg  4 mg IntraVENous Q6H PRN    albuterol (PROVENTIL VENTOLIN) nebulizer solution 2.5 mg  2.5 mg Nebulization Q4H PRN    lipase-protease-amylase (ZENPEP 5,000) capsule 5 Cap  5 Cap Oral TIDAC    aspirin delayed-release tablet 162 mg  162 mg Oral DAILY    atorvastatin (LIPITOR) tablet 40 mg  40 mg Oral QHS    budesonide-formoterol (SYMBICORT) 80-4.5 mcg inhaler  2 Puff Inhalation BID RT    furosemide (LASIX) tablet 40 mg  40 mg Oral DAILY    insulin lispro (HUMALOG) injection   SubCUTAneous AC&HS    insulin glargine (LANTUS) injection 50 Units  50 Units SubCUTAneous QHS    oxyCODONE IR (ROXICODONE) tablet 15 mg  15 mg Oral TID    pregabalin (LYRICA) capsule 100 mg  100 mg Oral TID    sertraline (ZOLOFT) tablet 150 mg  150 mg Oral DAILY    alcohol 62% (NOZIN) nasal  1 Ampule  1 Ampule Topical Q12H       Discharge Info:   Current Discharge Medication List      START taking these medications    Details   HYDROmorphone (DILAUDID) 2 mg tablet Take 1 Tab by mouth every six (6) hours as needed for Pain (pain not responsive to oxycodone) for up to 10 days. Max Daily Amount: 8 mg. Qty: 20 Tab, Refills: 0    Associated Diagnoses: Acute gastritis, presence of bleeding unspecified, unspecified gastritis type; Intractable abdominal pain; Idiopathic chronic pancreatitis (HCC)         CONTINUE these medications which have CHANGED    Details   pantoprazole (Protonix) 40 mg tablet Take 1 Tab by mouth two (2) times a day. Qty: 60 Tab, Refills: 2         CONTINUE these medications which have NOT CHANGED    Details   clopidogreL (PLAVIX) 75 mg tab Take 1 Tab by mouth daily. Qty: 30 Tab, Refills: 11      insulin glargine,hum.rec.anlog (TOUJEO SOLOSTAR U-300 INSULIN SC) 126 Units by SubCUTAneous route daily. aspirin delayed-release 81 mg tablet Take 2 Tabs by mouth daily. Qty: 60 Tab, Refills: 0      insulin aspart U-100 (NOVOLOG FLEXPEN U-100 INSULIN) 100 unit/mL inpn 20 Units by SubCUTAneous route Before breakfast, lunch, dinner and at bedtime.  Sliding scale with meals       pregabalin (LYRICA) 100 mg capsule Take 100 mg by mouth three (3) times daily. Indications: Diabetic Peripheral Neuropathy      sertraline (ZOLOFT) 100 mg tablet Take 150 mg by mouth daily. oxyCODONE IR (OXY-IR) 15 mg immediate release tablet Take 15 mg by mouth three (3) times daily. amylase-lipase-protease (CREON) 24,000-76,000 -120,000 unit capsule Take 1 Cap by mouth three (3) times daily (with meals). Indications: exocrine pancreatic insufficiency      budesonide-formoterol (SYMBICORT) 80-4.5 mcg/actuation HFAA inhaler Take 2 Puffs by inhalation two (2) times daily as needed. Indications: BRONCHOSPASM PREVENTION WITH COPD, bring on the dos      albuterol (PROVENTIL HFA, VENTOLIN HFA, PROAIR HFA) 90 mcg/actuation inhaler Take 2 Puffs by inhalation every six (6) hours as needed. Indications: BRONCHOSPASM PREVENTION, bring on the dos      furosemide (LASIX) 40 mg tablet Take 1 Tab by mouth daily. Qty: 90 Tab, Refills: 11    Associated Diagnoses: Diastolic CHF, acute on chronic (HCC)      atorvastatin (LIPITOR) 40 mg tablet Take 1 Tab by mouth nightly. Qty: 30 Tab, Refills: 3      cetirizine (ZYRTEC) 10 mg tablet Take 10 mg by mouth nightly. Indications: Allergic Rhinitis      colestipol (COLESTID) 1 gram tablet Take 1 g by mouth two (2) times a day. Indications: hypercholesterolemia               Time spent in patient discharge planning and coordination 35 minutes.     Signed:  Emilee Villalobos MD

## 2020-11-09 NOTE — PROGRESS NOTES
Problem: Pain  Goal: *Control of Pain  Outcome: Progressing Towards Goal  Goal: *PALLIATIVE CARE:  Alleviation of Pain  Outcome: Progressing Towards Goal     Problem: Patient Education: Go to Patient Education Activity  Goal: Patient/Family Education  Outcome: Progressing Towards Goal     Problem: Falls - Risk of  Goal: *Absence of Falls  Description: Document Alexandrea Levy Fall Risk and appropriate interventions in the flowsheet.   Outcome: Progressing Towards Goal  Note: Fall Risk Interventions:            Medication Interventions: Patient to call before getting OOB         History of Falls Interventions: Consult care management for discharge planning         Problem: Patient Education: Go to Patient Education Activity  Goal: Patient/Family Education  Outcome: Progressing Towards Goal     Problem: Breathing Pattern - Ineffective  Goal: *Absence of hypoxia  Outcome: Progressing Towards Goal  Goal: *Use of effective breathing techniques  Outcome: Progressing Towards Goal  Goal: *PALLIATIVE CARE:  Alleviation of Dyspnea  Outcome: Progressing Towards Goal     Problem: Patient Education: Go to Patient Education Activity  Goal: Patient/Family Education  Outcome: Progressing Towards Goal

## 2020-11-09 NOTE — DISCHARGE INSTRUCTIONS
Patient Education        Atrial Fibrillation: Care Instructions  Your Care Instructions     Atrial fibrillation is an irregular and often fast heartbeat. Treating this condition is important for several reasons. It can cause blood clots, which can travel from your heart to your brain and cause a stroke. If you have a fast heartbeat, you may feel lightheaded, dizzy, and weak. An irregular heartbeat can also increase your risk for heart failure. Atrial fibrillation is often the result of another heart condition, such as high blood pressure or coronary artery disease. Making changes to improve your heart condition will help you stay healthy and active. Follow-up care is a key part of your treatment and safety. Be sure to make and go to all appointments, and call your doctor if you are having problems. It's also a good idea to know your test results and keep a list of the medicines you take. How can you care for yourself at home? Medicines    · Take your medicines exactly as prescribed. Call your doctor if you think you are having a problem with your medicine. You will get more details on the specific medicines your doctor prescribes.     · If your doctor has given you a blood thinner to prevent a stroke, be sure you get instructions about how to take your medicine safely. Blood thinners can cause serious bleeding problems.     · Do not take any vitamins, over-the-counter drugs, or herbal products without talking to your doctor first.   Lifestyle changes    · Do not smoke. Smoking can increase your chance of a stroke and heart attack. If you need help quitting, talk to your doctor about stop-smoking programs and medicines. These can increase your chances of quitting for good.     · Eat a heart-healthy diet.     · Stay at a healthy weight. Lose weight if you need to.     · Limit alcohol to 2 drinks a day for men and 1 drink a day for women. Too much alcohol can cause health problems.     · Avoid colds and flu.  Get a pneumococcal vaccine shot. If you have had one before, ask your doctor whether you need another dose. Get a flu shot every year. If you must be around people with colds or flu, wash your hands often. Activity    · If your doctor recommends it, get more exercise. Walking is a good choice. Bit by bit, increase the amount you walk every day. Try for at least 30 minutes on most days of the week. You also may want to swim, bike, or do other activities. Your doctor may suggest that you join a cardiac rehabilitation program so that you can have help increasing your physical activity safely.     · Start light exercise if your doctor says it is okay. Even a small amount will help you get stronger, have more energy, and manage stress. Walking is an easy way to get exercise. Start out by walking a little more than you did in the hospital. Gradually increase the amount you walk.     · When you exercise, watch for signs that your heart is working too hard. You are pushing too hard if you cannot talk while you are exercising. If you become short of breath or dizzy or have chest pain, sit down and rest immediately.     · Check your pulse regularly. Place two fingers on the artery at the palm side of your wrist, in line with your thumb. If your heartbeat seems uneven or fast, talk to your doctor. When should you call for help? Call 911 anytime you think you may need emergency care. For example, call if:    · You have symptoms of a heart attack. These may include:  ? Chest pain or pressure, or a strange feeling in the chest.  ? Sweating. ? Shortness of breath. ? Nausea or vomiting. ? Pain, pressure, or a strange feeling in the back, neck, jaw, or upper belly or in one or both shoulders or arms. ? Lightheadedness or sudden weakness. ? A fast or irregular heartbeat. After you call 911, the  may tell you to chew 1 adult-strength or 2 to 4 low-dose aspirin. Wait for an ambulance.  Do not try to drive yourself.     · You have symptoms of a stroke. These may include:  ? Sudden numbness, tingling, weakness, or loss of movement in your face, arm, or leg, especially on only one side of your body. ? Sudden vision changes. ? Sudden trouble speaking. ? Sudden confusion or trouble understanding simple statements. ? Sudden problems with walking or balance. ? A sudden, severe headache that is different from past headaches.     · You passed out (lost consciousness). Call your doctor now or seek immediate medical care if:    · You have new or increased shortness of breath.     · You feel dizzy or lightheaded, or you feel like you may faint.     · Your heart rate becomes irregular.     · You can feel your heart flutter in your chest or skip heartbeats. Tell your doctor if these symptoms are new or worse. Watch closely for changes in your health, and be sure to contact your doctor if you have any problems. Where can you learn more? Go to http://www.gray.com/  Enter U020 in the search box to learn more about \"Atrial Fibrillation: Care Instructions. \"  Current as of: December 16, 2019               Content Version: 12.6  © 7039-0475 [employer]. Care instructions adapted under license by [employer] (which disclaims liability or warranty for this information). If you have questions about a medical condition or this instruction, always ask your healthcare professional. [address] any warranty or liability for your use of this information. Patient Education        Learning About Meal Planning for Diabetes  Why plan your meals? Meal planning can be a key part of managing diabetes. Planning meals and snacks with the right balance of carbohydrate, protein, and fat can help you keep your blood sugar at the target level you set with your doctor. You don't have to eat special foods. You can eat what your family eats, including sweets once in a while.  But you do have to pay attention to how often you eat and how much you eat of certain foods. You may want to work with a dietitian or a certified diabetes educator. He or she can give you tips and meal ideas and can answer your questions about meal planning. This health professional can also help you reach a healthy weight if that is one of your goals. What plan is right for you? Your dietitian or diabetes educator may suggest that you start with the plate format or carbohydrate counting. The plate format  The plate format is a simple way to help you manage how you eat. You plan meals by learning how much space each food should take on a plate. Using the plate format helps you spread carbohydrate throughout the day. It can make it easier to keep your blood sugar level within your target range. It also helps you see if you're eating healthy portion sizes. To use the plate format, you put non-starchy vegetables on half your plate. Add meat or meat substitutes on one-quarter of the plate. Put a grain or starchy vegetable (such as brown rice or a potato) on the final quarter of the plate. You can add a small piece of fruit and some low-fat or fat-free milk or yogurt, depending on your carbohydrate goal for each meal.  Here are some tips for using the plate format:  · Make sure that you are not using an oversized plate. A 9-inch plate is best. Many restaurants use larger plates. · Get used to using the plate format at home. Then you can use it when you eat out. · Write down your questions about using the plate format. Talk to your doctor, a dietitian, or a diabetes educator about your concerns. Carbohydrate counting  With carbohydrate counting, you plan meals based on the amount of carbohydrate in each food. Carbohydrate raises blood sugar higher and more quickly than any other nutrient. It is found in desserts, breads and cereals, and fruit.  It's also found in starchy vegetables such as potatoes and corn, grains such as rice and pasta, and milk and yogurt. Spreading carbohydrate throughout the day helps keep your blood sugar levels within your target range. Your daily amount depends on several things, including your weight, how active you are, which diabetes medicines you take, and what your goals are for your blood sugar levels. A registered dietitian or diabetes educator can help you plan how much carbohydrate to include in each meal and snack. A guideline for your daily amount of carbohydrate is:  · 45 to 60 grams at each meal. That's about the same as 3 to 4 carbohydrate servings. · 15 to 20 grams at each snack. That's about the same as 1 carbohydrate serving. The Nutrition Facts label on packaged foods tells you how much carbohydrate is in a serving of the food. First, look at the serving size on the food label. Is that the amount you eat in a serving? All of the nutrition information on a food label is based on that serving size. So if you eat more or less than that, you'll need to adjust the other numbers. Total carbohydrate is the next thing you need to look for on the label. If you count carbohydrate servings, one serving of carbohydrate is 15 grams. For foods that don't come with labels, such as fresh fruits and vegetables, you'll need a guide that lists carbohydrate in these foods. Ask your doctor, dietitian, or diabetes educator about books or other nutrition guides you can use. If you take insulin, you need to know how many grams of carbohydrate are in a meal. This lets you know how much rapid-acting insulin to take before you eat. If you use an insulin pump, you get a constant rate of insulin during the day. So the pump must be programmed at meals to give you extra insulin to cover the rise in blood sugar after meals. When you know how much carbohydrate you will eat, you can take the right amount of insulin.  Or, if you always use the same amount of insulin, you need to make sure that you eat the same amount of carbohydrate at meals. If you need more help to understand carbohydrate counting and food labels, ask your doctor, dietitian, or diabetes educator. How do you get started with meal planning? Here are some tips to get started:  · Plan your meals a week at a time. Don't forget to include snacks too. · Use cookbooks or online recipes to plan several main meals. Plan some quick meals for busy nights. You also can double some recipes that freeze well. Then you can save half for other busy nights when you don't have time to cook. · Make sure you have the ingredients you need for your recipes. If you're running low on basic items, put these items on your shopping list too. · List foods that you use to make breakfasts, lunches, and snacks. List plenty of fruits and vegetables. · Post this list on the refrigerator. Add to it as you think of more things you need. · Take the list to the store to do your weekly shopping. Follow-up care is a key part of your treatment and safety. Be sure to make and go to all appointments, and call your doctor if you are having problems. It's also a good idea to know your test results and keep a list of the medicines you take. Where can you learn more? Go to http://www.gray.com/  Enter X018 in the search box to learn more about \"Learning About Meal Planning for Diabetes. \"  Current as of: December 20, 2019               Content Version: 12.6  © 1022-6203 [employer]. Care instructions adapted under license by [employer] (which disclaims liability or warranty for this information). If you have questions about a medical condition or this instruction, always ask your healthcare professional. [place] [address] any warranty or liability for your use of this information.          Patient Education        DASH Diet: Care Instructions  Your Care Instructions     The DASH diet is an eating plan that can help lower your blood pressure. DASH stands for Dietary Approaches to Stop Hypertension. Hypertension is high blood pressure. The DASH diet focuses on eating foods that are high in calcium, potassium, and magnesium. These nutrients can lower blood pressure. The foods that are highest in these nutrients are fruits, vegetables, low-fat dairy products, nuts, seeds, and legumes. But taking calcium, potassium, and magnesium supplements instead of eating foods that are high in those nutrients does not have the same effect. The DASH diet also includes whole grains, fish, and poultry. The DASH diet is one of several lifestyle changes your doctor may recommend to lower your high blood pressure. Your doctor may also want you to decrease the amount of sodium in your diet. Lowering sodium while following the DASH diet can lower blood pressure even further than just the DASH diet alone. Follow-up care is a key part of your treatment and safety. Be sure to make and go to all appointments, and call your doctor if you are having problems. It's also a good idea to know your test results and keep a list of the medicines you take. How can you care for yourself at home? Following the DASH diet  · Eat 4 to 5 servings of fruit each day. A serving is 1 medium-sized piece of fruit, ½ cup chopped or canned fruit, 1/4 cup dried fruit, or 4 ounces (½ cup) of fruit juice. Choose fruit more often than fruit juice. · Eat 4 to 5 servings of vegetables each day. A serving is 1 cup of lettuce or raw leafy vegetables, ½ cup of chopped or cooked vegetables, or 4 ounces (½ cup) of vegetable juice. Choose vegetables more often than vegetable juice. · Get 2 to 3 servings of low-fat and fat-free dairy each day. A serving is 8 ounces of milk, 1 cup of yogurt, or 1 ½ ounces of cheese. · Eat 6 to 8 servings of grains each day.  A serving is 1 slice of bread, 1 ounce of dry cereal, or ½ cup of cooked rice, pasta, or cooked cereal. Try to choose whole-grain products as much as possible. · Limit lean meat, poultry, and fish to 2 servings each day. A serving is 3 ounces, about the size of a deck of cards. · Eat 4 to 5 servings of nuts, seeds, and legumes (cooked dried beans, lentils, and split peas) each week. A serving is 1/3 cup of nuts, 2 tablespoons of seeds, or ½ cup of cooked beans or peas. · Limit fats and oils to 2 to 3 servings each day. A serving is 1 teaspoon of vegetable oil or 2 tablespoons of salad dressing. · Limit sweets and added sugars to 5 servings or less a week. A serving is 1 tablespoon jelly or jam, ½ cup sorbet, or 1 cup of lemonade. · Eat less than 2,300 milligrams (mg) of sodium a day. If you limit your sodium to 1,500 mg a day, you can lower your blood pressure even more. Tips for success  · Start small. Do not try to make dramatic changes to your diet all at once. You might feel that you are missing out on your favorite foods and then be more likely to not follow the plan. Make small changes, and stick with them. Once those changes become habit, add a few more changes. · Try some of the following:  ? Make it a goal to eat a fruit or vegetable at every meal and at snacks. This will make it easy to get the recommended amount of fruits and vegetables each day. ? Try yogurt topped with fruit and nuts for a snack or healthy dessert. ? Add lettuce, tomato, cucumber, and onion to sandwiches. ? Combine a ready-made pizza crust with low-fat mozzarella cheese and lots of vegetable toppings. Try using tomatoes, squash, spinach, broccoli, carrots, cauliflower, and onions. ? Have a variety of cut-up vegetables with a low-fat dip as an appetizer instead of chips and dip. ? Sprinkle sunflower seeds or chopped almonds over salads. Or try adding chopped walnuts or almonds to cooked vegetables. ? Try some vegetarian meals using beans and peas. Add garbanzo or kidney beans to salads.  Make burritos and tacos with mashed antoine beans or black beans.  Where can you learn more? Go to http://www.Kanchufang.com/  Enter H967 in the search box to learn more about \"DASH Diet: Care Instructions. \"  Current as of: December 16, 2019               Content Version: 12.6  © 4785-6238 [employer]. Care instructions adapted under license by [employer] (which disclaims liability or warranty for this information). If you have questions about a medical condition or this instruction, always ask your healthcare professional. [place] [address] any warranty or liability for your use of this information. Patient Education        Gastritis: Care Instructions  Your Care Instructions     Gastritis is a sore and upset stomach. It happens when something irritates the stomach lining. Many things can cause it. These include an infection such as the flu or something you ate or drank. Medicines or a sore on the lining of the stomach (ulcer) also can cause it. Your belly may bloat and ache. You may belch, vomit, and feel sick to your stomach. You should be able to relieve the problem by taking medicine. And it may help to change your diet. If gastritis lasts, your doctor may prescribe medicine. Follow-up care is a key part of your treatment and safety. Be sure to make and go to all appointments, and call your doctor if you are having problems. It's also a good idea to know your test results and keep a list of the medicines you take. How can you care for yourself at home? · If your doctor prescribed antibiotics, take them as directed. Do not stop taking them just because you feel better. You need to take the full course of antibiotics. · Be safe with medicines. If your doctor prescribed medicine to decrease stomach acid, take it as directed. Call your doctor if you think you are having a problem with your medicine.   · Do not take any other medicine, including over-the-counter pain relievers, without talking to your doctor first.  · If your doctor recommends over-the-counter medicine to reduce stomach acid, such as Pepcid AC (famotidine), Prilosec (omeprazole), or Tagamet HB (cimetidine) follow the directions on the label. · Drink plenty of fluids (enough so that your urine is light yellow or clear like water) to prevent dehydration. Choose water and other caffeine-free clear liquids. If you have kidney, heart, or liver disease and have to limit fluids, talk with your doctor before you increase the amount of fluids you drink. · Limit how much alcohol you drink. · Avoid coffee, tea, cola drinks, chocolate, and other foods with caffeine. They increase stomach acid. When should you call for help? Call 911 anytime you think you may need emergency care. For example, call if:    · You vomit blood or what looks like coffee grounds.     · You pass maroon or very bloody stools. Call your doctor now or seek immediate medical care if:    · You start breathing fast and have not produced urine in the last 8 hours.     · You cannot keep fluids down. Watch closely for changes in your health, and be sure to contact your doctor if:    · You do not get better as expected. Where can you learn more? Go to http://www.Sols.com/  Enter Z536 in the search box to learn more about \"Gastritis: Care Instructions. \"  Current as of: April 15, 2020               Content Version: 12.6  © 0635-2981 [employer]. Care instructions adapted under license by [employer] (which disclaims liability or warranty for this information). If you have questions about a medical condition or this instruction, always ask your healthcare professional. [address] any warranty or liability for your use of this information.

## 2020-11-09 NOTE — PROGRESS NOTES
Care Management Interventions  PCP Verified by CM: Yes  Discharge Durable Medical Equipment: No  Physical Therapy Consult: No  Occupational Therapy Consult: No  Speech Therapy Consult: No  Current Support Network: Own Home, Lives with Spouse  Confirm Follow Up Transport: Family  Discharge Location  Discharge Placement: Home    Patient to discharge home today. No CM needs have been identified. Patient will transport home with family. All milestones for discharge have been met. CM remains available should any needs arise.

## 2020-11-09 NOTE — PROGRESS NOTES
Shift Summary  Hourly rounds performed on pt, pt resting in bed quietly with head the bed elevated and eyes cosed. Patient remained on RA. Patient c/o pain and was medicated with prescribed PRN and scheduled pain medicine with very little relief reported. Patient c/o nausea but still eat and no vomiting witness this shift. Patient was medicated with antiemetic medicine this shift. Patient remained alert and oriented x 4. Left forearm IV site remained patent. No concern voiced this shift, afebrile, VSS, some output and 0 BM noted and charted. No acute distress noted.

## 2020-11-10 ENCOUNTER — PATIENT OUTREACH (OUTPATIENT)
Dept: CASE MANAGEMENT | Age: 55
End: 2020-11-10

## 2020-11-10 NOTE — PROGRESS NOTES
Unable to reach patient by phone after ED/Hospital visit for at risk COVID education, left message. Visit not related to [facility] and does not have a [facility] Kim PCP, so only one attempt needed.

## 2020-11-29 ENCOUNTER — HOSPITAL ENCOUNTER (EMERGENCY)
Age: 55
Discharge: HOME | End: 2020-11-29
Attending: EMERGENCY MEDICINE
Payer: MEDICARE

## 2020-11-29 VITALS
SYSTOLIC BLOOD PRESSURE: 148 MMHG | HEART RATE: 66 BPM | BODY MASS INDEX: 30.75 KG/M2 | OXYGEN SATURATION: 95 % | HEIGHT: 72 IN | WEIGHT: 227 LBS | TEMPERATURE: 97.3 F | RESPIRATION RATE: 18 BRPM | DIASTOLIC BLOOD PRESSURE: 74 MMHG

## 2020-11-29 DIAGNOSIS — G89.29 CHRONIC ABDOMINAL PAIN: Primary | ICD-10-CM

## 2020-11-29 DIAGNOSIS — R11.10 VOMITING AND DIARRHEA: ICD-10-CM

## 2020-11-29 DIAGNOSIS — R19.7 VOMITING AND DIARRHEA: ICD-10-CM

## 2020-11-29 DIAGNOSIS — R10.9 CHRONIC ABDOMINAL PAIN: Primary | ICD-10-CM

## 2020-11-29 LAB
ALBUMIN SERPL-MCNC: 3.8 G/DL (ref 3.5–5)
ALBUMIN/GLOB SERPL: 1 {RATIO} (ref 1.2–3.5)
ALP SERPL-CCNC: 85 U/L (ref 50–136)
ALT SERPL-CCNC: 17 U/L (ref 12–65)
ANION GAP SERPL CALC-SCNC: 4 MMOL/L (ref 7–16)
AST SERPL-CCNC: 13 U/L (ref 15–37)
ATRIAL RATE: 87 BPM
BASOPHILS # BLD: 0 K/UL (ref 0–0.2)
BASOPHILS NFR BLD: 1 % (ref 0–2)
BILIRUB SERPL-MCNC: 0.5 MG/DL (ref 0.2–1.1)
BUN SERPL-MCNC: 22 MG/DL (ref 6–23)
CALCIUM SERPL-MCNC: 9.6 MG/DL (ref 8.3–10.4)
CALCULATED P AXIS, ECG09: 9 DEGREES
CALCULATED R AXIS, ECG10: 76 DEGREES
CALCULATED T AXIS, ECG11: 23 DEGREES
CHLORIDE SERPL-SCNC: 100 MMOL/L (ref 98–107)
CO2 SERPL-SCNC: 31 MMOL/L (ref 21–32)
CREAT SERPL-MCNC: 1.79 MG/DL (ref 0.8–1.5)
DIAGNOSIS, 93000: NORMAL
DIFFERENTIAL METHOD BLD: ABNORMAL
EOSINOPHIL # BLD: 0.2 K/UL (ref 0–0.8)
EOSINOPHIL NFR BLD: 4 % (ref 0.5–7.8)
ERYTHROCYTE [DISTWIDTH] IN BLOOD BY AUTOMATED COUNT: 13.8 % (ref 11.9–14.6)
GLOBULIN SER CALC-MCNC: 3.9 G/DL (ref 2.3–3.5)
GLUCOSE BLD STRIP.AUTO-MCNC: 293 MG/DL (ref 65–100)
GLUCOSE SERPL-MCNC: 336 MG/DL (ref 65–100)
HCT VFR BLD AUTO: 44.5 % (ref 41.1–50.3)
HGB BLD-MCNC: 14.3 G/DL (ref 13.6–17.2)
IMM GRANULOCYTES # BLD AUTO: 0 K/UL (ref 0–0.5)
IMM GRANULOCYTES NFR BLD AUTO: 1 % (ref 0–5)
LACTATE SERPL-SCNC: 1 MMOL/L (ref 0.4–2)
LIPASE SERPL-CCNC: 62 U/L (ref 73–393)
LYMPHOCYTES # BLD: 1.4 K/UL (ref 0.5–4.6)
LYMPHOCYTES NFR BLD: 25 % (ref 13–44)
MCH RBC QN AUTO: 27.3 PG (ref 26.1–32.9)
MCHC RBC AUTO-ENTMCNC: 32.1 G/DL (ref 31.4–35)
MCV RBC AUTO: 85.1 FL (ref 79.6–97.8)
MONOCYTES # BLD: 0.4 K/UL (ref 0.1–1.3)
MONOCYTES NFR BLD: 7 % (ref 4–12)
NEUTS SEG # BLD: 3.5 K/UL (ref 1.7–8.2)
NEUTS SEG NFR BLD: 64 % (ref 43–78)
NRBC # BLD: 0 K/UL (ref 0–0.2)
P-R INTERVAL, ECG05: 184 MS
PLATELET # BLD AUTO: 116 K/UL (ref 150–450)
PMV BLD AUTO: 12.1 FL (ref 9.4–12.3)
POTASSIUM SERPL-SCNC: 4 MMOL/L (ref 3.5–5.1)
PROT SERPL-MCNC: 7.7 G/DL (ref 6.3–8.2)
Q-T INTERVAL, ECG07: 344 MS
QRS DURATION, ECG06: 84 MS
QTC CALCULATION (BEZET), ECG08: 413 MS
RBC # BLD AUTO: 5.23 M/UL (ref 4.23–5.6)
SODIUM SERPL-SCNC: 135 MMOL/L (ref 138–145)
TROPONIN-HIGH SENSITIVITY: 24.5 PG/ML (ref 0–14)
VENTRICULAR RATE, ECG03: 87 BPM
WBC # BLD AUTO: 5.5 K/UL (ref 4.3–11.1)

## 2020-11-29 PROCEDURE — 74011636637 HC RX REV CODE- 636/637: Performed by: EMERGENCY MEDICINE

## 2020-11-29 PROCEDURE — 99284 EMERGENCY DEPT VISIT MOD MDM: CPT

## 2020-11-29 PROCEDURE — 82962 GLUCOSE BLOOD TEST: CPT

## 2020-11-29 PROCEDURE — 83690 ASSAY OF LIPASE: CPT

## 2020-11-29 PROCEDURE — 74011000250 HC RX REV CODE- 250: Performed by: EMERGENCY MEDICINE

## 2020-11-29 PROCEDURE — 80053 COMPREHEN METABOLIC PANEL: CPT

## 2020-11-29 PROCEDURE — 74011250636 HC RX REV CODE- 250/636: Performed by: EMERGENCY MEDICINE

## 2020-11-29 PROCEDURE — 84484 ASSAY OF TROPONIN QUANT: CPT

## 2020-11-29 PROCEDURE — 96374 THER/PROPH/DIAG INJ IV PUSH: CPT

## 2020-11-29 PROCEDURE — 93005 ELECTROCARDIOGRAM TRACING: CPT | Performed by: EMERGENCY MEDICINE

## 2020-11-29 PROCEDURE — 85025 COMPLETE CBC W/AUTO DIFF WBC: CPT

## 2020-11-29 PROCEDURE — 96375 TX/PRO/DX INJ NEW DRUG ADDON: CPT

## 2020-11-29 PROCEDURE — 83605 ASSAY OF LACTIC ACID: CPT

## 2020-11-29 RX ORDER — SODIUM CHLORIDE, SODIUM LACTATE, POTASSIUM CHLORIDE, CALCIUM CHLORIDE 600; 310; 30; 20 MG/100ML; MG/100ML; MG/100ML; MG/100ML
1000 INJECTION, SOLUTION INTRAVENOUS CONTINUOUS
Status: DISCONTINUED | OUTPATIENT
Start: 2020-11-29 | End: 2020-11-29 | Stop reason: HOSPADM

## 2020-11-29 RX ORDER — PROMETHAZINE HYDROCHLORIDE 25 MG/1
25 SUPPOSITORY RECTAL
Qty: 12 SUPPOSITORY | Refills: 0 | Status: SHIPPED | OUTPATIENT
Start: 2020-11-29

## 2020-11-29 RX ORDER — DIPHENHYDRAMINE HYDROCHLORIDE 50 MG/ML
12.5 INJECTION, SOLUTION INTRAMUSCULAR; INTRAVENOUS
Status: COMPLETED | OUTPATIENT
Start: 2020-11-29 | End: 2020-11-29

## 2020-11-29 RX ORDER — PROMETHAZINE HYDROCHLORIDE 25 MG/1
25 TABLET ORAL
Qty: 12 TAB | Refills: 0 | Status: SHIPPED | OUTPATIENT
Start: 2020-11-29

## 2020-11-29 RX ORDER — HYDROMORPHONE HYDROCHLORIDE 1 MG/ML
1 INJECTION, SOLUTION INTRAMUSCULAR; INTRAVENOUS; SUBCUTANEOUS
Status: COMPLETED | OUTPATIENT
Start: 2020-11-29 | End: 2020-11-29

## 2020-11-29 RX ORDER — ONDANSETRON 2 MG/ML
4 INJECTION INTRAMUSCULAR; INTRAVENOUS
Status: DISCONTINUED | OUTPATIENT
Start: 2020-11-29 | End: 2020-11-29

## 2020-11-29 RX ADMIN — PROCHLORPERAZINE EDISYLATE 5 MG: 5 INJECTION INTRAMUSCULAR; INTRAVENOUS at 13:21

## 2020-11-29 RX ADMIN — SODIUM CHLORIDE, SODIUM LACTATE, POTASSIUM CHLORIDE, AND CALCIUM CHLORIDE 1000 ML: 600; 310; 30; 20 INJECTION, SOLUTION INTRAVENOUS at 13:21

## 2020-11-29 RX ADMIN — INSULIN HUMAN 5 UNITS: 100 INJECTION, SOLUTION PARENTERAL at 13:55

## 2020-11-29 RX ADMIN — HYDROMORPHONE HYDROCHLORIDE 1 MG: 1 INJECTION, SOLUTION INTRAMUSCULAR; INTRAVENOUS; SUBCUTANEOUS at 13:20

## 2020-11-29 RX ADMIN — DIPHENHYDRAMINE HYDROCHLORIDE 12.5 MG: 50 INJECTION, SOLUTION INTRAMUSCULAR; INTRAVENOUS at 13:20

## 2020-11-29 NOTE — ED PROVIDER NOTES
57-year-old male with a history of recurrent pancreatitis, pancreatic divisum, gastritis, atrial fibrillation on Plavix, chronic kidney disease, diabetes, hypertension, appendectomy, cholecystectomy presents with severe right upper quadrant abdominal pain radiating to his ribs and back since Thanksgiving. He has had multiple episodes of vomiting, most recently with blood. Also reports diarrhea and feeling dehydrated. No fevers or chills. No chest pain or shortness of breath. Pain not relieved with oxycodone. Abdominal Pain    Associated symptoms include diarrhea, nausea, vomiting and back pain. Pertinent negatives include no fever, no dysuria, no headaches and no chest pain. Past Medical History:   Diagnosis Date    Acute renal failure (ARF) ([address] 06/2017    septic from necrotizing fascitis. was on dialysis short term.  Anticoagulated on Coumadin     has not been instructed to hold.  Atrial fibrillation (HCC)     CAD (coronary artery disease)     Chest pain, precordial 5/4/2016    Chronic kidney disease     Chronic pain     back    Chronic pancreatitis ([address]     Diabetes ([address] 2008    insulin reliant. bs: 120's .      Edema     Gastrointestinal disorder     Headache     Heart failure (HCC)     History of peptic ulcer disease     years ago    History of sepsis 06/2017    Hypertension     Morbid obesity ([address] 5/4/2016    35 bmi    Necrotizing fasciitis ([address] 06/2017    left upper arm    Palpitations     PUD (peptic ulcer disease)     Restless leg syndrome 5/4/2016    Sleep apnea     cpap    Tobacco dependence 5/4/2016       Past Surgical History:   Procedure Laterality Date    HX APPENDECTOMY      HX CATARACT REMOVAL Right     with IOL    HX CHOLECYSTECTOMY      HX HEART CATHETERIZATION      HX LUMBAR FUSION  2012 and 2013    x 2    HX OTHER SURGICAL      cardioversion         Family History:   Problem Relation Age of Onset    Diabetes Mother     Heart Disease Mother     No Known Problems Father     Pulmonary Embolism Sister        Social History     Socioeconomic History    Marital status:      Spouse name: Not on file    Number of children: Not on file    Years of education: Not on file    Highest education level: Not on file   Occupational History    Not on file   Social Needs    Financial resource strain: Not on file    Food insecurity     Worry: Not on file     Inability: Not on file    Transportation needs     Medical: Not on file     Non-medical: Not on file   Tobacco Use    Smoking status: Current Every Day Smoker     Packs/day: 0.25     Years: 40.00     Pack years: 10.00    Smokeless tobacco: Never Used    Tobacco comment: Down to 5 cigs daily. 6/24/20KH   Substance and Sexual Activity    Alcohol use: No     Alcohol/week: 0.0 standard drinks    Drug use: No    Sexual activity: Not on file   Lifestyle    Physical activity     Days per week: Not on file     Minutes per session: Not on file    Stress: Not on file   Relationships    Social connections     Talks on phone: Not on file     Gets together: Not on file     Attends Rastafari service: Not on file     Active member of club or organization: Not on file     Attends meetings of clubs or organizations: Not on file     Relationship status: Not on file    Intimate partner violence     Fear of current or ex partner: Not on file     Emotionally abused: Not on file     Physically abused: Not on file     Forced sexual activity: Not on file   Other Topics Concern    Not on file   Social History Narrative    Not on file         ALLERGIES: Eliquis [apixaban]; Lisinopril; Metformin; Morphine; and Xarelto [rivaroxaban]    Review of Systems   Constitutional: Positive for appetite change and fatigue. Negative for chills and fever. HENT: Negative for hearing loss. Eyes: Negative for visual disturbance. Respiratory: Negative for cough and shortness of breath.     Cardiovascular: Negative for chest pain and palpitations. Gastrointestinal: Positive for abdominal pain, diarrhea, nausea and vomiting. Genitourinary: Negative for dysuria. Musculoskeletal: Positive for back pain. Skin: Negative for rash. Neurological: Negative for weakness and headaches. Psychiatric/Behavioral: Negative for confusion. Vitals:    11/29/20 1248   BP: (!) 130/96   Pulse: 92   Resp: 18   Temp: 97.3 °F (36.3 °C)   SpO2: 99%   Weight: 103 kg (227 lb)   Height: 6' (1.829 m)            Physical Exam  Vitals signs and nursing note reviewed. Constitutional:       Appearance: He is well-developed. He is ill-appearing. HENT:      Head: Normocephalic and atraumatic. Eyes:      Pupils: Pupils are equal, round, and reactive to light. Neck:      Musculoskeletal: Normal range of motion and neck supple. Cardiovascular:      Rate and Rhythm: Regular rhythm. Heart sounds: Normal heart sounds. Pulmonary:      Effort: Pulmonary effort is normal.      Breath sounds: Normal breath sounds. Abdominal:      General: A surgical scar is present. Palpations: Abdomen is soft. Tenderness: There is abdominal tenderness in the right upper quadrant. Musculoskeletal: Normal range of motion. Skin:     General: Skin is warm and dry. Neurological:      Mental Status: He is alert. Psychiatric:         Behavior: Behavior normal.          MDM  Number of Diagnoses or Management Options  Chronic abdominal pain:   Vomiting and diarrhea:   Diagnosis management comments: Parts of this document were created using dragon voice recognition software. The chart has been reviewed but errors may still be present. I wore appropriate PPE throughout this patient's ED visit. Pippa Antonio MD, 1:20 PM    2:49 PM  Pain improved. Labs unremarkable. Will prescribe Phenergan. Trop near baseline. No active vomiting. Hb stable.      I discussed the results of all labs, procedures, radiographs, and treatments with the patient and available family. Treatment plan is agreed upon and the patient is ready for discharge. Questions about treatment in the ED and differential diagnosis of presenting condition were answered. Patient was given verbal discharge instructions including, but not limited to, importance of returning to the emergency department for any concern of worsening or continued symptoms. Instructions were given to follow up with a primary care provider or specialist within 1-2 days. Adverse effects of medications, if prescribed, were discussed and patient was advised to refrain from significant physical activity until followed up by primary care physician and to not drive or operate heavy machinery after taking any sedating substances.            Amount and/or Complexity of Data Reviewed  Clinical lab tests: ordered and reviewed (Results for orders placed or performed during the hospital encounter of 11/29/20  -CBC WITH AUTOMATED DIFF       Result                      Value             Ref Range           WBC                         5.5               4.3 - 11.1 K*       RBC                         5.23              4.23 - 5.6 M*       HGB                         14.3              13.6 - 17.2 *       HCT                         44.5              41.1 - 50.3 %       MCV                         85.1              79.6 - 97.8 *       MCH                         27.3              26.1 - 32.9 *       MCHC                        32.1              31.4 - 35.0 *       RDW                         13.8              11.9 - 14.6 %       PLATELET                    116 (L)           150 - 450 K/*       MPV                         12.1              9.4 - 12.3 FL       ABSOLUTE NRBC               0.00              0.0 - 0.2 K/*       DF                          AUTOMATED                             NEUTROPHILS                 64                43 - 78 %           LYMPHOCYTES                 25                13 - 44 %           MONOCYTES 7                 4.0 - 12.0 %        EOSINOPHILS                 4                 0.5 - 7.8 %         BASOPHILS                   1                 0.0 - 2.0 %         IMMATURE GRANULOCYTES       1                 0.0 - 5.0 %         ABS. NEUTROPHILS            3.5               1.7 - 8.2 K/*       ABS. LYMPHOCYTES            1.4               0.5 - 4.6 K/*       ABS. MONOCYTES              0.4               0.1 - 1.3 K/*       ABS. EOSINOPHILS            0.2               0.0 - 0.8 K/*       ABS. BASOPHILS              0.0               0.0 - 0.2 K/*       ABS. IMM. GRANS.            0.0               0.0 - 0.5 K/*  -METABOLIC PANEL, COMPREHENSIVE       Result                      Value             Ref Range           Sodium                      135 (L)           138 - 145 mm*       Potassium                   4.0               3.5 - 5.1 mm*       Chloride                    100               98 - 107 mmo*       CO2                         31                21 - 32 mmol*       Anion gap                   4 (L)             7 - 16 mmol/L       Glucose                     336 (H)           65 - 100 mg/*       BUN                         22                6 - 23 MG/DL        Creatinine                  1.79 (H)          0.8 - 1.5 MG*       GFR est AA                  51 (L)            >60 ml/min/1*       GFR est non-AA              42 (L)            >60 ml/min/1*       Calcium                     9.6               8.3 - 10.4 M*       Bilirubin, total            0.5               0.2 - 1.1 MG*       ALT (SGPT)                  17                12 - 65 U/L         AST (SGOT)                  13 (L)            15 - 37 U/L         Alk.  phosphatase            85                50 - 136 U/L        Protein, total              7.7               6.3 - 8.2 g/*       Albumin                     3.8               3.5 - 5.0 g/*       Globulin                    3.9 (H)           2.3 - 3.5 g/*       A-G Ratio 1.0 (L)           1.2 - 3.5      -LIPASE       Result                      Value             Ref Range           Lipase                      62 (L)            73 - 393 U/L   -LACTIC ACID       Result                      Value             Ref Range           Lactic acid                 1.0               0.4 - 2.0 MM*  -TROPONIN-HIGH SENSITIVITY       Result                      Value             Ref Range           Troponin-High Sensitiv*     24.5 (H)          0 - 14 pg/mL   -EKG, 12 LEAD, INITIAL       Result                      Value             Ref Range           Ventricular Rate            87                BPM                 Atrial Rate                 87                BPM                 P-R Interval                184               ms                  QRS Duration                84                ms                  Q-T Interval                344               ms                  QTC Calculation (Bezet)     413               ms                  Calculated P Axis           9                 degrees             Calculated R Axis           76                degrees             Calculated T Axis           23                degrees             Diagnosis                                                     Sinus rhythm with Premature atrial complexes   Nonspecific ST and T wave abnormality   Abnormal ECG   When compared with ECG of 05-NOV-2020 14:23,   Premature atrial complexes are now Present     )  Tests in the medicine section of CPT®: reviewed and ordered           Procedures

## 2020-11-29 NOTE — ED NOTES
I have reviewed discharge instructions with the patient and spouse. The patient and spouse verbalized understanding. Patient left ED via Discharge Method: ambulatory to Home with family    Opportunity for questions and clarification provided. Patient given 2 scripts. To continue your aftercare when you leave the hospital, you may receive an automated call from our care team to check in on how you are doing. This is a free service and part of our promise to provide the best care and service to meet your aftercare needs.  If you have questions, or wish to unsubscribe from this service please call [phone]. Thank you for Choosing our [facility] Emergency Department. Is This A New Presentation, Or A Follow-Up?: Skin Lesion

## 2020-11-29 NOTE — ED TRIAGE NOTES
Pt reports RUQ pain that radiates to his back with NVD and chills, history of pancreatitis. States this feels consistent with a flare up. Denies fever. Recent history of a stent to his SMA.

## 2021-04-06 ENCOUNTER — TELEPHONE (OUTPATIENT)
Dept: CARDIAC CATH/INVASIVE PROCEDURES | Age: 56
End: 2021-04-06

## 2021-04-06 NOTE — TELEPHONE ENCOUNTER
Patient contacted for [facility] 6 month follow up Phone Call. Patient currently taking [id number] and plavix 75mg. Patient unable to d/c plavix at this time due to mesenteric BMS placement. Dr Anderson Wong office was contacted to see how long patient will need to continue plavix. They said he should continue indefinitely as long as he does not have issues with plavix. Patient was instructed to follow up with Dr Anderson Wong office with questions or concerns related to plavix. He states that over all he feels well. He has not had any hospitalizations or procedures since his 45 day follow up. Patient has no questions or concerns at this time.

## 2021-05-21 ENCOUNTER — HOSPITAL ENCOUNTER (OUTPATIENT)
Dept: ULTRASOUND IMAGING | Age: 56
Discharge: HOME | End: 2021-05-21
Attending: NURSE PRACTITIONER
Payer: MEDICARE

## 2021-05-21 DIAGNOSIS — N28.1 RENAL CYST, RIGHT: ICD-10-CM

## 2021-05-21 PROCEDURE — 76770 US EXAM ABDO BACK WALL COMP: CPT

## 2021-10-08 ENCOUNTER — APPOINTMENT (OUTPATIENT)
Dept: CT IMAGING | Age: 56
End: 2021-10-08
Attending: INTERNAL MEDICINE
Payer: MEDICARE

## 2021-10-08 ENCOUNTER — HOSPITAL ENCOUNTER (OUTPATIENT)
Age: 56
Discharge: HOME | End: 2021-10-09
Attending: INTERNAL MEDICINE | Admitting: INTERNAL MEDICINE
Payer: MEDICARE

## 2021-10-08 DIAGNOSIS — I25.119 CORONARY ARTERY DISEASE INVOLVING NATIVE CORONARY ARTERY OF NATIVE HEART WITH ANGINA PECTORIS (HCC): Chronic | ICD-10-CM

## 2021-10-08 DIAGNOSIS — I48.0 PAROXYSMAL ATRIAL FIBRILLATION (HCC): ICD-10-CM

## 2021-10-08 DIAGNOSIS — R07.9 CHEST PAIN, UNSPECIFIED TYPE: ICD-10-CM

## 2021-10-08 PROBLEM — I45.10 RBBB: Status: ACTIVE | Noted: 2021-10-08

## 2021-10-08 LAB
ALBUMIN SERPL-MCNC: 3.8 G/DL (ref 3.5–5)
ALBUMIN/GLOB SERPL: 1.2 {RATIO} (ref 1.2–3.5)
ALP SERPL-CCNC: 90 U/L (ref 50–136)
ALT SERPL-CCNC: 26 U/L (ref 12–65)
ANION GAP SERPL CALC-SCNC: 3 MMOL/L (ref 7–16)
AST SERPL-CCNC: 28 U/L (ref 15–37)
ATRIAL RATE: 71 BPM
BILIRUB SERPL-MCNC: 0.3 MG/DL (ref 0.2–1.1)
BUN SERPL-MCNC: 18 MG/DL (ref 6–23)
CALCIUM SERPL-MCNC: 9 MG/DL (ref 8.3–10.4)
CALCULATED P AXIS, ECG09: -23 DEGREES
CALCULATED R AXIS, ECG10: 77 DEGREES
CALCULATED T AXIS, ECG11: 49 DEGREES
CHLORIDE SERPL-SCNC: 110 MMOL/L (ref 98–107)
CO2 SERPL-SCNC: 24 MMOL/L (ref 21–32)
CREAT BLD-MCNC: 1.39 MG/DL (ref 0.8–1.5)
CREAT SERPL-MCNC: 1.55 MG/DL (ref 0.8–1.5)
DIAGNOSIS, 93000: NORMAL
ERYTHROCYTE [DISTWIDTH] IN BLOOD BY AUTOMATED COUNT: 13.7 % (ref 11.9–14.6)
GLOBULIN SER CALC-MCNC: 3.3 G/DL (ref 2.3–3.5)
GLUCOSE BLD STRIP.AUTO-MCNC: 271 MG/DL (ref 65–100)
GLUCOSE SERPL-MCNC: 162 MG/DL (ref 65–100)
HCT VFR BLD AUTO: 45.1 % (ref 41.1–50.3)
HGB BLD-MCNC: 14.4 G/DL (ref 13.6–17.2)
MAGNESIUM SERPL-MCNC: 2.1 MG/DL (ref 1.8–2.4)
MCH RBC QN AUTO: 27.9 PG (ref 26.1–32.9)
MCHC RBC AUTO-ENTMCNC: 31.9 G/DL (ref 31.4–35)
MCV RBC AUTO: 87.4 FL (ref 79.6–97.8)
NRBC # BLD: 0 K/UL (ref 0–0.2)
P-R INTERVAL, ECG05: 216 MS
PLATELET # BLD AUTO: 115 K/UL (ref 150–450)
PMV BLD AUTO: 12.5 FL (ref 9.4–12.3)
POTASSIUM SERPL-SCNC: 3.9 MMOL/L (ref 3.5–5.1)
PROT SERPL-MCNC: 7.1 G/DL (ref 6.3–8.2)
Q-T INTERVAL, ECG07: 376 MS
QRS DURATION, ECG06: 128 MS
QTC CALCULATION (BEZET), ECG08: 408 MS
RBC # BLD AUTO: 5.16 M/UL (ref 4.23–5.6)
SERVICE CMNT-IMP: ABNORMAL
SODIUM SERPL-SCNC: 137 MMOL/L (ref 136–145)
VENTRICULAR RATE, ECG03: 71 BPM
WBC # BLD AUTO: 8.3 K/UL (ref 4.3–11.1)

## 2021-10-08 PROCEDURE — 77030041064 HC CATH DX ANGI DXTRTY MEDT -B: Performed by: INTERNAL MEDICINE

## 2021-10-08 PROCEDURE — 74011250637 HC RX REV CODE- 250/637: Performed by: INTERNAL MEDICINE

## 2021-10-08 PROCEDURE — 80053 COMPREHEN METABOLIC PANEL: CPT

## 2021-10-08 PROCEDURE — 93458 L HRT ARTERY/VENTRICLE ANGIO: CPT | Performed by: INTERNAL MEDICINE

## 2021-10-08 PROCEDURE — 74011250636 HC RX REV CODE- 250/636: Performed by: INTERNAL MEDICINE

## 2021-10-08 PROCEDURE — 74011000250 HC RX REV CODE- 250: Performed by: INTERNAL MEDICINE

## 2021-10-08 PROCEDURE — 93005 ELECTROCARDIOGRAM TRACING: CPT | Performed by: INTERNAL MEDICINE

## 2021-10-08 PROCEDURE — 74011000636 HC RX REV CODE- 636: Performed by: INTERNAL MEDICINE

## 2021-10-08 PROCEDURE — 99153 MOD SED SAME PHYS/QHP EA: CPT | Performed by: INTERNAL MEDICINE

## 2021-10-08 PROCEDURE — 71275 CT ANGIOGRAPHY CHEST: CPT

## 2021-10-08 PROCEDURE — 85027 COMPLETE CBC AUTOMATED: CPT

## 2021-10-08 PROCEDURE — 77030016699 HC CATH ANGI DX INFN1 CARD -A: Performed by: INTERNAL MEDICINE

## 2021-10-08 PROCEDURE — 74011636637 HC RX REV CODE- 636/637: Performed by: NURSE PRACTITIONER

## 2021-10-08 PROCEDURE — 82962 GLUCOSE BLOOD TEST: CPT

## 2021-10-08 PROCEDURE — 74011250637 HC RX REV CODE- 250/637: Performed by: NURSE PRACTITIONER

## 2021-10-08 PROCEDURE — 82565 ASSAY OF CREATININE: CPT

## 2021-10-08 PROCEDURE — C1769 GUIDE WIRE: HCPCS | Performed by: INTERNAL MEDICINE

## 2021-10-08 PROCEDURE — 99152 MOD SED SAME PHYS/QHP 5/>YRS: CPT | Performed by: INTERNAL MEDICINE

## 2021-10-08 PROCEDURE — 77030029997 HC DEV COM RDL R BND TELE -B: Performed by: INTERNAL MEDICINE

## 2021-10-08 PROCEDURE — C1894 INTRO/SHEATH, NON-LASER: HCPCS | Performed by: INTERNAL MEDICINE

## 2021-10-08 PROCEDURE — 83735 ASSAY OF MAGNESIUM: CPT

## 2021-10-08 PROCEDURE — 74011000258 HC RX REV CODE- 258: Performed by: INTERNAL MEDICINE

## 2021-10-08 RX ORDER — TRAZODONE HYDROCHLORIDE 50 MG/1
50 TABLET ORAL DAILY
Status: DISCONTINUED | OUTPATIENT
Start: 2021-10-09 | End: 2021-10-09 | Stop reason: HOSPADM

## 2021-10-08 RX ORDER — SODIUM CHLORIDE 0.9 % (FLUSH) 0.9 %
5-40 SYRINGE (ML) INJECTION AS NEEDED
Status: DISCONTINUED | OUTPATIENT
Start: 2021-10-08 | End: 2021-10-08

## 2021-10-08 RX ORDER — ACETAMINOPHEN 325 MG/1
650 TABLET ORAL
Status: DISCONTINUED | OUTPATIENT
Start: 2021-10-08 | End: 2021-10-09 | Stop reason: HOSPADM

## 2021-10-08 RX ORDER — MIDAZOLAM HYDROCHLORIDE 1 MG/ML
INJECTION, SOLUTION INTRAMUSCULAR; INTRAVENOUS AS NEEDED
Status: DISCONTINUED | OUTPATIENT
Start: 2021-10-08 | End: 2021-10-08 | Stop reason: HOSPADM

## 2021-10-08 RX ORDER — INSULIN LISPRO 100 [IU]/ML
INJECTION, SOLUTION INTRAVENOUS; SUBCUTANEOUS
Status: DISCONTINUED | OUTPATIENT
Start: 2021-10-08 | End: 2021-10-09 | Stop reason: HOSPADM

## 2021-10-08 RX ORDER — SODIUM CHLORIDE 0.9 % (FLUSH) 0.9 %
10 SYRINGE (ML) INJECTION
Status: COMPLETED | OUTPATIENT
Start: 2021-10-08 | End: 2021-10-08

## 2021-10-08 RX ORDER — SODIUM CHLORIDE 9 MG/ML
75 INJECTION, SOLUTION INTRAVENOUS CONTINUOUS
Status: DISCONTINUED | OUTPATIENT
Start: 2021-10-08 | End: 2021-10-09 | Stop reason: HOSPADM

## 2021-10-08 RX ORDER — HYDROCODONE BITARTRATE AND ACETAMINOPHEN 5; 325 MG/1; MG/1
1 TABLET ORAL
Status: DISCONTINUED | OUTPATIENT
Start: 2021-10-08 | End: 2021-10-08

## 2021-10-08 RX ORDER — SODIUM CHLORIDE 9 MG/ML
75 INJECTION, SOLUTION INTRAVENOUS CONTINUOUS
Status: DISCONTINUED | OUTPATIENT
Start: 2021-10-08 | End: 2021-10-08

## 2021-10-08 RX ORDER — GUAIFENESIN 100 MG/5ML
81-324 LIQUID (ML) ORAL ONCE
Status: COMPLETED | OUTPATIENT
Start: 2021-10-08 | End: 2021-10-08

## 2021-10-08 RX ORDER — PANTOPRAZOLE SODIUM 40 MG/1
40 TABLET, DELAYED RELEASE ORAL
Status: DISCONTINUED | OUTPATIENT
Start: 2021-10-08 | End: 2021-10-08

## 2021-10-08 RX ORDER — SODIUM CHLORIDE 0.9 % (FLUSH) 0.9 %
5-40 SYRINGE (ML) INJECTION EVERY 8 HOURS
Status: DISCONTINUED | OUTPATIENT
Start: 2021-10-08 | End: 2021-10-08

## 2021-10-08 RX ORDER — CETIRIZINE HCL 10 MG
10 TABLET ORAL
Status: DISCONTINUED | OUTPATIENT
Start: 2021-10-08 | End: 2021-10-09 | Stop reason: HOSPADM

## 2021-10-08 RX ORDER — OXYCODONE HYDROCHLORIDE 5 MG/1
15 TABLET ORAL 3 TIMES DAILY
Status: DISCONTINUED | OUTPATIENT
Start: 2021-10-08 | End: 2021-10-09 | Stop reason: HOSPADM

## 2021-10-08 RX ORDER — MAG HYDROX/ALUMINUM HYD/SIMETH 200-200-20
30 SUSPENSION, ORAL (FINAL DOSE FORM) ORAL
Status: DISCONTINUED | OUTPATIENT
Start: 2021-10-08 | End: 2021-10-09 | Stop reason: HOSPADM

## 2021-10-08 RX ORDER — PREGABALIN 50 MG/1
100 CAPSULE ORAL 3 TIMES DAILY
Status: DISCONTINUED | OUTPATIENT
Start: 2021-10-08 | End: 2021-10-09 | Stop reason: HOSPADM

## 2021-10-08 RX ORDER — HYDROCODONE BITARTRATE AND ACETAMINOPHEN 5; 325 MG/1; MG/1
1 TABLET ORAL
Status: DISCONTINUED | OUTPATIENT
Start: 2021-10-08 | End: 2021-10-09 | Stop reason: HOSPADM

## 2021-10-08 RX ORDER — PANTOPRAZOLE SODIUM 40 MG/1
40 TABLET, DELAYED RELEASE ORAL 2 TIMES DAILY
Status: DISCONTINUED | OUTPATIENT
Start: 2021-10-08 | End: 2021-10-08 | Stop reason: SDUPTHER

## 2021-10-08 RX ORDER — INSULIN GLARGINE 100 [IU]/ML
126 INJECTION, SOLUTION SUBCUTANEOUS DAILY
Status: DISCONTINUED | OUTPATIENT
Start: 2021-10-09 | End: 2021-10-09 | Stop reason: HOSPADM

## 2021-10-08 RX ORDER — HEPARIN SODIUM 200 [USP'U]/100ML
INJECTION, SOLUTION INTRAVENOUS
Status: COMPLETED | OUTPATIENT
Start: 2021-10-08 | End: 2021-10-08

## 2021-10-08 RX ORDER — MORPHINE SULFATE 2 MG/ML
2 INJECTION, SOLUTION INTRAMUSCULAR; INTRAVENOUS
Status: DISCONTINUED | OUTPATIENT
Start: 2021-10-08 | End: 2021-10-09 | Stop reason: HOSPADM

## 2021-10-08 RX ORDER — ATORVASTATIN CALCIUM 40 MG/1
40 TABLET, FILM COATED ORAL DAILY
Status: DISCONTINUED | OUTPATIENT
Start: 2021-10-09 | End: 2021-10-09 | Stop reason: HOSPADM

## 2021-10-08 RX ORDER — NITROGLYCERIN 0.4 MG/1
0.4 TABLET SUBLINGUAL
Status: DISCONTINUED | OUTPATIENT
Start: 2021-10-08 | End: 2021-10-09 | Stop reason: HOSPADM

## 2021-10-08 RX ORDER — INSULIN LISPRO 100 [IU]/ML
20 INJECTION, SOLUTION INTRAVENOUS; SUBCUTANEOUS
Status: DISCONTINUED | OUTPATIENT
Start: 2021-10-08 | End: 2021-10-09 | Stop reason: HOSPADM

## 2021-10-08 RX ORDER — PANTOPRAZOLE SODIUM 40 MG/1
40 TABLET, DELAYED RELEASE ORAL
Status: COMPLETED | OUTPATIENT
Start: 2021-10-08 | End: 2021-10-08

## 2021-10-08 RX ORDER — LIDOCAINE HYDROCHLORIDE 10 MG/ML
INJECTION INFILTRATION; PERINEURAL AS NEEDED
Status: DISCONTINUED | OUTPATIENT
Start: 2021-10-08 | End: 2021-10-08 | Stop reason: HOSPADM

## 2021-10-08 RX ORDER — FENTANYL CITRATE 50 UG/ML
INJECTION, SOLUTION INTRAMUSCULAR; INTRAVENOUS AS NEEDED
Status: DISCONTINUED | OUTPATIENT
Start: 2021-10-08 | End: 2021-10-08 | Stop reason: HOSPADM

## 2021-10-08 RX ORDER — PANTOPRAZOLE SODIUM 40 MG/1
40 TABLET, DELAYED RELEASE ORAL
Status: DISCONTINUED | OUTPATIENT
Start: 2021-10-08 | End: 2021-10-09 | Stop reason: HOSPADM

## 2021-10-08 RX ADMIN — OXYCODONE 15 MG: 5 TABLET ORAL at 19:02

## 2021-10-08 RX ADMIN — HYDROCODONE BITARTRATE AND ACETAMINOPHEN 1 TABLET: 5; 325 TABLET ORAL at 15:37

## 2021-10-08 RX ADMIN — OXYCODONE 15 MG: 5 TABLET ORAL at 23:43

## 2021-10-08 RX ADMIN — PREGABALIN 100 MG: 50 CAPSULE ORAL at 19:13

## 2021-10-08 RX ADMIN — CETIRIZINE HYDROCHLORIDE 10 MG: 10 TABLET, FILM COATED ORAL at 21:37

## 2021-10-08 RX ADMIN — PREGABALIN 100 MG: 50 CAPSULE ORAL at 23:43

## 2021-10-08 RX ADMIN — IOPAMIDOL 75 ML: 755 INJECTION, SOLUTION INTRAVENOUS at 16:24

## 2021-10-08 RX ADMIN — PANTOPRAZOLE SODIUM 40 MG: 40 TABLET, DELAYED RELEASE ORAL at 15:37

## 2021-10-08 RX ADMIN — SODIUM CHLORIDE 100 ML: 900 INJECTION, SOLUTION INTRAVENOUS at 16:24

## 2021-10-08 RX ADMIN — INSULIN LISPRO 6 UNITS: 100 INJECTION, SOLUTION INTRAVENOUS; SUBCUTANEOUS at 21:28

## 2021-10-08 RX ADMIN — ALUMINUM HYDROXIDE, MAGNESIUM HYDROXIDE, AND SIMETHICONE 30 ML: 200; 200; 20 SUSPENSION ORAL at 15:38

## 2021-10-08 RX ADMIN — ASPIRIN 324 MG: 81 TABLET, CHEWABLE ORAL at 13:06

## 2021-10-08 RX ADMIN — Medication 10 ML: at 16:24

## 2021-10-08 NOTE — PROGRESS NOTES
Patient complaining of non cardiac chest pain, Dr. Priscilla Easton notified. Orders received to give Mylanta, Protonix, and Norco 5/325 mg po for non cardiac chest pain.

## 2021-10-08 NOTE — Clinical Note
TRANSFER - OUT REPORT:     Verbal report given to: RN. Report consisted of patient's Situation, Background, Assessment and   Recommendations(SBAR). Opportunity for questions and clarification was provided. Patient transported with a Registered Nurse. Patient transported to: [facility]

## 2021-10-08 NOTE — PROGRESS NOTES
TRANSFER - OUT REPORT:    Verbal report given to Roger Velazquez RN(name) on [facility]  being transferred to CPRU(unit) for routine progression of care       Report consisted of patients Situation, Background, Assessment and   Recommendations(SBAR). Information from the following report(s) SBAR was reviewed with the receiving nurse.     [facility] with Dr Heidi Ortega  No interventions  Chest CT  R Radial  TR band at 12mL  Versed 2mg  Fentanyl 75mcg  Heparin 5000 units in radial cocktail

## 2021-10-08 NOTE — PROGRESS NOTES
Report received from [address] [place] [address]. Procedural findings communicated. Intra procedural  medication administration reviewed. Progression of care discussed.      Patient received into [id number] [facility] 5 post sheath removal.     Access site without bleeding or swelling yes    Dressing dry and intact yes    Patient instructed to limit movement to right upper extremity    Routine post procedural vital signs and site assessment initiated yes

## 2021-10-08 NOTE — RT PROTOCOL NOTE
Bedside and Verbal shift change report given to myself (oncoming nurse) by Armani Villanueva RN (offgoing nurse). Report included the following information SBAR, Kardex, MAR and Recent Results.

## 2021-10-08 NOTE — PROGRESS NOTES
Verbal report given to Tele RN on Martha Greene   for routine progression of care       Report consisted of patients Situation, Background, Assessment and   Recommendations(SBAR). Information from the following report(s) Procedure Summary was reviewed with the receiving nurse. Lines:   Peripheral IV 10/08/21 Right Antecubital (Active)   Site Assessment Clean, dry, & intact 10/08/21 1303   Phlebitis Assessment 0 10/08/21 1303   Dressing Status Clean, dry, & intact 10/08/21 1303   Dressing Type Transparent 10/08/21 1303   Hub Color/Line Status Pink 10/08/21 1303        Opportunity for questions and clarification was provided.

## 2021-10-08 NOTE — ROUTINE PROCESS
Patient received to [address] [place] [address] room # 12  Ambulatory from [facility]. Patient scheduled for [facility] today with Dr Priscilla Easton. Procedure reviewed & questions answered, voiced good understanding consent obtained & placed on chart. All medications and medical history reviewed. Will prep patient per orders. Patient & family updated on plan of care. The patient has a fraility score of 3-MANAGING WELL, based on reports no recent falls.     ASA 81 mg x 4 given on arrival.

## 2021-10-09 VITALS
SYSTOLIC BLOOD PRESSURE: 134 MMHG | HEIGHT: 72 IN | DIASTOLIC BLOOD PRESSURE: 65 MMHG | WEIGHT: 244 LBS | RESPIRATION RATE: 16 BRPM | OXYGEN SATURATION: 99 % | BODY MASS INDEX: 33.05 KG/M2 | HEART RATE: 80 BPM | TEMPERATURE: 97.7 F

## 2021-10-09 LAB
ANION GAP SERPL CALC-SCNC: 5 MMOL/L (ref 7–16)
BUN SERPL-MCNC: 15 MG/DL (ref 6–23)
CALCIUM SERPL-MCNC: 8.9 MG/DL (ref 8.3–10.4)
CHLORIDE SERPL-SCNC: 107 MMOL/L (ref 98–107)
CO2 SERPL-SCNC: 25 MMOL/L (ref 21–32)
CREAT SERPL-MCNC: 1.49 MG/DL (ref 0.8–1.5)
ERYTHROCYTE [DISTWIDTH] IN BLOOD BY AUTOMATED COUNT: 14 % (ref 11.9–14.6)
GLUCOSE SERPL-MCNC: 81 MG/DL (ref 65–100)
HCT VFR BLD AUTO: 44 % (ref 41.1–50.3)
HGB BLD-MCNC: 14.2 G/DL (ref 13.6–17.2)
MAGNESIUM SERPL-MCNC: 2.1 MG/DL (ref 1.8–2.4)
MCH RBC QN AUTO: 27.2 PG (ref 26.1–32.9)
MCHC RBC AUTO-ENTMCNC: 32.3 G/DL (ref 31.4–35)
MCV RBC AUTO: 84.3 FL (ref 79.6–97.8)
NRBC # BLD: 0 K/UL (ref 0–0.2)
PLATELET # BLD AUTO: 112 K/UL (ref 150–450)
PMV BLD AUTO: 11.4 FL (ref 9.4–12.3)
POTASSIUM SERPL-SCNC: 3.5 MMOL/L (ref 3.5–5.1)
RBC # BLD AUTO: 5.22 M/UL (ref 4.23–5.6)
SODIUM SERPL-SCNC: 137 MMOL/L (ref 138–145)
WBC # BLD AUTO: 9.1 K/UL (ref 4.3–11.1)

## 2021-10-09 PROCEDURE — 80048 BASIC METABOLIC PNL TOTAL CA: CPT

## 2021-10-09 PROCEDURE — 83735 ASSAY OF MAGNESIUM: CPT

## 2021-10-09 PROCEDURE — 74011250636 HC RX REV CODE- 250/636: Performed by: NURSE PRACTITIONER

## 2021-10-09 PROCEDURE — 99212 OFFICE O/P EST SF 10 MIN: CPT | Performed by: INTERNAL MEDICINE

## 2021-10-09 PROCEDURE — 74011250637 HC RX REV CODE- 250/637: Performed by: NURSE PRACTITIONER

## 2021-10-09 PROCEDURE — 74011250637 HC RX REV CODE- 250/637: Performed by: INTERNAL MEDICINE

## 2021-10-09 PROCEDURE — 85027 COMPLETE CBC AUTOMATED: CPT

## 2021-10-09 PROCEDURE — 36415 COLL VENOUS BLD VENIPUNCTURE: CPT

## 2021-10-09 RX ADMIN — ATORVASTATIN CALCIUM 40 MG: 40 TABLET, FILM COATED ORAL at 08:49

## 2021-10-09 RX ADMIN — HYDROCODONE BITARTRATE AND ACETAMINOPHEN 1 TABLET: 5; 325 TABLET ORAL at 05:36

## 2021-10-09 RX ADMIN — MORPHINE SULFATE 2 MG: 2 INJECTION, SOLUTION INTRAMUSCULAR; INTRAVENOUS at 01:31

## 2021-10-09 RX ADMIN — SERTRALINE 150 MG: 100 TABLET, FILM COATED ORAL at 08:48

## 2021-10-09 RX ADMIN — OXYCODONE 15 MG: 5 TABLET ORAL at 08:48

## 2021-10-09 RX ADMIN — PANTOPRAZOLE SODIUM 40 MG: 40 TABLET, DELAYED RELEASE ORAL at 05:36

## 2021-10-09 RX ADMIN — PREGABALIN 100 MG: 50 CAPSULE ORAL at 08:48

## 2021-10-09 NOTE — DISCHARGE SUMMARY
[facility] Discharge Summary     Patient ID:  Williams Quarles  [id number]  1 y.o.  1965    Admit date: 10/8/2021    Discharge date:  10/9/2021    Admitting Physician: Mayda Dennis MD     Discharge Physician: Dr. Emy Anderson    Admission Diagnoses: Chest pain, unspecified type [R07.9]  Chest pain [R07.9]    Discharge Diagnoses:    Diagnosis    Chest pain    Paroxysmal atrial fibrillation     RBBB    Coronary artery disease involving native coronary artery of native heart    Atrial fibrillation and flutter     Type 2 diabetes with nephropathy     Severe obesity (BMI 35.0-39. 9)    Non-rheumatic mitral regurgitation    Chronic diastolic congestive heart failure     Tobacco dependence    Restless leg syndrome    HTN (hypertension)    WILBERTO on CPAP    Dyslipidemia       Cardiology Procedures this admission:  Diagnostic left heart catheterization  Consults: None    Hospital Course: Patient was seen at the office of [facility] Card[facility] by Dr. Emy Anderson for urgent visit due to complaints of CP. He was found to have new RBBB and was sent to [facility] and scheduled for a LHC on 10/8/21. Patient underwent cardiac catheterization by Dr. Sravanthi Valdovinos. Patient was found to have minimal CAD and thus CP felt to be non-cardiac. He was sent to CTA chest which was negative for PE. Patient tolerated the procedure(s) well and was taken to the prep and recovery area for recovery. The following morning patient was up feeling well without any complaints of chest pain or shortness of breath. Patient's right radial cath site was clean, dry and intact without hematoma or bruit. Patient's labs were stable. Patient was seen and examined by Dr. Emy Anderson and determined stable and ready for discharge. Patient was instructed on the importance of medication compliance and OP follow-up. The patient will follow up with [facility] as directed.       DISPOSITION: The patient is being discharged home in stable condition on a low saturated fat, low cholesterol and low salt diet. The patient is instructed to advance activities as tolerated to the limit of fatigue or shortness of breath. The patient is instructed to avoid all heavy lifting, straining, stooping or squatting for 3-5 days. The patient is instructed to watch the cath site for bleeding/oozing; if seen, the patient is instructed to apply firm pressure with a clean cloth and call [facility] at [phone]. The patient is instructed to watch for signs of infection which include: increasing area of redness, fever/hot to touch or purulent drainage at the catheterization site. The patient is instructed not to soak in a bathtub for 7-10 days, but is cleared to shower. The patient is instructed to call the office or return to the ER for immediate evaluation for any shortness of breath or chest pain not relieved by NTG. Discharge Exam:   Visit Vitals  /61 (BP 1 Location: Left upper arm, BP Patient Position: At rest)   Pulse 67   Temp 97.7 °F (36.5 °C)   Resp 18   Ht 6' (1.829 m)   Wt 110.7 kg (244 lb)   SpO2 98%   BMI 33.09 kg/m²       Patient has been seen by Dr. Eleazar Evans: see his progress note for exam details.     Recent Results (from the past 24 hour(s))   CBC W/O DIFF    Collection Time: 10/08/21 12:42 PM   Result Value Ref Range    WBC 8.3 4.3 - 11.1 K/uL    RBC 5.16 4.23 - 5.6 M/uL    HGB 14.4 13.6 - 17.2 g/dL    HCT 45.1 41.1 - 50.3 %    MCV 87.4 79.6 - 97.8 FL    MCH 27.9 26.1 - 32.9 PG    MCHC 31.9 31.4 - 35.0 g/dL    RDW 13.7 11.9 - 14.6 %    PLATELET 708 (L) 661 - 450 K/uL    MPV 12.5 (H) 9.4 - 12.3 FL    ABSOLUTE NRBC 0.00 0.0 - 0.2 K/uL   METABOLIC PANEL, COMPREHENSIVE    Collection Time: 10/08/21 12:42 PM   Result Value Ref Range    Sodium 137 136 - 145 mmol/L    Potassium 3.9 3.5 - 5.1 mmol/L    Chloride 110 (H) 98 - 107 mmol/L    CO2 24 21 - 32 mmol/L    Anion gap 3 (L) 7 - 16 mmol/L    Glucose 162 (H) 65 - 100 mg/dL    BUN 18 6 - 23 MG/DL    Creatinine 1.55 (H) 0.8 - 1.5 MG/DL    GFR est AA 60 (L) >60 ml/min/1.73m2    GFR est non-AA 49 (L) >60 ml/min/1.73m2    Calcium 9.0 8.3 - 10.4 MG/DL    Bilirubin, total 0.3 0.2 - 1.1 MG/DL    ALT (SGPT) 26 12 - 65 U/L    AST (SGOT) 28 15 - 37 U/L    Alk. phosphatase 90 50 - 136 U/L    Protein, total 7.1 6.3 - 8.2 g/dL    Albumin 3.8 3.5 - 5.0 g/dL    Globulin 3.3 2.3 - 3.5 g/dL    A-G Ratio 1.2 1.2 - 3.5     MAGNESIUM    Collection Time: 10/08/21 12:42 PM   Result Value Ref Range    Magnesium 2.1 1.8 - 2.4 mg/dL   EKG, 12 LEAD, INITIAL    Collection Time: 10/08/21  2:20 PM   Result Value Ref Range    Ventricular Rate 71 BPM    Atrial Rate 71 BPM    P-R Interval 216 ms    QRS Duration 128 ms    Q-T Interval 376 ms    QTC Calculation (Bezet) 408 ms    Calculated P Axis -23 degrees    Calculated R Axis 77 degrees    Calculated T Axis 49 degrees    Diagnosis       Sinus rhythm with 1st degree A-V block  Right bundle branch block  Abnormal ECG  When compared with ECG of 29-NOV-2020 12:53,  Premature atrial complexes are no longer Present  SC interval has increased  Right bundle branch block is now Present  Confirmed by [facility]  MD ()BEAU ([id number]) on 10/8/2021 3:33:19 PM     CREATININE, POC    Collection Time: 10/08/21  4:21 PM   Result Value Ref Range    Creatinine (POC) 1.39 0.8 - 1.5 mg/dL    GFRAA, POC >60 >60 ml/min/1.73m2    GFRNA, POC 56 (L) >60 ml/min/1.73m2   GLUCOSE, POC    Collection Time: 10/08/21  9:25 PM   Result Value Ref Range    Glucose (POC) 271 (H) 65 - 100 mg/dL    Performed by Paola Sow          Patient Instructions:   Current Discharge Medication List      CONTINUE these medications which have NOT CHANGED    Details   insulin glargine,hum.rec.anlog (TOUJEO SOLOSTAR U-300 INSULIN SC) 126 Units by SubCUTAneous route daily. insulin aspart U-100 (NOVOLOG FLEXPEN U-100 INSULIN) 100 unit/mL inpn 20 Units by SubCUTAneous route Before breakfast, lunch, dinner and at bedtime. Sliding scale with meals       oxyCODONE IR (OXY-IR) 15 mg immediate release tablet Take 15 mg by mouth three (3) times daily. furosemide (LASIX) 40 mg tablet Take 1 Tab by mouth daily. Qty: 90 Tab, Refills: 11    Associated Diagnoses: Diastolic CHF, acute on chronic (HCC)      traZODone (DESYREL) 50 mg tablet Take 1 Tablet by mouth.      promethazine (PHENERGAN) 25 mg tablet Take 1 Tab by mouth every six (6) hours as needed (for headache and/or nausea). Qty: 12 Tab, Refills: 0      promethazine (PHENERGAN) 25 mg suppository Insert 1 Suppository into rectum every six (6) hours as needed for Nausea. Qty: 12 Suppository, Refills: 0      pantoprazole (Protonix) 40 mg tablet Take 1 Tab by mouth two (2) times a day. Qty: 60 Tab, Refills: 2      pregabalin (LYRICA) 100 mg capsule Take 100 mg by mouth three (3) times daily. Indications: Diabetic Peripheral Neuropathy      sertraline (ZOLOFT) 100 mg tablet Take 150 mg by mouth daily. amylase-lipase-protease (CREON) 24,000-76,000 -120,000 unit capsule Take 1 Cap by mouth three (3) times daily (with meals). Indications: exocrine pancreatic insufficiency      colestipol (COLESTID) 1 gram tablet Take 1 g by mouth two (2) times a day. Indications: hypercholesterolemia      budesonide-formoterol (SYMBICORT) 80-4.5 mcg/actuation HFAA inhaler Take 2 Puffs by inhalation two (2) times daily as needed. Indications: BRONCHOSPASM PREVENTION WITH COPD, bring on the dos      albuterol (PROVENTIL HFA, VENTOLIN HFA, PROAIR HFA) 90 mcg/actuation inhaler Take 2 Puffs by inhalation every six (6) hours as needed. Indications: BRONCHOSPASM PREVENTION, bring on the dos      atorvastatin (LIPITOR) 40 mg tablet Take 1 Tab by mouth nightly. Qty: 30 Tab, Refills: 3      cetirizine (ZYRTEC) 10 mg tablet Take 10 mg by mouth nightly. Indications:  Allergic Rhinitis           Dr. Diallo Murphy, PABrittonC

## 2021-10-09 NOTE — DISCHARGE INSTRUCTIONS
DISCHARGE SUMMARY from Nurse    PATIENT INSTRUCTIONS:    After general anesthesia or intravenous sedation, for 24 hours or while taking prescription Narcotics:  · Limit your activities  · Do not drive and operate hazardous machinery  · Do not make important personal or business decisions  · Do  not drink alcoholic beverages  · If you have not urinated within 8 hours after discharge, please contact your surgeon on call. Report the following to your surgeon:  · Excessive pain, swelling, redness or odor of or around the surgical area  · Temperature over 100.5  · Nausea and vomiting lasting longer than 4 hours or if unable to take medications  · Any signs of decreased circulation or nerve impairment to extremity: change in color, persistent  numbness, tingling, coldness or increase pain  · Any questions    What to do at Home:  Recommended activity:     *  Please give a list of your current medications to your Primary Care Provider. *  Please update this list whenever your medications are discontinued, doses are      changed, or new medications (including over-the-counter products) are added. *  Please carry medication information at all times in case of emergency situations. These are general instructions for a healthy lifestyle:    No smoking/ No tobacco products/ Avoid exposure to second hand smoke  Surgeon General's Warning:  Quitting smoking now greatly reduces serious risk to your health. Obesity, smoking, and sedentary lifestyle greatly increases your risk for illness    A healthy diet, regular physical exercise & weight monitoring are important for maintaining a healthy lifestyle    You may be retaining fluid if you have a history of heart failure or if you experience any of the following symptoms:  Weight gain of 3 pounds or more overnight or 5 pounds in a week, increased swelling in our hands or feet or shortness of breath while lying flat in bed.   Please call your doctor as soon as you notice any of these symptoms; do not wait until your next office visit. The discharge information has been reviewed with the patient. The patient verbalized understanding. Discharge medications reviewed with the patient and appropriate educational materials and side effects teaching were provided. ___________________________________________________________________________________________________________________________________                     Cardiac Catheterization/Angiography Discharge Instructions    *Check the puncture site frequently for swelling or bleeding. If you see any bleeding, lie down and apply pressure over the area with a clean town or washcloth. Notify your doctor for any redness, swelling, drainage or oozing from the puncture site. Notify your doctor for any fever or chills. *If the leg or arm with the puncture becomes cold, numb or painful, call [phone]    *Activity should be limited for the next 48 hours. Climb stairs as little as possible and avoid any stooping, bending or strenuous activity for 48 hours. No heavy lifting (anything over 10 pounds) for three days. *Do not drive for 48 hours. *You may resume your usual diet. Drink more fluids than usual.    *Have a responsible person drive you home and stay with you for at least 24 hours after your heart catheterization/angiography. *You may remove the bandage from your Right and Arm in 24 hours. You may shower in 24 hours. No tub baths, hot tubs or swimming for one week. Do not place any lotions, creams, powders, ointments over the puncture site for one week. You may place a clean band-aid over the puncture site each day for 5 days. Change this daily.

## 2021-10-09 NOTE — ROUTINE PROCESS
Bedside and Verbal shift change report given to [employer], RN (oncoming nurse) by myself (offgoing nurse). Report included the following information SBAR, Kardex, MAR and Recent Results.

## 2021-10-09 NOTE — PROGRESS NOTES
Discharge instructions given and reviewed with patient. Discharged home. Unable to obtain signature as there is no signature pad on the only available WOW. Patient voiced understanding of instructions.

## 2021-10-09 NOTE — PROGRESS NOTES
Pt received oxycodone and lyrica at 1900. Spoke with Benjamín Bond NP about patients next dose of oxycodone and lyrica due at 2200. Orders to move to 2300. Also spoke with Sheri Caban in pharmacy and she stated that both meds are TID, but not necessarily 8 hours apart.

## 2021-10-09 NOTE — PROGRESS NOTES
[facility] CARDIOLOGY PROGRESS NOTE           10/9/2021 8:49 AM    Admit Date: 10/8/2021    Admit Diagnosis: Chest pain, unspecified type [R07.9]; Chest pain [R07.9]    Assessment:   Active Problems:    Chest pain (10/8/2021)        Plan:   1. Chest pain - stable LHC and chest CT. Possibly allergies? Continue home medicines including anti-histamine. 2. History of AF - s/p previous ablation, off [facility], no recurrent AF seen over last 3 years. 3. DMII - continue home medicines. 4. Dispo - anticipate d/c today. Thank you for allowing me to participate in the electrophysiologic care of this most pleasant patient. Please feel free to contact me if there are any questions or concerns. Puja Vu MD, MS  Clinical Cardiac Electrophysiology  [facility]    Subjective:   No complaints this AM, no chest pain or shortness of breath    Interval History: (History of pertinent interval events obtained from nursing staff)    ROS:  GEN:  No fever or chills  Cardiovascular:  As noted above  Pulmonary:  As noted above  Neuro:  No new focal motor or sensory loss      Objective:     Vitals:    10/08/21 1957 10/08/21 2345 10/09/21 0417 10/09/21 0801   BP: 130/61 (!) 126/58 (!) 131/54 (P) 134/65   Pulse: 67 72 69    Resp: 18 16 20 (P) 16   Temp: 97.7 °F (36.5 °C) 98.6 °F (37 °C) 97.8 °F (36.6 °C) (P) 97.7 °F (36.5 °C)   SpO2: 98% 99% 96% (P) 99%   Weight:       Height:           Physical Exam:  General-Well Developed, Well Nourished, No Acute Distress, Alert & Oriented x 3, appropriate mood. Neck- supple, no JVD  CV- regular rate and rhythm no MRG  Lung- clear bilaterally  Abd- soft, nontender, nondistended  Ext- no edema bilaterally.   Skin- warm and dry    Current Facility-Administered Medications   Medication Dose Route Frequency    0.9% sodium chloride infusion  75 mL/hr IntraVENous CONTINUOUS    pantoprazole (PROTONIX) tablet 40 mg  40 mg Oral ACB&D    pregabalin (LYRICA) capsule 100 mg 100 mg Oral TID    cetirizine (ZYRTEC) tablet 10 mg  10 mg Oral QHS    atorvastatin (LIPITOR) tablet 40 mg  40 mg Oral DAILY    oxyCODONE IR (ROXICODONE) tablet 15 mg  15 mg Oral TID    sertraline (ZOLOFT) tablet 150 mg  150 mg Oral DAILY    traZODone (DESYREL) tablet 50 mg  50 mg Oral DAILY    insulin lispro (HUMALOG) injection 20 Units  20 Units SubCUTAneous TIDAC    insulin lispro (HUMALOG) injection   SubCUTAneous AC&HS    insulin glargine (LANTUS) injection 126 Units  126 Units SubCUTAneous DAILY    nitroglycerin (NITROSTAT) tablet 0.4 mg  0.4 mg SubLINGual Q5MIN PRN    acetaminophen (TYLENOL) tablet 650 mg  650 mg Oral Q4H PRN    morphine injection 2 mg  2 mg IntraVENous Q4H PRN    HYDROcodone-acetaminophen (NORCO) 5-325 mg per tablet 1 Tablet  1 Tablet Oral Q6H PRN    alum-mag hydroxide-simeth (MYLANTA) oral suspension 30 mL  30 mL Oral Q4H PRN       Data Review:   Recent Results (from the past 24 hour(s))   CBC W/O DIFF    Collection Time: 10/08/21 12:42 PM   Result Value Ref Range    WBC 8.3 4.3 - 11.1 K/uL    RBC 5.16 4.23 - 5.6 M/uL    HGB 14.4 13.6 - 17.2 g/dL    HCT 45.1 41.1 - 50.3 %    MCV 87.4 79.6 - 97.8 FL    MCH 27.9 26.1 - 32.9 PG    MCHC 31.9 31.4 - 35.0 g/dL    RDW 13.7 11.9 - 14.6 %    PLATELET 743 (L) 838 - 450 K/uL    MPV 12.5 (H) 9.4 - 12.3 FL    ABSOLUTE NRBC 0.00 0.0 - 0.2 K/uL   METABOLIC PANEL, COMPREHENSIVE    Collection Time: 10/08/21 12:42 PM   Result Value Ref Range    Sodium 137 136 - 145 mmol/L    Potassium 3.9 3.5 - 5.1 mmol/L    Chloride 110 (H) 98 - 107 mmol/L    CO2 24 21 - 32 mmol/L    Anion gap 3 (L) 7 - 16 mmol/L    Glucose 162 (H) 65 - 100 mg/dL    BUN 18 6 - 23 MG/DL    Creatinine 1.55 (H) 0.8 - 1.5 MG/DL    GFR est AA 60 (L) >60 ml/min/1.73m2    GFR est non-AA 49 (L) >60 ml/min/1.73m2    Calcium 9.0 8.3 - 10.4 MG/DL    Bilirubin, total 0.3 0.2 - 1.1 MG/DL    ALT (SGPT) 26 12 - 65 U/L    AST (SGOT) 28 15 - 37 U/L    Alk.  phosphatase 90 50 - 136 U/L Protein, total 7.1 6.3 - 8.2 g/dL    Albumin 3.8 3.5 - 5.0 g/dL    Globulin 3.3 2.3 - 3.5 g/dL    A-G Ratio 1.2 1.2 - 3.5     MAGNESIUM    Collection Time: 10/08/21 12:42 PM   Result Value Ref Range    Magnesium 2.1 1.8 - 2.4 mg/dL   EKG, 12 LEAD, INITIAL    Collection Time: 10/08/21  2:20 PM   Result Value Ref Range    Ventricular Rate 71 BPM    Atrial Rate 71 BPM    P-R Interval 216 ms    QRS Duration 128 ms    Q-T Interval 376 ms    QTC Calculation (Bezet) 408 ms    Calculated P Axis -23 degrees    Calculated R Axis 77 degrees    Calculated T Axis 49 degrees    Diagnosis       Sinus rhythm with 1st degree A-V block  Right bundle branch block  Abnormal ECG  When compared with ECG of 29-NOV-2020 12:53,  Premature atrial complexes are no longer Present  MS interval has increased  Right bundle branch block is now Present  Confirmed by Pina Hernández MD (), BEAU TAYLOR ([id number]) on 10/8/2021 3:33:19 PM     CREATININE, POC    Collection Time: 10/08/21  4:21 PM   Result Value Ref Range    Creatinine (POC) 1.39 0.8 - 1.5 mg/dL    GFRAA, POC >60 >60 ml/min/1.73m2    GFRNA, POC 56 (L) >60 ml/min/1.73m2   GLUCOSE, POC    Collection Time: 10/08/21  9:25 PM   Result Value Ref Range    Glucose (POC) 271 (H) 65 - 100 mg/dL    [facility]    METABOLIC PANEL, BASIC    Collection Time: 10/09/21  6:23 AM   Result Value Ref Range    Sodium 137 (L) 138 - 145 mmol/L    Potassium 3.5 3.5 - 5.1 mmol/L    Chloride 107 98 - 107 mmol/L    CO2 25 21 - 32 mmol/L    Anion gap 5 (L) 7 - 16 mmol/L    Glucose 81 65 - 100 mg/dL    BUN 15 6 - 23 MG/DL    Creatinine 1.49 0.8 - 1.5 MG/DL    GFR est AA >60 >60 ml/min/1.73m2    GFR est non-AA 51 (L) >60 ml/min/1.73m2    Calcium 8.9 8.3 - 10.4 MG/DL   CBC W/O DIFF    Collection Time: 10/09/21  6:23 AM   Result Value Ref Range    WBC 9.1 4.3 - 11.1 K/uL    RBC 5.22 4.23 - 5.6 M/uL    HGB 14.2 13.6 - 17.2 g/dL    HCT 44.0 41.1 - 50.3 %    MCV 84.3 79.6 - 97.8 FL    MCH 27.2 26.1 - 32.9 PG MCHC 32.3 31.4 - 35.0 g/dL    RDW 14.0 11.9 - 14.6 %    PLATELET 778 (L) 128 - 450 K/uL    MPV 11.4 9.4 - 12.3 FL    ABSOLUTE NRBC 0.00 0.0 - 0.2 K/uL   MAGNESIUM    Collection Time: 10/09/21  6:23 AM   Result Value Ref Range    Magnesium 2.1 1.8 - 2.4 mg/dL       EKG:  (EKG has been independently visualized by me with interpretation below): NSR, FDAVB, RBBB, normal axis.

## 2021-12-28 ENCOUNTER — APPOINTMENT (OUTPATIENT)
Dept: GENERAL RADIOLOGY | Age: 56
End: 2021-12-28
Attending: EMERGENCY MEDICINE
Payer: MEDICARE

## 2021-12-28 ENCOUNTER — HOSPITAL ENCOUNTER (EMERGENCY)
Age: 56
Discharge: HOME | End: 2021-12-28
Attending: EMERGENCY MEDICINE
Payer: MEDICARE

## 2021-12-28 VITALS
HEART RATE: 90 BPM | BODY MASS INDEX: 33.86 KG/M2 | OXYGEN SATURATION: 92 % | DIASTOLIC BLOOD PRESSURE: 72 MMHG | HEIGHT: 72 IN | RESPIRATION RATE: 25 BRPM | WEIGHT: 250 LBS | TEMPERATURE: 98.7 F | SYSTOLIC BLOOD PRESSURE: 161 MMHG

## 2021-12-28 DIAGNOSIS — J18.9 COMMUNITY ACQUIRED PNEUMONIA OF RIGHT LOWER LOBE OF LUNG: Primary | ICD-10-CM

## 2021-12-28 LAB
ALBUMIN SERPL-MCNC: 3.6 G/DL (ref 3.5–5)
ALBUMIN/GLOB SERPL: 1.1 {RATIO} (ref 1.2–3.5)
ALP SERPL-CCNC: 71 U/L (ref 50–136)
ALT SERPL-CCNC: 28 U/L (ref 12–65)
ANION GAP SERPL CALC-SCNC: 5 MMOL/L (ref 7–16)
AST SERPL-CCNC: 17 U/L (ref 15–37)
ATRIAL RATE: 85 BPM
B PERT DNA SPEC QL NAA+PROBE: NOT DETECTED
BASOPHILS # BLD: 0 K/UL (ref 0–0.2)
BASOPHILS NFR BLD: 0 % (ref 0–2)
BILIRUB SERPL-MCNC: 0.5 MG/DL (ref 0.2–1.1)
BORDETELLA PARAPERTUSSIS PCR, BORPAR: NOT DETECTED
BUN SERPL-MCNC: 15 MG/DL (ref 6–23)
C PNEUM DNA SPEC QL NAA+PROBE: NOT DETECTED
CALCIUM SERPL-MCNC: 9 MG/DL (ref 8.3–10.4)
CALCULATED P AXIS, ECG09: 6 DEGREES
CALCULATED R AXIS, ECG10: 101 DEGREES
CALCULATED T AXIS, ECG11: 14 DEGREES
CHLORIDE SERPL-SCNC: 108 MMOL/L (ref 98–107)
CO2 SERPL-SCNC: 25 MMOL/L (ref 21–32)
CREAT SERPL-MCNC: 1.38 MG/DL (ref 0.8–1.5)
DIAGNOSIS, 93000: NORMAL
DIFFERENTIAL METHOD BLD: ABNORMAL
EOSINOPHIL # BLD: 0.1 K/UL (ref 0–0.8)
EOSINOPHIL NFR BLD: 1 % (ref 0.5–7.8)
ERYTHROCYTE [DISTWIDTH] IN BLOOD BY AUTOMATED COUNT: 13.9 % (ref 11.9–14.6)
FLUAV SUBTYP SPEC NAA+PROBE: NOT DETECTED
FLUBV RNA SPEC QL NAA+PROBE: NOT DETECTED
GLOBULIN SER CALC-MCNC: 3.2 G/DL (ref 2.3–3.5)
GLUCOSE SERPL-MCNC: 218 MG/DL (ref 65–100)
HADV DNA SPEC QL NAA+PROBE: NOT DETECTED
HCOV 229E RNA SPEC QL NAA+PROBE: NOT DETECTED
HCOV HKU1 RNA SPEC QL NAA+PROBE: NOT DETECTED
HCOV NL63 RNA SPEC QL NAA+PROBE: NOT DETECTED
HCOV OC43 RNA SPEC QL NAA+PROBE: NOT DETECTED
HCT VFR BLD AUTO: 41.1 % (ref 41.1–50.3)
HGB BLD-MCNC: 13.5 G/DL (ref 13.6–17.2)
HMPV RNA SPEC QL NAA+PROBE: NOT DETECTED
HPIV1 RNA SPEC QL NAA+PROBE: NOT DETECTED
HPIV2 RNA SPEC QL NAA+PROBE: NOT DETECTED
HPIV3 RNA SPEC QL NAA+PROBE: NOT DETECTED
HPIV4 RNA SPEC QL NAA+PROBE: NOT DETECTED
IMM GRANULOCYTES # BLD AUTO: 0 K/UL (ref 0–0.5)
IMM GRANULOCYTES NFR BLD AUTO: 0 % (ref 0–5)
LACTATE SERPL-SCNC: 2 MMOL/L (ref 0.4–2)
LIPASE SERPL-CCNC: 61 U/L (ref 73–393)
LYMPHOCYTES # BLD: 1.1 K/UL (ref 0.5–4.6)
LYMPHOCYTES NFR BLD: 12 % (ref 13–44)
M PNEUMO DNA SPEC QL NAA+PROBE: NOT DETECTED
MAGNESIUM SERPL-MCNC: 1.6 MG/DL (ref 1.8–2.4)
MCH RBC QN AUTO: 28.3 PG (ref 26.1–32.9)
MCHC RBC AUTO-ENTMCNC: 32.8 G/DL (ref 31.4–35)
MCV RBC AUTO: 86.2 FL (ref 79.6–97.8)
MONOCYTES # BLD: 0.5 K/UL (ref 0.1–1.3)
MONOCYTES NFR BLD: 5 % (ref 4–12)
NEUTS SEG # BLD: 7.6 K/UL (ref 1.7–8.2)
NEUTS SEG NFR BLD: 81 % (ref 43–78)
NRBC # BLD: 0 K/UL (ref 0–0.2)
P-R INTERVAL, ECG05: 213 MS
PLATELET # BLD AUTO: 93 K/UL (ref 150–450)
PMV BLD AUTO: 12.4 FL (ref 9.4–12.3)
POTASSIUM SERPL-SCNC: 4.3 MMOL/L (ref 3.5–5.1)
PROCALCITONIN SERPL-MCNC: <0.05 NG/ML (ref 0–0.49)
PROT SERPL-MCNC: 6.8 G/DL (ref 6.3–8.2)
Q-T INTERVAL, ECG07: 345 MS
QRS DURATION, ECG06: 128 MS
QTC CALCULATION (BEZET), ECG08: 408 MS
RBC # BLD AUTO: 4.77 M/UL (ref 4.23–5.6)
RSV RNA SPEC QL NAA+PROBE: NOT DETECTED
RV+EV RNA SPEC QL NAA+PROBE: NOT DETECTED
SARS-COV-2 PCR, COVPCR: NOT DETECTED
SODIUM SERPL-SCNC: 138 MMOL/L (ref 138–145)
TROPONIN-HIGH SENSITIVITY: 32.1 PG/ML (ref 0–14)
TROPONIN-HIGH SENSITIVITY: 34.1 PG/ML (ref 0–14)
VENTRICULAR RATE, ECG03: 84 BPM
WBC # BLD AUTO: 9.3 K/UL (ref 4.3–11.1)

## 2021-12-28 PROCEDURE — 87040 BLOOD CULTURE FOR BACTERIA: CPT

## 2021-12-28 PROCEDURE — 74011250636 HC RX REV CODE- 250/636: Performed by: EMERGENCY MEDICINE

## 2021-12-28 PROCEDURE — 80053 COMPREHEN METABOLIC PANEL: CPT

## 2021-12-28 PROCEDURE — 83690 ASSAY OF LIPASE: CPT

## 2021-12-28 PROCEDURE — 83735 ASSAY OF MAGNESIUM: CPT

## 2021-12-28 PROCEDURE — 96366 THER/PROPH/DIAG IV INF ADDON: CPT

## 2021-12-28 PROCEDURE — 85025 COMPLETE CBC W/AUTO DIFF WBC: CPT

## 2021-12-28 PROCEDURE — 0202U NFCT DS 22 TRGT SARS-COV-2: CPT

## 2021-12-28 PROCEDURE — 96375 TX/PRO/DX INJ NEW DRUG ADDON: CPT

## 2021-12-28 PROCEDURE — 71045 X-RAY EXAM CHEST 1 VIEW: CPT

## 2021-12-28 PROCEDURE — 84484 ASSAY OF TROPONIN QUANT: CPT

## 2021-12-28 PROCEDURE — 93005 ELECTROCARDIOGRAM TRACING: CPT

## 2021-12-28 PROCEDURE — 74011000258 HC RX REV CODE- 258: Performed by: EMERGENCY MEDICINE

## 2021-12-28 PROCEDURE — 99285 EMERGENCY DEPT VISIT HI MDM: CPT

## 2021-12-28 PROCEDURE — 96365 THER/PROPH/DIAG IV INF INIT: CPT

## 2021-12-28 PROCEDURE — 36415 COLL VENOUS BLD VENIPUNCTURE: CPT

## 2021-12-28 PROCEDURE — 96368 THER/DIAG CONCURRENT INF: CPT

## 2021-12-28 PROCEDURE — 83605 ASSAY OF LACTIC ACID: CPT

## 2021-12-28 PROCEDURE — 84145 PROCALCITONIN (PCT): CPT

## 2021-12-28 RX ORDER — SODIUM CHLORIDE, SODIUM LACTATE, POTASSIUM CHLORIDE, CALCIUM CHLORIDE 600; 310; 30; 20 MG/100ML; MG/100ML; MG/100ML; MG/100ML
1000 INJECTION, SOLUTION INTRAVENOUS CONTINUOUS
Status: DISCONTINUED | OUTPATIENT
Start: 2021-12-28 | End: 2021-12-28 | Stop reason: HOSPADM

## 2021-12-28 RX ORDER — MORPHINE SULFATE 4 MG/ML
4 INJECTION INTRAVENOUS
Status: COMPLETED | OUTPATIENT
Start: 2021-12-28 | End: 2021-12-28

## 2021-12-28 RX ORDER — HYDROMORPHONE HYDROCHLORIDE 1 MG/ML
1 INJECTION, SOLUTION INTRAMUSCULAR; INTRAVENOUS; SUBCUTANEOUS
Status: COMPLETED | OUTPATIENT
Start: 2021-12-28 | End: 2021-12-28

## 2021-12-28 RX ORDER — SODIUM CHLORIDE 0.9 % (FLUSH) 0.9 %
5-10 SYRINGE (ML) INJECTION EVERY 8 HOURS
Status: DISCONTINUED | OUTPATIENT
Start: 2021-12-28 | End: 2021-12-28 | Stop reason: HOSPADM

## 2021-12-28 RX ORDER — SODIUM CHLORIDE 0.9 % (FLUSH) 0.9 %
5-10 SYRINGE (ML) INJECTION AS NEEDED
Status: DISCONTINUED | OUTPATIENT
Start: 2021-12-28 | End: 2021-12-28 | Stop reason: HOSPADM

## 2021-12-28 RX ORDER — DOXYCYCLINE HYCLATE 100 MG
100 TABLET ORAL 2 TIMES DAILY
Qty: 14 TABLET | Refills: 0 | Status: SHIPPED | OUTPATIENT
Start: 2021-12-28 | End: 2022-01-04

## 2021-12-28 RX ADMIN — SODIUM CHLORIDE, SODIUM LACTATE, POTASSIUM CHLORIDE, AND CALCIUM CHLORIDE 1000 ML: 600; 310; 30; 20 INJECTION, SOLUTION INTRAVENOUS at 14:40

## 2021-12-28 RX ADMIN — CEFTRIAXONE 1 G: 1 INJECTION, POWDER, FOR SOLUTION INTRAMUSCULAR; INTRAVENOUS at 17:06

## 2021-12-28 RX ADMIN — MORPHINE SULFATE 4 MG: 4 INJECTION INTRAVENOUS at 17:07

## 2021-12-28 RX ADMIN — Medication 5 ML: at 17:07

## 2021-12-28 RX ADMIN — PROCHLORPERAZINE EDISYLATE 5 MG: 5 INJECTION INTRAMUSCULAR; INTRAVENOUS at 14:34

## 2021-12-28 RX ADMIN — AZITHROMYCIN MONOHYDRATE 500 MG: 500 INJECTION, POWDER, LYOPHILIZED, FOR SOLUTION INTRAVENOUS at 17:06

## 2021-12-28 RX ADMIN — HYDROMORPHONE HYDROCHLORIDE 1 MG: 1 INJECTION, SOLUTION INTRAMUSCULAR; INTRAVENOUS; SUBCUTANEOUS at 14:34

## 2021-12-28 NOTE — ED TRIAGE NOTES
Pt arrives per EMS from home with complaints of chest pain, nausea, diarrhea and vomiting. EMS fount vitals 120HR, RR 28-30, bp 160/100 and bgl 265. Temp 100.4 ax. GIven 500ml NS and one set of cultures drawn.

## 2021-12-28 NOTE — ED PROVIDER NOTES
49-year-old male with a history of chronic abdominal pain, pancreatitis, multiple prior abdominal surgeries, chronic back pain, chronic kidney disease, atrial fibrillation on anticoagulation, coronary artery disease, diabetes, congestive heart failure, peptic ulcer disease, sleep apnea presents with epigastric pain radiating to his chest and back since yesterday. Pain is sharp, severe, and constant. It is similar to prior episodes of pain. He reports multiple episodes of vomiting and diarrhea. He developed fever today. He reports a cough productive of yellow sputum. Denies ill contacts or Covid exposure. He has been vaccinated against Covid. Temp 100.4 by EMS. Given fluids. Heart rate 120. Patient reports some shortness of breath. Chest Pain (Angina)   Associated symptoms include abdominal pain, back pain, cough, a fever, headaches, nausea, shortness of breath and vomiting. Past Medical History:   Diagnosis Date    Acute renal failure (ARF) ([address] 06/2017    septic from necrotizing fascitis. was on dialysis short term.  Anticoagulated on Coumadin     has not been instructed to hold.  Atrial fibrillation (HCC)     CAD (coronary artery disease)     Chest pain, precordial 5/4/2016    Chronic kidney disease     Chronic pain     back    Chronic pancreatitis ([address]     Diabetes ([address] 2008    insulin reliant. bs: 120's .      Edema     Gastrointestinal disorder     Headache     Heart failure ([address]     History of peptic ulcer disease     years ago    History of sepsis 06/2017    Hypertension     Morbid obesity ([address] 5/4/2016    35 bmi    Necrotizing fasciitis ([address] 06/2017    left upper arm    Palpitations     PUD (peptic ulcer disease)     Restless leg syndrome 5/4/2016    Sleep apnea     cpap    Tobacco dependence 5/4/2016       Past Surgical History:   Procedure Laterality Date    HX APPENDECTOMY      HX CATARACT REMOVAL Right     with IOL    HX CHOLECYSTECTOMY      HX HEART CATHETERIZATION      HX LUMBAR FUSION  2012 and 2013    x 2    HX OTHER SURGICAL      cardioversion         Family History:   Problem Relation Age of Onset    Diabetes Mother     Heart Disease Mother     No Known Problems Father     Pulmonary Embolism Sister        Social History     Socioeconomic History    Marital status:      Spouse name: Not on file    Number of children: Not on file    Years of education: Not on file    Highest education level: Not on file   Occupational History    Not on file   Tobacco Use    Smoking status: Current Every Day Smoker     Packs/day: 0.25     Years: 40.00     Pack years: 10.00    Smokeless tobacco: Never Used    Tobacco comment: down to 2 cigarettes a day (2/17/21 PS)   Substance and Sexual Activity    Alcohol use: No     Alcohol/week: 0.0 standard drinks    Drug use: No    Sexual activity: Not on file   Other Topics Concern    Not on file   Social History Narrative    Not on file     Social Determinants of Health     Financial Resource Strain:     Difficulty of Paying Living Expenses: Not on file   Food Insecurity:     Worried About Running Out of Food in the Last Year: Not on file    Armaan of Food in the Last Year: Not on file   Transportation Needs:     Lack of Transportation (Medical): Not on file    Lack of Transportation (Non-Medical):  Not on file   Physical Activity:     Days of Exercise per Week: Not on file    Minutes of Exercise per Session: Not on file   Stress:     Feeling of Stress : Not on file   Social Connections:     Frequency of Communication with Friends and Family: Not on file    Frequency of Social Gatherings with Friends and Family: Not on file    Attends Restorationism Services: Not on file    Active Member of Clubs or Organizations: Not on file    Attends Club or Organization Meetings: Not on file    Marital Status: Not on file   Intimate Partner Violence:     Fear of Current or Ex-Partner: Not on file   [facility] Emotionally Abused: Not on file    Physically Abused: Not on file    Sexually Abused: Not on file   Housing Stability:     Unable to Pay for Housing in the Last Year: Not on file    Number of Places Lived in the Last Year: Not on file    Unstable Housing in the Last Year: Not on file         ALLERGIES: Eliquis [apixaban], Lisinopril, Metformin, Morphine, Xarelto [rivaroxaban], and Zofran [ondansetron hcl]    Review of Systems   Constitutional: Positive for fatigue and fever. HENT: Positive for congestion. Negative for hearing loss. Eyes: Negative for visual disturbance. Respiratory: Positive for cough and shortness of breath. Cardiovascular: Positive for chest pain. Gastrointestinal: Positive for abdominal pain, diarrhea, nausea and vomiting. Negative for blood in stool. Genitourinary: Negative for dysuria. Musculoskeletal: Positive for back pain. Skin: Negative for rash. Neurological: Positive for headaches. Psychiatric/Behavioral: Negative for confusion. All other systems reviewed and are negative. Vitals:    12/28/21 1409   BP: (!) 155/83   Pulse: 86   Resp: 24   Temp: 98.7 °F (37.1 °C)   SpO2: 99%   Weight: 113.4 kg (250 lb)   Height: 6' (1.829 m)            Physical Exam  Vitals and nursing note reviewed. Constitutional:       Appearance: Normal appearance. HENT:      Head: Normocephalic and atraumatic. Nose: Nose normal.      Mouth/Throat:      Mouth: Mucous membranes are moist.   Eyes:      Pupils: Pupils are equal, round, and reactive to light. Cardiovascular:      Rate and Rhythm: Normal rate and regular rhythm. Pulmonary:      Effort: Pulmonary effort is normal.      Breath sounds: No stridor. Abdominal:      General: Abdomen is flat. Musculoskeletal:         General: No deformity. Normal range of motion. Cervical back: Normal range of motion and neck supple. Skin:     General: Skin is warm and dry.    Neurological:      General: No focal deficit present. Mental Status: He is alert. Mental status is at baseline. Psychiatric:         Mood and Affect: Mood normal.         Behavior: Behavior normal.          MDM  Number of Diagnoses or Management Options  Diagnosis management comments: Parts of this document were created using dragon voice recognition software. The chart has been reviewed but errors may still be present. I wore appropriate PPE throughout this patient's ED visit. Claudene Larry, MD, 2:28 PM    7:21 PM  Work-up unremarkable except for right lower lobe pneumonia. Given antibiotics. Chronically elevated troponin with history of chronic pain. Will discharge with antibiotics. I discussed the results of all labs, procedures, radiographs, and treatments with the patient and available family. Treatment plan is agreed upon and the patient is ready for discharge. Questions about treatment in the ED and differential diagnosis of presenting condition were answered. Patient was given verbal discharge instructions including, but not limited to, importance of returning to the emergency department for any concern of worsening or continued symptoms. Instructions were given to follow up with a primary care provider or specialist within 1-2 days. Adverse effects of medications, if prescribed, were discussed and patient was advised to refrain from significant physical activity until followed up by primary care physician and to not drive or operate heavy machinery after taking any sedating substances.            Amount and/or Complexity of Data Reviewed  Clinical lab tests: reviewed and ordered (Results for orders placed or performed during the hospital encounter of 12/28/21  -RESPIRATORY VIRUS PANEL W/COVID-19, PCR:   Specimen: Nasopharyngeal       Result                      Value             Ref Range           Adenovirus                  NOT DETECTED      NOTDET              Coronavirus 229E            NOT DETECTED      NOTDET Coronavirus HKU1            NOT DETECTED      NOTDET              Coronavirus CVNL63          NOT DETECTED      NOTDET              Coronavirus OC43            NOT DETECTED      NOTDET              SARS-CoV-2, PCR             NOT DETECTED      NOTDET              Metapneumovirus             NOT DETECTED      NOTDET              Rhinovirus and Enterov*     NOT DETECTED      NOTDET              Influenza A                 NOT DETECTED      NOTDET              Influenza B                 NOT DETECTED      NOTDET              Parainfluenza 1             NOT DETECTED      NOTDET              Parainfluenza 2             NOT DETECTED      NOTDET              Parainfluenza 3             NOT DETECTED      NOTDET              Parainfluenza virus 4       NOT DETECTED      NOTDET              RSV by PCR                  NOT DETECTED      NOTDET              B. parapertussis, PCR       NOT DETECTED      NOTDET              Bordetella pertussis -*     NOT DETECTED      NOTDET              Chlamydophila pneumoni*     NOT DETECTED      NOTDET              Mycoplasma pneumoniae *     NOT DETECTED      NOTDET         -TROPONIN-HIGH SENSITIVITY:        Result                      Value             Ref Range           Troponin-High Sensitiv*     32.1 (H)          0 - 14 pg/mL   -TROPONIN-HIGH SENSITIVITY:        Result                      Value             Ref Range           Troponin-High Sensitiv*     34.1 (H)          0 - 14 pg/mL   -CBC WITH AUTOMATED DIFF:        Result                      Value             Ref Range           WBC                         9.3               4.3 - 11.1 K*       RBC                         4.77              4.23 - 5.6 M*       HGB                         13.5 (L)          13.6 - 17.2 *       HCT                         41.1              41.1 - 50.3 %       MCV                         86.2              79.6 - 97.8 *       MCH                         28.3              26.1 - 32.9 *       MCHC 32.8              31.4 - 35.0 *       RDW                         13.9              11.9 - 14.6 %       PLATELET                    93 (L)            150 - 450 K/*       MPV                         12.4 (H)          9.4 - 12.3 FL       ABSOLUTE NRBC               0.00              0.0 - 0.2 K/*       DF                          AUTOMATED                             NEUTROPHILS                 81 (H)            43 - 78 %           LYMPHOCYTES                 12 (L)            13 - 44 %           MONOCYTES                   5                 4.0 - 12.0 %        EOSINOPHILS                 1                 0.5 - 7.8 %         BASOPHILS                   0                 0.0 - 2.0 %         IMMATURE GRANULOCYTES       0                 0.0 - 5.0 %         ABS. NEUTROPHILS            7.6               1.7 - 8.2 K/*       ABS. LYMPHOCYTES            1.1               0.5 - 4.6 K/*       ABS. MONOCYTES              0.5               0.1 - 1.3 K/*       ABS. EOSINOPHILS            0.1               0.0 - 0.8 K/*       ABS. BASOPHILS              0.0               0.0 - 0.2 K/*       ABS. IMM.  GRANS.            0.0               0.0 - 0.5 K/*  -METABOLIC PANEL, COMPREHENSIVE:        Result                      Value             Ref Range           Sodium                      138               138 - 145 mm*       Potassium                   4.3               3.5 - 5.1 mm*       Chloride                    108 (H)           98 - 107 mmo*       CO2                         25                21 - 32 mmol*       Anion gap                   5 (L)             7 - 16 mmol/L       Glucose                     218 (H)           65 - 100 mg/*       BUN                         15                6 - 23 MG/DL        Creatinine                  1.38              0.8 - 1.5 MG*       GFR est AA                  >60               >60 ml/min/1*       GFR est non-AA              56 (L)            >60 ml/min/1*       Calcium 9.0               8.3 - 10.4 M*       Bilirubin, total            0.5               0.2 - 1.1 MG*       ALT (SGPT)                  28                12 - 65 U/L         AST (SGOT)                  17                15 - 37 U/L         Alk.  phosphatase            71                50 - 136 U/L        Protein, total              6.8               6.3 - 8.2 g/*       Albumin                     3.6               3.5 - 5.0 g/*       Globulin                    3.2               2.3 - 3.5 g/*       A-G Ratio                   1.1 (L)           1.2 - 3.5      -LIPASE:        Result                      Value             Ref Range           Lipase                      61 (L)            73 - 393 U/L   -MAGNESIUM:        Result                      Value             Ref Range           Magnesium                   1.6 (L)           1.8 - 2.4 mg*  -LACTIC ACID:        Result                      Value             Ref Range           Lactic acid                 2.0               0.4 - 2.0 MM*  -PROCALCITONIN:        Result                      Value             Ref Range           Procalcitonin               <0.05             0.00 - 0.49 *  -EKG, 12 LEAD, INITIAL:        Result                      Value             Ref Range           Ventricular Rate            84                BPM                 Atrial Rate                 85                BPM                 P-R Interval                213               ms                  QRS Duration                128               ms                  Q-T Interval                345               ms                  QTC Calculation (Bezet)     408               ms                  Calculated P Axis           6                 degrees             Calculated R Axis           101               degrees             Calculated T Axis           14                degrees             Diagnosis                                                     Sinus rhythm   Prolonged NC interval   RBBB and LPFB         Confirmed by ST ISABELL CARRILLO MD (), JONATHAN GONZALEZ ([id number]) on 12/28/2021 2:54:24 PM     )  Tests in the radiology section of CPT®: ordered and reviewed (XR CHEST PORT    Result Date: 12/28/2021  PORTABLE CHEST 1 VIEW HISTORY: Chest pain and shortness of breath and fever COMPARISON: November 5, 2020 FINDINGS: The cardiac silhouette is prominent. Interstitial infiltrates are present in the right lung base. This may be caused by edema or an infectious infiltrate. Pleural spaces are clear. EKG leads are present.      Low lung volumes with infiltrates in the right lung base.    )  Tests in the medicine section of CPT®: reviewed and ordered           EKG    Date/Time: 12/28/2021 2:28 PM  Performed by: Ron Butler MD  Authorized by: Ron Butler MD     ECG reviewed by ED Physician in the absence of a cardiologist: yes    Interpretation:     Interpretation: abnormal    Rate:     ECG rate:  84    ECG rate assessment: normal    Rhythm:     Rhythm: sinus rhythm    Ectopy:     Ectopy: none    Conduction:     Conduction: abnormal      Abnormal conduction: complete RBBB and LPFB    ST segments:     ST segments:  Non-specific  T waves:     T waves: inverted      Inverted:  III

## 2022-01-02 LAB
BACTERIA SPEC CULT: NORMAL
BACTERIA SPEC CULT: NORMAL
SERVICE CMNT-IMP: NORMAL
SERVICE CMNT-IMP: NORMAL

## 2022-03-18 PROBLEM — I25.10 CORONARY ARTERY DISEASE INVOLVING NATIVE CORONARY ARTERY OF NATIVE HEART: Status: ACTIVE | Noted: 2020-06-23

## 2022-03-18 PROBLEM — K29.00 ACUTE GASTRITIS WITHOUT HEMORRHAGE: Status: ACTIVE | Noted: 2020-11-05

## 2022-03-18 PROBLEM — I45.10 RBBB: Status: ACTIVE | Noted: 2021-10-08

## 2022-03-19 PROBLEM — I48.91 ATRIAL FIBRILLATION AND FLUTTER (HCC): Status: ACTIVE | Noted: 2018-12-05

## 2022-03-19 PROBLEM — I48.0 PAROXYSMAL ATRIAL FIBRILLATION (HCC): Status: ACTIVE | Noted: 2020-08-11

## 2022-03-19 PROBLEM — E11.21 TYPE 2 DIABETES WITH NEPHROPATHY (HCC): Status: ACTIVE | Noted: 2018-09-11

## 2022-03-19 PROBLEM — N28.89 RENAL MASS OF UNKNOWN NATURE: Status: ACTIVE | Noted: 2020-11-05

## 2022-03-19 PROBLEM — I48.92 ATRIAL FIBRILLATION AND FLUTTER (HCC): Status: ACTIVE | Noted: 2018-12-05

## 2022-03-19 PROBLEM — R07.9 CHEST PAIN: Status: ACTIVE | Noted: 2021-10-08

## 2022-08-10 ENCOUNTER — APPOINTMENT (RX ONLY)
Dept: URBAN - METROPOLITAN AREA CLINIC 329 | Facility: CLINIC | Age: 57
Setting detail: DERMATOLOGY
End: 2022-08-10

## 2022-08-10 DIAGNOSIS — B35.8 OTHER DERMATOPHYTOSES: ICD-10-CM | Status: INADEQUATELY CONTROLLED

## 2022-08-10 DIAGNOSIS — D22 MELANOCYTIC NEVI: ICD-10-CM

## 2022-08-10 PROBLEM — D48.5 NEOPLASM OF UNCERTAIN BEHAVIOR OF SKIN: Status: ACTIVE | Noted: 2022-08-10

## 2022-08-10 PROBLEM — D22.61 MELANOCYTIC NEVI OF RIGHT UPPER LIMB, INCLUDING SHOULDER: Status: ACTIVE | Noted: 2022-08-10

## 2022-08-10 PROBLEM — D22.62 MELANOCYTIC NEVI OF LEFT UPPER LIMB, INCLUDING SHOULDER: Status: ACTIVE | Noted: 2022-08-10

## 2022-08-10 PROCEDURE — ? FULL BODY SKIN EXAM - DECLINED

## 2022-08-10 PROCEDURE — 87220 TISSUE EXAM FOR FUNGI: CPT

## 2022-08-10 PROCEDURE — ? ADDITIONAL NOTES

## 2022-08-10 PROCEDURE — ? PRESCRIPTION

## 2022-08-10 PROCEDURE — ? SUNSCREEN RECOMMENDATIONS

## 2022-08-10 PROCEDURE — ? COUNSELING

## 2022-08-10 PROCEDURE — ? BIOPSY BY SHAVE METHOD

## 2022-08-10 PROCEDURE — ? KOH PREP

## 2022-08-10 PROCEDURE — 69100 BIOPSY OF EXTERNAL EAR: CPT

## 2022-08-10 PROCEDURE — ? PRESCRIPTION MEDICATION MANAGEMENT

## 2022-08-10 PROCEDURE — 99214 OFFICE O/P EST MOD 30 MIN: CPT | Mod: 25

## 2022-08-10 RX ORDER — CICLOPIROX 7.7 MG/G
GEL TOPICAL
Qty: 30 | Refills: 2 | Status: ERX | COMMUNITY
Start: 2022-08-10

## 2022-08-10 RX ADMIN — CICLOPIROX: 7.7 GEL TOPICAL at 00:00

## 2022-08-10 ASSESSMENT — LOCATION DETAILED DESCRIPTION DERM
LOCATION DETAILED: RIGHT LOWER CUTANEOUS LIP
LOCATION DETAILED: LEFT PROXIMAL DORSAL FOREARM
LOCATION DETAILED: RIGHT PROXIMAL DORSAL FOREARM

## 2022-08-10 ASSESSMENT — LOCATION ZONE DERM
LOCATION ZONE: LIP
LOCATION ZONE: ARM

## 2022-08-10 ASSESSMENT — LOCATION SIMPLE DESCRIPTION DERM
LOCATION SIMPLE: RIGHT FOREARM
LOCATION SIMPLE: RIGHT LIP
LOCATION SIMPLE: LEFT FOREARM

## 2022-08-29 PROCEDURE — 93298 REM INTERROG DEV EVAL SCRMS: CPT | Performed by: INTERNAL MEDICINE

## 2022-08-29 PROCEDURE — G2066 INTER DEVC REMOTE 30D: HCPCS | Performed by: INTERNAL MEDICINE

## 2022-10-13 DIAGNOSIS — I48.0 PAROXYSMAL ATRIAL FIBRILLATION (HCC): Primary | ICD-10-CM

## 2022-10-13 DIAGNOSIS — Z95.818 STATUS POST PLACEMENT OF IMPLANTABLE LOOP RECORDER: ICD-10-CM

## 2022-10-13 DIAGNOSIS — I48.0 PAROXYSMAL ATRIAL FIBRILLATION (HCC): ICD-10-CM

## 2022-10-14 LAB
AVERAGE GLUCOSE: NORMAL
HBA1C MFR BLD: 10.4 %

## 2023-04-07 LAB
AVERAGE GLUCOSE: NORMAL
HBA1C MFR BLD: 8.3 %

## 2023-07-16 ENCOUNTER — HOSPITAL ENCOUNTER (OUTPATIENT)
Age: 58
Setting detail: OBSERVATION
Discharge: HOME | End: 2023-07-18
Attending: EMERGENCY MEDICINE | Admitting: INTERNAL MEDICINE
Payer: MEDICARE

## 2023-07-16 ENCOUNTER — APPOINTMENT (OUTPATIENT)
Dept: GENERAL RADIOLOGY | Age: 58
End: 2023-07-16
Payer: MEDICARE

## 2023-07-16 DIAGNOSIS — R06.02 SHORTNESS OF BREATH: ICD-10-CM

## 2023-07-16 DIAGNOSIS — I50.32 CHRONIC DIASTOLIC CONGESTIVE HEART FAILURE (HCC): Chronic | ICD-10-CM

## 2023-07-16 DIAGNOSIS — E86.0 DEHYDRATION: ICD-10-CM

## 2023-07-16 DIAGNOSIS — J44.9 SUSPECTED CHRONIC OBSTRUCTIVE PULMONARY DISEASE BASED ON INITIAL EVALUATION (HCC): ICD-10-CM

## 2023-07-16 DIAGNOSIS — R07.9 CHEST PAIN IN ADULT: ICD-10-CM

## 2023-07-16 DIAGNOSIS — R53.83 OTHER FATIGUE: ICD-10-CM

## 2023-07-16 DIAGNOSIS — M79.89 LEG SWELLING: ICD-10-CM

## 2023-07-16 DIAGNOSIS — N17.9 ACUTE KIDNEY INJURY (HCC): Primary | ICD-10-CM

## 2023-07-16 PROBLEM — I48.91 ATRIAL FIBRILLATION AND FLUTTER (HCC): Status: ACTIVE | Noted: 2018-12-05

## 2023-07-16 PROBLEM — N28.89 RENAL MASS OF UNKNOWN NATURE: Status: ACTIVE | Noted: 2020-11-05

## 2023-07-16 PROBLEM — I48.92 ATRIAL FIBRILLATION AND FLUTTER (HCC): Status: ACTIVE | Noted: 2018-12-05

## 2023-07-16 LAB
ANION GAP SERPL CALC-SCNC: 6 MMOL/L (ref 2–11)
BASOPHILS # BLD: 0 K/UL (ref 0–0.2)
BASOPHILS NFR BLD: 1 % (ref 0–2)
BUN SERPL-MCNC: 23 MG/DL (ref 8–23)
CALCIUM SERPL-MCNC: 8.9 MG/DL (ref 8.3–10.4)
CHLORIDE SERPL-SCNC: 111 MMOL/L (ref 101–110)
CO2 SERPL-SCNC: 26 MMOL/L (ref 21–32)
CREAT SERPL-MCNC: 2 MG/DL (ref 0.8–1.5)
D DIMER PPP FEU-MCNC: 0.43 UG/ML(FEU)
DIFFERENTIAL METHOD BLD: ABNORMAL
EOSINOPHIL # BLD: 0.2 K/UL (ref 0–0.8)
EOSINOPHIL NFR BLD: 3 % (ref 0.5–7.8)
ERYTHROCYTE [DISTWIDTH] IN BLOOD BY AUTOMATED COUNT: 14 % (ref 11.9–14.6)
EST. AVERAGE GLUCOSE BLD GHB EST-MCNC: 240 MG/DL
GLUCOSE SERPL-MCNC: 217 MG/DL (ref 65–100)
HBA1C MFR BLD: 10 % (ref 4.8–5.6)
HCT VFR BLD AUTO: 40.8 % (ref 41.1–50.3)
HGB BLD-MCNC: 13.6 G/DL (ref 13.6–17.2)
IMM GRANULOCYTES # BLD AUTO: 0.1 K/UL (ref 0–0.5)
IMM GRANULOCYTES NFR BLD AUTO: 1 % (ref 0–5)
LIPASE SERPL-CCNC: 87 U/L (ref 73–393)
LYMPHOCYTES # BLD: 2.1 K/UL (ref 0.5–4.6)
LYMPHOCYTES NFR BLD: 34 % (ref 13–44)
MAGNESIUM SERPL-MCNC: 2 MG/DL (ref 1.8–2.4)
MCH RBC QN AUTO: 28.6 PG (ref 26.1–32.9)
MCHC RBC AUTO-ENTMCNC: 33.3 G/DL (ref 31.4–35)
MCV RBC AUTO: 85.7 FL (ref 82–102)
MONOCYTES # BLD: 0.4 K/UL (ref 0.1–1.3)
MONOCYTES NFR BLD: 6 % (ref 4–12)
NEUTS SEG # BLD: 3.5 K/UL (ref 1.7–8.2)
NEUTS SEG NFR BLD: 55 % (ref 43–78)
NRBC # BLD: 0 K/UL (ref 0–0.2)
NT PRO BNP: 80 PG/ML (ref 5–125)
PLATELET # BLD AUTO: 122 K/UL (ref 150–450)
PMV BLD AUTO: 11.9 FL (ref 9.4–12.3)
POTASSIUM SERPL-SCNC: 3.9 MMOL/L (ref 3.5–5.1)
RBC # BLD AUTO: 4.76 M/UL (ref 4.23–5.6)
SODIUM SERPL-SCNC: 143 MMOL/L (ref 133–143)
TROPONIN I SERPL HS-MCNC: 18.2 PG/ML (ref 0–14)
TROPONIN I SERPL HS-MCNC: 19.2 PG/ML (ref 0–14)
WBC # BLD AUTO: 6.3 K/UL (ref 4.3–11.1)

## 2023-07-16 PROCEDURE — 2580000003 HC RX 258: Performed by: EMERGENCY MEDICINE

## 2023-07-16 PROCEDURE — 99285 EMERGENCY DEPT VISIT HI MDM: CPT

## 2023-07-16 PROCEDURE — 94762 N-INVAS EAR/PLS OXIMTRY CONT: CPT

## 2023-07-16 PROCEDURE — 1100000000 HC RM PRIVATE

## 2023-07-16 PROCEDURE — 83735 ASSAY OF MAGNESIUM: CPT

## 2023-07-16 PROCEDURE — 93005 ELECTROCARDIOGRAM TRACING: CPT | Performed by: EMERGENCY MEDICINE

## 2023-07-16 PROCEDURE — 85025 COMPLETE CBC W/AUTO DIFF WBC: CPT

## 2023-07-16 PROCEDURE — 80048 BASIC METABOLIC PNL TOTAL CA: CPT

## 2023-07-16 PROCEDURE — 6370000000 HC RX 637 (ALT 250 FOR IP): Performed by: EMERGENCY MEDICINE

## 2023-07-16 PROCEDURE — 71046 X-RAY EXAM CHEST 2 VIEWS: CPT

## 2023-07-16 PROCEDURE — 83690 ASSAY OF LIPASE: CPT

## 2023-07-16 PROCEDURE — 84484 ASSAY OF TROPONIN QUANT: CPT

## 2023-07-16 PROCEDURE — 85379 FIBRIN DEGRADATION QUANT: CPT

## 2023-07-16 PROCEDURE — 83036 HEMOGLOBIN GLYCOSYLATED A1C: CPT

## 2023-07-16 PROCEDURE — 83880 ASSAY OF NATRIURETIC PEPTIDE: CPT

## 2023-07-16 RX ORDER — MAGNESIUM HYDROXIDE/ALUMINUM HYDROXICE/SIMETHICONE 120; 1200; 1200 MG/30ML; MG/30ML; MG/30ML
30 SUSPENSION ORAL
Status: COMPLETED | OUTPATIENT
Start: 2023-07-16 | End: 2023-07-16

## 2023-07-16 RX ORDER — PANTOPRAZOLE SODIUM 40 MG/1
40 TABLET, DELAYED RELEASE ORAL 2 TIMES DAILY
Status: DISCONTINUED | OUTPATIENT
Start: 2023-07-17 | End: 2023-07-17

## 2023-07-16 RX ORDER — SODIUM CHLORIDE 0.9 % (FLUSH) 0.9 %
5-40 SYRINGE (ML) INJECTION PRN
Status: DISCONTINUED | OUTPATIENT
Start: 2023-07-16 | End: 2023-07-18 | Stop reason: HOSPADM

## 2023-07-16 RX ORDER — ACETAMINOPHEN 325 MG/1
650 TABLET ORAL
Status: COMPLETED | OUTPATIENT
Start: 2023-07-16 | End: 2023-07-16

## 2023-07-16 RX ORDER — CHOLESTYRAMINE LIGHT 4 G/5.7G
4 POWDER, FOR SUSPENSION ORAL DAILY
Status: DISCONTINUED | OUTPATIENT
Start: 2023-07-17 | End: 2023-07-18 | Stop reason: HOSPADM

## 2023-07-16 RX ORDER — SODIUM CHLORIDE 9 MG/ML
INJECTION, SOLUTION INTRAVENOUS PRN
Status: DISCONTINUED | OUTPATIENT
Start: 2023-07-16 | End: 2023-07-18 | Stop reason: HOSPADM

## 2023-07-16 RX ORDER — PREGABALIN 100 MG/1
100 CAPSULE ORAL 2 TIMES DAILY
Status: DISCONTINUED | OUTPATIENT
Start: 2023-07-17 | End: 2023-07-18 | Stop reason: HOSPADM

## 2023-07-16 RX ORDER — ACETAMINOPHEN 650 MG/1
650 SUPPOSITORY RECTAL EVERY 6 HOURS PRN
Status: DISCONTINUED | OUTPATIENT
Start: 2023-07-16 | End: 2023-07-18 | Stop reason: HOSPADM

## 2023-07-16 RX ORDER — INSULIN GLARGINE 100 [IU]/ML
48 INJECTION, SOLUTION SUBCUTANEOUS EVERY 12 HOURS
Status: DISCONTINUED | OUTPATIENT
Start: 2023-07-17 | End: 2023-07-17

## 2023-07-16 RX ORDER — INSULIN LISPRO 100 [IU]/ML
0-4 INJECTION, SOLUTION INTRAVENOUS; SUBCUTANEOUS NIGHTLY
Status: DISCONTINUED | OUTPATIENT
Start: 2023-07-16 | End: 2023-07-17

## 2023-07-16 RX ORDER — OXYCODONE HYDROCHLORIDE 15 MG/1
15 TABLET ORAL EVERY 12 HOURS PRN
Status: DISCONTINUED | OUTPATIENT
Start: 2023-07-16 | End: 2023-07-18 | Stop reason: HOSPADM

## 2023-07-16 RX ORDER — INSULIN LISPRO 100 [IU]/ML
0-8 INJECTION, SOLUTION INTRAVENOUS; SUBCUTANEOUS
Status: DISCONTINUED | OUTPATIENT
Start: 2023-07-17 | End: 2023-07-17

## 2023-07-16 RX ORDER — POLYETHYLENE GLYCOL 3350 17 G/17G
17 POWDER, FOR SOLUTION ORAL DAILY PRN
Status: DISCONTINUED | OUTPATIENT
Start: 2023-07-16 | End: 2023-07-18 | Stop reason: HOSPADM

## 2023-07-16 RX ORDER — BUDESONIDE 0.5 MG/2ML
0.5 INHALANT ORAL 2 TIMES DAILY
Status: DISCONTINUED | OUTPATIENT
Start: 2023-07-17 | End: 2023-07-17

## 2023-07-16 RX ORDER — OXYCODONE HYDROCHLORIDE 5 MG/1
15 TABLET ORAL
Status: COMPLETED | OUTPATIENT
Start: 2023-07-16 | End: 2023-07-16

## 2023-07-16 RX ORDER — 0.9 % SODIUM CHLORIDE 0.9 %
750 INTRAVENOUS SOLUTION INTRAVENOUS
Status: COMPLETED | OUTPATIENT
Start: 2023-07-16 | End: 2023-07-16

## 2023-07-16 RX ORDER — SODIUM CHLORIDE 9 MG/ML
INJECTION, SOLUTION INTRAVENOUS CONTINUOUS
Status: ACTIVE | OUTPATIENT
Start: 2023-07-16 | End: 2023-07-17

## 2023-07-16 RX ORDER — IPRATROPIUM BROMIDE AND ALBUTEROL SULFATE 2.5; .5 MG/3ML; MG/3ML
1 SOLUTION RESPIRATORY (INHALATION) EVERY 6 HOURS PRN
Status: DISCONTINUED | OUTPATIENT
Start: 2023-07-16 | End: 2023-07-18 | Stop reason: HOSPADM

## 2023-07-16 RX ORDER — SODIUM CHLORIDE 0.9 % (FLUSH) 0.9 %
5-40 SYRINGE (ML) INJECTION EVERY 12 HOURS SCHEDULED
Status: DISCONTINUED | OUTPATIENT
Start: 2023-07-16 | End: 2023-07-18 | Stop reason: HOSPADM

## 2023-07-16 RX ORDER — AZITHROMYCIN 250 MG/1
500 TABLET, FILM COATED ORAL DAILY
Status: COMPLETED | OUTPATIENT
Start: 2023-07-16 | End: 2023-07-18

## 2023-07-16 RX ORDER — PREDNISONE 20 MG/1
40 TABLET ORAL DAILY
Status: DISCONTINUED | OUTPATIENT
Start: 2023-07-16 | End: 2023-07-17

## 2023-07-16 RX ORDER — PROCHLORPERAZINE EDISYLATE 5 MG/ML
10 INJECTION INTRAMUSCULAR; INTRAVENOUS EVERY 6 HOURS PRN
Status: DISCONTINUED | OUTPATIENT
Start: 2023-07-16 | End: 2023-07-18 | Stop reason: HOSPADM

## 2023-07-16 RX ORDER — ATORVASTATIN CALCIUM 40 MG/1
40 TABLET, FILM COATED ORAL NIGHTLY
Status: DISCONTINUED | OUTPATIENT
Start: 2023-07-16 | End: 2023-07-18 | Stop reason: HOSPADM

## 2023-07-16 RX ORDER — IBUPROFEN 600 MG/1
1 TABLET ORAL PRN
Status: DISCONTINUED | OUTPATIENT
Start: 2023-07-16 | End: 2023-07-18 | Stop reason: HOSPADM

## 2023-07-16 RX ORDER — TRAZODONE HYDROCHLORIDE 50 MG/1
100 TABLET ORAL NIGHTLY
Status: DISCONTINUED | OUTPATIENT
Start: 2023-07-16 | End: 2023-07-18 | Stop reason: HOSPADM

## 2023-07-16 RX ORDER — ACETAMINOPHEN 325 MG/1
650 TABLET ORAL EVERY 6 HOURS PRN
Status: DISCONTINUED | OUTPATIENT
Start: 2023-07-16 | End: 2023-07-18 | Stop reason: HOSPADM

## 2023-07-16 RX ORDER — NICOTINE 21 MG/24HR
1 PATCH, TRANSDERMAL 24 HOURS TRANSDERMAL DAILY
Status: DISCONTINUED | OUTPATIENT
Start: 2023-07-17 | End: 2023-07-18 | Stop reason: HOSPADM

## 2023-07-16 RX ORDER — CETIRIZINE HYDROCHLORIDE 10 MG/1
10 TABLET ORAL NIGHTLY
Status: DISCONTINUED | OUTPATIENT
Start: 2023-07-16 | End: 2023-07-18 | Stop reason: HOSPADM

## 2023-07-16 RX ORDER — DEXTROSE MONOHYDRATE 100 MG/ML
INJECTION, SOLUTION INTRAVENOUS CONTINUOUS PRN
Status: DISCONTINUED | OUTPATIENT
Start: 2023-07-16 | End: 2023-07-18 | Stop reason: HOSPADM

## 2023-07-16 RX ADMIN — OXYCODONE HYDROCHLORIDE 15 MG: 5 TABLET ORAL at 20:08

## 2023-07-16 RX ADMIN — SODIUM CHLORIDE 750 ML: 9 INJECTION, SOLUTION INTRAVENOUS at 22:10

## 2023-07-16 RX ADMIN — ACETAMINOPHEN 650 MG: 325 TABLET ORAL at 20:08

## 2023-07-16 RX ADMIN — ALUMINUM HYDROXIDE, MAGNESIUM HYDROXIDE, AND SIMETHICONE 30 ML: 200; 200; 20 SUSPENSION ORAL at 21:05

## 2023-07-16 ASSESSMENT — PAIN DESCRIPTION - LOCATION
LOCATION: CHEST;BACK
LOCATION: CHEST
LOCATION: CHEST

## 2023-07-16 ASSESSMENT — PAIN DESCRIPTION - ORIENTATION: ORIENTATION: MID

## 2023-07-16 ASSESSMENT — PAIN SCALES - GENERAL
PAINLEVEL_OUTOF10: 5
PAINLEVEL_OUTOF10: 7
PAINLEVEL_OUTOF10: 7
PAINLEVEL_OUTOF10: 6
PAINLEVEL_OUTOF10: 6

## 2023-07-16 ASSESSMENT — PAIN DESCRIPTION - DESCRIPTORS: DESCRIPTORS: DULL;SHARP

## 2023-07-16 ASSESSMENT — PAIN - FUNCTIONAL ASSESSMENT: PAIN_FUNCTIONAL_ASSESSMENT: 0-10

## 2023-07-17 ENCOUNTER — APPOINTMENT (OUTPATIENT)
Dept: NON INVASIVE DIAGNOSTICS | Age: 58
End: 2023-07-17
Attending: INTERNAL MEDICINE
Payer: MEDICARE

## 2023-07-17 PROBLEM — I48.0 PAROXYSMAL ATRIAL FIBRILLATION (HCC): Chronic | Status: ACTIVE | Noted: 2020-08-11

## 2023-07-17 PROBLEM — I48.91 ATRIAL FIBRILLATION AND FLUTTER (HCC): Chronic | Status: ACTIVE | Noted: 2018-12-05

## 2023-07-17 PROBLEM — N28.89 RENAL MASS OF UNKNOWN NATURE: Chronic | Status: ACTIVE | Noted: 2020-11-05

## 2023-07-17 PROBLEM — I48.0 PAROXYSMAL ATRIAL FIBRILLATION (HCC): Status: ACTIVE | Noted: 2020-08-11

## 2023-07-17 PROBLEM — N18.31 STAGE 3A CHRONIC KIDNEY DISEASE (HCC): Chronic | Status: ACTIVE | Noted: 2023-07-17

## 2023-07-17 PROBLEM — I48.92 ATRIAL FIBRILLATION AND FLUTTER (HCC): Chronic | Status: ACTIVE | Noted: 2018-12-05

## 2023-07-17 LAB
ANION GAP SERPL CALC-SCNC: 5 MMOL/L (ref 2–11)
BASOPHILS # BLD: 0 K/UL (ref 0–0.2)
BASOPHILS NFR BLD: 0 % (ref 0–2)
BUN SERPL-MCNC: 21 MG/DL (ref 8–23)
CALCIUM SERPL-MCNC: 8.5 MG/DL (ref 8.3–10.4)
CHLORIDE SERPL-SCNC: 112 MMOL/L (ref 101–110)
CO2 SERPL-SCNC: 26 MMOL/L (ref 21–32)
CREAT SERPL-MCNC: 1.8 MG/DL (ref 0.8–1.5)
DIFFERENTIAL METHOD BLD: ABNORMAL
ECHO AO ASC DIAM: 3.7 CM
ECHO AO ASCENDING AORTA INDEX: 1.5 CM/M2
ECHO AO ROOT DIAM: 4.1 CM
ECHO AO ROOT INDEX: 1.67 CM/M2
ECHO AV AREA PEAK VELOCITY: 1.7 CM2
ECHO AV AREA VTI: 1.8 CM2
ECHO AV AREA/BSA PEAK VELOCITY: 0.7 CM2/M2
ECHO AV AREA/BSA VTI: 0.7 CM2/M2
ECHO AV MEAN GRADIENT: 11 MMHG
ECHO AV MEAN VELOCITY: 1.5 M/S
ECHO AV PEAK GRADIENT: 21 MMHG
ECHO AV PEAK VELOCITY: 2.3 M/S
ECHO AV VELOCITY RATIO: 0.52
ECHO AV VTI: 48.9 CM
ECHO BSA: 2.54 M2
ECHO LA AREA 2C: 22.7 CM2
ECHO LA AREA 4C: 22.9 CM2
ECHO LA MAJOR AXIS: 6.4 CM
ECHO LA MINOR AXIS: 6 CM
ECHO LA VOL 2C: 70 ML (ref 18–58)
ECHO LA VOL 4C: 65 ML (ref 18–58)
ECHO LA VOL BP: 69 ML (ref 18–58)
ECHO LA VOL/BSA BIPLANE: 28 ML/M2 (ref 16–34)
ECHO LA VOLUME INDEX A2C: 28 ML/M2 (ref 16–34)
ECHO LA VOLUME INDEX A4C: 26 ML/M2 (ref 16–34)
ECHO LV E' LATERAL VELOCITY: 9 CM/S
ECHO LV E' SEPTAL VELOCITY: 6 CM/S
ECHO LV EDV A2C: 134 ML
ECHO LV EDV A4C: 130 ML
ECHO LV EDV INDEX A4C: 53 ML/M2
ECHO LV EDV NDEX A2C: 54 ML/M2
ECHO LV EJECTION FRACTION A2C: 63 %
ECHO LV EJECTION FRACTION A4C: 60 %
ECHO LV EJECTION FRACTION BIPLANE: 62 % (ref 55–100)
ECHO LV ESV A2C: 49 ML
ECHO LV ESV A4C: 52 ML
ECHO LV ESV INDEX A2C: 20 ML/M2
ECHO LV ESV INDEX A4C: 21 ML/M2
ECHO LV FRACTIONAL SHORTENING: 41 % (ref 28–44)
ECHO LV INTERNAL DIMENSION DIASTOLE INDEX: 1.79 CM/M2
ECHO LV INTERNAL DIMENSION DIASTOLIC: 4.4 CM (ref 4.2–5.9)
ECHO LV INTERNAL DIMENSION SYSTOLIC INDEX: 1.06 CM/M2
ECHO LV INTERNAL DIMENSION SYSTOLIC: 2.6 CM
ECHO LV IVSD: 1.1 CM (ref 0.6–1)
ECHO LV MASS 2D: 168.9 G (ref 88–224)
ECHO LV MASS INDEX 2D: 68.7 G/M2 (ref 49–115)
ECHO LV POSTERIOR WALL DIASTOLIC: 1.1 CM (ref 0.6–1)
ECHO LV RELATIVE WALL THICKNESS RATIO: 0.5
ECHO LVOT AREA: 3.1 CM2
ECHO LVOT AV VTI INDEX: 0.58
ECHO LVOT DIAM: 2 CM
ECHO LVOT MEAN GRADIENT: 3 MMHG
ECHO LVOT PEAK GRADIENT: 6 MMHG
ECHO LVOT PEAK VELOCITY: 1.2 M/S
ECHO LVOT STROKE VOLUME INDEX: 36.3 ML/M2
ECHO LVOT SV: 89.2 ML
ECHO LVOT VTI: 28.4 CM
ECHO MV A VELOCITY: 0.81 M/S
ECHO MV AREA VTI: 2.5 CM2
ECHO MV E DECELERATION TIME (DT): 202 MS
ECHO MV E VELOCITY: 1.19 M/S
ECHO MV E/A RATIO: 1.47
ECHO MV E/E' LATERAL: 13.22
ECHO MV E/E' RATIO (AVERAGED): 16.53
ECHO MV E/E' SEPTAL: 19.83
ECHO MV LVOT VTI INDEX: 1.26
ECHO MV MAX VELOCITY: 1.3 M/S
ECHO MV MEAN GRADIENT: 3 MMHG
ECHO MV MEAN VELOCITY: 0.8 M/S
ECHO MV PEAK GRADIENT: 7 MMHG
ECHO MV VTI: 35.8 CM
ECHO PV ACCELERATION TIME (AT): 100 MS
ECHO PV MAX VELOCITY: 1.1 M/S
ECHO PV PEAK GRADIENT: 5 MMHG
ECHO RV BASAL DIMENSION: 4.2 CM
ECHO RV FREE WALL PEAK S': 14 CM/S
ECHO RV INTERNAL DIMENSION: 4 CM
ECHO RV TAPSE: 2 CM (ref 1.7–?)
EKG ATRIAL RATE: 81 BPM
EKG DIAGNOSIS: NORMAL
EKG P AXIS: -39 DEGREES
EKG P-R INTERVAL: 222 MS
EKG Q-T INTERVAL: 366 MS
EKG QRS DURATION: 132 MS
EKG QTC CALCULATION (BAZETT): 425 MS
EKG R AXIS: 32 DEGREES
EKG T AXIS: 25 DEGREES
EKG VENTRICULAR RATE: 81 BPM
EOSINOPHIL # BLD: 0.1 K/UL (ref 0–0.8)
EOSINOPHIL NFR BLD: 2 % (ref 0.5–7.8)
ERYTHROCYTE [DISTWIDTH] IN BLOOD BY AUTOMATED COUNT: 14.2 % (ref 11.9–14.6)
GLUCOSE BLD STRIP.AUTO-MCNC: 182 MG/DL (ref 65–100)
GLUCOSE BLD STRIP.AUTO-MCNC: 297 MG/DL (ref 65–100)
GLUCOSE BLD STRIP.AUTO-MCNC: 307 MG/DL (ref 65–100)
GLUCOSE BLD STRIP.AUTO-MCNC: 314 MG/DL (ref 65–100)
GLUCOSE BLD STRIP.AUTO-MCNC: 382 MG/DL (ref 65–100)
GLUCOSE SERPL-MCNC: 219 MG/DL (ref 65–100)
HCT VFR BLD AUTO: 40.3 % (ref 41.1–50.3)
HGB BLD-MCNC: 12.9 G/DL (ref 13.6–17.2)
IMM GRANULOCYTES # BLD AUTO: 0.1 K/UL (ref 0–0.5)
IMM GRANULOCYTES NFR BLD AUTO: 1 % (ref 0–5)
LYMPHOCYTES # BLD: 1.7 K/UL (ref 0.5–4.6)
LYMPHOCYTES NFR BLD: 27 % (ref 13–44)
MCH RBC QN AUTO: 27.9 PG (ref 26.1–32.9)
MCHC RBC AUTO-ENTMCNC: 32 G/DL (ref 31.4–35)
MCV RBC AUTO: 87 FL (ref 82–102)
MONOCYTES # BLD: 0.2 K/UL (ref 0.1–1.3)
MONOCYTES NFR BLD: 3 % (ref 4–12)
NEUTS SEG # BLD: 4.2 K/UL (ref 1.7–8.2)
NEUTS SEG NFR BLD: 67 % (ref 43–78)
NRBC # BLD: 0 K/UL (ref 0–0.2)
PLATELET # BLD AUTO: 104 K/UL (ref 150–450)
PMV BLD AUTO: 12.3 FL (ref 9.4–12.3)
POTASSIUM SERPL-SCNC: 4.3 MMOL/L (ref 3.5–5.1)
RBC # BLD AUTO: 4.63 M/UL (ref 4.23–5.6)
SERVICE CMNT-IMP: ABNORMAL
SODIUM SERPL-SCNC: 143 MMOL/L (ref 133–143)
WBC # BLD AUTO: 6.3 K/UL (ref 4.3–11.1)

## 2023-07-17 PROCEDURE — 94760 N-INVAS EAR/PLS OXIMETRY 1: CPT

## 2023-07-17 PROCEDURE — 6360000004 HC RX CONTRAST MEDICATION: Performed by: INTERNAL MEDICINE

## 2023-07-17 PROCEDURE — C8929 TTE W OR WO FOL WCON,DOPPLER: HCPCS

## 2023-07-17 PROCEDURE — 6370000000 HC RX 637 (ALT 250 FOR IP): Performed by: FAMILY MEDICINE

## 2023-07-17 PROCEDURE — 2580000003 HC RX 258: Performed by: INTERNAL MEDICINE

## 2023-07-17 PROCEDURE — 94640 AIRWAY INHALATION TREATMENT: CPT

## 2023-07-17 PROCEDURE — 6360000002 HC RX W HCPCS: Performed by: FAMILY MEDICINE

## 2023-07-17 PROCEDURE — 2580000003 HC RX 258: Performed by: FAMILY MEDICINE

## 2023-07-17 PROCEDURE — 82962 GLUCOSE BLOOD TEST: CPT

## 2023-07-17 PROCEDURE — 80048 BASIC METABOLIC PNL TOTAL CA: CPT

## 2023-07-17 PROCEDURE — 96361 HYDRATE IV INFUSION ADD-ON: CPT

## 2023-07-17 PROCEDURE — G0378 HOSPITAL OBSERVATION PER HR: HCPCS

## 2023-07-17 PROCEDURE — 6370000000 HC RX 637 (ALT 250 FOR IP): Performed by: INTERNAL MEDICINE

## 2023-07-17 PROCEDURE — 97165 OT EVAL LOW COMPLEX 30 MIN: CPT

## 2023-07-17 PROCEDURE — 97161 PT EVAL LOW COMPLEX 20 MIN: CPT

## 2023-07-17 PROCEDURE — 85025 COMPLETE CBC W/AUTO DIFF WBC: CPT

## 2023-07-17 PROCEDURE — 36415 COLL VENOUS BLD VENIPUNCTURE: CPT

## 2023-07-17 PROCEDURE — 97530 THERAPEUTIC ACTIVITIES: CPT

## 2023-07-17 PROCEDURE — 96360 HYDRATION IV INFUSION INIT: CPT

## 2023-07-17 PROCEDURE — 96374 THER/PROPH/DIAG INJ IV PUSH: CPT

## 2023-07-17 PROCEDURE — 97535 SELF CARE MNGMENT TRAINING: CPT

## 2023-07-17 RX ORDER — PREDNISONE 20 MG/1
40 TABLET ORAL DAILY
Status: DISCONTINUED | OUTPATIENT
Start: 2023-07-18 | End: 2023-07-18 | Stop reason: HOSPADM

## 2023-07-17 RX ORDER — INSULIN GLARGINE 100 [IU]/ML
60 INJECTION, SOLUTION SUBCUTANEOUS EVERY 12 HOURS
Status: DISCONTINUED | OUTPATIENT
Start: 2023-07-17 | End: 2023-07-18 | Stop reason: HOSPADM

## 2023-07-17 RX ORDER — MORPHINE SULFATE 2 MG/ML
1 INJECTION, SOLUTION INTRAMUSCULAR; INTRAVENOUS ONCE
Status: COMPLETED | OUTPATIENT
Start: 2023-07-17 | End: 2023-07-17

## 2023-07-17 RX ORDER — SODIUM CHLORIDE 9 MG/ML
INJECTION, SOLUTION INTRAVENOUS CONTINUOUS
Status: DISCONTINUED | OUTPATIENT
Start: 2023-07-17 | End: 2023-07-17

## 2023-07-17 RX ORDER — INSULIN LISPRO 100 [IU]/ML
0-10 INJECTION, SOLUTION INTRAVENOUS; SUBCUTANEOUS
Status: DISCONTINUED | OUTPATIENT
Start: 2023-07-17 | End: 2023-07-18 | Stop reason: HOSPADM

## 2023-07-17 RX ORDER — INSULIN LISPRO 100 [IU]/ML
20 INJECTION, SOLUTION INTRAVENOUS; SUBCUTANEOUS
Status: DISCONTINUED | OUTPATIENT
Start: 2023-07-17 | End: 2023-07-18 | Stop reason: HOSPADM

## 2023-07-17 RX ORDER — PANTOPRAZOLE SODIUM 40 MG/1
40 TABLET, DELAYED RELEASE ORAL
Status: DISCONTINUED | OUTPATIENT
Start: 2023-07-18 | End: 2023-07-18 | Stop reason: HOSPADM

## 2023-07-17 RX ORDER — FUROSEMIDE 40 MG/1
40 TABLET ORAL DAILY
Status: DISCONTINUED | OUTPATIENT
Start: 2023-07-17 | End: 2023-07-18

## 2023-07-17 RX ORDER — INSULIN GLARGINE 100 [IU]/ML
50 INJECTION, SOLUTION SUBCUTANEOUS EVERY 12 HOURS
Status: DISCONTINUED | OUTPATIENT
Start: 2023-07-17 | End: 2023-07-17

## 2023-07-17 RX ORDER — INSULIN LISPRO 100 [IU]/ML
0-8 INJECTION, SOLUTION INTRAVENOUS; SUBCUTANEOUS NIGHTLY
Status: DISCONTINUED | OUTPATIENT
Start: 2023-07-17 | End: 2023-07-18 | Stop reason: HOSPADM

## 2023-07-17 RX ADMIN — AZITHROMYCIN DIHYDRATE 500 MG: 250 TABLET, FILM COATED ORAL at 08:22

## 2023-07-17 RX ADMIN — FUROSEMIDE 40 MG: 40 TABLET ORAL at 13:15

## 2023-07-17 RX ADMIN — SODIUM CHLORIDE, PRESERVATIVE FREE 10 ML: 5 INJECTION INTRAVENOUS at 21:44

## 2023-07-17 RX ADMIN — BUDESONIDE 500 MCG: 0.5 SUSPENSION RESPIRATORY (INHALATION) at 07:27

## 2023-07-17 RX ADMIN — TRAZODONE HYDROCHLORIDE 100 MG: 50 TABLET ORAL at 21:44

## 2023-07-17 RX ADMIN — SODIUM CHLORIDE, PRESERVATIVE FREE 0.45 ML: 5 INJECTION INTRAVENOUS at 16:11

## 2023-07-17 RX ADMIN — INSULIN LISPRO 20 UNITS: 100 INJECTION, SOLUTION INTRAVENOUS; SUBCUTANEOUS at 13:13

## 2023-07-17 RX ADMIN — PREDNISONE 40 MG: 20 TABLET ORAL at 08:23

## 2023-07-17 RX ADMIN — INSULIN LISPRO 6 UNITS: 100 INJECTION, SOLUTION INTRAVENOUS; SUBCUTANEOUS at 08:24

## 2023-07-17 RX ADMIN — PANCRELIPASE LIPASE, PANCRELIPASE PROTEASE, PANCRELIPASE AMYLASE 5000 UNITS: 5000; 17000; 24000 CAPSULE, DELAYED RELEASE ORAL at 17:51

## 2023-07-17 RX ADMIN — AZITHROMYCIN DIHYDRATE 500 MG: 250 TABLET, FILM COATED ORAL at 00:01

## 2023-07-17 RX ADMIN — PANCRELIPASE LIPASE, PANCRELIPASE PROTEASE, PANCRELIPASE AMYLASE 5000 UNITS: 5000; 17000; 24000 CAPSULE, DELAYED RELEASE ORAL at 08:22

## 2023-07-17 RX ADMIN — SODIUM CHLORIDE, PRESERVATIVE FREE 5 ML: 5 INJECTION INTRAVENOUS at 08:25

## 2023-07-17 RX ADMIN — SODIUM CHLORIDE, PRESERVATIVE FREE 10 ML: 5 INJECTION INTRAVENOUS at 00:03

## 2023-07-17 RX ADMIN — SODIUM CHLORIDE: 9 INJECTION, SOLUTION INTRAVENOUS at 00:08

## 2023-07-17 RX ADMIN — PREGABALIN 100 MG: 100 CAPSULE ORAL at 08:21

## 2023-07-17 RX ADMIN — ATORVASTATIN CALCIUM 40 MG: 40 TABLET, FILM COATED ORAL at 00:01

## 2023-07-17 RX ADMIN — MOMETASONE FUROATE AND FORMOTEROL FUMARATE DIHYDRATE 2 PUFF: 100; 5 AEROSOL RESPIRATORY (INHALATION) at 13:57

## 2023-07-17 RX ADMIN — INSULIN LISPRO 6 UNITS: 100 INJECTION, SOLUTION INTRAVENOUS; SUBCUTANEOUS at 17:28

## 2023-07-17 RX ADMIN — TRAZODONE HYDROCHLORIDE 100 MG: 50 TABLET ORAL at 00:01

## 2023-07-17 RX ADMIN — PREDNISONE 40 MG: 20 TABLET ORAL at 00:00

## 2023-07-17 RX ADMIN — OXYCODONE HYDROCHLORIDE 15 MG: 15 TABLET ORAL at 08:21

## 2023-07-17 RX ADMIN — INSULIN GLARGINE 48 UNITS: 100 INJECTION, SOLUTION SUBCUTANEOUS at 08:23

## 2023-07-17 RX ADMIN — PANCRELIPASE LIPASE, PANCRELIPASE PROTEASE, PANCRELIPASE AMYLASE 20000 UNITS: 5000; 17000; 24000 CAPSULE, DELAYED RELEASE ORAL at 08:00

## 2023-07-17 RX ADMIN — MORPHINE SULFATE 1 MG: 2 INJECTION, SOLUTION INTRAMUSCULAR; INTRAVENOUS at 01:23

## 2023-07-17 RX ADMIN — OXYCODONE HYDROCHLORIDE 15 MG: 15 TABLET ORAL at 20:23

## 2023-07-17 RX ADMIN — INSULIN LISPRO 8 UNITS: 100 INJECTION, SOLUTION INTRAVENOUS; SUBCUTANEOUS at 12:07

## 2023-07-17 RX ADMIN — CETIRIZINE HYDROCHLORIDE 10 MG: 10 TABLET, FILM COATED ORAL at 21:43

## 2023-07-17 RX ADMIN — INSULIN LISPRO 4 UNITS: 100 INJECTION, SOLUTION INTRAVENOUS; SUBCUTANEOUS at 21:43

## 2023-07-17 RX ADMIN — PANCRELIPASE LIPASE, PANCRELIPASE PROTEASE, PANCRELIPASE AMYLASE 5000 UNITS: 5000; 17000; 24000 CAPSULE, DELAYED RELEASE ORAL at 12:08

## 2023-07-17 RX ADMIN — CETIRIZINE HYDROCHLORIDE 10 MG: 10 TABLET, FILM COATED ORAL at 00:01

## 2023-07-17 RX ADMIN — ATORVASTATIN CALCIUM 40 MG: 40 TABLET, FILM COATED ORAL at 21:43

## 2023-07-17 RX ADMIN — PANCRELIPASE LIPASE, PANCRELIPASE PROTEASE, PANCRELIPASE AMYLASE 20000 UNITS: 5000; 17000; 24000 CAPSULE, DELAYED RELEASE ORAL at 13:15

## 2023-07-17 RX ADMIN — PANCRELIPASE LIPASE, PANCRELIPASE PROTEASE, PANCRELIPASE AMYLASE 20000 UNITS: 5000; 17000; 24000 CAPSULE, DELAYED RELEASE ORAL at 17:51

## 2023-07-17 RX ADMIN — PANTOPRAZOLE SODIUM 40 MG: 40 TABLET, DELAYED RELEASE ORAL at 08:23

## 2023-07-17 RX ADMIN — INSULIN GLARGINE 60 UNITS: 100 INJECTION, SOLUTION SUBCUTANEOUS at 21:35

## 2023-07-17 RX ADMIN — MOMETASONE FUROATE AND FORMOTEROL FUMARATE DIHYDRATE 2 PUFF: 100; 5 AEROSOL RESPIRATORY (INHALATION) at 20:44

## 2023-07-17 RX ADMIN — SERTRALINE 150 MG: 50 TABLET, FILM COATED ORAL at 08:23

## 2023-07-17 RX ADMIN — CHOLESTYRAMINE 4 G: 4 POWDER, FOR SUSPENSION ORAL at 08:22

## 2023-07-17 RX ADMIN — PREGABALIN 100 MG: 100 CAPSULE ORAL at 21:44

## 2023-07-17 RX ADMIN — INSULIN LISPRO 20 UNITS: 100 INJECTION, SOLUTION INTRAVENOUS; SUBCUTANEOUS at 17:27

## 2023-07-17 ASSESSMENT — PAIN SCALES - GENERAL
PAINLEVEL_OUTOF10: 0
PAINLEVEL_OUTOF10: 0
PAINLEVEL_OUTOF10: 6
PAINLEVEL_OUTOF10: 7
PAINLEVEL_OUTOF10: 7

## 2023-07-17 ASSESSMENT — PAIN - FUNCTIONAL ASSESSMENT: PAIN_FUNCTIONAL_ASSESSMENT: PREVENTS OR INTERFERES SOME ACTIVE ACTIVITIES AND ADLS

## 2023-07-17 ASSESSMENT — PAIN DESCRIPTION - DESCRIPTORS
DESCRIPTORS: ACHING
DESCRIPTORS: ACHING
DESCRIPTORS: DISCOMFORT;ACHING

## 2023-07-17 ASSESSMENT — PAIN DESCRIPTION - LOCATION
LOCATION: BACK
LOCATION: CHEST;BACK;GENERALIZED
LOCATION: BACK;CHEST

## 2023-07-17 ASSESSMENT — PAIN DESCRIPTION - ORIENTATION: ORIENTATION: RIGHT

## 2023-07-17 ASSESSMENT — PAIN DESCRIPTION - PAIN TYPE: TYPE: CHRONIC PAIN

## 2023-07-17 ASSESSMENT — PAIN DESCRIPTION - FREQUENCY: FREQUENCY: CONTINUOUS

## 2023-07-17 ASSESSMENT — PAIN DESCRIPTION - ONSET: ONSET: PROGRESSIVE

## 2023-07-17 NOTE — CARE COORDINATION
Chart reviewed by  for potential discharge needs or concerns. Therapy evals complete with the recommendation for outpatient PT at [place]. CM met with pt to discuss this recommendation. Demographics, insurance and PCP confirmed. Pt is agreeable with outpatient PT and confirmed he would have transportation. Pt requested referral to [facility] outpatient [facility] (#[phone] Referral faxed to #[phone]. Code 40 & MOON letter delivered. No other dc needs or concerns identified or reported. Please notify/consult  if other discharge needs arise. 07/17/23 4947   Service Assessment   Patient Orientation Alert and Oriented   Cognition Alert   History Provided By Patient;Medical Record   Primary Caregiver Self   Support Systems Spouse/Significant Other   Patient's Healthcare Decision Maker is: Legal Next of Kin   PCP Verified by CM Yes  ([address])   Last Visit to PCP Within last 3 months   Prior Functional Level Independent in ADLs/IADLs   Current Functional Level Independent in ADLs/IADLs   Can patient return to prior living arrangement Yes   Ability to make needs known: Good   Family able to assist with home care needs: Yes   Would you like for me to discuss the discharge plan with any other family members/significant others, and if so, who?  No   Financial Resources Medicare[place]   Community Resources None   Social/Functional History   Lives With Spouse;Daughter   Type of Home Mobile home   Home Access Level entry   Bathroom Shower/Tub Walk-in shower   [address] chair   Home Equipment Cane;Walker, rolling   ADL Assistance Independent   Homemaking Assistance Independent   Ambulation Assistance Independent   Transfer Assistance Independent   Active  Yes   Occupation On disability   Discharge Planning   Type of Residence Trailer/Mobile Home   Living Arrangements Children;Spouse/Significant Other   Current Services Prior To Admission None   Potential Assistance

## 2023-07-17 NOTE — ED NOTES
TRANSFER - OUT REPORT:    Verbal report given to YAMILET roman on Irma Shaw  being transferred to 8th Floor for routine progression of patient care       Report consisted of patient's Situation, Background, Assessment and   Recommendations(SBAR). Information from the following report(s) Nurse Handoff Report, ED Encounter Summary, ED SBAR, Intake/Output, MAR, Recent Results, Neuro Assessment, and Event Log was reviewed with the receiving nurse. Cameron Fall Assessment:    Presents to emergency department  because of falls (Syncope, seizure, or loss of consciousness): No  Age > 70: No  Altered Mental Status, Intoxication with alcohol or substance confusion (Disorientation, impaired judgment, poor safety awaremess, or inability to follow instructions): No  Impaired Mobility: Ambulates or transfers with assistive devices or assistance; Unable to ambulate or transer.: Yes  Nursing Judgement: Yes          Lines:   Peripheral IV 07/16/23 Right Antecubital (Active)   Site Assessment Clean, dry & intact 07/16/23 1848   Line Status Blood return noted; Flushed;Specimen collected 07/16/23 1848   Phlebitis Assessment No symptoms 07/16/23 1848   Infiltration Assessment 0 07/16/23 1848   Dressing Status Clean, dry & intact 07/16/23 1848   Dressing Type Transparent 07/16/23 1848        Opportunity for questions and clarification was provided.       Patient transported with:  Tommy Torres RN  07/16/23 4730

## 2023-07-17 NOTE — ACP (ADVANCE CARE PLANNING)
[facility]  At the heart of better care     Advance Care Planning   Admit Date:  2023  6:53 PM   Name:  Franc Campo   Age:  59 y.o. Sex:  male  :  1965   MRN:  [id number]   Room:  [address] has capacity to make his own decisions:   Yes    Patient / surrogate decision-maker directed code status:  Full Code. Would not want long term life support     Patient or surrogate consented to discussion of the current conditions, workup, management plans, prognosis, and the risk for further deterioration. Time spent: 17 minutes in direct discussion.       Signed:  Bhakti Davis DO

## 2023-07-17 NOTE — H&P
Hospitalist History and Physical   Admit Date:  2023  6:53 PM   Name:  Robbie Westfall   Age:  59 y.o. Sex:  male  :  1965   MRN:  [id number]   Room:  [facility]/    Presenting/Chief Complaint: Chest Pain     Reason(s) for Admission: ADÁN (acute kidney injury) ([address]) [N17.9]     History of Present Illness:   Robbie Westfall is a 59 y.o. male who presented to the ED for cc SOB with edema to legs over the past few days. Admits to productive cough with white sputum. Having wheezing and just stopped smoking three days ago. He also states his glucose have been running high over the past few days in the 400s. Hx of a fib s/p watchman, depression, chronic pancreatitis, DM type II, FIDENCIO on CPAP, and suspected COPD    Creatine 2 from baseline 1.4. Assessment & Plan: Active Problems:    ADÁN - Dry on exam. Suspecting due to over diuresis from his lasix and hyperglycemia. Control glucose. Gentle fluids. Possibly Hypoxia at home?? No documented hypoxia here. Holding lasix for tonight. Reduced his pain medication dosing. Suspected COPD exacerbation - flattened diaphragm noted on chest x ray with hx of tobacco abuse. Suspecting he has undiagnosed COPD. Will need outpatient PFT. Give prednisone burst, duonebs, pulmicort. Sputum culture. Azithromycin. Hx of a fib - S/p watchman. No longer on anticoagulation. Leg edema - Actually dry on exam. Suspecting this is lymphedema. DM type II - SS. Will have pharmacy convert his Tuojeo 120 units in AM to Lantus. Check A1C    HLD - statin    Chronic back pain - Reduced his oxy IR and lyrica. Chronic pancreatitis - Creaon     Depression - Zoloft. Trazodone    FIDENCIO - CPAP    Renal mass - Has nephrology appointment next month for further investigation per the patient. PPI      PT/OT evals and PPD needed/ordered? Yes  Diet: ADULT DIET; Regular; 4 carb choices (60 gm/meal);  Low Fat/Low Chol/High Fiber/2 gm Na  VTE prophylaxis: SCD's   Code status:

## 2023-07-17 NOTE — ED NOTES
Ambulated in rodriguez with this RN. HR maintained in low 80s with O2 Sats 93-95% on RA. RR increased to 32 and patient reported feeling fatigued. He also states that the pain medication did not change pressure sensation in chest and that he would like something for the pressure.   Will notify MD. Nicholas Mcneal RN  07/16/23 2052

## 2023-07-18 VITALS
SYSTOLIC BLOOD PRESSURE: 136 MMHG | HEIGHT: 72 IN | BODY MASS INDEX: 38.47 KG/M2 | RESPIRATION RATE: 18 BRPM | DIASTOLIC BLOOD PRESSURE: 56 MMHG | WEIGHT: 284 LBS | HEART RATE: 74 BPM | OXYGEN SATURATION: 98 % | TEMPERATURE: 97.3 F

## 2023-07-18 PROBLEM — N17.9 AKI (ACUTE KIDNEY INJURY) (HCC): Status: RESOLVED | Noted: 2023-07-16 | Resolved: 2023-07-18

## 2023-07-18 PROBLEM — I50.33 ACUTE ON CHRONIC DIASTOLIC HEART FAILURE (HCC): Status: ACTIVE | Noted: 2023-07-18

## 2023-07-18 PROBLEM — J20.9 ACUTE BRONCHITIS: Status: ACTIVE | Noted: 2023-07-18

## 2023-07-18 PROBLEM — N18.32 STAGE 3B CHRONIC KIDNEY DISEASE (HCC): Chronic | Status: ACTIVE | Noted: 2023-07-17

## 2023-07-18 LAB
ANION GAP SERPL CALC-SCNC: 5 MMOL/L (ref 2–11)
BUN SERPL-MCNC: 23 MG/DL (ref 8–23)
CALCIUM SERPL-MCNC: 8.8 MG/DL (ref 8.3–10.4)
CHLORIDE SERPL-SCNC: 109 MMOL/L (ref 101–110)
CO2 SERPL-SCNC: 25 MMOL/L (ref 21–32)
CREAT SERPL-MCNC: 2 MG/DL (ref 0.8–1.5)
GLUCOSE BLD STRIP.AUTO-MCNC: 222 MG/DL (ref 65–100)
GLUCOSE BLD STRIP.AUTO-MCNC: 279 MG/DL (ref 65–100)
GLUCOSE SERPL-MCNC: 266 MG/DL (ref 65–100)
POTASSIUM SERPL-SCNC: 4.1 MMOL/L (ref 3.5–5.1)
SERVICE CMNT-IMP: ABNORMAL
SERVICE CMNT-IMP: ABNORMAL
SODIUM SERPL-SCNC: 139 MMOL/L (ref 133–143)

## 2023-07-18 PROCEDURE — 6370000000 HC RX 637 (ALT 250 FOR IP): Performed by: FAMILY MEDICINE

## 2023-07-18 PROCEDURE — 2580000003 HC RX 258: Performed by: FAMILY MEDICINE

## 2023-07-18 PROCEDURE — 80048 BASIC METABOLIC PNL TOTAL CA: CPT

## 2023-07-18 PROCEDURE — 36415 COLL VENOUS BLD VENIPUNCTURE: CPT

## 2023-07-18 PROCEDURE — 94640 AIRWAY INHALATION TREATMENT: CPT

## 2023-07-18 PROCEDURE — 94760 N-INVAS EAR/PLS OXIMETRY 1: CPT

## 2023-07-18 PROCEDURE — 6370000000 HC RX 637 (ALT 250 FOR IP): Performed by: INTERNAL MEDICINE

## 2023-07-18 PROCEDURE — G0378 HOSPITAL OBSERVATION PER HR: HCPCS

## 2023-07-18 PROCEDURE — 82962 GLUCOSE BLOOD TEST: CPT

## 2023-07-18 PROCEDURE — 96361 HYDRATE IV INFUSION ADD-ON: CPT

## 2023-07-18 RX ORDER — INSULIN GLARGINE 300 U/ML
130 INJECTION, SOLUTION SUBCUTANEOUS NIGHTLY
Qty: 1 ADJUSTABLE DOSE PRE-FILLED PEN SYRINGE | Refills: 2 | Status: SHIPPED
Start: 2023-07-18

## 2023-07-18 RX ORDER — PREDNISONE 10 MG/1
TABLET ORAL
Qty: 18 TABLET | Refills: 0 | Status: SHIPPED | OUTPATIENT
Start: 2023-07-18

## 2023-07-18 RX ORDER — INSULIN GLARGINE 300 U/ML
120 INJECTION, SOLUTION SUBCUTANEOUS NIGHTLY
Status: ON HOLD | COMMUNITY
End: 2023-07-18 | Stop reason: SDUPTHER

## 2023-07-18 RX ORDER — AZITHROMYCIN 500 MG/1
500 TABLET, FILM COATED ORAL DAILY
Qty: 1 TABLET | Refills: 0 | Status: SHIPPED | OUTPATIENT
Start: 2023-07-18 | End: 2023-07-19

## 2023-07-18 RX ORDER — BUDESONIDE AND FORMOTEROL FUMARATE DIHYDRATE 160; 4.5 UG/1; UG/1
2 AEROSOL RESPIRATORY (INHALATION) DAILY
Qty: 1 EACH | Refills: 1 | Status: SHIPPED | OUTPATIENT
Start: 2023-07-18

## 2023-07-18 RX ADMIN — INSULIN LISPRO 20 UNITS: 100 INJECTION, SOLUTION INTRAVENOUS; SUBCUTANEOUS at 08:53

## 2023-07-18 RX ADMIN — MOMETASONE FUROATE AND FORMOTEROL FUMARATE DIHYDRATE 2 PUFF: 100; 5 AEROSOL RESPIRATORY (INHALATION) at 07:27

## 2023-07-18 RX ADMIN — PANCRELIPASE LIPASE, PANCRELIPASE PROTEASE, PANCRELIPASE AMYLASE 20000 UNITS: 5000; 17000; 24000 CAPSULE, DELAYED RELEASE ORAL at 12:19

## 2023-07-18 RX ADMIN — INSULIN GLARGINE 60 UNITS: 100 INJECTION, SOLUTION SUBCUTANEOUS at 08:50

## 2023-07-18 RX ADMIN — SERTRALINE 150 MG: 50 TABLET, FILM COATED ORAL at 08:49

## 2023-07-18 RX ADMIN — PANTOPRAZOLE SODIUM 40 MG: 40 TABLET, DELAYED RELEASE ORAL at 06:25

## 2023-07-18 RX ADMIN — CHOLESTYRAMINE 4 G: 4 POWDER, FOR SUSPENSION ORAL at 08:49

## 2023-07-18 RX ADMIN — INSULIN LISPRO 20 UNITS: 100 INJECTION, SOLUTION INTRAVENOUS; SUBCUTANEOUS at 12:18

## 2023-07-18 RX ADMIN — SODIUM CHLORIDE, PRESERVATIVE FREE 5 ML: 5 INJECTION INTRAVENOUS at 08:52

## 2023-07-18 RX ADMIN — AZITHROMYCIN DIHYDRATE 500 MG: 250 TABLET, FILM COATED ORAL at 08:54

## 2023-07-18 RX ADMIN — PREDNISONE 40 MG: 20 TABLET ORAL at 08:48

## 2023-07-18 RX ADMIN — INSULIN LISPRO 6 UNITS: 100 INJECTION, SOLUTION INTRAVENOUS; SUBCUTANEOUS at 12:18

## 2023-07-18 RX ADMIN — PANCRELIPASE LIPASE, PANCRELIPASE PROTEASE, PANCRELIPASE AMYLASE 5000 UNITS: 5000; 17000; 24000 CAPSULE, DELAYED RELEASE ORAL at 12:20

## 2023-07-18 RX ADMIN — INSULIN LISPRO 3 UNITS: 100 INJECTION, SOLUTION INTRAVENOUS; SUBCUTANEOUS at 08:53

## 2023-07-18 RX ADMIN — PANCRELIPASE LIPASE, PANCRELIPASE PROTEASE, PANCRELIPASE AMYLASE 5000 UNITS: 5000; 17000; 24000 CAPSULE, DELAYED RELEASE ORAL at 08:48

## 2023-07-18 RX ADMIN — OXYCODONE HYDROCHLORIDE 15 MG: 15 TABLET ORAL at 09:54

## 2023-07-18 RX ADMIN — PREGABALIN 100 MG: 100 CAPSULE ORAL at 08:52

## 2023-07-18 RX ADMIN — PANCRELIPASE LIPASE, PANCRELIPASE PROTEASE, PANCRELIPASE AMYLASE 20000 UNITS: 5000; 17000; 24000 CAPSULE, DELAYED RELEASE ORAL at 08:47

## 2023-07-18 ASSESSMENT — PAIN SCALES - GENERAL
PAINLEVEL_OUTOF10: 7
PAINLEVEL_OUTOF10: 0

## 2023-07-18 ASSESSMENT — PAIN DESCRIPTION - LOCATION
LOCATION: GENERALIZED
LOCATION: GENERALIZED

## 2023-07-18 NOTE — DIABETES MGMT
planning, basics of healthy meal plan, and Consistent Carbohydrate Diet. Educated patient regarding the benefits of physical activity (as cleared by provider) on glycemic control. Explained the relationship between hyperglycemia and infection and delayed healing. Discussed ADA target goals for blood glucose and A1C and the significance of an HbA1c of 10%. Educated patient regarding diabetic medications including mechanism of action, timing, and possible side effects. Patient verbalizes understanding of teaching. Patient has been discharged. Stressed the importance of follow up care for diabetes management with PCP. Pt states that he is seen at the [facility] in [facility] and is working with a diabetes educator/ new PCP there to get better control of his diabetes regimen. Pt politely [facility], but given contact information. Pt states that he has attended formal diabetes education at the [facility]. Pt has no further questions regarding diabetes education. Patient has attended formal diabetes education in the past. Patient reports no difficulty with affording their diabetic supplies.

## 2023-07-18 NOTE — DISCHARGE SUMMARY
Hospitalist Discharge Summary   Admit Date:  2023  6:53 PM   DC Note date: 2023  Name:  Jodi Tran   Age:  59 y.o. Sex:  male  :  1965   MRN:  [id number]   Room:  [facility]  PCP:  Aliza Abraham MD    Presenting Complaint: Chest Pain     Initial Admission Diagnosis: Shortness of breath [R06.02]  Dehydration [E86.0]  ADÁN (acute kidney injury) ([address]) [N17.9]  Acute kidney injury ([address]) [N17.9]  Chest pain in adult [R07.9]  Other fatigue [R53.83]     Problem List for this Hospitalization (present on admission):    Principal Problem:    Acute bronchitis  Active Problems:    Paroxysmal atrial fibrillation (HCC)    Chronic diastolic congestive heart failure (HCC)    FIDENCIO on CPAP    Tobacco dependence    Renal mass of unknown nature    HTN (hypertension)    Type 2 diabetes mellitus with hyperglycemia, with long-term current use of insulin (HCC)    Dyslipidemia    Stage 3b chronic kidney disease ([address])    Acute on chronic diastolic heart failure ([address])  Resolved Problems:    ADÁN (acute kidney injury) [facility]      Hospital Course:  Jodi Tran is a 59 y.o. male who presented to the ED for cc SOB with edema to legs over the past few days. Admits to productive cough with white sputum. Having wheezing and just stopped smoking three days ago. He also states his glucose have been running high over the past few days in the 400s. Hx of a fib s/p watchman, depression, chronic pancreatitis, DM type II, FIDENCIO on CPAP, and suspected COPD  Creatine 2 from baseline 1.4. Admitted with volume overload and suspected COPD exacerbation. Leg swelling improved. He says his renal function has fluctuated from 1.3-2 ever since he had bout of necrotizing fasciitis that landed him on dialysis. Says he has been on lasix 40mg for years. He feels great and wants to go home. I did increase his home insulin bc his a1c >10.   This will likely need further adjustment with PCP    Instructed him to take his symbicort scheduled

## 2023-07-18 NOTE — PLAN OF CARE
Problem: Respiratory - Adult  Goal: Achieves optimal ventilation and oxygenation  Outcome: Progressing  Flowsheets (Taken 7/18/2023 7086)  Achieves optimal ventilation and oxygenation:   Assess for changes in respiratory status   Position to facilitate oxygenation and minimize respiratory effort   Respiratory therapy support as indicated   Assess for changes in mentation and behavior   Oxygen supplementation based on oxygen saturation or arterial blood gases   Encourage broncho-pulmonary hygiene including cough, deep breathe, incentive spirometry   Assess and instruct to report shortness of breath or any respiratory difficulty

## 2023-07-18 NOTE — CARE COORDINATION
07/18/23 0841   Discharge Planning   Type of Residence House; Other (Comment)  (outpt therapy)   Patient expects to be discharged to: Other (comment)  (outpt therapy)   Condition of Participation: Discharge Planning   The Patient and/Or Patient Representative agree with the Discharge Plan? Yes   Freedom of Choice list was provided with basic dialogue that supports the patient's individualized plan of care/goals, treatment preferences, and shares the quality data associated with the providers? Yes     MCR IL explained, given, and signed by pt. Copy to pt and paper chart.

## 2023-07-18 NOTE — PROGRESS NOTES
ACUTE PHYSICAL THERAPY GOALS:   (Developed with and agreed upon by patient and/or caregiver.)    (1.) Gino Bailey  will move from supine to sit and sit to supine , scoot up and down, and roll side to side with MODIFIED INDEPENDENCE within 7 treatment day(s). (2.) Gino Bailey will transfer from bed to chair and chair to bed with MODIFIED INDEPENDENCE using the least restrictive device within 7 treatment day(s). (3.) Gino Bailey will ambulate with SUPERVISION for 250 feet with the least restrictive device within 7 treatment day(s). (4.) Gino Bailey will perform standing static and dynamic balance activities x 25 minutes with SUPERVISION to improve safety within 7 treatment day(s). (5.) Gino Bailey will perform therapeutic exercises x 25 min for HEP with SUPERVISION to improve strength, endurance, and functional mobility within 7 treatment day(s). PHYSICAL THERAPY Initial Assessment, Daily Note, and AM  (Link to Caseload Tracking: PT Visit Days : 1  Acknowledge Orders  Time In/Out  PT Charge Capture  Rehab Caseload Tracker    Gino Bailey is a 59 y.o. male   PRIMARY DIAGNOSIS: ADÁN (acute kidney injury) ([address])  Shortness of breath [R06.02]  Dehydration [E86.0]  ADÁN (acute kidney injury) ([address]) [N17.9]  Acute kidney injury ([address]) [N17.9]  Chest pain in adult [R07.9]  Other fatigue [R53.83]       Reason for Referral: Generalized Muscle Weakness (M62.81)  Other abnormalities of gait and mobility (R26.89)  Other Low Back Pain (M54.59)  Inpatient: Payor: Codi Acuna / Plan: Susan Oliveros / Product Type: *No Product type* /     ASSESSMENT:     REHAB RECOMMENDATIONS:   Recommendation to date pending progress:  Setting:  Outpatient Therapy    Equipment:    To Be Determined     ASSESSMENT:  Mr. Carmela Morales is a 59year old M who presents ADÁN, SOB, and chest pain. Pt has constant chest and back pain, with back pain increasing with exertion.    This date pt performs mobility including
Oxycodone 15 mg po given for back pain 6/10.
Patient in possession of a medium sized pocket knife w/American flag. Patient aware that RN gave knife to charge RN to keep safe until discharge or until family member/spouse requests to take knife home.
Patient is being discharged, education on disease process given at bedside, discharge paperwork printed and signed.
Patient resting in bed no further complaints about pain.
TRANSFER - IN REPORT:    Verbal report received from Hugo Bloom. RN on Dakota Morales  being received from ED for routine progression of patient care      Report consisted of patient's Situation, Background, Assessment and   Recommendations(SBAR). Information from the following report(s) Nurse Handoff Report was reviewed with the receiving nurse. Opportunity for questions and clarification was provided. Assessment completed upon patient's arrival to unit and care assumed.
report any increased pain with mobility)       Vitals          Oxygen            GROSS EVALUATION: INTACT IMPAIRED   (See Comments)   UE AROM [x] []   UE PROM [x] []   Strength []  Generalized weakness     Posture / Balance [] Posture: Good  Sitting - Static: Good  Sitting - Dynamic: Good  Standing - Static: Fair, +  Standing - Dynamic: Fair, +   Sensation []     Coordination []       Tone []       Edema [] Some BLE edema   Activity Tolerance []  Decreased, limited by fatigue     Hand Dominance R [] L []      COGNITION/  PERCEPTION: INTACT IMPAIRED   (See Comments)   Orientation [x]     Vision [x]     Hearing [x]     Cognition  [x]     Perception []       MOBILITY: I Mod I S SBA CGA Min Mod Max Total  NT x2 Comments:   Bed Mobility    Rolling [] [] [] [] [] [] [] [] [] [x] []    Supine to Sit [] [] [] [x] [] [] [] [] [] [] []    Scooting [] [] [] [x] [] [] [] [] [] [] []    Sit to Supine [] [] [] [] [] [] [] [] [] [x] []    Transfers    Sit to Stand [] [] [] [x] [x] [] [] [] [] [] []    Bed to Chair [] [] [] [] [] [] [] [] [] [x] []    Stand to Sit [] [] [] [x] [x] [] [] [] [] [] []    Tub/Shower [] [] [] [] [] [] [] [] [] [x] []     Toilet [] [] [] [] [] [] [] [] [] [x] []      [] [] [] [] [] [] [] [] [] [] []    I=Independent, Mod I=Modified Independent, S=Supervision/Setup, SBA=Standby Assistance, CGA=Contact Guard Assistance, Min=Minimal Assistance, Mod=Moderate Assistance, Max=Maximal Assistance, Total=Total Assistance, NT=Not Tested    ACTIVITIES OF DAILY LIVING: I Mod I S SBA CGA Min Mod Max Total NT Comments   BASIC ADLs:              Upper Body Bathing  [] [] [] [] [] [] [] [] [] [x]    Lower Body Bathing [] [] [] [] [] [] [] [] [] [x]    Toileting [] [] [] [] [] [] [] [] [] [x]    Upper Body Dressing [] [] [] [] [] [] [] [] [] [x]    Lower Body Dressing [] [] [] [x] [] [] [] [] [] [] Donning socks   Feeding [] [] [] [] [] [] [] [] [] [x]    Grooming [] [] [] [x] [] [] [] [] [] [] Standing at sink, washed
MCH 28.6 26.1 - 32.9 PG    MCHC 33.3 31.4 - 35.0 g/dL    RDW 14.0 11.9 - 14.6 %    Platelets 381 (L) 254 - 450 K/uL    MPV 11.9 9.4 - 12.3 FL    nRBC 0.00 0.0 - 0.2 K/uL    Differential Type AUTOMATED      Neutrophils % 55 43 - 78 %    Lymphocytes % 34 13 - 44 %    Monocytes % 6 4.0 - 12.0 %    Eosinophils % 3 0.5 - 7.8 %    Basophils % 1 0.0 - 2.0 %    Immature Granulocytes 1 0.0 - 5.0 %    Neutrophils Absolute 3.5 1.7 - 8.2 K/UL    Lymphocytes Absolute 2.1 0.5 - 4.6 K/UL    Monocytes Absolute 0.4 0.1 - 1.3 K/UL    Eosinophils Absolute 0.2 0.0 - 0.8 K/UL    Basophils Absolute 0.0 0.0 - 0.2 K/UL    Absolute Immature Granulocyte 0.1 0.0 - 0.5 K/UL   Troponin    Collection Time: 07/16/23  6:50 PM   Result Value Ref Range    Troponin, High Sensitivity 18.2 (H) 0 - 14 pg/mL   Magnesium    Collection Time: 07/16/23  6:50 PM   Result Value Ref Range    Magnesium 2.0 1.8 - 2.4 mg/dL   Brain Natriuretic Peptide    Collection Time: 07/16/23  6:50 PM   Result Value Ref Range    NT Pro-BNP 80 5 - 125 PG/ML   D-Dimer, Quantitative    Collection Time: 07/16/23  6:50 PM   Result Value Ref Range    D-Dimer, Quant 0.43 <0.56 ug/ml(FEU)   Hemoglobin A1C    Collection Time: 07/16/23  6:50 PM   Result Value Ref Range    Hemoglobin A1C 10.0 (H) 4.8 - 5.6 %    eAG 240 mg/dL   Troponin    Collection Time: 07/16/23  8:24 PM   Result Value Ref Range    Troponin, High Sensitivity 19.2 (H) 0 - 14 pg/mL   Lipase    Collection Time: 07/16/23  8:24 PM   Result Value Ref Range    Lipase 87 73 - 393 U/L   POCT Glucose    Collection Time: 07/16/23 11:56 PM   Result Value Ref Range    POC Glucose 182 (H) 65 - 100 mg/dL    Performed by: [facility]    Basic Metabolic Panel w/ Reflex to MG    Collection Time: 07/17/23  3:52 AM   Result Value Ref Range    Sodium 143 133 - 143 mmol/L    Potassium 4.3 3.5 - 5.1 mmol/L    Chloride 112 (H) 101 - 110 mmol/L    CO2 26 21 - 32 mmol/L    Anion Gap 5 2 - 11 mmol/L    Glucose 219 (H) 65 - 100 mg/dL

## 2023-07-18 NOTE — NURSE NAVIGATOR
CHF teaching completed. CHF background completed. Teaching emphasis on Call Cardiology STAT ,if any of the following are noted:  Short of Breath; with activity or without/ while reclined, Generalize weakness, edema are noted individually or grouped. Cardiac Diet  and salt/fluid restrictions covered. Daily WTs emphasized. Planner with summarization sheet provided with cardiology service and phone number and bathroom scale offered. Total time @ BS is 1 hour and pt verbalize understanding/past post test.  Pt instructed to call myself, if any questions occur. Patient states he is familiar with these teachings.

## 2023-07-19 ENCOUNTER — CARE COORDINATION (OUTPATIENT)
Dept: CARE COORDINATION | Facility: CLINIC | Age: 58
End: 2023-07-19

## 2023-07-19 NOTE — CARE COORDINATION
[facility] Care Transitions Initial Follow Up Call    Call within 2 business days of discharge: Yes    Patient Current Location:  Home: [address] [place] [address]    LPN Care Coordinator contacted the family by telephone to perform post hospital discharge assessment. Verified name and  with family as identifiers. Provided introduction to self, and explanation of the LPN Care Coordinator role. Patient: Mikey  Nix Patient : 1965   MRN: [id number]  Reason for Admission: Acute Bronchitis  Discharge Date: 23 RARS: Readmission Risk Score: 12.2      Last Discharge [address]       Date Complaint Diagnosis Description Type Department Provider    23 Chest Pain Acute kidney injury ([address]) . .. ED to Hosp-Admission (Discharged) (ADMITTED) [id number] Lashell Batres MD; Bryn Oglesby... Was this an external facility discharge? No Discharge Facility: [address] [place] [address] Road to be reviewed by the provider   Additional needs identified to be addressed with provider: No  none               Method of communication with provider: none. Spoke with spouse states patient is doing ok. Spouse denies any pain. Spouse states patient's blood sugar this am 238 elevated due to prednisone. Sp[facility] states [facility]. LPN Care Coordinator reviewed discharge instructions with family who verbalized understanding. The family was given an opportunity to ask questions and does not have any further questions or concerns at this time. Were discharge instructions available to patient? Yes. Reviewed appropriate site of care based on symptoms and resources available to patient including: PCP  Specialist  When to call 911. The family agrees to contact the PCP office for questions related to their healthcare. Advance Care Planning:   Does patient have an Advance Directive: decision maker updated.     Medication reconciliation was performed with family, who verbalizes

## 2023-07-25 ENCOUNTER — CARE COORDINATION (OUTPATIENT)
Dept: CARE COORDINATION | Facility: CLINIC | Age: 58
End: 2023-07-25

## 2023-07-26 ENCOUNTER — CARE COORDINATION (OUTPATIENT)
Dept: CARE COORDINATION | Facility: CLINIC | Age: 58
End: 2023-07-26

## 2023-07-26 NOTE — CARE COORDINATION
Care Transitions Outreach Attempt    Call within 2 business days of discharge: Yes   2nd Attempted to reach patient for transitions of care follow up. Unable to reach patient. Will re-open if phone call is returned. Episode Resolved. Patient: Celeste Pearl Patient : 1965 MRN: [id number]    Last Discharge [address]       Date Complaint Diagnosis Description Type Department Provider    23 Chest Pain Acute kidney injury ([address] ) . .. ED to Hosp-Admission (Discharged) (ADMITTED) [id number] Samantha Germain MD; Arie Beavers. .. Was this an external facility discharge?  No Discharge Facility: [facility]    Noted following upcoming appointments from discharge chart review:   [facility] follow up appointment(s):   Future Appointments   Date Time Provider [address]   2023 10:00 AM Lisha Akins MD [facility] AMB     Non-[facility] follow up appointment(s): n/a

## 2023-09-13 ENCOUNTER — HOSPITAL ENCOUNTER (INPATIENT)
Age: 58
LOS: 1 days | Discharge: HOME | End: 2023-09-15
Attending: STUDENT IN AN ORGANIZED HEALTH CARE EDUCATION/TRAINING PROGRAM
Payer: COMMERCIAL

## 2023-09-13 ENCOUNTER — APPOINTMENT (OUTPATIENT)
Dept: GENERAL RADIOLOGY | Age: 58
End: 2023-09-13
Payer: COMMERCIAL

## 2023-09-13 ENCOUNTER — APPOINTMENT (OUTPATIENT)
Dept: CT IMAGING | Age: 58
End: 2023-09-13
Payer: COMMERCIAL

## 2023-09-13 DIAGNOSIS — I50.33 ACUTE ON CHRONIC DIASTOLIC HEART FAILURE (HCC): ICD-10-CM

## 2023-09-13 DIAGNOSIS — I31.39 PERICARDIAL EFFUSION: ICD-10-CM

## 2023-09-13 DIAGNOSIS — R51.9 NONINTRACTABLE HEADACHE, UNSPECIFIED CHRONICITY PATTERN, UNSPECIFIED HEADACHE TYPE: ICD-10-CM

## 2023-09-13 DIAGNOSIS — R07.9 CHEST PAIN, UNSPECIFIED TYPE: Primary | ICD-10-CM

## 2023-09-13 LAB
BASOPHILS # BLD: 0.1 K/UL (ref 0–0.2)
BASOPHILS NFR BLD: 0 % (ref 0–2)
DIFFERENTIAL METHOD BLD: ABNORMAL
EOSINOPHIL # BLD: 0.3 K/UL (ref 0–0.8)
EOSINOPHIL NFR BLD: 2 % (ref 0.5–7.8)
ERYTHROCYTE [DISTWIDTH] IN BLOOD BY AUTOMATED COUNT: 14.6 % (ref 11.9–14.6)
HCT VFR BLD AUTO: 46.5 % (ref 41.1–50.3)
HGB BLD-MCNC: 15 G/DL (ref 13.6–17.2)
IMM GRANULOCYTES # BLD AUTO: 0.1 K/UL (ref 0–0.5)
IMM GRANULOCYTES NFR BLD AUTO: 1 % (ref 0–5)
LYMPHOCYTES # BLD: 2.5 K/UL (ref 0.5–4.6)
LYMPHOCYTES NFR BLD: 19 % (ref 13–44)
MCH RBC QN AUTO: 27.7 PG (ref 26.1–32.9)
MCHC RBC AUTO-ENTMCNC: 32.3 G/DL (ref 31.4–35)
MCV RBC AUTO: 86 FL (ref 82–102)
MONOCYTES # BLD: 0.6 K/UL (ref 0.1–1.3)
MONOCYTES NFR BLD: 5 % (ref 4–12)
NEUTS SEG # BLD: 9.9 K/UL (ref 1.7–8.2)
NEUTS SEG NFR BLD: 73 % (ref 43–78)
NRBC # BLD: 0 K/UL (ref 0–0.2)
NT PRO BNP: 868 PG/ML (ref 5–125)
PLATELET # BLD AUTO: 170 K/UL (ref 150–450)
PMV BLD AUTO: 11.4 FL (ref 9.4–12.3)
RBC # BLD AUTO: 5.41 M/UL (ref 4.23–5.6)
WBC # BLD AUTO: 13.5 K/UL (ref 4.3–11.1)

## 2023-09-13 PROCEDURE — 70450 CT HEAD/BRAIN W/O DYE: CPT

## 2023-09-13 PROCEDURE — 80053 COMPREHEN METABOLIC PANEL: CPT

## 2023-09-13 PROCEDURE — 6360000002 HC RX W HCPCS: Performed by: STUDENT IN AN ORGANIZED HEALTH CARE EDUCATION/TRAINING PROGRAM

## 2023-09-13 PROCEDURE — 83735 ASSAY OF MAGNESIUM: CPT

## 2023-09-13 PROCEDURE — 96374 THER/PROPH/DIAG INJ IV PUSH: CPT

## 2023-09-13 PROCEDURE — 74174 CTA ABD&PLVS W/CONTRAST: CPT

## 2023-09-13 PROCEDURE — 96375 TX/PRO/DX INJ NEW DRUG ADDON: CPT

## 2023-09-13 PROCEDURE — 93005 ELECTROCARDIOGRAM TRACING: CPT | Performed by: STUDENT IN AN ORGANIZED HEALTH CARE EDUCATION/TRAINING PROGRAM

## 2023-09-13 PROCEDURE — 84484 ASSAY OF TROPONIN QUANT: CPT

## 2023-09-13 PROCEDURE — 71260 CT THORAX DX C+: CPT

## 2023-09-13 PROCEDURE — 6370000000 HC RX 637 (ALT 250 FOR IP): Performed by: STUDENT IN AN ORGANIZED HEALTH CARE EDUCATION/TRAINING PROGRAM

## 2023-09-13 PROCEDURE — 6360000004 HC RX CONTRAST MEDICATION: Performed by: STUDENT IN AN ORGANIZED HEALTH CARE EDUCATION/TRAINING PROGRAM

## 2023-09-13 PROCEDURE — 99285 EMERGENCY DEPT VISIT HI MDM: CPT

## 2023-09-13 PROCEDURE — 83690 ASSAY OF LIPASE: CPT

## 2023-09-13 PROCEDURE — 83880 ASSAY OF NATRIURETIC PEPTIDE: CPT

## 2023-09-13 PROCEDURE — 71045 X-RAY EXAM CHEST 1 VIEW: CPT

## 2023-09-13 PROCEDURE — 85025 COMPLETE CBC W/AUTO DIFF WBC: CPT

## 2023-09-13 RX ORDER — SODIUM CHLORIDE 0.9 % (FLUSH) 0.9 %
10 SYRINGE (ML) INJECTION
Status: DISCONTINUED | OUTPATIENT
Start: 2023-09-13 | End: 2023-09-15 | Stop reason: HOSPADM

## 2023-09-13 RX ORDER — PROCHLORPERAZINE EDISYLATE 5 MG/ML
10 INJECTION INTRAMUSCULAR; INTRAVENOUS
Status: COMPLETED | OUTPATIENT
Start: 2023-09-13 | End: 2023-09-13

## 2023-09-13 RX ORDER — HYDROMORPHONE HYDROCHLORIDE 1 MG/ML
1 INJECTION, SOLUTION INTRAMUSCULAR; INTRAVENOUS; SUBCUTANEOUS
Status: DISCONTINUED | OUTPATIENT
Start: 2023-09-13 | End: 2023-09-13

## 2023-09-13 RX ORDER — MORPHINE SULFATE 4 MG/ML
4 INJECTION INTRAVENOUS ONCE
Status: COMPLETED | OUTPATIENT
Start: 2023-09-13 | End: 2023-09-13

## 2023-09-13 RX ORDER — ASPIRIN 325 MG
325 TABLET ORAL
Status: DISCONTINUED | OUTPATIENT
Start: 2023-09-13 | End: 2023-09-13

## 2023-09-13 RX ORDER — 0.9 % SODIUM CHLORIDE 0.9 %
1000 INTRAVENOUS SOLUTION INTRAVENOUS
Status: DISCONTINUED | OUTPATIENT
Start: 2023-09-13 | End: 2023-09-13

## 2023-09-13 RX ORDER — 0.9 % SODIUM CHLORIDE 0.9 %
100 INTRAVENOUS SOLUTION INTRAVENOUS
Status: DISCONTINUED | OUTPATIENT
Start: 2023-09-13 | End: 2023-09-15 | Stop reason: HOSPADM

## 2023-09-13 RX ORDER — ASPIRIN 81 MG/1
243 TABLET, CHEWABLE ORAL
Status: COMPLETED | OUTPATIENT
Start: 2023-09-13 | End: 2023-09-13

## 2023-09-13 RX ADMIN — NITROGLYCERIN 0.5 INCH: 20 OINTMENT TOPICAL at 23:02

## 2023-09-13 RX ADMIN — MORPHINE SULFATE 4 MG: 4 INJECTION INTRAVENOUS at 23:49

## 2023-09-13 RX ADMIN — PROCHLORPERAZINE EDISYLATE 10 MG: 5 INJECTION INTRAMUSCULAR; INTRAVENOUS at 23:01

## 2023-09-13 RX ADMIN — ASPIRIN 243 MG: 81 TABLET, CHEWABLE ORAL at 23:11

## 2023-09-13 RX ADMIN — IOPAMIDOL 100 ML: 755 INJECTION, SOLUTION INTRAVENOUS at 22:54

## 2023-09-13 ASSESSMENT — PAIN DESCRIPTION - DESCRIPTORS: DESCRIPTORS: SHARP

## 2023-09-13 ASSESSMENT — PAIN SCALES - GENERAL
PAINLEVEL_OUTOF10: 10
PAINLEVEL_OUTOF10: 10

## 2023-09-13 ASSESSMENT — ENCOUNTER SYMPTOMS
BACK PAIN: 1
SHORTNESS OF BREATH: 1
CHEST TIGHTNESS: 1

## 2023-09-13 ASSESSMENT — PAIN - FUNCTIONAL ASSESSMENT: PAIN_FUNCTIONAL_ASSESSMENT: 0-10

## 2023-09-13 ASSESSMENT — PAIN DESCRIPTION - LOCATION: LOCATION: HEAD;CHEST;BACK

## 2023-09-14 ENCOUNTER — APPOINTMENT (OUTPATIENT)
Dept: NON INVASIVE DIAGNOSTICS | Age: 58
End: 2023-09-14
Payer: COMMERCIAL

## 2023-09-14 PROBLEM — E11.21 TYPE 2 DIABETES WITH NEPHROPATHY (HCC): Status: ACTIVE | Noted: 2018-09-11

## 2023-09-14 PROBLEM — R07.9 CHEST PAIN: Status: ACTIVE | Noted: 2021-10-08

## 2023-09-14 PROBLEM — I25.10 CORONARY ARTERY DISEASE INVOLVING NATIVE CORONARY ARTERY OF NATIVE HEART: Status: ACTIVE | Noted: 2020-06-23

## 2023-09-14 PROBLEM — R51.9 NONINTRACTABLE HEADACHE: Status: ACTIVE | Noted: 2023-09-14

## 2023-09-14 PROBLEM — K86.1 CHRONIC PANCREATITIS (HCC): Status: ACTIVE | Noted: 2023-09-14

## 2023-09-14 PROBLEM — I31.39 PERICARDIAL EFFUSION: Status: ACTIVE | Noted: 2023-09-14

## 2023-09-14 PROBLEM — D72.829 LEUKOCYTOSIS: Status: ACTIVE | Noted: 2023-09-14

## 2023-09-14 LAB
ALBUMIN SERPL-MCNC: 4.2 G/DL (ref 3.2–4.6)
ALBUMIN/GLOB SERPL: 1.2 (ref 0.4–1.6)
ALP SERPL-CCNC: 81 U/L (ref 50–136)
ALT SERPL-CCNC: 31 U/L (ref 12–65)
ANION GAP SERPL CALC-SCNC: 5 MMOL/L (ref 2–11)
ANION GAP SERPL CALC-SCNC: 8 MMOL/L (ref 2–11)
APPEARANCE UR: CLEAR
AST SERPL-CCNC: 26 U/L (ref 15–37)
BACTERIA URNS QL MICRO: NEGATIVE /HPF
BILIRUB SERPL-MCNC: 0.4 MG/DL (ref 0.2–1.1)
BILIRUB UR QL: NEGATIVE
BUN SERPL-MCNC: 29 MG/DL (ref 8–23)
BUN SERPL-MCNC: 32 MG/DL (ref 8–23)
CALCIUM SERPL-MCNC: 9 MG/DL (ref 8.3–10.4)
CALCIUM SERPL-MCNC: 9.4 MG/DL (ref 8.3–10.4)
CASTS URNS QL MICRO: ABNORMAL /LPF
CHLORIDE SERPL-SCNC: 110 MMOL/L (ref 101–110)
CHLORIDE SERPL-SCNC: 110 MMOL/L (ref 101–110)
CO2 SERPL-SCNC: 22 MMOL/L (ref 21–32)
CO2 SERPL-SCNC: 27 MMOL/L (ref 21–32)
COLOR UR: ABNORMAL
CREAT SERPL-MCNC: 2.1 MG/DL (ref 0.8–1.5)
CREAT SERPL-MCNC: 2.4 MG/DL (ref 0.8–1.5)
ECHO AO ASC DIAM: 3.5 CM
ECHO AO ASCENDING AORTA INDEX: 1.43 CM/M2
ECHO AO ROOT DIAM: 4.1 CM
ECHO AO ROOT INDEX: 1.67 CM/M2
ECHO AV AREA PEAK VELOCITY: 2.1 CM2
ECHO AV AREA VTI: 2.2 CM2
ECHO AV AREA/BSA PEAK VELOCITY: 0.9 CM2/M2
ECHO AV AREA/BSA VTI: 0.9 CM2/M2
ECHO AV MEAN GRADIENT: 14 MMHG
ECHO AV MEAN VELOCITY: 1.8 M/S
ECHO AV PEAK GRADIENT: 25 MMHG
ECHO AV PEAK VELOCITY: 2.5 M/S
ECHO AV VELOCITY RATIO: 0.52
ECHO AV VTI: 34.2 CM
ECHO BSA: 2.53 M2
ECHO EST RA PRESSURE: 15 MMHG
ECHO IVC PROX: 2.6 CM
ECHO LA AREA 4C: 25.3 CM2
ECHO LA MAJOR AXIS: 6.4 CM
ECHO LA VOL 4C: 81 ML (ref 18–58)
ECHO LA VOLUME INDEX A4C: 33 ML/M2 (ref 16–34)
ECHO LV E' SEPTAL VELOCITY: 11 CM/S
ECHO LV EDV A4C: 126 ML
ECHO LV EDV INDEX A4C: 51 ML/M2
ECHO LV EJECTION FRACTION A4C: 63 %
ECHO LV ESV A4C: 47 ML
ECHO LV ESV INDEX A4C: 19 ML/M2
ECHO LV FRACTIONAL SHORTENING: 39 % (ref 28–44)
ECHO LV INTERNAL DIMENSION DIASTOLE INDEX: 1.67 CM/M2
ECHO LV INTERNAL DIMENSION DIASTOLIC: 4.1 CM (ref 4.2–5.9)
ECHO LV INTERNAL DIMENSION SYSTOLIC INDEX: 1.02 CM/M2
ECHO LV INTERNAL DIMENSION SYSTOLIC: 2.5 CM
ECHO LV IVSD: 1.3 CM (ref 0.6–1)
ECHO LV MASS 2D: 182.5 G (ref 88–224)
ECHO LV MASS INDEX 2D: 74.5 G/M2 (ref 49–115)
ECHO LV POSTERIOR WALL DIASTOLIC: 1.2 CM (ref 0.6–1)
ECHO LV RELATIVE WALL THICKNESS RATIO: 0.59
ECHO LVOT AREA: 4.2 CM2
ECHO LVOT AV VTI INDEX: 0.53
ECHO LVOT DIAM: 2.3 CM
ECHO LVOT MEAN GRADIENT: 3 MMHG
ECHO LVOT PEAK GRADIENT: 6 MMHG
ECHO LVOT PEAK VELOCITY: 1.3 M/S
ECHO LVOT STROKE VOLUME INDEX: 30.8 ML/M2
ECHO LVOT SV: 75.6 ML
ECHO LVOT VTI: 18.2 CM
ECHO MV E VELOCITY: 0.89 M/S
ECHO MV E/E' SEPTAL: 8.09
ECHO PV ACCELERATION TIME (AT): 69 MS
ECHO PV MAX VELOCITY: 1 M/S
ECHO PV PEAK GRADIENT: 4 MMHG
ECHO RV FREE WALL PEAK S': 17 CM/S
EKG ATRIAL RATE: 160 BPM
EKG DIAGNOSIS: NORMAL
EKG DIAGNOSIS: NORMAL
EKG Q-T INTERVAL: 358 MS
EKG Q-T INTERVAL: 373 MS
EKG QRS DURATION: 124 MS
EKG QRS DURATION: 143 MS
EKG QTC CALCULATION (BAZETT): 454 MS
EKG QTC CALCULATION (BAZETT): 459 MS
EKG R AXIS: -62 DEGREES
EKG R AXIS: 270 DEGREES
EKG T AXIS: 37 DEGREES
EKG T AXIS: 54 DEGREES
EKG VENTRICULAR RATE: 96 BPM
EKG VENTRICULAR RATE: 99 BPM
EPI CELLS #/AREA URNS HPF: ABNORMAL /HPF
GLOBULIN SER CALC-MCNC: 3.6 G/DL (ref 2.8–4.5)
GLUCOSE BLD STRIP.AUTO-MCNC: 119 MG/DL (ref 65–100)
GLUCOSE BLD STRIP.AUTO-MCNC: 150 MG/DL (ref 65–100)
GLUCOSE BLD STRIP.AUTO-MCNC: 178 MG/DL (ref 65–100)
GLUCOSE BLD STRIP.AUTO-MCNC: 189 MG/DL (ref 65–100)
GLUCOSE SERPL-MCNC: 123 MG/DL (ref 65–100)
GLUCOSE SERPL-MCNC: 85 MG/DL (ref 65–100)
GLUCOSE UR STRIP.AUTO-MCNC: >1000 MG/DL
HGB UR QL STRIP: NEGATIVE
KETONES UR QL STRIP.AUTO: NEGATIVE MG/DL
LEUKOCYTE ESTERASE UR QL STRIP.AUTO: NEGATIVE
LIPASE SERPL-CCNC: 49 U/L (ref 73–393)
MAGNESIUM SERPL-MCNC: 2.2 MG/DL (ref 1.8–2.4)
MAGNESIUM SERPL-MCNC: 2.2 MG/DL (ref 1.8–2.4)
MUCOUS THREADS URNS QL MICRO: 0 /LPF
NITRITE UR QL STRIP.AUTO: NEGATIVE
PH UR STRIP: 5 (ref 5–9)
POTASSIUM SERPL-SCNC: 4.3 MMOL/L (ref 3.5–5.1)
POTASSIUM SERPL-SCNC: 4.4 MMOL/L (ref 3.5–5.1)
PROT SERPL-MCNC: 7.8 G/DL (ref 6.3–8.2)
PROT UR STRIP-MCNC: NEGATIVE MG/DL
RBC #/AREA URNS HPF: ABNORMAL /HPF
SARS-COV-2 RDRP RESP QL NAA+PROBE: NOT DETECTED
SERVICE CMNT-IMP: ABNORMAL
SODIUM SERPL-SCNC: 140 MMOL/L (ref 133–143)
SODIUM SERPL-SCNC: 142 MMOL/L (ref 133–143)
SOURCE: NORMAL
SP GR UR REFRACTOMETRY: 1.02 (ref 1–1.02)
TROPONIN I SERPL HS-MCNC: 43.9 PG/ML (ref 0–14)
TROPONIN I SERPL HS-MCNC: 44.2 PG/ML (ref 0–14)
TROPONIN I SERPL HS-MCNC: 48.5 PG/ML (ref 0–14)
URINE CULTURE IF INDICATED: ABNORMAL
UROBILINOGEN UR QL STRIP.AUTO: 0.2 EU/DL (ref 0.2–1)
WBC URNS QL MICRO: ABNORMAL /HPF

## 2023-09-14 PROCEDURE — 2500000003 HC RX 250 WO HCPCS: Performed by: NURSE PRACTITIONER

## 2023-09-14 PROCEDURE — 93010 ELECTROCARDIOGRAM REPORT: CPT | Performed by: INTERNAL MEDICINE

## 2023-09-14 PROCEDURE — 94760 N-INVAS EAR/PLS OXIMETRY 1: CPT

## 2023-09-14 PROCEDURE — 81001 URINALYSIS AUTO W/SCOPE: CPT

## 2023-09-14 PROCEDURE — 6360000004 HC RX CONTRAST MEDICATION: Performed by: INTERNAL MEDICINE

## 2023-09-14 PROCEDURE — 6360000002 HC RX W HCPCS: Performed by: INTERNAL MEDICINE

## 2023-09-14 PROCEDURE — 2580000003 HC RX 258: Performed by: NURSE PRACTITIONER

## 2023-09-14 PROCEDURE — 1100000000 HC RM PRIVATE

## 2023-09-14 PROCEDURE — 97535 SELF CARE MNGMENT TRAINING: CPT

## 2023-09-14 PROCEDURE — 99222 1ST HOSP IP/OBS MODERATE 55: CPT | Performed by: INTERNAL MEDICINE

## 2023-09-14 PROCEDURE — C8929 TTE W OR WO FOL WCON,DOPPLER: HCPCS

## 2023-09-14 PROCEDURE — 6360000002 HC RX W HCPCS: Performed by: NURSE PRACTITIONER

## 2023-09-14 PROCEDURE — 96376 TX/PRO/DX INJ SAME DRUG ADON: CPT

## 2023-09-14 PROCEDURE — 84484 ASSAY OF TROPONIN QUANT: CPT

## 2023-09-14 PROCEDURE — 97112 NEUROMUSCULAR REEDUCATION: CPT

## 2023-09-14 PROCEDURE — 2580000003 HC RX 258: Performed by: INTERNAL MEDICINE

## 2023-09-14 PROCEDURE — 6370000000 HC RX 637 (ALT 250 FOR IP): Performed by: NURSE PRACTITIONER

## 2023-09-14 PROCEDURE — 83735 ASSAY OF MAGNESIUM: CPT

## 2023-09-14 PROCEDURE — 6360000002 HC RX W HCPCS: Performed by: STUDENT IN AN ORGANIZED HEALTH CARE EDUCATION/TRAINING PROGRAM

## 2023-09-14 PROCEDURE — 97530 THERAPEUTIC ACTIVITIES: CPT

## 2023-09-14 PROCEDURE — 36415 COLL VENOUS BLD VENIPUNCTURE: CPT

## 2023-09-14 PROCEDURE — 87635 SARS-COV-2 COVID-19 AMP PRB: CPT

## 2023-09-14 PROCEDURE — 97161 PT EVAL LOW COMPLEX 20 MIN: CPT

## 2023-09-14 PROCEDURE — 80048 BASIC METABOLIC PNL TOTAL CA: CPT

## 2023-09-14 PROCEDURE — 6370000000 HC RX 637 (ALT 250 FOR IP): Performed by: STUDENT IN AN ORGANIZED HEALTH CARE EDUCATION/TRAINING PROGRAM

## 2023-09-14 PROCEDURE — 82962 GLUCOSE BLOOD TEST: CPT

## 2023-09-14 PROCEDURE — 2700000000 HC OXYGEN THERAPY PER DAY

## 2023-09-14 PROCEDURE — 97165 OT EVAL LOW COMPLEX 30 MIN: CPT

## 2023-09-14 PROCEDURE — 94640 AIRWAY INHALATION TREATMENT: CPT

## 2023-09-14 RX ORDER — TRAZODONE HYDROCHLORIDE 50 MG/1
100 TABLET ORAL NIGHTLY
Status: DISCONTINUED | OUTPATIENT
Start: 2023-09-14 | End: 2023-09-15 | Stop reason: HOSPADM

## 2023-09-14 RX ORDER — FUROSEMIDE 10 MG/ML
40 INJECTION INTRAMUSCULAR; INTRAVENOUS 2 TIMES DAILY
Status: DISCONTINUED | OUTPATIENT
Start: 2023-09-14 | End: 2023-09-15 | Stop reason: HOSPADM

## 2023-09-14 RX ORDER — POTASSIUM CHLORIDE 20 MEQ/1
40 TABLET, EXTENDED RELEASE ORAL PRN
Status: DISCONTINUED | OUTPATIENT
Start: 2023-09-14 | End: 2023-09-15 | Stop reason: HOSPADM

## 2023-09-14 RX ORDER — INSULIN LISPRO 100 [IU]/ML
0-4 INJECTION, SOLUTION INTRAVENOUS; SUBCUTANEOUS NIGHTLY
Status: DISCONTINUED | OUTPATIENT
Start: 2023-09-14 | End: 2023-09-15 | Stop reason: HOSPADM

## 2023-09-14 RX ORDER — ONDANSETRON 2 MG/ML
4 INJECTION INTRAMUSCULAR; INTRAVENOUS EVERY 6 HOURS PRN
Status: DISCONTINUED | OUTPATIENT
Start: 2023-09-14 | End: 2023-09-15 | Stop reason: HOSPADM

## 2023-09-14 RX ORDER — DEXTROSE MONOHYDRATE 100 MG/ML
INJECTION, SOLUTION INTRAVENOUS CONTINUOUS PRN
Status: DISCONTINUED | OUTPATIENT
Start: 2023-09-14 | End: 2023-09-15 | Stop reason: HOSPADM

## 2023-09-14 RX ORDER — MAGNESIUM HYDROXIDE/ALUMINUM HYDROXICE/SIMETHICONE 120; 1200; 1200 MG/30ML; MG/30ML; MG/30ML
30 SUSPENSION ORAL
Status: COMPLETED | OUTPATIENT
Start: 2023-09-14 | End: 2023-09-14

## 2023-09-14 RX ORDER — POLYETHYLENE GLYCOL 3350 17 G/17G
17 POWDER, FOR SOLUTION ORAL DAILY PRN
Status: DISCONTINUED | OUTPATIENT
Start: 2023-09-14 | End: 2023-09-15 | Stop reason: HOSPADM

## 2023-09-14 RX ORDER — FAMOTIDINE 20 MG/1
20 TABLET, FILM COATED ORAL 2 TIMES DAILY
Status: DISCONTINUED | OUTPATIENT
Start: 2023-09-14 | End: 2023-09-14

## 2023-09-14 RX ORDER — INSULIN LISPRO 100 [IU]/ML
0-8 INJECTION, SOLUTION INTRAVENOUS; SUBCUTANEOUS
Status: DISCONTINUED | OUTPATIENT
Start: 2023-09-14 | End: 2023-09-15 | Stop reason: HOSPADM

## 2023-09-14 RX ORDER — ATORVASTATIN CALCIUM 40 MG/1
40 TABLET, FILM COATED ORAL NIGHTLY
Status: DISCONTINUED | OUTPATIENT
Start: 2023-09-14 | End: 2023-09-15 | Stop reason: HOSPADM

## 2023-09-14 RX ORDER — OXYCODONE HYDROCHLORIDE 5 MG/1
15 TABLET ORAL EVERY 8 HOURS
Status: DISCONTINUED | OUTPATIENT
Start: 2023-09-14 | End: 2023-09-15 | Stop reason: HOSPADM

## 2023-09-14 RX ORDER — SODIUM CHLORIDE 9 MG/ML
INJECTION, SOLUTION INTRAVENOUS PRN
Status: DISCONTINUED | OUTPATIENT
Start: 2023-09-14 | End: 2023-09-15 | Stop reason: HOSPADM

## 2023-09-14 RX ORDER — POTASSIUM CHLORIDE 7.45 MG/ML
10 INJECTION INTRAVENOUS PRN
Status: DISCONTINUED | OUTPATIENT
Start: 2023-09-14 | End: 2023-09-15 | Stop reason: HOSPADM

## 2023-09-14 RX ORDER — IBUPROFEN 600 MG/1
1 TABLET ORAL PRN
Status: DISCONTINUED | OUTPATIENT
Start: 2023-09-14 | End: 2023-09-15 | Stop reason: HOSPADM

## 2023-09-14 RX ORDER — SODIUM CHLORIDE 0.9 % (FLUSH) 0.9 %
5-40 SYRINGE (ML) INJECTION EVERY 12 HOURS SCHEDULED
Status: DISCONTINUED | OUTPATIENT
Start: 2023-09-14 | End: 2023-09-15 | Stop reason: HOSPADM

## 2023-09-14 RX ORDER — SODIUM CHLORIDE 0.9 % (FLUSH) 0.9 %
5-40 SYRINGE (ML) INJECTION PRN
Status: DISCONTINUED | OUTPATIENT
Start: 2023-09-14 | End: 2023-09-15 | Stop reason: HOSPADM

## 2023-09-14 RX ORDER — PREGABALIN 100 MG/1
100 CAPSULE ORAL 3 TIMES DAILY
Status: DISCONTINUED | OUTPATIENT
Start: 2023-09-14 | End: 2023-09-15 | Stop reason: HOSPADM

## 2023-09-14 RX ORDER — MORPHINE SULFATE 2 MG/ML
2 INJECTION, SOLUTION INTRAMUSCULAR; INTRAVENOUS EVERY 4 HOURS PRN
Status: DISCONTINUED | OUTPATIENT
Start: 2023-09-14 | End: 2023-09-15 | Stop reason: HOSPADM

## 2023-09-14 RX ORDER — ENOXAPARIN SODIUM 100 MG/ML
30 INJECTION SUBCUTANEOUS 2 TIMES DAILY
Status: DISCONTINUED | OUTPATIENT
Start: 2023-09-14 | End: 2023-09-14 | Stop reason: ALTCHOICE

## 2023-09-14 RX ORDER — LIDOCAINE HYDROCHLORIDE 20 MG/ML
15 SOLUTION OROPHARYNGEAL
Status: COMPLETED | OUTPATIENT
Start: 2023-09-14 | End: 2023-09-14

## 2023-09-14 RX ORDER — ALBUTEROL SULFATE 90 UG/1
2 AEROSOL, METERED RESPIRATORY (INHALATION) EVERY 6 HOURS PRN
Status: DISCONTINUED | OUTPATIENT
Start: 2023-09-14 | End: 2023-09-15 | Stop reason: HOSPADM

## 2023-09-14 RX ORDER — ACETAMINOPHEN 325 MG/1
650 TABLET ORAL EVERY 6 HOURS PRN
Status: DISCONTINUED | OUTPATIENT
Start: 2023-09-14 | End: 2023-09-15 | Stop reason: HOSPADM

## 2023-09-14 RX ORDER — HEPARIN SODIUM 5000 [USP'U]/ML
5000 INJECTION, SOLUTION INTRAVENOUS; SUBCUTANEOUS EVERY 8 HOURS SCHEDULED
Status: DISCONTINUED | OUTPATIENT
Start: 2023-09-14 | End: 2023-09-15 | Stop reason: HOSPADM

## 2023-09-14 RX ORDER — ONDANSETRON 4 MG/1
4 TABLET, ORALLY DISINTEGRATING ORAL EVERY 8 HOURS PRN
Status: DISCONTINUED | OUTPATIENT
Start: 2023-09-14 | End: 2023-09-15 | Stop reason: HOSPADM

## 2023-09-14 RX ORDER — MORPHINE SULFATE 4 MG/ML
4 INJECTION INTRAVENOUS ONCE
Status: COMPLETED | OUTPATIENT
Start: 2023-09-14 | End: 2023-09-14

## 2023-09-14 RX ORDER — INSULIN GLARGINE 100 [IU]/ML
60 INJECTION, SOLUTION SUBCUTANEOUS NIGHTLY
Status: DISCONTINUED | OUTPATIENT
Start: 2023-09-14 | End: 2023-09-15 | Stop reason: HOSPADM

## 2023-09-14 RX ORDER — PANTOPRAZOLE SODIUM 40 MG/1
40 TABLET, DELAYED RELEASE ORAL 2 TIMES DAILY
Status: DISCONTINUED | OUTPATIENT
Start: 2023-09-14 | End: 2023-09-15 | Stop reason: HOSPADM

## 2023-09-14 RX ORDER — MAGNESIUM SULFATE IN WATER 40 MG/ML
2000 INJECTION, SOLUTION INTRAVENOUS PRN
Status: DISCONTINUED | OUTPATIENT
Start: 2023-09-14 | End: 2023-09-15 | Stop reason: HOSPADM

## 2023-09-14 RX ADMIN — HEPARIN SODIUM 5000 UNITS: 5000 INJECTION INTRAVENOUS; SUBCUTANEOUS at 22:02

## 2023-09-14 RX ADMIN — PANCRELIPASE LIPASE, PANCRELIPASE PROTEASE, PANCRELIPASE AMYLASE 5000 UNITS: 5000; 17000; 24000 CAPSULE, DELAYED RELEASE ORAL at 16:23

## 2023-09-14 RX ADMIN — HEPARIN SODIUM 5000 UNITS: 5000 INJECTION INTRAVENOUS; SUBCUTANEOUS at 05:49

## 2023-09-14 RX ADMIN — MORPHINE SULFATE 4 MG: 4 INJECTION INTRAVENOUS at 01:53

## 2023-09-14 RX ADMIN — FUROSEMIDE 40 MG: 10 INJECTION, SOLUTION INTRAMUSCULAR; INTRAVENOUS at 08:12

## 2023-09-14 RX ADMIN — PANCRELIPASE LIPASE, PANCRELIPASE PROTEASE, PANCRELIPASE AMYLASE 5000 UNITS: 5000; 17000; 24000 CAPSULE, DELAYED RELEASE ORAL at 11:37

## 2023-09-14 RX ADMIN — FUROSEMIDE 40 MG: 10 INJECTION, SOLUTION INTRAMUSCULAR; INTRAVENOUS at 16:24

## 2023-09-14 RX ADMIN — TRAZODONE HYDROCHLORIDE 100 MG: 50 TABLET ORAL at 20:17

## 2023-09-14 RX ADMIN — OXYCODONE HYDROCHLORIDE 15 MG: 5 TABLET ORAL at 22:03

## 2023-09-14 RX ADMIN — SODIUM CHLORIDE, PRESERVATIVE FREE 10 ML: 5 INJECTION INTRAVENOUS at 20:17

## 2023-09-14 RX ADMIN — HEPARIN SODIUM 5000 UNITS: 5000 INJECTION INTRAVENOUS; SUBCUTANEOUS at 13:33

## 2023-09-14 RX ADMIN — MORPHINE SULFATE 2 MG: 2 INJECTION, SOLUTION INTRAMUSCULAR; INTRAVENOUS at 12:39

## 2023-09-14 RX ADMIN — SODIUM CHLORIDE, PRESERVATIVE FREE 10 ML: 5 INJECTION INTRAVENOUS at 08:13

## 2023-09-14 RX ADMIN — PANTOPRAZOLE SODIUM 40 MG: 40 TABLET, DELAYED RELEASE ORAL at 05:49

## 2023-09-14 RX ADMIN — MOMETASONE FUROATE AND FORMOTEROL FUMARATE DIHYDRATE 2 PUFF: 100; 5 AEROSOL RESPIRATORY (INHALATION) at 20:17

## 2023-09-14 RX ADMIN — ACETAMINOPHEN 650 MG: 325 TABLET ORAL at 08:13

## 2023-09-14 RX ADMIN — PANTOPRAZOLE SODIUM 40 MG: 40 TABLET, DELAYED RELEASE ORAL at 16:23

## 2023-09-14 RX ADMIN — SODIUM CHLORIDE, PRESERVATIVE FREE 0.45 ML: 5 INJECTION INTRAVENOUS at 10:10

## 2023-09-14 RX ADMIN — ATORVASTATIN CALCIUM 40 MG: 40 TABLET, FILM COATED ORAL at 20:17

## 2023-09-14 RX ADMIN — INSULIN GLARGINE 60 UNITS: 100 INJECTION, SOLUTION SUBCUTANEOUS at 20:17

## 2023-09-14 RX ADMIN — PREGABALIN 100 MG: 100 CAPSULE ORAL at 08:13

## 2023-09-14 RX ADMIN — TUBERCULIN PURIFIED PROTEIN DERIVATIVE 5 UNITS: 5 INJECTION, SOLUTION INTRADERMAL at 08:14

## 2023-09-14 RX ADMIN — OXYCODONE HYDROCHLORIDE 15 MG: 5 TABLET ORAL at 13:33

## 2023-09-14 RX ADMIN — OXYCODONE HYDROCHLORIDE 15 MG: 5 TABLET ORAL at 05:49

## 2023-09-14 RX ADMIN — PANCRELIPASE LIPASE, PANCRELIPASE PROTEASE, PANCRELIPASE AMYLASE 5000 UNITS: 5000; 17000; 24000 CAPSULE, DELAYED RELEASE ORAL at 08:13

## 2023-09-14 RX ADMIN — PREGABALIN 100 MG: 100 CAPSULE ORAL at 20:17

## 2023-09-14 RX ADMIN — ALUMINUM HYDROXIDE, MAGNESIUM HYDROXIDE, AND SIMETHICONE 30 ML: 200; 200; 20 SUSPENSION ORAL at 00:43

## 2023-09-14 RX ADMIN — MORPHINE SULFATE 2 MG: 2 INJECTION, SOLUTION INTRAMUSCULAR; INTRAVENOUS at 20:17

## 2023-09-14 RX ADMIN — LIDOCAINE HYDROCHLORIDE 15 ML: 20 SOLUTION ORAL at 00:43

## 2023-09-14 RX ADMIN — MOMETASONE FUROATE AND FORMOTEROL FUMARATE DIHYDRATE 2 PUFF: 100; 5 AEROSOL RESPIRATORY (INHALATION) at 07:43

## 2023-09-14 RX ADMIN — PREGABALIN 100 MG: 100 CAPSULE ORAL at 13:33

## 2023-09-14 RX ADMIN — SERTRALINE HYDROCHLORIDE 150 MG: 100 TABLET ORAL at 08:13

## 2023-09-14 ASSESSMENT — PAIN SCALES - GENERAL
PAINLEVEL_OUTOF10: 3
PAINLEVEL_OUTOF10: 8
PAINLEVEL_OUTOF10: 3
PAINLEVEL_OUTOF10: 5
PAINLEVEL_OUTOF10: 3
PAINLEVEL_OUTOF10: 5
PAINLEVEL_OUTOF10: 0
PAINLEVEL_OUTOF10: 10
PAINLEVEL_OUTOF10: 2
PAINLEVEL_OUTOF10: 10
PAINLEVEL_OUTOF10: 10
PAINLEVEL_OUTOF10: 4
PAINLEVEL_OUTOF10: 5

## 2023-09-14 ASSESSMENT — PAIN DESCRIPTION - FREQUENCY
FREQUENCY: CONTINUOUS
FREQUENCY: CONTINUOUS
FREQUENCY: INTERMITTENT
FREQUENCY: CONTINUOUS
FREQUENCY: CONTINUOUS

## 2023-09-14 ASSESSMENT — PAIN DESCRIPTION - ORIENTATION
ORIENTATION: LEFT;MID
ORIENTATION: LEFT;UPPER
ORIENTATION: LEFT
ORIENTATION: MID
ORIENTATION: LEFT
ORIENTATION: LEFT
ORIENTATION: MID

## 2023-09-14 ASSESSMENT — PAIN DESCRIPTION - LOCATION
LOCATION: NECK;BACK
LOCATION: CHEST;NECK
LOCATION: NECK;ARM;BACK
LOCATION: CHEST;BACK
LOCATION: BACK;CHEST;SHOULDER
LOCATION: NECK;BACK
LOCATION: NECK;BACK
LOCATION: BACK
LOCATION: CHEST;BACK;SHOULDER

## 2023-09-14 ASSESSMENT — PAIN DESCRIPTION - ONSET
ONSET: ON-GOING
ONSET: ON-GOING
ONSET: GRADUAL
ONSET: GRADUAL
ONSET: ON-GOING

## 2023-09-14 ASSESSMENT — PAIN - FUNCTIONAL ASSESSMENT
PAIN_FUNCTIONAL_ASSESSMENT: ACTIVITIES ARE NOT PREVENTED
PAIN_FUNCTIONAL_ASSESSMENT: ACTIVITIES ARE NOT PREVENTED
PAIN_FUNCTIONAL_ASSESSMENT: PREVENTS OR INTERFERES SOME ACTIVE ACTIVITIES AND ADLS

## 2023-09-14 ASSESSMENT — LIFESTYLE VARIABLES
HOW OFTEN DO YOU HAVE A DRINK CONTAINING ALCOHOL: NEVER
HOW MANY STANDARD DRINKS CONTAINING ALCOHOL DO YOU HAVE ON A TYPICAL DAY: PATIENT DOES NOT DRINK

## 2023-09-14 ASSESSMENT — PAIN DESCRIPTION - DESCRIPTORS
DESCRIPTORS: SHARP;STABBING;PRESSURE
DESCRIPTORS: ACHING
DESCRIPTORS: DISCOMFORT;STABBING

## 2023-09-14 ASSESSMENT — PAIN DESCRIPTION - PAIN TYPE
TYPE: CHRONIC PAIN
TYPE: ACUTE PAIN;CHRONIC PAIN

## 2023-09-14 ASSESSMENT — ENCOUNTER SYMPTOMS
NAUSEA: 0
VOMITING: 0
SHORTNESS OF BREATH: 0
EYES NEGATIVE: 1
HEARTBURN: 0

## 2023-09-14 NOTE — CONSULTS
Nutrition Note:   Acknowledge Consult for Education: CHF diet education    Initial education to be completed by CHF Nurse Navigator. Will defer RD intervention at this time. Navigator will reconsult RD if additional diet education intervention needed.     Karis Slade RD

## 2023-09-14 NOTE — ED PROVIDER NOTES
Emergency Department Provider Note       PCP: Zoltan Pena MD   Age: 59 y.o. Sex: male     Dae Dempsey Admitted 09/14/2023 02:22:56 AM       ICD-10-CM    1. Chest pain, unspecified type  R07.9       2. Pericardial effusion  I31.39       3. Nonintractable headache, unspecified chronicity pattern, unspecified headache type  R51.9           Medical Decision Making     Complexity of Problems Addressed:  1 or more acute illnesses that pose a threat to life or bodily function. Data Reviewed and Analyzed:  I independently ordered and reviewed each unique test.  I reviewed external records: ED visit note from an outside group. I reviewed external records: provider visit note from PCP. I reviewed external records: provider visit note from outside specialist.   The patients assessment required an independent historian: Patient's spouse at bedside. The reason they were needed is important historical information not provided by the patient. I interpreted the X-rays no focal infiltrate. I interpreted the CT scan, no obvious aortic dissection    Discussion of management or test interpretation. Ill-appearing 59-year-old male patient presenting to the ER with reports of sudden onset headache now chest back and arm pain. Patient reports significant shortness of breath. Patient's description of his headache is concerning for intracerebral event, will obtain CT imaging. His biggest complaint now is severe chest pain that radiates through to his back that started suddenly. I do have concerns for aortic catastrophe as a cause of this pain and will obtain CT imaging of the chest abdomen pelvis as well. EKG appears similar to previous tracings on file. The patient was admitted and I have discussed patient management with the admitting provider. The management of this patient was discussed with an external consultant.       Risk of Complications and/or Morbidity of Patient Management:  Parental controlled Mild changes small vessel ischemic disease of indeterminate age, presumably    mostly chronic. Volume loss. Atherosclerosis. Tiny old lacunar type infarct left    cerebellum. Hever Park M.D.    9/13/2023 11:29:00 PM      CTA CHEST ABDOMEN PELVIS W WO CONTRAST   Final Result   Impression:    1. Mild atherosclerosis without aneurysm or dissection. An SMA stent appears    patent. 2. Small pericardial effusion. 3. Mild pneumobilia is stable, nonspecific in the setting of prior    cholecystectomy. Joleen Felty, M.D.    9/14/2023 1:32:00 AM      XR CHEST PORTABLE   Final Result   Impression:   No evidence of an acute pleural or pulmonary parenchymal abnormality. Ronak Ladd M.D.    9/13/2023 10:51:00 PM                        Voice dictation software was used during the making of this note. This software is not perfect and grammatical and other typographical errors may be present. This note has not been completely proofread for errors.      [id number] TOMMY RUTH [facility],   09/14/23 8462

## 2023-09-14 NOTE — CARE COORDINATION
Pt presented to the ED with CP and posterior HA. Medical hx includes: HTN, DHF, DM, HLD, CAD, chronic pancreatitis, FIDENCIO, CKD 3 and A.Fib. PTA, pt indep with his ADLs. Lives with his spouse and dtr in a mobile home. On 2L supplemental, denies home oxygen use. Uses a cane and RW. No current home care services. PT/OT consulted and recommended HH. PCP established at the [facility] in [facility]. [address] [facility] insurance verified and able to afford home meds. Will revisit to discuss therapies recs. 09/14/23 5719   Service Assessment   Patient Orientation Alert and Oriented   Cognition Alert   History Provided By Patient   Primary Caregiver Self   Support Systems Spouse/Significant Other;Children;Family Members;[employer];Friends/Neighbors   PCP Verified by CM Yes   Prior Functional Level Independent in ADLs/IADLs   Current Functional Level Independent in ADLs/IADLs   Can patient return to prior living arrangement Yes   Ability to make needs known: Good   Family able to assist with home care needs: Yes   Would you like for me to discuss the discharge plan with any other family members/significant others, and if so, who? No   Financial Resources Other (Comment)  ([employer])   Community Resources None   Social/Functional History   Lives With Spouse;Daughter   Type of Home Mobile home   Home Layout One level   [address] [place] Needs assistance   Active  Yes   Mode of Transportation Car   Occupation On disability   Discharge Planning   Current Services Prior To Admission None   Potential Assistance Needed Home Care   DME Ordered? No   Potential Assistance Purchasing Medications No   Type of Home Care Services Nursing Services;PT;OT   Patient expects to be discharged to:  Unknown   Services At/After Discharge   Transition of Care Consult

## 2023-09-14 NOTE — CONSULTS
[facility] Cardiology Initial Cardiac Evaluation                Date of  Admission: 9/13/2023 10:30 PM     PCP: Jeffrey Stacy MD  Requested by: Lawson ADEN  Primary Cardiologist: Dr Kathy Garner Now Dr Marva Childers Attending: Dr Supa Rosenthal    Reason for Evaluation: Consideration of CHF      Robbie Westfall is a 59 y.o. male with hx of PAF s/p Ablation, s/p watchman, HFpEF, HTN, HLD, CAD, tobacco abuse, PUD, chronic pancreatitis, CKD, and coronary artery disease. Patient presents to the emergency department with complaints of a headache and somnolence chest pain while walking into his home. The patient's headache is described as being hit in the head by ton of brix which then radiated to his neck and back. Patient's chest pain was described at the time of his shortness of breath with worsening with deep inspirations and radiating to his left arm. In the emergency department patient had CT of the head that showed no acute intracranial process with mild small vessel ischemic changes thought to be chronic. CTA  did show mild atherosclerosis without aneurysm, small pericardial effusion, and mild stable pneumobilia. In the emergency department patient's EKG showed normal sinus rhythm with no acute changes, high sensitive troponin low level and flat, NT proBNP of 868, creatinine 2.4 with a baseline of 1.82, white blood cell count of 13.5 and neutrophil of 9.9. Per patient he has gained 20 pounds in the past 2 months coincide with the last time he was here for a COPD aspiration and with volume overload at that time. The patient's legs are currently at his baseline, he is no reports of orthopnea in the outpatient setting, and has no complaints of shortness of breath. Currently patient's main complaint is headache reported a 20 out of 10 pain that still radiates down into his neck. He has no CP now an the pain at the time appeared to be atypical with increasing and decreasing with deep breathing.         Recent Cardiac musculoskeletal or pleuritic chest discomfort and this in no way sounds anginal.  His headache is noncoronary but may certainly be vascular in etiology. Unfortunately he is also wearing nitroglycerin paste which is removed during my evaluation. CT of the chest on admission shows a small pericardial effusion but he has no rub clinically and has no evidence for volume overload on exam today. Creatinine is abnormal at 2.4 with a proBNP of 868. White blood count is 13.5 and other labs are benign. He states that he has gained 20 pounds in the past couple of months coinciding with the last time he was here with a COPD exacerbation, however he does not appear to be overtly volume overloaded on exam today. Patient Active Problem List    Diagnosis Date Noted    Chest pain 10/08/2021     Priority: High    Paroxysmal atrial fibrillation ([address]) 08/11/2020     Priority: High    Pericardial effusion 09/14/2023    Leukocytosis 09/14/2023    Chronic pancreatitis ([address]) 09/14/2023    Nonintractable headache 09/14/2023    Acute bronchitis 07/18/2023    Acute on chronic diastolic heart failure ([address]) 07/18/2023    Stage 3b chronic kidney disease ([address]) 07/17/2023     Overview Note:     Baseline Cr 1.3 - 1.4      RBBB 10/08/2021    Acute gastritis without hemorrhage 11/05/2020    Renal mass of unknown nature 11/05/2020    Coronary artery disease involving native coronary artery of native heart 06/23/2020     Overview Note:     1. Cardiac Cath    10/2021  minimal irregularity. Atrial fibrillation and flutter ([address]) 12/05/2018    Type 2 diabetes with nephropathy ([address]) 09/11/2018    Chronic diastolic congestive heart failure ([address]) 05/05/2016     Overview Note:     new onset with aflutter         Atrial flutter ([address]) 05/05/2016     Overview Note:     1. A.  Flutter ablation - June 2016        Non-rheumatic mitral regurgitation 05/05/2016     Overview Note:     moderate on Lina  echo   EF nl          Tobacco dependence

## 2023-09-14 NOTE — FLOWSHEET NOTE
09/14/23 0300   Dual Clinician Skin Assessment   Dual Skin Assessment (4 Eyes) WDL   Second Clinical  (First and Last Name) Kim Sterling RN   Skin Integumentary    Skin Integumentary (WDL) X   Skin Color Ecchymosis (comment); Apolinar   Skin Condition/Temp Warm;Dry   Skin Integrity Ecchymosis;Scars (comment); Vascular discoloration or hemochromatosis/staining   Location scattered scars and bruising; BLE apolinar     Patient's skin is clean, dry, and intact. Heels and sacrum without any redness or injury. Scattered scars and bruising noted. BLE apolinar with 1+ non-pitting edema. No other abnormalities noted.

## 2023-09-14 NOTE — PROGRESS NOTES
LOW COMPLEXITY: (Untimed Charge)    TREATMENT:   Neuromuscular Re-education (10 Minutes): Patient participated in neuromuscular re-education including functional reaching, weight shifting, postural training, midline training, fine motor skills training, standing tolerance activity , and sitting balance activity   with minimal and need for assistance, verbal cues, education, and adaptive equipment to improve sitting balance, standing balance, proprioception, posture, coordination, static balance, and dynamic balance in order to prepare for functional task, prepare for seated ADLs, prepare for standing ADLs, prepare for functional transfer, increase safety awareness, prepare for discharge home , and prepare for self care. .   Self Care (20 minutes): Patient participated in toileting, lower body dressing, self feeding, and grooming ADLs in supported sitting, unsupported sitting, standing, and supine with minimal verbal, manual, and tactile cueing to increase independence, decrease assistance required, increase activity tolerance, increase safety awareness, and maintain precautions. Patient also participated in functional mobility, energy conservation, and adaptive equipment training to increase independence, decrease assistance required, increase activity tolerance, increase safety awareness, and maintain precautions. TREATMENT GRID:  N/A    AFTER TREATMENT PRECAUTIONS: Bed, Bed/Chair Locked, Call light within reach, Heels floated, Needs within reach, RN notified, and Side rails x3    INTERDISCIPLINARY COLLABORATION:  RN/ PCT, PT/ PTA, OT/ CASTILLO, and RN Case Manager/      EDUCATION:  Education Given To: Patient;Staff  Education Provided: Role of Therapy;Plan of Care;Home Exercise Program;Precautions; ADL Adaptive Strategies;Transfer Training;Energy Conservation;IADL Safety;Orientation;Equipment; Fall Prevention Strategies  Education Provided Comments: pursed lip breathing  Education Method: Demonstration;Verbal  Barriers to Learning: None  Education Outcome: Continued education needed    TOTAL TREATMENT DURATION AND TIME:  Time In: 1230  Time Out: 1300  Minutes: 27    VIN FRASER, OT    Vin Fraser, MS, OTR/L

## 2023-09-14 NOTE — ED TRIAGE NOTES
Pt to ED with c/o chest pain and headache. Pt states started around 1800 while walking in from the porch. Pt states sxs hit him all the sudden. Pt states pain to left chest and left arm. Pt states unable to open his eyes. Pt states hx of migraines but does not feel the same. Wife states pt was just seen for CHF. States he was sick for a few days last week but tested negative for covid and flu.

## 2023-09-14 NOTE — PROGRESS NOTES
TRANSFER - IN REPORT:    Verbal report received from YAMILET Bettencourt on [address] [facility]  being received from ED for routine progression of patient care      Report consisted of patient's Situation, Background, Assessment and   Recommendations(SBAR). Information from the following report(s) Nurse Handoff Report, Index, ED Encounter Summary, ED SBAR, MAR, and Recent Results was reviewed with the receiving nurse. Opportunity for questions and clarification was provided. Assessment completed upon patient's arrival to unit and care assumed. Patient arrived to room 317. Vital signs obtained, telemetry monitor verified, and admission assessment completed. Patient oriented to call light and instructed to call for any assistance.

## 2023-09-14 NOTE — PROGRESS NOTES
ACUTE PHYSICAL THERAPY GOALS:   (Developed with and agreed upon by patient and/or caregiver.)  Pt will perform all bed mobility and transfers (I) without LOB or miss-steps in 7 therapy sessions. Pt will ambulate 250 ft under (S) without LOB or miss-steps and breaks as needed in 7 therapy sessions. Pt will perform standing dynamic balance activities with minimal postural sway in 7 therapy sessions. Pt will tolerate multiple sets and reps of BLE exercises in 7 therapy sessions. PHYSICAL THERAPY Initial Assessment, Daily Note, and AM  (Link to Caseload Tracking: PT Visit Days : 1  Acknowledge Orders  Time In/Out  PT Charge Capture  Rehab Caseload Tracker    Hailey Pino is a 59 y.o. male   PRIMARY DIAGNOSIS: Acute on chronic diastolic heart failure (HCC)  Acute on chronic diastolic heart failure (HCC) [I50.33]  Pericardial effusion [I31.39]  Nonintractable headache, unspecified chronicity pattern, unspecified headache type [R51.9]  Chest pain, unspecified type [R07.9]       Reason for Referral: Other abnormalities of gait and mobility (R26.89)  Inpatient: Payor: Kaya Santo / Plan: Kaya Santo / Product Type: *No Product type* /     ASSESSMENT:     REHAB RECOMMENDATIONS:   Recommendation to date pending progress:  Setting:  Home Health Therapy    Equipment:    None  To Be Determined     ASSESSMENT:  Mr. Tamra Tran Is a 59 y.o. male presenting to PT after being admitted on 9/1323 for acute on chronic diastolic HF associated with atypical CP radiating to L shoulder and back. At time of initial evaluation, pt presents just below baseline LOF with increased pain as well as slight deficits in standing dynamic balance, gait and activity tolerance limiting his overall functional mobility. Today, pt performed all mobility with SB/CG(A), inc time and cueing including ambulation of 30'x1 and 50'x1 c seated rest break provided.  Of note, pt ambulating with wide MIRI, decreased jennie and increased sway although he NT=Not Tested    PLAN:   FREQUENCY AND DURATION: 3 times/week for duration of hospital stay or until stated goals are met, whichever comes first.    THERAPY PROGNOSIS: Fair    PROBLEM LIST:   (Skilled intervention is medically necessary to address:)  Decreased Activity Tolerance  Decreased Balance  Decreased Gait Ability  Increased Pain INTERVENTIONS PLANNED:   (Benefits and precautions of physical therapy have been discussed with the patient.)  Therapeutic Activity  Therapeutic Exercise/HEP  Gait Training  Education       TREATMENT:   EVALUATION: LOW COMPLEXITY: (Untimed Charge)    TREATMENT:   Therapeutic Activity (23 Minutes): Therapeutic activity included Supine to Sit, Scooting, Ambulation on level ground, Sitting balance , and Standing balance to improve functional Activity tolerance, Balance, Mobility, and Strength.     TREATMENT GRID:  N/A    AFTER TREATMENT PRECAUTIONS: Alarm Activated, Bed/Chair Locked, Call light within reach, Chair, Needs within reach, and RN notified    INTERDISCIPLINARY COLLABORATION:  RN/ PCT and PT/ PTA    EDUCATION: Education Given To: Patient  Education Provided: Role of Therapy    TIME IN/OUT:  Time In: 4904  Time Out: 3600 [email or web address]  Minutes: 26    Marlene Fenton PT

## 2023-09-14 NOTE — ED NOTES
TRANSFER - OUT REPORT:    Verbal report given to [place], RN on [address] [facility]  being transferred to [facility] for routine progression of patient care       Report consisted of patient's Situation, Background, Assessment and   Recommendations(SBAR). Information from the following report(s) Nurse Handoff Report was reviewed with the receiving nurse. Elizabeth Fall Assessment:    Presents to emergency department  because of falls (Syncope, seizure, or loss of consciousness): No  Age > 70: No  Altered Mental Status, Intoxication with alcohol or substance confusion (Disorientation, impaired judgment, poor safety awaremess, or inability to follow instructions): No  Impaired Mobility: Ambulates or transfers with assistive devices or assistance; Unable to ambulate or transer.: No  Nursing Judgement: No          Lines:   Peripheral IV 09/13/23 Right Antecubital (Active)   Site Assessment Clean, dry & intact 09/13/23 2241   Line Status Blood return noted; Flushed 09/13/23 2241   Phlebitis Assessment No symptoms 09/13/23 2241   Infiltration Assessment 0 09/13/23 2241   Alcohol Cap Used No 09/13/23 2241   Dressing Status New dressing applied 09/13/23 2241   Dressing Type Transparent 09/13/23 2241   Dressing Intervention New 09/13/23 2241        Opportunity for questions and clarification was provided.       Patient transported with:  Monitor, O2 @ 2lpm, and Registered Nurse          Rachelle Rockwell RN  09/14/23 2128

## 2023-09-14 NOTE — PLAN OF CARE
Problem: Discharge Planning  Goal: Discharge to home or other facility with appropriate resources  Outcome: Progressing     Problem: Pain  Goal: Verbalizes/displays adequate comfort level or baseline comfort level  Outcome: Progressing     Problem: Safety - Adult  Goal: Free from fall injury  Outcome: Progressing  Flowsheets (Taken 9/14/2023 0342)  Free From Fall Injury:   Instruct family/caregiver on patient safety   Based on caregiver fall risk screen, instruct family/caregiver to ask for assistance with transferring infant if caregiver noted to have fall risk factors     Problem: ABCDS Injury Assessment  Goal: Absence of physical injury  Outcome: Progressing  Flowsheets (Taken 9/14/2023 0342)  Absence of Physical Injury: Implement safety measures based on patient assessment     Problem: Cardiovascular - Adult  Goal: Maintains optimal cardiac output and hemodynamic stability  Outcome: Progressing  Flowsheets (Taken 9/14/2023 0342)  Maintains optimal cardiac output and hemodynamic stability:   Monitor blood pressure and heart rate   Assess for signs of decreased cardiac output   Administer fluid and/or volume expanders as ordered   Administer vasoactive medications as ordered  Goal: Absence of cardiac dysrhythmias or at baseline  Outcome: Progressing  Flowsheets (Taken 9/14/2023 0342)  Absence of cardiac dysrhythmias or at baseline:   Monitor cardiac rate and rhythm   Assess for signs of decreased cardiac output   Administer antiarrhythmia medication and electrolyte replacement as ordered

## 2023-09-14 NOTE — ACP (ADVANCE CARE PLANNING)
[facility] Hospitalist Service  At the heart of better care     Advance Care Planning   Admit Date:  2023 10:30 PM   Name:  Timoteo Cary   Age:  59 y.o. Sex:  male  :  1965   MRN:  [id number]   Room:  [place] [address]    Timoteo Cary has capacity to make his own decisions:   Yes    If pt unable to make decisions, POA/surrogate decision maker:  N/A    Other people present:   None    Patient / surrogate decision-maker directed code status:  Full Code    Other ACP topics discussed, if applicable:   Lasix    Patient or surrogate consented to discussion of the current conditions, workup, management plans, prognosis, and the risk for further deterioration. Time spent: 30 minutes in direct discussion.       Signed:  Dickey Litten, APRN - CNP

## 2023-09-14 NOTE — PROGRESS NOTES
CHF teaching provide to patient in both verbal and written format. Discussed the important of weighing daily and recording the weight daily in monthly calendar provide. Instructed pt to carry the calendar to Cardiologist visits. Bathroom scale provided to pt. Cardiac Diet reviewed. (Salt/fluid restrictions)    Reinforce when to call Cardiology STAT, if any of following occurs:  Shortness of Breath: with or without activity   Generalized weakness  Edema  Weight gain >=2 lbs or more a day or >=5 lbs or more per week    Instructed pt to call the Heart Failure Navigator with any questions. Phone number provided for HF Navigator.     Pt verbalized understanding understanding and denied any questions

## 2023-09-14 NOTE — PROGRESS NOTES
mmHg   POCT Glucose    Collection Time: 09/14/23 11:38 AM   Result Value Ref Range    POC Glucose 150 (H) 65 - 100 mg/dL    Performed by: Siobhan        Current Meds:  Current Facility-Administered Medications   Medication Dose Route Frequency    insulin lispro (HUMALOG) injection vial 0-8 Units  0-8 Units SubCUTAneous TID WC    insulin lispro (HUMALOG) injection vial 0-4 Units  0-4 Units SubCUTAneous Nightly    glucose chewable tablet 16 g  4 tablet Oral PRN    dextrose bolus 10% 125 mL  125 mL IntraVENous PRN    Or    dextrose bolus 10% 250 mL  250 mL IntraVENous PRN    Glucagon Emergency KIT 1 mg  1 mg SubCUTAneous PRN    dextrose 10 % infusion   IntraVENous Continuous PRN    sodium chloride flush 0.9 % injection 5-40 mL  5-40 mL IntraVENous 2 times per day    sodium chloride flush 0.9 % injection 5-40 mL  5-40 mL IntraVENous PRN    0.9 % sodium chloride infusion   IntraVENous PRN    ondansetron (ZOFRAN-ODT) disintegrating tablet 4 mg  4 mg Oral Q8H PRN    Or    ondansetron (ZOFRAN) injection 4 mg  4 mg IntraVENous Q6H PRN    polyethylene glycol (GLYCOLAX) packet 17 g  17 g Oral Daily PRN    acetaminophen (TYLENOL) tablet 650 mg  650 mg Oral Q6H PRN    Or    acetaminophen (TYLENOL) suppository 650 mg  650 mg Rectal Q6H PRN    potassium chloride (KLOR-CON M) extended release tablet 40 mEq  40 mEq Oral PRN    Or    potassium bicarb-citric acid (EFFER-K) effervescent tablet 40 mEq  40 mEq Oral PRN    Or    potassium chloride 10 mEq/100 mL IVPB (Peripheral Line)  10 mEq IntraVENous PRN    magnesium sulfate 2000 mg in 50 mL IVPB premix  2,000 mg IntraVENous PRN    furosemide (LASIX) injection 40 mg  40 mg IntraVENous BID    atorvastatin (LIPITOR) tablet 40 mg  40 mg Oral Nightly    albuterol sulfate HFA (PROVENTIL;VENTOLIN;PROAIR) 108 (90 Base) MCG/ACT inhaler 2 puff  2 puff Inhalation Q6H PRN    mometasone-formoterol (DULERA) 100-5 MCG/ACT inhaler 2 puff  2 puff Inhalation BID RT lipase-protease-amylase (ZENPEP) delayed release capsule 5,000 Units  5,000 Units Oral TID WC    pantoprazole (PROTONIX) tablet 40 mg  40 mg Oral BID    pregabalin (LYRICA) capsule 100 mg  100 mg Oral TID    sertraline (ZOLOFT) tablet 150 mg  150 mg Oral Daily    traZODone (DESYREL) tablet 100 mg  100 mg Oral Nightly    heparin (porcine) injection 5,000 Units  5,000 Units SubCUTAneous 3 times per day    oxyCODONE (ROXICODONE) immediate release tablet 15 mg  15 mg Oral q8h    insulin glargine (LANTUS) injection vial 60 Units  60 Units SubCUTAneous Nightly    tuberculin injection 5 Units  5 Units IntraDERmal Once    morphine injection 2 mg  2 mg IntraVENous Q4H PRN    sodium chloride flush 0.9 % injection 10 mL  10 mL IntraVENous ONCE PRN    sodium chloride 0.9 % bolus 100 mL  100 mL IntraVENous ONCE PRN       Signed:  Shaheed Long MD    Part of this note may have been written by using a voice dictation software. The note has been proof read but may still contain some grammatical/other typographical errors.

## 2023-09-14 NOTE — H&P
Hospitalist History and Physical   Admit Date:  2023 10:30 PM   Name:  Gino Bailey   Age:  59 y.o. Sex:  male  :  1965   MRN:  [id number]   Room:  [facility]    Presenting/Chief Complaint: No chief complaint on file. Reason(s) for Admission: Acute on chronic diastolic heart failure (HCC) [I50.33]     History of Present Illness:   Gino Bailey is a 59 y.o. male with medical history of HTN, DHF, DM, HLD, CAD, chronic pancreatitis,  FIDENCIO, CKD 3, A Fib with Watchman device who presented with CP and HA posterior head onset tonight. CT head in ED shows IMPRESSION:  1. No acute intracranial process. MRI is more sensitive for the detection of   acute nonhemorrhagic infarct. 2. Mild changes small vessel ischemic disease of indeterminate age, presumably   mostly chronic. Volume loss. Atherosclerosis. Tiny old lacunar type infarct left   cerebellum. EKG shows NSR. Trop 48.5 then 43.9, . Cr 2.4 with baseline 1.8-2. WBC 12. CXR no acute findings. CTA chest, abd, pelvis:  IMPRESSION:  Impression:   1. Mild atherosclerosis without aneurysm or dissection. An SMA stent appears   patent. 2. Small pericardial effusion. 3. Mild pneumobilia is stable, nonspecific in the setting of prior   cholecystectomy.          Assessment & Plan:     Principal Problem:    Acute on chronic diastolic heart failure (HCC)  Plan: Lasix IV  I&O  Last echo July with normal EF, abnorrmal diastolic function, will defer to cards once seen today if another needed  Consult cards    Active Problems:    Chest pain  Plan: telemetry  Monitor trop  Cards consult      Paroxysmal atrial fibrillation ([address])  Plan: s/p Watchman      Coronary artery disease involving native coronary artery of native heart  Plan: home meds      FIDENCIO on CPAP  Plan: non compliance with CPAP      Tobacco dependence  Plan: cessation advised      Type 2 diabetes with nephropathy ([address])  Plan: SSI  Lantus 60 units nightly      HTN (hypertension)  Plan: home

## 2023-09-15 VITALS
RESPIRATION RATE: 18 BRPM | TEMPERATURE: 98.6 F | DIASTOLIC BLOOD PRESSURE: 72 MMHG | HEIGHT: 72 IN | HEART RATE: 97 BPM | SYSTOLIC BLOOD PRESSURE: 137 MMHG | BODY MASS INDEX: 37.37 KG/M2 | WEIGHT: 275.9 LBS | OXYGEN SATURATION: 95 %

## 2023-09-15 LAB
ANION GAP SERPL CALC-SCNC: 8 MMOL/L (ref 2–11)
BASOPHILS # BLD: 0 K/UL (ref 0–0.2)
BASOPHILS NFR BLD: 0 % (ref 0–2)
BUN SERPL-MCNC: 33 MG/DL (ref 8–23)
CALCIUM SERPL-MCNC: 8.9 MG/DL (ref 8.3–10.4)
CHLORIDE SERPL-SCNC: 107 MMOL/L (ref 101–110)
CHOLEST SERPL-MCNC: 73 MG/DL
CO2 SERPL-SCNC: 25 MMOL/L (ref 21–32)
CREAT SERPL-MCNC: 2.3 MG/DL (ref 0.8–1.5)
DIFFERENTIAL METHOD BLD: ABNORMAL
EOSINOPHIL # BLD: 0.1 K/UL (ref 0–0.8)
EOSINOPHIL NFR BLD: 1 % (ref 0.5–7.8)
ERYTHROCYTE [DISTWIDTH] IN BLOOD BY AUTOMATED COUNT: 14.7 % (ref 11.9–14.6)
GLUCOSE BLD STRIP.AUTO-MCNC: 195 MG/DL (ref 65–100)
GLUCOSE BLD STRIP.AUTO-MCNC: 96 MG/DL (ref 65–100)
GLUCOSE SERPL-MCNC: 152 MG/DL (ref 65–100)
HCT VFR BLD AUTO: 42.2 % (ref 41.1–50.3)
HDLC SERPL-MCNC: 37 MG/DL (ref 40–60)
HDLC SERPL: 2
HGB BLD-MCNC: 13.2 G/DL (ref 13.6–17.2)
IMM GRANULOCYTES # BLD AUTO: 0.1 K/UL (ref 0–0.5)
IMM GRANULOCYTES NFR BLD AUTO: 1 % (ref 0–5)
LDLC SERPL CALC-MCNC: 9.8 MG/DL
LYMPHOCYTES # BLD: 2.4 K/UL (ref 0.5–4.6)
LYMPHOCYTES NFR BLD: 23 % (ref 13–44)
MCH RBC QN AUTO: 27.2 PG (ref 26.1–32.9)
MCHC RBC AUTO-ENTMCNC: 31.3 G/DL (ref 31.4–35)
MCV RBC AUTO: 87 FL (ref 82–102)
MONOCYTES # BLD: 0.7 K/UL (ref 0.1–1.3)
MONOCYTES NFR BLD: 7 % (ref 4–12)
NEUTS SEG # BLD: 7 K/UL (ref 1.7–8.2)
NEUTS SEG NFR BLD: 68 % (ref 43–78)
NRBC # BLD: 0 K/UL (ref 0–0.2)
PLATELET # BLD AUTO: 121 K/UL (ref 150–450)
PMV BLD AUTO: 11.9 FL (ref 9.4–12.3)
POTASSIUM SERPL-SCNC: 3.9 MMOL/L (ref 3.5–5.1)
RBC # BLD AUTO: 4.85 M/UL (ref 4.23–5.6)
SERVICE CMNT-IMP: ABNORMAL
SERVICE CMNT-IMP: NORMAL
SODIUM SERPL-SCNC: 140 MMOL/L (ref 133–143)
TRIGL SERPL-MCNC: 131 MG/DL (ref 35–150)
VLDLC SERPL CALC-MCNC: 26.2 MG/DL (ref 6–23)
WBC # BLD AUTO: 10.3 K/UL (ref 4.3–11.1)

## 2023-09-15 PROCEDURE — 36415 COLL VENOUS BLD VENIPUNCTURE: CPT

## 2023-09-15 PROCEDURE — 6360000002 HC RX W HCPCS: Performed by: NURSE PRACTITIONER

## 2023-09-15 PROCEDURE — 2580000003 HC RX 258: Performed by: NURSE PRACTITIONER

## 2023-09-15 PROCEDURE — 99232 SBSQ HOSP IP/OBS MODERATE 35: CPT | Performed by: INTERNAL MEDICINE

## 2023-09-15 PROCEDURE — 6370000000 HC RX 637 (ALT 250 FOR IP): Performed by: NURSE PRACTITIONER

## 2023-09-15 PROCEDURE — 94640 AIRWAY INHALATION TREATMENT: CPT

## 2023-09-15 PROCEDURE — 85025 COMPLETE CBC W/AUTO DIFF WBC: CPT

## 2023-09-15 PROCEDURE — 80048 BASIC METABOLIC PNL TOTAL CA: CPT

## 2023-09-15 PROCEDURE — 80061 LIPID PANEL: CPT

## 2023-09-15 PROCEDURE — 82962 GLUCOSE BLOOD TEST: CPT

## 2023-09-15 RX ADMIN — HEPARIN SODIUM 5000 UNITS: 5000 INJECTION INTRAVENOUS; SUBCUTANEOUS at 05:32

## 2023-09-15 RX ADMIN — SODIUM CHLORIDE, PRESERVATIVE FREE 10 ML: 5 INJECTION INTRAVENOUS at 08:38

## 2023-09-15 RX ADMIN — PREGABALIN 100 MG: 100 CAPSULE ORAL at 08:38

## 2023-09-15 RX ADMIN — OXYCODONE HYDROCHLORIDE 15 MG: 5 TABLET ORAL at 05:31

## 2023-09-15 RX ADMIN — FUROSEMIDE 40 MG: 10 INJECTION, SOLUTION INTRAMUSCULAR; INTRAVENOUS at 08:38

## 2023-09-15 RX ADMIN — PANCRELIPASE LIPASE, PANCRELIPASE PROTEASE, PANCRELIPASE AMYLASE 5000 UNITS: 5000; 17000; 24000 CAPSULE, DELAYED RELEASE ORAL at 08:38

## 2023-09-15 RX ADMIN — PANTOPRAZOLE SODIUM 40 MG: 40 TABLET, DELAYED RELEASE ORAL at 05:31

## 2023-09-15 RX ADMIN — SERTRALINE HYDROCHLORIDE 150 MG: 100 TABLET ORAL at 08:38

## 2023-09-15 RX ADMIN — MOMETASONE FUROATE AND FORMOTEROL FUMARATE DIHYDRATE 2 PUFF: 100; 5 AEROSOL RESPIRATORY (INHALATION) at 07:24

## 2023-09-15 ASSESSMENT — PAIN SCALES - GENERAL: PAINLEVEL_OUTOF10: 0

## 2023-09-15 NOTE — CARE COORDINATION
Discharge order is in. Pt is discharging home today in stable condition. PT/OT consulted and are recommending HH. Referral sent to [facility] for RN/PT/OT. No other discharge needs identified. Tx goals met. 09/15/23 [address] [place] Discharge   Transition of Care Consult (CM Consult) Discharge Planning;Home Health  ([facility])   Internal Home Health No   Reason Outside [address] [place] [address] specific requirement  ([address] [place] [address] insurance)   Services At/After Discharge Home Health;OT;PT;Nursing services   [facility] Resource Information Provided? Yes   Mode of Transport at Discharge Other (see comment)  (Family)   Confirm Follow Up Transport Family   Condition of Participation: Discharge Planning   The Patient and/or Patient Representative was provided with a Choice of Provider? Patient   The Patient and/Or Patient Representative agree with the Discharge Plan? Yes   Freedom of Choice list was provided with basic dialogue that supports the patient's individualized plan of care/goals, treatment preferences, and shares the quality data associated with the providers?   Yes

## 2023-09-15 NOTE — PROGRESS NOTES
PT/OT consulted and recommended HH. Pt agreeable to [facility]; agency in contact with the [address] [place] [address]. Referral sent for RN/PT/OT. Pt expected to discharge later today.

## 2023-09-15 NOTE — DISCHARGE SUMMARY
Hospitalist Discharge Summary   Admit Date:  2023 10:30 PM   DC Note date: 9/15/2023  Name:  Waldo Sandoval   Age:  59 y.o. Sex:  male  :  1965   MRN:  [id number]   Room:  [facility]  PCP:  Zoltan Pena MD    Presenting Complaint: No chief complaint on file. Initial Admission Diagnosis: Acute on chronic diastolic heart failure (HCC) [I50.33]  Pericardial effusion [I31.39]  Nonintractable headache, unspecified chronicity pattern, unspecified headache type [R51.9]  Chest pain, unspecified type [R07.9]     Problem List for this Hospitalization (present on admission):    Principal Problem:    Acute on chronic diastolic heart failure ([address])  Active Problems:    Chest pain    Paroxysmal atrial fibrillation ([address])    Coronary artery disease involving native coronary artery of native heart    FIDENCIO on CPAP    Tobacco dependence    Type 2 diabetes with nephropathy (HCC)    HTN (hypertension)    Dyslipidemia    Stage 3b chronic kidney disease (HCC)    Pericardial effusion    Leukocytosis    Chronic pancreatitis (HCC)    Nonintractable headache  Resolved Problems:    * No resolved hospital problems. [facility] Course:  Please refer to the admission H&P for details of presentation. In summary, Waldo Sandoavl is a 59 y.o. male with past medical history significant for HTN, DHF, DM, HLD, CAD, chronic pancreatitis,  FIDENCIO, CKD 3, A Fib with Watchman device who presented with CP and HA posterior head which started in the evening/night prior to presenting to ED. CT Head without acute abnormalities but showed mild changes small vessel ischemic disease of indeterminate with volume loss and tiny old lacunar type infarct left cerebellum. EKG with NSR with trop of 48. 5. pBNP 868 and Cr 2.4. WBC 12. CXR without acute changes. CTA C/A/P with patent SMA stent, small pericardial effusion and without aneurysm/dissections. Pt was started on IV lasix given elevated pBNP with worsening LE edema.  Echo showed EF of budesonide-formoterol (SYMBICORT) 160-4.5 MCG/ACT AERO Inhale 2 puffs into the lungs daily  Qty: 1 each, Refills: 1      albuterol sulfate  (90 Base) MCG/ACT inhaler Inhale 2 puffs into the lungs every 6 hours as needed      atorvastatin (LIPITOR) 40 MG tablet Take 1 tablet by mouth      cetirizine (ZYRTEC) 10 MG tablet Take 1 tablet by mouth      furosemide (LASIX) 40 MG tablet Take 1 tablet by mouth daily      insulin aspart (NOVOLOG) 100 UNIT/ML injection pen Inject 20 Units into the skin 4 times daily (before meals and nightly)      oxyCODONE (OXY-IR) 15 MG immediate release tablet Take 1 tablet by mouth 3 times daily. lipase-protease-amylase (CREON) 23609-22442 units delayed release capsule Take 1 capsule by mouth 3 times daily (with meals)      pantoprazole (PROTONIX) 40 MG tablet Take 1 tablet by mouth 2 times daily      pregabalin (LYRICA) 100 MG capsule Take 1 capsule by mouth 3 times daily. promethazine (PHENERGAN) 25 MG suppository Place 1 suppository rectally every 6 hours as needed      promethazine (PHENERGAN) 25 MG tablet Take 1 tablet by mouth every 6 hours as needed      sertraline (ZOLOFT) 100 MG tablet Take 1.5 tablets by mouth daily      traZODone (DESYREL) 50 MG tablet Take 2 tablets by mouth           STOP taking these medications       predniSONE (DELTASONE) 10 MG tablet Comments:   Reason for Stopping:               Some of the medications may be marked as \"stop taking\" by the system; but in reality pt or family reported already being off these meds; defer to outpatient/prescribing providers.     Procedures done this admission:  * No surgery found *    Consults this admission:  IP CONSULT TO HEART FAILURE NURSE/COORDINATOR  IP CONSULT TO DIETITIAN  IP CONSULT TO CARDIOLOGY  IP CONSULT HOME HEALTH    Echocardiogram results:  09/13/23    ECHO (TTE) COMPLETE (CONTRAST/BUBBLE/3D PRN) 09/14/2023 10:37 AM (Final)    Interpretation Summary    Left Ventricle: Normal left ventricular

## 2023-09-18 ENCOUNTER — CARE COORDINATION (OUTPATIENT)
Dept: CARE COORDINATION | Facility: CLINIC | Age: 58
End: 2023-09-18

## 2023-09-18 ENCOUNTER — TELEPHONE (OUTPATIENT)
Age: 58
End: 2023-09-18

## 2023-09-18 NOTE — TELEPHONE ENCOUNTER
Care Transitions Initial Follow Up Call    Call within 2 business days of discharge: Yes     Patient: Steven Sanchez Patient : 1965 MRN: [id number]  RARS: Readmission Risk Score: 12.2  Spoke with: PATIENT    Non-face-to-face services provided:  Transitional Care fu after DC from [facility]  9/15/23  Acute on chronic diastolic heart failure and pericardial effusion. I spoke w/pt. he is doing well,wt.is stable,he has all his meds and is weighing daily. I went over CHF monitoring w/daily weight,med compliance,diet,fluid restrictions and fu appt. w/. All questions answered and pt.advised to call PRN and v/u.     Follow Up  Future Appointments   Date Time Provider [address] [place] [address]   2023  1:45 PM DO MICHAEL Manuel RN

## 2023-09-22 ENCOUNTER — TELEPHONE (OUTPATIENT)
Age: 58
End: 2023-09-22

## 2023-09-22 NOTE — TELEPHONE ENCOUNTER
Wife called stating that patient gained 5 lbs since yesterday. Has been lethargic, headache, legs swollen. Takes Lasix 40 mg at night. B/p 134/74 pulse 88. Can patient take an additional Lasix 40 mg today?    Patient has follow up on Tuesday.//arnie

## 2023-09-22 NOTE — TELEPHONE ENCOUNTER
Pt gained 5 lbs, 276 yesterday. More lethargic, space gaps in conversation. Swelling/legs. When pt at hospital last Friday, per the members wife,  home health nurse would be able to administer an iv dieretic at home however the nurse advised it was not ordered. Hoping to be seen today.

## 2023-09-22 NOTE — TELEPHONE ENCOUNTER
Please let the patient know would be reasonable to take extra dose of lasix today and tomorrow to see if swelling and symptoms get better. If he is not improving or gets worse should call 911 or take patient to ER. Call back with further questions. Thanks.

## 2023-09-25 ENCOUNTER — CARE COORDINATION (OUTPATIENT)
Dept: CARE COORDINATION | Facility: CLINIC | Age: 58
End: 2023-09-25

## 2023-09-26 ENCOUNTER — OFFICE VISIT (OUTPATIENT)
Age: 58
End: 2023-09-26

## 2023-09-26 VITALS
HEART RATE: 80 BPM | BODY MASS INDEX: 39.01 KG/M2 | SYSTOLIC BLOOD PRESSURE: 138 MMHG | DIASTOLIC BLOOD PRESSURE: 64 MMHG | HEIGHT: 72 IN | WEIGHT: 288 LBS

## 2023-09-26 DIAGNOSIS — E78.2 MIXED HYPERLIPIDEMIA: ICD-10-CM

## 2023-09-26 DIAGNOSIS — I10 PRIMARY HYPERTENSION: Chronic | ICD-10-CM

## 2023-09-26 DIAGNOSIS — Q23.1 BICUSPID AORTIC VALVE: ICD-10-CM

## 2023-09-26 DIAGNOSIS — Z98.890 H/O CARDIAC RADIOFREQUENCY ABLATION: ICD-10-CM

## 2023-09-26 DIAGNOSIS — I50.32 CHRONIC DIASTOLIC (CONGESTIVE) HEART FAILURE (HCC): Primary | ICD-10-CM

## 2023-09-26 DIAGNOSIS — Z95.818 PRESENCE OF WATCHMAN LEFT ATRIAL APPENDAGE CLOSURE DEVICE: ICD-10-CM

## 2023-09-26 DIAGNOSIS — I48.0 PAROXYSMAL ATRIAL FIBRILLATION (HCC): ICD-10-CM

## 2023-09-26 DIAGNOSIS — I25.10 CORONARY ARTERY DISEASE INVOLVING NATIVE CORONARY ARTERY OF NATIVE HEART WITHOUT ANGINA PECTORIS: ICD-10-CM

## 2023-09-26 RX ORDER — METOPROLOL SUCCINATE 25 MG/1
25 TABLET, EXTENDED RELEASE ORAL DAILY
Qty: 90 TABLET | Refills: 3 | Status: SHIPPED | OUTPATIENT
Start: 2023-09-26

## 2023-09-26 RX ORDER — BUMETANIDE 1 MG/1
1 TABLET ORAL DAILY
Qty: 90 TABLET | Refills: 3 | Status: SHIPPED | OUTPATIENT
Start: 2023-09-26

## 2023-09-26 RX ORDER — METOPROLOL SUCCINATE 25 MG/1
25 TABLET, EXTENDED RELEASE ORAL DAILY
Qty: 90 TABLET | Refills: 3 | Status: SHIPPED | OUTPATIENT
Start: 2023-09-26 | End: 2023-09-26

## 2023-09-26 RX ORDER — METOPROLOL SUCCINATE 25 MG/1
25 TABLET, EXTENDED RELEASE ORAL DAILY
Qty: 30 TABLET | Refills: 3 | Status: SHIPPED | OUTPATIENT
Start: 2023-09-26 | End: 2023-09-26

## 2023-09-26 ASSESSMENT — ENCOUNTER SYMPTOMS
ORTHOPNEA: 1
COUGH: 0
SHORTNESS OF BREATH: 0
SLEEP DISTURBANCES DUE TO BREATHING: 1

## 2023-09-26 NOTE — CARE COORDINATION
self-management-voices understanding, and Assessment and support for treatment adherence and medication management-voices understanding    Offered patient enrollment in the Remote Patient Monitoring (RPM) program for in-home monitoring: Yes, but did not enroll at this time. Care Transitions Subsequent and Final Call    Subsequent and Final Calls  Care Transitions Interventions                          Other Interventions:             Care Transition Nurse provided contact information for future needs. Plan for follow-up call in 10-14 days based on severity of symptoms and risk factors.   Plan for next call:  discuss outcome of PCP appointment    Ganga Bustamante RN

## 2023-09-26 NOTE — PROGRESS NOTES
Visit Diagnoses       Chronic diastolic (congestive) heart failure (HCC)    -  Primary    Relevant Medications    bumetanide (BUMEX) 1 MG tablet    metoprolol succinate (TOPROL XL) 25 MG extended release tablet    Other Relevant Orders    Basic Metabolic Panel    H/O cardiac radiofrequency ablation        Presence of Watchman left atrial appendage closure device        Mixed hyperlipidemia        Relevant Medications    bumetanide (BUMEX) 1 MG tablet    metoprolol succinate (TOPROL XL) 25 MG extended release tablet            Instructed patient go to ER or call 911/EMS should symptoms recur or worsen. Patient has been instructed and agrees to call our office with any issues or other concerns related to their cardiac condition(s) and/or complaint(s). Return in about 2 months (around 11/26/2023).     Misa Kraus,   9/26/2023 1:45 PM

## 2023-10-01 ENCOUNTER — HOSPITAL ENCOUNTER (EMERGENCY)
Age: 58
Discharge: HOME | End: 2023-10-02
Attending: EMERGENCY MEDICINE | Admitting: EMERGENCY MEDICINE
Payer: OTHER GOVERNMENT

## 2023-10-01 ENCOUNTER — APPOINTMENT (OUTPATIENT)
Dept: GENERAL RADIOLOGY | Age: 58
End: 2023-10-01
Payer: OTHER GOVERNMENT

## 2023-10-01 DIAGNOSIS — R60.9 PERIPHERAL EDEMA: ICD-10-CM

## 2023-10-01 DIAGNOSIS — R73.9 HYPERGLYCEMIA: ICD-10-CM

## 2023-10-01 DIAGNOSIS — K86.1 CHRONIC PANCREATITIS, UNSPECIFIED PANCREATITIS TYPE (HCC): Primary | ICD-10-CM

## 2023-10-01 DIAGNOSIS — R06.02 SHORTNESS OF BREATH: ICD-10-CM

## 2023-10-01 LAB
BASOPHILS # BLD: 0 K/UL (ref 0–0.2)
BASOPHILS NFR BLD: 0 % (ref 0–2)
DIFFERENTIAL METHOD BLD: ABNORMAL
EOSINOPHIL # BLD: 0 K/UL (ref 0–0.8)
EOSINOPHIL NFR BLD: 0 % (ref 0.5–7.8)
ERYTHROCYTE [DISTWIDTH] IN BLOOD BY AUTOMATED COUNT: 15 % (ref 11.9–14.6)
HCT VFR BLD AUTO: 38.1 % (ref 41.1–50.3)
HGB BLD-MCNC: 11.8 G/DL (ref 13.6–17.2)
IMM GRANULOCYTES # BLD AUTO: 0.1 K/UL (ref 0–0.5)
IMM GRANULOCYTES NFR BLD AUTO: 0 % (ref 0–5)
LYMPHOCYTES # BLD: 1.2 K/UL (ref 0.5–4.6)
LYMPHOCYTES NFR BLD: 10 % (ref 13–44)
MCH RBC QN AUTO: 27.3 PG (ref 26.1–32.9)
MCHC RBC AUTO-ENTMCNC: 31 G/DL (ref 31.4–35)
MCV RBC AUTO: 88 FL (ref 82–102)
MONOCYTES # BLD: 0.7 K/UL (ref 0.1–1.3)
MONOCYTES NFR BLD: 6 % (ref 4–12)
NEUTS SEG # BLD: 9.6 K/UL (ref 1.7–8.2)
NEUTS SEG NFR BLD: 84 % (ref 43–78)
NRBC # BLD: 0 K/UL (ref 0–0.2)
PLATELET # BLD AUTO: 190 K/UL (ref 150–450)
PMV BLD AUTO: 11.3 FL (ref 9.4–12.3)
RBC # BLD AUTO: 4.33 M/UL (ref 4.23–5.6)
WBC # BLD AUTO: 11.6 K/UL (ref 4.3–11.1)

## 2023-10-01 PROCEDURE — 84484 ASSAY OF TROPONIN QUANT: CPT

## 2023-10-01 PROCEDURE — 82077 ASSAY SPEC XCP UR&BREATH IA: CPT

## 2023-10-01 PROCEDURE — 93005 ELECTROCARDIOGRAM TRACING: CPT | Performed by: EMERGENCY MEDICINE

## 2023-10-01 PROCEDURE — 85025 COMPLETE CBC W/AUTO DIFF WBC: CPT

## 2023-10-01 PROCEDURE — 99285 EMERGENCY DEPT VISIT HI MDM: CPT

## 2023-10-01 PROCEDURE — 83690 ASSAY OF LIPASE: CPT

## 2023-10-01 PROCEDURE — 83735 ASSAY OF MAGNESIUM: CPT

## 2023-10-01 PROCEDURE — 6370000000 HC RX 637 (ALT 250 FOR IP): Performed by: EMERGENCY MEDICINE

## 2023-10-01 PROCEDURE — 80053 COMPREHEN METABOLIC PANEL: CPT

## 2023-10-01 PROCEDURE — 94762 N-INVAS EAR/PLS OXIMTRY CONT: CPT

## 2023-10-01 PROCEDURE — 83880 ASSAY OF NATRIURETIC PEPTIDE: CPT

## 2023-10-01 PROCEDURE — 71045 X-RAY EXAM CHEST 1 VIEW: CPT

## 2023-10-01 RX ORDER — PROMETHAZINE HYDROCHLORIDE 25 MG/1
25 TABLET ORAL ONCE
Status: COMPLETED | OUTPATIENT
Start: 2023-10-01 | End: 2023-10-01

## 2023-10-01 RX ADMIN — PROMETHAZINE HYDROCHLORIDE 25 MG: 25 TABLET ORAL at 23:14

## 2023-10-01 ASSESSMENT — ENCOUNTER SYMPTOMS
BACK PAIN: 1
DIARRHEA: 1
NAUSEA: 1
ABDOMINAL PAIN: 1
COUGH: 0
VOMITING: 1
SHORTNESS OF BREATH: 1

## 2023-10-01 ASSESSMENT — PAIN DESCRIPTION - DESCRIPTORS: DESCRIPTORS: PRESSURE

## 2023-10-01 ASSESSMENT — PAIN - FUNCTIONAL ASSESSMENT: PAIN_FUNCTIONAL_ASSESSMENT: 0-10

## 2023-10-01 ASSESSMENT — PAIN DESCRIPTION - LOCATION: LOCATION: CHEST;BACK

## 2023-10-01 ASSESSMENT — PAIN DESCRIPTION - ONSET: ONSET: AWAKENED FROM SLEEP

## 2023-10-02 ENCOUNTER — TELEPHONE (OUTPATIENT)
Age: 58
End: 2023-10-02

## 2023-10-02 VITALS
HEART RATE: 82 BPM | WEIGHT: 280 LBS | DIASTOLIC BLOOD PRESSURE: 78 MMHG | HEIGHT: 72 IN | BODY MASS INDEX: 37.93 KG/M2 | SYSTOLIC BLOOD PRESSURE: 114 MMHG | RESPIRATION RATE: 18 BRPM | OXYGEN SATURATION: 91 % | TEMPERATURE: 98.2 F

## 2023-10-02 LAB
ALBUMIN SERPL-MCNC: 3.3 G/DL (ref 3.2–4.6)
ALBUMIN/GLOB SERPL: 0.8 (ref 0.4–1.6)
ALP SERPL-CCNC: 117 U/L (ref 50–136)
ALT SERPL-CCNC: 27 U/L (ref 12–65)
ANION GAP SERPL CALC-SCNC: 7 MMOL/L (ref 2–11)
AST SERPL-CCNC: 14 U/L (ref 15–37)
BILIRUB SERPL-MCNC: 0.3 MG/DL (ref 0.2–1.1)
BUN SERPL-MCNC: 30 MG/DL (ref 8–23)
CALCIUM SERPL-MCNC: 8.3 MG/DL (ref 8.3–10.4)
CHLORIDE SERPL-SCNC: 104 MMOL/L (ref 101–110)
CO2 SERPL-SCNC: 25 MMOL/L (ref 21–32)
CREAT SERPL-MCNC: 2.5 MG/DL (ref 0.8–1.5)
EKG ATRIAL RATE: 84 BPM
EKG DIAGNOSIS: NORMAL
EKG P AXIS: -25 DEGREES
EKG P-R INTERVAL: 234 MS
EKG Q-T INTERVAL: 360 MS
EKG QRS DURATION: 120 MS
EKG QTC CALCULATION (BAZETT): 425 MS
EKG R AXIS: -20 DEGREES
EKG T AXIS: 20 DEGREES
EKG VENTRICULAR RATE: 84 BPM
ETHANOL SERPL-MCNC: <3 MG/DL (ref 0–0.08)
GLOBULIN SER CALC-MCNC: 4 G/DL (ref 2.8–4.5)
GLUCOSE SERPL-MCNC: 328 MG/DL (ref 65–100)
LIPASE SERPL-CCNC: 41 U/L (ref 73–393)
MAGNESIUM SERPL-MCNC: 2.3 MG/DL (ref 1.8–2.4)
NT PRO BNP: 365 PG/ML (ref 5–125)
POTASSIUM SERPL-SCNC: 4.6 MMOL/L (ref 3.5–5.1)
PROT SERPL-MCNC: 7.3 G/DL (ref 6.3–8.2)
SODIUM SERPL-SCNC: 136 MMOL/L (ref 133–143)
TROPONIN I SERPL HS-MCNC: 29.2 PG/ML (ref 0–14)

## 2023-10-02 PROCEDURE — 96374 THER/PROPH/DIAG INJ IV PUSH: CPT

## 2023-10-02 PROCEDURE — 6360000002 HC RX W HCPCS: Performed by: EMERGENCY MEDICINE

## 2023-10-02 RX ORDER — FUROSEMIDE 10 MG/ML
40 INJECTION INTRAMUSCULAR; INTRAVENOUS ONCE
Status: COMPLETED | OUTPATIENT
Start: 2023-10-02 | End: 2023-10-02

## 2023-10-02 RX ORDER — ASPIRIN 81 MG/1
81 TABLET ORAL DAILY
COMMUNITY

## 2023-10-02 RX ADMIN — FUROSEMIDE 40 MG: 10 INJECTION, SOLUTION INTRAMUSCULAR; INTRAVENOUS at 00:10

## 2023-10-02 NOTE — DISCHARGE INSTRUCTIONS
Follow-up with your cardiologist and primary care physician closely. Return for worsening or concerning symptoms.

## 2023-10-02 NOTE — TELEPHONE ENCOUNTER
Patient wife-Cynthia called and said patient was in ER yesterday 10/01/2023. ER did call Dr. Mayi Barber according to wife and he started patient on   bumetanide (BUMEX) 1 MG tablet [[id number]]     Order Details  Dose: 1 mg Route: Oral Frequency: DAILY   Dispense Quantity: 90 tablet Refills: 3          Sig: Take 1 tablet by mouth daily           Estelle Hernandez said patient had 8 to 10 pounds of fluid on him. ER did give patient Lasix in IV also. The patient does have an upcoming appointment with Dr. Mayi Barber 10/26/2023.

## 2023-10-02 NOTE — ED TRIAGE NOTES
Patient arrives driven by wife, needed assistance out of car to wheelchair, patient states he is drinking bud light and requests it from car. c/o SOB and chest pain since 10am this morning. States pain woke him up. Hx of afib. A&O x3 but appears sleepy.

## 2023-10-02 NOTE — ED PROVIDER NOTES
15 - 37 U/L    Alk Phosphatase 117 50 - 136 U/L    Total Protein 7.3 6.3 - 8.2 g/dL    Albumin 3.3 3.2 - 4.6 g/dL    Globulin 4.0 2.8 - 4.5 g/dL    Albumin/Globulin Ratio 0.8 0.4 - 1.6     Magnesium   Result Value Ref Range    Magnesium 2.3 1.8 - 2.4 mg/dL   Troponin   Result Value Ref Range    Troponin, High Sensitivity 29.2 (H) 0 - 14 pg/mL   Ethanol   Result Value Ref Range    Ethanol Lvl <3 MG/DL   Lipase   Result Value Ref Range    Lipase 41 (L) 73 - 393 U/L   Brain Natriuretic Peptide   Result Value Ref Range    NT Pro- (H) 5 - 125 PG/ML   EKG 12 Lead   Result Value Ref Range    Ventricular Rate 84 BPM    Atrial Rate 84 BPM    P-R Interval 234 ms    QRS Duration 120 ms    Q-T Interval 360 ms    QTc Calculation (Bazett) 425 ms    P Axis -25 degrees    R Axis -20 degrees    T Axis 20 degrees    Diagnosis       Sinus rhythm with 1st degree A-V block  Indeterminate axis  Low voltage QRS  Right bundle branch block  Cannot rule out Anterior infarct , age undetermined  Abnormal ECG  When compared with ECG of 13-SEP-2023 23:00,  PREVIOUS ECG IS PRESENT          XR CHEST PORTABLE   Final Result   No active disease and no change from prior study. Sallie Osborne M.D.    10/2/2023 12:00:00 AM                        Voice dictation software was used during the making of this note. This software is not perfect and grammatical and other typographical errors may be present. This note has not been completely proofread for errors.      Cari Leventhal, MD  10/02/23 [id number]

## 2023-10-02 NOTE — TELEPHONE ENCOUNTER
Seen in [facility], yesterday, with severe diarrhea, 8-10 lb weight gain in 1-2 weeks,   SOB, and extreme weakness. Was given IV lasix in [facility], and told that he had pancreatitis and a fib. This morning, weight-253 lbs on new home scale, down 35 lbs from 9/26/23 office visit with Dr. Rodolfo Mandel. Still very SOB and weak with very poor appetite, this morning, with only slight improvement in very bad edema in both feet and legs to knees. Eating and drinking very little. Lower legs \"look a little black\", but are warm to touch with no pain and no open areas. Taking Bumex 1 mg qd, Toprol XL 25 mg qd, ASA 81 mg qd, Lipitor 40 mg qd, and Jardiance 25 mg 1/2 tab qd. Next scheduled appointment with Dr. Rodolfo Mandel is 10/26/23. Wife, Aileensimone Wilfredo, asks for Dr. Timur Devi' recommendations for continued edema, weakness, and SOB, and asks for earlier appointment. Scheduled next available appointment with: Dr Rodolfo Mandel on 10/19/23 at 8:15 am. Haider Penny that I will notify cardiologist of above and call with recommendations. Vika Villalobos verbalized understanding.

## 2023-10-02 NOTE — TELEPHONE ENCOUNTER
MD Tez Aaron RN  Caller: Unspecified (Today, 10:04 AM)  Would try increase bumex to bid x 5 days and  then return to daily.    Thanks   klaus

## 2023-10-02 NOTE — TELEPHONE ENCOUNTER
Left message on voicemail for Saad Prince to call this triage nurse. In message, advised Saad Prince of Dr. Maisha Dennison response, and requested she call to confirm that she received and understands message.

## 2023-10-03 NOTE — TELEPHONE ENCOUNTER
Valente Singh of Dr. Jelani Wagner response. Valente Singh to call with any questions or concerns prior to 10/19/23 appointment with Dr. Jazz Carson. Michael Delatorre verbalized understanding.

## 2023-10-05 ENCOUNTER — CARE COORDINATION (OUTPATIENT)
Dept: CARE COORDINATION | Facility: CLINIC | Age: 58
End: 2023-10-05

## 2023-10-05 NOTE — CARE COORDINATION
Care Transitions Outreach Attempt    Call within 2 business days of discharge: Yes   Attempted to reach patient for transitions of care follow up. Unable to reach patient. Patient: Dora Nina [facility] Patient : 1965 MRN: [id number]    Last Discharge Facility       Date Complaint Diagnosis Description Type Department Provider    10/1/23 Chest Pain; Shortness of Breath Chronic pancreatitis, unspecified pancreatitis type ([address]) . .. ED (DISCHARGE) Kelly Streeter MD              Was this an external facility discharge?  No Discharge Facility Name: [facility]    Noted following upcoming appointments from discharge chart review:   [address] follow up appointment(s):   Future Appointments   Date Time Provider [address]   10/19/2023  8:15 AM Reanna Kennedy DO [facility] AM[facility]   2023 10:15 AM Reanna Kennedy DO [facility] AMB     Non-[facility]  follow up appointment(s): none noted

## 2023-10-06 ENCOUNTER — TELEPHONE (OUTPATIENT)
Age: 58
End: 2023-10-06

## 2023-10-06 ENCOUNTER — APPOINTMENT (OUTPATIENT)
Dept: GENERAL RADIOLOGY | Age: 58
End: 2023-10-06
Payer: OTHER GOVERNMENT

## 2023-10-06 ENCOUNTER — HOSPITAL ENCOUNTER (EMERGENCY)
Age: 58
Discharge: HOME | End: 2023-10-06
Attending: EMERGENCY MEDICINE | Admitting: EMERGENCY MEDICINE
Payer: OTHER GOVERNMENT

## 2023-10-06 VITALS
SYSTOLIC BLOOD PRESSURE: 152 MMHG | HEART RATE: 75 BPM | WEIGHT: 274 LBS | RESPIRATION RATE: 15 BRPM | BODY MASS INDEX: 37.11 KG/M2 | TEMPERATURE: 98.4 F | DIASTOLIC BLOOD PRESSURE: 117 MMHG | OXYGEN SATURATION: 90 % | HEIGHT: 72 IN

## 2023-10-06 DIAGNOSIS — I50.9 CHRONIC CONGESTIVE HEART FAILURE, UNSPECIFIED HEART FAILURE TYPE (HCC): ICD-10-CM

## 2023-10-06 DIAGNOSIS — J18.9 PNEUMONIA OF LEFT LOWER LOBE DUE TO INFECTIOUS ORGANISM: Primary | ICD-10-CM

## 2023-10-06 LAB
ALBUMIN SERPL-MCNC: 3.2 G/DL (ref 3.2–4.6)
ALBUMIN/GLOB SERPL: 0.8 (ref 0.4–1.6)
ALP SERPL-CCNC: 99 U/L (ref 50–136)
ALT SERPL-CCNC: 40 U/L (ref 12–65)
ANION GAP SERPL CALC-SCNC: 7 MMOL/L (ref 2–11)
AST SERPL-CCNC: 63 U/L (ref 15–37)
BASOPHILS # BLD: 0 K/UL (ref 0–0.2)
BASOPHILS NFR BLD: 0 % (ref 0–2)
BILIRUB SERPL-MCNC: 0.4 MG/DL (ref 0.2–1.1)
BUN SERPL-MCNC: 27 MG/DL (ref 8–23)
CALCIUM SERPL-MCNC: 9.1 MG/DL (ref 8.3–10.4)
CHLORIDE SERPL-SCNC: 106 MMOL/L (ref 101–110)
CO2 SERPL-SCNC: 24 MMOL/L (ref 21–32)
CREAT SERPL-MCNC: 2.3 MG/DL (ref 0.8–1.5)
DIFFERENTIAL METHOD BLD: ABNORMAL
EKG ATRIAL RATE: 80 BPM
EKG DIAGNOSIS: NORMAL
EKG P AXIS: -18 DEGREES
EKG P-R INTERVAL: 220 MS
EKG Q-T INTERVAL: 394 MS
EKG QRS DURATION: 142 MS
EKG QTC CALCULATION (BAZETT): 454 MS
EKG R AXIS: 10 DEGREES
EKG T AXIS: 24 DEGREES
EKG VENTRICULAR RATE: 80 BPM
EOSINOPHIL # BLD: 0.1 K/UL (ref 0–0.8)
EOSINOPHIL NFR BLD: 1 % (ref 0.5–7.8)
ERYTHROCYTE [DISTWIDTH] IN BLOOD BY AUTOMATED COUNT: 14.5 % (ref 11.9–14.6)
GLOBULIN SER CALC-MCNC: 4.1 G/DL (ref 2.8–4.5)
GLUCOSE SERPL-MCNC: 253 MG/DL (ref 65–100)
HCT VFR BLD AUTO: 37.7 % (ref 41.1–50.3)
HGB BLD-MCNC: 11.9 G/DL (ref 13.6–17.2)
IMM GRANULOCYTES # BLD AUTO: 0.1 K/UL (ref 0–0.5)
IMM GRANULOCYTES NFR BLD AUTO: 1 % (ref 0–5)
LYMPHOCYTES # BLD: 1.7 K/UL (ref 0.5–4.6)
LYMPHOCYTES NFR BLD: 19 % (ref 13–44)
MAGNESIUM SERPL-MCNC: 2.5 MG/DL (ref 1.8–2.4)
MCH RBC QN AUTO: 26.7 PG (ref 26.1–32.9)
MCHC RBC AUTO-ENTMCNC: 31.6 G/DL (ref 31.4–35)
MCV RBC AUTO: 84.7 FL (ref 82–102)
MONOCYTES # BLD: 0.5 K/UL (ref 0.1–1.3)
MONOCYTES NFR BLD: 6 % (ref 4–12)
NEUTS SEG # BLD: 6.4 K/UL (ref 1.7–8.2)
NEUTS SEG NFR BLD: 73 % (ref 43–78)
NRBC # BLD: 0 K/UL (ref 0–0.2)
NT PRO BNP: 315 PG/ML (ref 5–125)
PLATELET # BLD AUTO: 203 K/UL (ref 150–450)
PMV BLD AUTO: 11.4 FL (ref 9.4–12.3)
POTASSIUM SERPL-SCNC: 4 MMOL/L (ref 3.5–5.1)
PROT SERPL-MCNC: 7.3 G/DL (ref 6.3–8.2)
RBC # BLD AUTO: 4.45 M/UL (ref 4.23–5.6)
SODIUM SERPL-SCNC: 137 MMOL/L (ref 133–143)
TROPONIN I SERPL HS-MCNC: 41.3 PG/ML (ref 0–14)
WBC # BLD AUTO: 8.8 K/UL (ref 4.3–11.1)

## 2023-10-06 PROCEDURE — 71046 X-RAY EXAM CHEST 2 VIEWS: CPT

## 2023-10-06 PROCEDURE — 96375 TX/PRO/DX INJ NEW DRUG ADDON: CPT

## 2023-10-06 PROCEDURE — 94762 N-INVAS EAR/PLS OXIMTRY CONT: CPT

## 2023-10-06 PROCEDURE — 83735 ASSAY OF MAGNESIUM: CPT

## 2023-10-06 PROCEDURE — 6360000002 HC RX W HCPCS: Performed by: NURSE PRACTITIONER

## 2023-10-06 PROCEDURE — 85025 COMPLETE CBC W/AUTO DIFF WBC: CPT

## 2023-10-06 PROCEDURE — 93005 ELECTROCARDIOGRAM TRACING: CPT | Performed by: NURSE PRACTITIONER

## 2023-10-06 PROCEDURE — 96374 THER/PROPH/DIAG INJ IV PUSH: CPT

## 2023-10-06 PROCEDURE — 84484 ASSAY OF TROPONIN QUANT: CPT

## 2023-10-06 PROCEDURE — 99285 EMERGENCY DEPT VISIT HI MDM: CPT

## 2023-10-06 PROCEDURE — 80053 COMPREHEN METABOLIC PANEL: CPT

## 2023-10-06 PROCEDURE — 83880 ASSAY OF NATRIURETIC PEPTIDE: CPT

## 2023-10-06 RX ORDER — AZITHROMYCIN 250 MG/1
250 TABLET, FILM COATED ORAL SEE ADMIN INSTRUCTIONS
Qty: 6 TABLET | Refills: 0 | Status: SHIPPED | OUTPATIENT
Start: 2023-10-06 | End: 2023-10-11

## 2023-10-06 RX ORDER — FUROSEMIDE 10 MG/ML
40 INJECTION INTRAMUSCULAR; INTRAVENOUS ONCE
Status: COMPLETED | OUTPATIENT
Start: 2023-10-06 | End: 2023-10-06

## 2023-10-06 RX ORDER — MORPHINE SULFATE 4 MG/ML
2 INJECTION INTRAVENOUS ONCE
Status: COMPLETED | OUTPATIENT
Start: 2023-10-06 | End: 2023-10-06

## 2023-10-06 RX ADMIN — FUROSEMIDE 40 MG: 10 INJECTION, SOLUTION INTRAMUSCULAR; INTRAVENOUS at 14:46

## 2023-10-06 RX ADMIN — MORPHINE SULFATE 2 MG: 4 INJECTION INTRAVENOUS at 15:04

## 2023-10-06 ASSESSMENT — ENCOUNTER SYMPTOMS
SHORTNESS OF BREATH: 1
ABDOMINAL PAIN: 0

## 2023-10-06 ASSESSMENT — PAIN SCALES - GENERAL
PAINLEVEL_OUTOF10: 6
PAINLEVEL_OUTOF10: 6

## 2023-10-06 ASSESSMENT — PAIN DESCRIPTION - LOCATION
LOCATION: BACK
LOCATION: HEAD;BACK;CHEST

## 2023-10-06 NOTE — ED PROVIDER NOTES
Emergency Department Provider Note       PCP: Jeffrey Stacy MD   Age: 59 y.o. Sex: male     DISPOSITION Decision To Discharge 10/06/2023 03:45:33 PM       ICD-10-CM    1. Pneumonia of left lower lobe due to infectious organism  J18.9       2. Chronic congestive heart failure, unspecified heart failure type ([address])  I50.9           Medical Decision Making     Complexity of Problems Addressed:  1 or more acute illnesses that pose a threat to life or bodily function. Data Reviewed and Analyzed:   I independently ordered and reviewed each unique test.  I reviewed external records: ED visit note from an outside group. I reviewed external records: previous lab results from outside ED. I reviewed external records: previous imaging study including radiologist interpretation. I independently ordered and interpreted the ED EKG in the absence of a Cardiologist.    Rate: 80  EKG Interpretation: EKG Interpretation: sinus rhythm, no evidence of arrhythmia  ST Segments: Normal ST segments - NO STEMI      I interpreted the X-rays concern for left lower lobe infiltrate. Agree with radiologist impression. .  I interpreted the labs. Discussion of management or test interpretation. 70-year-old male with a history of CKD, A-fib, CAD, pancreatitis, diabetes, CHF, hypertension, and COPD presents emergency department today with complaint of shortness of breath. Patient states symptoms progressively worsening over the last 3 weeks. He feels that his COPD is getting worse. Also with some increased swelling to lower extremities after switching from Lasix to Bumex. He appears in no acute distress. He is speaking in full sentences without difficulty. No hypoxia noted. Will check labs, EKG, and chest x-ray. Left lower lobe infiltrate versus effusion. He does have lower extremity edema. Will give 1 dose of IV Lasix in the emergency department. Labs unremarkable and appear to be at his baseline.   Considered

## 2023-10-06 NOTE — TELEPHONE ENCOUNTER
Left message on voicemail for [facility], [place], to call this triage nurse.

## 2023-10-06 NOTE — DISCHARGE INSTRUCTIONS
Take antibiotic as prescribed. Follow-up with your primary care provider and cardiologist. Return to the ER for any new, worsening, or concerning symptoms.

## 2023-10-06 NOTE — CARE COORDINATION
Patient accepts outpatient monitoring kit and has the phone number to call for set up. Patient is aware to save the box so he can return equipment in this box when monitoring program ends.

## 2023-10-06 NOTE — TELEPHONE ENCOUNTER
Bumex was increased and he has lost 10 lbs Soes not feel any better Weak She recommended him to go to ER He is very drowsy Please call

## 2023-10-06 NOTE — TELEPHONE ENCOUNTER
Very weak, tired, and lethargic. Very sleepy. Not sleeping very much, because he gets up so often to go to bathroom during night. Feels \"off\". Has lost 10 lbs since 10/1/23 [facility] ER visit for pancreatitis and SOB. Both feet and ankles are very blue and painful. Very dizzy when he stands from sitting. Poor balance. Wife, Lady Wilcox,  asks for Dr. Vikki Duque to directly admit patient to [facility], so that patient does not have to go to ER. Sara Elizabeth that Dr. Vikki Duque is out of office, today. Sara Elizabeth to take patient to [facility] ER, where patient will be evaluated by ER doctor. Sara Elizabeth that ER doctor will call in cardiologist and other specialists, as needed. Lady Scottgrecia verbalized understanding.

## 2023-10-06 NOTE — ED TRIAGE NOTES
Patient a 58 y/o Male reports to the ED with complaint of Shortness of Breath. States he has a Hx of COPD and CHF. States he was on Lasix but they took him off. States he has noticed some swelling in his lower extremities. Has been feeling lethargic for approx 3 weeks.

## 2023-10-06 NOTE — TELEPHONE ENCOUNTER
He isnt feeling any better. He is weak and unstable and poor circulation in feet. Shortness of breath but denies chest pains and only dizzy when he stands up.  Pt would appreciate a call back

## 2023-10-06 NOTE — TELEPHONE ENCOUNTER
Joe Melchor that patient is going to [facility] MANUEL Welch verbalized understanding.

## 2023-10-09 ENCOUNTER — CARE COORDINATION (OUTPATIENT)
Dept: CARE COORDINATION | Facility: CLINIC | Age: 58
End: 2023-10-09

## 2023-10-09 DIAGNOSIS — I10 PRIMARY HYPERTENSION: Chronic | ICD-10-CM

## 2023-10-09 DIAGNOSIS — I50.32 CHRONIC DIASTOLIC CONGESTIVE HEART FAILURE (HCC): Primary | Chronic | ICD-10-CM

## 2023-10-09 NOTE — PROGRESS NOTES
Remote Patient Monitoring Treatment Plan    Received request from ACROXANNE/ENRIQUETA Dent RN  to order remote patient monitoring for in home monitoring of CHF and HTN and order completed. Patient will be monitoring blood pressure   pulse ox   weight. Patient will engage in Remote Patient Monitoring each day to develop the skills necessary for self management. RPM Care Team Responsibilities:   Alerts will be reviewed daily and addressed within 2-4 hours during operational hours (Monday -Friday 9 am-4 pm)  Alert response and intervention documented in patient medical record  Alert response escalated to PCP per protocol and documented in patient medical record  Patient monitored over approximately  days  Discharge from program based on self-management readiness    See care coordination encounters for additional details.

## 2023-10-10 ENCOUNTER — CARE COORDINATION (OUTPATIENT)
Dept: CARE COORDINATION | Facility: CLINIC | Age: 58
End: 2023-10-10

## 2023-10-10 NOTE — CARE COORDINATION
Remote Patient Monitoring Welcome Note  Date/Time:  10/10/2023 5:46 PM  Patient Current Location: Home: [address] [place] [id number]  Verified patients name and  as identifiers. Completed and confirmed the following:   Emergency Contact: Cynthia Adame   [phone]   [x] Patient received all RPM equipment (tablet, scale, blood pressure device and cuff, and pulse oximeter)  Cuff Size: large (13.8\"-19.68\")    Weight Scale:  regular (<330lbs)                    [x] Instructed patient keep box for use when returning equipment                                                          [x] Reviewed Patient Welcome Letter with patient                         [x] Reviewed expectations for patient and care team  Monitoring hours M-F 9-4pm  Completing monitoring by 12pm on  so that alerts can be responded to in the same day  Patient weighs self at same time every day (or after urinating and waking up)  Take blood pressure 1-2 hrs after medications   RPM team may have different phone area code (including [facility], [facility],[address] or [address] [place] [address])                              [x] Instructed patient to keep scale on flat surface                                                         [x] Instructed patient to keep tablet plugged in at all times                         [x] Instructed how to contact IT support ([phone])  [x] Provided Remote Patient Monitoring care  information               All questions answered at this time.

## 2023-10-10 NOTE — CARE COORDINATION
ED Follow Up Call    10/10/2023    Patient: Reggie Westfall Patient : 1965   MRN: [id number]  Reason for Admission: ED visit for shortness of breath. Discharge Date: 10/6/2023. Care Transitions ED Follow Up    Care Transitions Interventions                            Do you have any ongoing symptoms?: No   Do you have a copy of your discharge instructions?: Yes   Do you understand what to report and when to return?: Yes   Are you following your discharge instructions?: Yes   Do you have all of your prescriptions and are they filled?: Yes                   Patient referred for RPM post ED visit and enrolled. Per patient, he will set up the monitoring system after this call. .            Care Transition Nurse provided contact information for future needs. Plan for follow-up call in 7-10 days based on severity of symptoms and risk factors. Plan for next call: symptom management-reports of shortness of breath. self management-chronic disease including follow up with providers, self monitor for any concerns/questions.     Negin De La Garza RN

## 2023-10-11 ENCOUNTER — CARE COORDINATION (OUTPATIENT)
Dept: CARE COORDINATION | Facility: CLINIC | Age: 58
End: 2023-10-11

## 2023-10-11 NOTE — CARE COORDINATION
Remote Alert Monitoring Note  Rpm alert to be reviewed by the provider   red alert   pulse ox reading (91%)   Additional needs to be addressed by N/A: No                    Date/Time:  10/11/2023 9:15 AM  Patient Current Location: Home: [address] [place] [address] N  LPN contacted patient by telephone. Verified patients name and  as identifiers. Background: Pt enrolled in RPM r/t CHF and HTN. Refer to 911 immediately if:  Patient unresponsive or unable to provide history  Change in cognition or sudden confusion  Patient unable to respond in complete sentences  Intense chest pain/tightness  Any concern for any clinical emergency  Red Alert: Provider response time of 1 hr required for any red alert requiring intervention  Yellow Alert: Provider response time of 3hr required for any escalated yellow alert    O2 Triage  Are you having any Chest Pain? no   Are you having any Shortness of Breath? no   Swelling in your hands or feet? no     Are you having any other health concerns or issues? no      Clinical Interventions: Reviewed and followed up on alerts and treatments-Pt speaking in full sentences and denies acute distress / is asymptomatic. Pt agreeable to recheck pulse ox. Updated reading 96% reported and updated in HRS per pt request. Pt v/u of when to notify provider of changes / concerns and when to seek emergent medical care. No further action necessary at this time. Plan/Follow Up: Will continue to review, monitor and address alerts with follow up based on severity of symptoms and risk factors.

## 2023-10-12 ENCOUNTER — CARE COORDINATION (OUTPATIENT)
Dept: CARE COORDINATION | Facility: CLINIC | Age: 58
End: 2023-10-12

## 2023-10-12 NOTE — CARE COORDINATION
Remote Alert Monitoring Note  Rpm alert to be reviewed by the provider   red alert   weight (10.5# increase in last day)   Additional needs to be addressed by N/A: No                    Date/Time:  10/12/2023 9:50 AM  Patient Current Location: Home: [address] [place] [address] N  LPN contacted patient by telephone. Verified patients name and  as identifiers. Background: Pt enrolled in RPM r/t CHF and HTN. Refer to 911 immediately if:  Patient unresponsive or unable to provide history  Change in cognition or sudden confusion  Patient unable to respond in complete sentences  Intense chest pain/tightness  Any concern for any clinical emergency  Red Alert: Provider response time of 1 hr required for any red alert requiring intervention  Yellow Alert: Provider response time of 3hr required for any escalated yellow alert    Weight Scale Triage  Was your weight obtained upon rising/waking today? no   Was your weight obtained after voiding and/or use of the bathroom today? no   Did you weigh yourself in the same amount of clothing today, compared to how you typically do? no   Was the scale bumped or moved prior to today's weight? no   Is your scale on a flat/hard surface? yes   Did you obtain your weight with shoes on? yes   If yes, is this something you normally do during your daily weights? no   Were you standing up straight on the scale today? yes   Were you leaning on anything while obtaining your weight today? no     Weight Triage  Are you weighing any different than you did yesterday? (time of day, clothes and shoes on or off, etc)? Yes. Time, \"extra clothing\", and shoes. Do you have any shortness of breath? Yes, with exertion   Do you have any swelling in your hands or feet? no   Are you having any other health concerns or issues? no     Clinical Interventions: Reviewed and followed up on alerts and treatments-Pt newly enrolled in RPM and establishing baseline weight.  Discussed 10.5# increase in last

## 2023-10-13 ENCOUNTER — CARE COORDINATION (OUTPATIENT)
Dept: CARE COORDINATION | Facility: CLINIC | Age: 58
End: 2023-10-13

## 2023-10-13 NOTE — CARE COORDINATION
Care Transitions Outreach Attempt    Call within 2 business days of discharge: Yes   Attempted to reach patient for transitions of care follow up. Unable to reach patient. Patient: Cinda Owensx Patient : 1965 MRN: [id number]    Last Discharge Facility       Date Complaint Diagnosis Description Type Department Provider    10/6/23 Shortness of Breath Pneumonia of left lower lobe due to infectious organism . .. ED (DISCHARGE) [facility] Veronica Donis MD              Was this an external facility discharge?  No Discharge Facility Name: [facility]    Noted following upcoming appointments from discharge chart review:   [facility] follow up appointment(s):   Future Appointments   Date Time Provider [address]   10/19/2023  8:15 AM Momo Mackey, DO [facility] AM[facility]   2023 10:15 AM Momo Mackey, DO [facility] AMB     Non-[facility]  follow up appointment(s): none noted      Patient's PCP is with [address] [place] [address]

## 2023-10-16 ENCOUNTER — CARE COORDINATION (OUTPATIENT)
Dept: CARE COORDINATION | Facility: CLINIC | Age: 58
End: 2023-10-16

## 2023-10-16 NOTE — CARE COORDINATION
Remote Alert Monitoring Note  Rpm alert to be reviewed by the provider   red alert   weight (18.5# increase since 10/11/23)   Additional needs to be addressed by N/A: No                    Date/Time:  10/16/2023 10:06 AM  Patient Current Location: Home: [address] [place] [address]  LPN contacted patient by telephone. Verified patients name and  as identifiers. Background: Pt enrolled in RPM r/t CHF and HTN. Refer to 911 immediately if:  Patient unresponsive or unable to provide history  Change in cognition or sudden confusion  Patient unable to respond in complete sentences  Intense chest pain/tightness  Any concern for any clinical emergency  Red Alert: Provider response time of 1 hr required for any red alert requiring intervention  Yellow Alert: Provider response time of 3hr required for any escalated yellow alert    Weight Scale Triage  Was your weight obtained upon rising/waking today? no   Was your weight obtained after voiding and/or use of the bathroom today? yes   Did you weigh yourself in the same amount of clothing today, compared to how you typically do? no   Was the scale bumped or moved prior to today's weight? no   Is your scale on a flat/hard surface? yes   Did you obtain your weight with shoes on? yes   If yes, is this something you normally do during your daily weights? no   Were you standing up straight on the scale today? yes   Were you leaning on anything while obtaining your weight today? no     Weight Triage  Are you weighing any different than you did yesterday? (time of day, clothes and shoes on or off, etc)? Pt did not weigh self yesterday    Do you have any shortness of breath? no   Do you have any swelling in your hands or feet? no   Are you having any other health concerns or issues? no      Clinical Interventions: Reviewed and followed up on alerts and treatments-Reviewed and discussed RPM weights and weight of 274.0#  recorded in Epic on 10/6/23 with pt.  Pt denies acute

## 2023-10-18 ENCOUNTER — CARE COORDINATION (OUTPATIENT)
Dept: CARE COORDINATION | Age: 58
End: 2023-10-18

## 2023-10-18 ENCOUNTER — CARE COORDINATION (OUTPATIENT)
Dept: CARE COORDINATION | Facility: CLINIC | Age: 58
End: 2023-10-18

## 2023-10-18 NOTE — CARE COORDINATION
10/18/23 4:05 PM Patient is currently enrolled in Remote Patient Monitoring for CHF and HTN. Pt has a scheduled office visit tomorrow morning at 8;15 am.  RPM metrics added for review.

## 2023-10-18 NOTE — CARE COORDINATION
Remote Patient Monitoring Note      Date/Time:  10/18/2023 12:19 PM  Patient Current Location: Home: [address] [place] [phone]  LPN contacted patient by telephone regarding yellow alert received for no BP or weight taken x 2 days. Verified patients name and  as identifiers. Background: Pt enrolled in RPM r/t CHF and HTN. Clinical Interventions: Discussed need to complete daily metrics and RPM adherence with pt. Pt v/u and states, \"I'll get in there in just a little bit and take it\". Pt v/u of when to notify provider(s) of changes / concerns and when to seek emergent medical care. No further action necessary at this time. Plan/Follow Up: Will continue to review, monitor and address alerts with follow up based on severity of symptoms and risk factors.

## 2023-10-19 ENCOUNTER — CARE COORDINATION (OUTPATIENT)
Dept: CARE COORDINATION | Facility: CLINIC | Age: 58
End: 2023-10-19

## 2023-10-19 ENCOUNTER — OFFICE VISIT (OUTPATIENT)
Age: 58
End: 2023-10-19
Payer: OTHER GOVERNMENT

## 2023-10-19 VITALS
HEIGHT: 72 IN | SYSTOLIC BLOOD PRESSURE: 112 MMHG | BODY MASS INDEX: 37.65 KG/M2 | DIASTOLIC BLOOD PRESSURE: 62 MMHG | HEART RATE: 76 BPM | WEIGHT: 278 LBS

## 2023-10-19 DIAGNOSIS — N18.32 STAGE 3B CHRONIC KIDNEY DISEASE (HCC): Primary | ICD-10-CM

## 2023-10-19 DIAGNOSIS — I25.10 CORONARY ARTERY DISEASE INVOLVING NATIVE CORONARY ARTERY OF NATIVE HEART WITHOUT ANGINA PECTORIS: ICD-10-CM

## 2023-10-19 DIAGNOSIS — R06.09 DYSPNEA ON EXERTION: ICD-10-CM

## 2023-10-19 DIAGNOSIS — I10 PRIMARY HYPERTENSION: ICD-10-CM

## 2023-10-19 DIAGNOSIS — E78.2 MIXED HYPERLIPIDEMIA: ICD-10-CM

## 2023-10-19 PROCEDURE — 3078F DIAST BP <80 MM HG: CPT | Performed by: INTERNAL MEDICINE

## 2023-10-19 PROCEDURE — 99214 OFFICE O/P EST MOD 30 MIN: CPT | Performed by: INTERNAL MEDICINE

## 2023-10-19 PROCEDURE — 3074F SYST BP LT 130 MM HG: CPT | Performed by: INTERNAL MEDICINE

## 2023-10-19 ASSESSMENT — ENCOUNTER SYMPTOMS
RESPIRATORY NEGATIVE: 1
GASTROINTESTINAL NEGATIVE: 1
COUGH: 0
SHORTNESS OF BREATH: 0

## 2023-10-19 NOTE — CARE COORDINATION
Remote Patient Monitoring Note      Date/Time:  10/19/2023 2:08 PM  Patient Current Location: [address] [place] [address]  RPM yellow alert received for no BP or weight taken x 3 days. LPN previously spoke with pt on 10/18/23 regarding need to complete daily metrics and RPM adherence. Pt v/u and stated, \"I'll get in there in just a little bit and take it\". See RPM note from 10/18/23. Pt has not updated metrics as of this time and is nonadherent with RPM.     Background: Pt enrolled in RPM r/t CHF and HTN. Clinical Interventions: Escalated alert to RN-Update provided      Plan/Follow Up: Will continue to review, monitor and address alerts with follow up based on severity of symptoms and risk factors.

## 2023-10-19 NOTE — CARE COORDINATION
Care Transitions Outreach Attempt    Call within 2 business days of discharge: Yes   Attempted to reach patient for transitions of care follow up. Unable to reach patient. Patient: Gail Adame Patient : 1965 MRN: [id number]    Last Discharge Facility       Date Complaint Diagnosis Description Type Department Provider    10/6/23 Shortness of Breath Pneumonia of left lower lobe due to infectious organism . .. ED (DISCHARGE) [facility] Jasmin Sanderson MD              Was this an external facility discharge? No Discharge Facility Name: [facility]    Noted following upcoming appointments from discharge chart review:   [id number] [address] [facility],[address] follow up appointment(s):   Future Appointments   Date Time Provider [address]   2023  8:45 AM [facility] NUCLEAR STRESS UCDE GVL AMB   2023  8:45 AM [facility] NUCLEAR STRESS UCDE GVL AMB   2023 10:15 AM Tylor Mcdonald DO [facility] AMB     Non-[facility]  follow up appointment(s): none noted      Called to follow up on patient's Card. Appointment.

## 2023-10-19 NOTE — PROGRESS NOTES
found for this or any previous visit (from the past 168 hour(s)). Current Outpatient Medications:     aspirin 81 MG EC tablet, Take 1 tablet by mouth daily, Disp: , Rfl:     bumetanide (BUMEX) 1 MG tablet, Take 1 tablet by mouth daily, Disp: 90 tablet, Rfl: 3    metoprolol succinate (TOPROL XL) 25 MG extended release tablet, Take 1 tablet by mouth daily, Disp: 90 tablet, Rfl: 3    Insulin Glargine, 2 Unit Dial, (TOUJEO MAX SOLOSTAR) 300 UNIT/ML SOPN, Inject 130 Units into the skin at bedtime, Disp: 1 Adjustable Dose Pre-filled Pen Syringe, Rfl: 2    albuterol sulfate  (90 Base) MCG/ACT inhaler, Inhale 2 puffs into the lungs every 6 hours as needed, Disp: , Rfl:     atorvastatin (LIPITOR) 40 MG tablet, Take 1 tablet by mouth, Disp: , Rfl:     cetirizine (ZYRTEC) 10 MG tablet, Take 1 tablet by mouth, Disp: , Rfl:     insulin aspart (NOVOLOG) 100 UNIT/ML injection pen, Inject 20 Units into the skin 4 times daily (before meals and nightly), Disp: , Rfl:     oxyCODONE (OXY-IR) 15 MG immediate release tablet, Take 1 tablet by mouth 3 times daily. , Disp: , Rfl:     lipase-protease-amylase (CREON) 40963-05299 units delayed release capsule, Take 1 capsule by mouth 3 times daily (with meals), Disp: , Rfl:     pantoprazole (PROTONIX) 40 MG tablet, Take 1 tablet by mouth 2 times daily, Disp: , Rfl:     pregabalin (LYRICA) 100 MG capsule, Take 1 capsule by mouth 3 times daily. , Disp: , Rfl:     promethazine (PHENERGAN) 25 MG suppository, Place 1 suppository rectally every 6 hours as needed, Disp: , Rfl:     promethazine (PHENERGAN) 25 MG tablet, Take 1 tablet by mouth every 6 hours as needed, Disp: , Rfl:     sertraline (ZOLOFT) 100 MG tablet, Take 1.5 tablets by mouth daily, Disp: , Rfl:     traZODone (DESYREL) 50 MG tablet, Take 2 tablets by mouth, Disp: , Rfl:     budesonide-formoterol (SYMBICORT) 160-4.5 MCG/ACT AERO, Inhale 2 puffs into the lungs daily (Patient not taking: Reported on 10/19/2023), Disp: 1 each,

## 2023-10-20 ENCOUNTER — CARE COORDINATION (OUTPATIENT)
Dept: CARE COORDINATION | Facility: CLINIC | Age: 58
End: 2023-10-20

## 2023-10-20 NOTE — CARE COORDINATION
Remote Alert Monitoring Note  Rpm alert to be reviewed by the provider   red alert   weight (8.4# increase since 10/14/23)   Additional needs to be addressed by N/A: No                    Date/Time:  10/20/2023 12:16 PM  Patient Current Location: [facility]  LPN contacted patient by telephone. Verified patients name and  as identifiers. Background: Pt enrolled in RPM r/t CHF and HTN. Refer to 911 immediately if:  Patient unresponsive or unable to provide history  Change in cognition or sudden confusion  Patient unable to respond in complete sentences  Intense chest pain/tightness  Any concern for any clinical emergency  Red Alert: Provider response time of 1 hr required for any red alert requiring intervention  Yellow Alert: Provider response time of 3hr required for any escalated yellow alert    Weight Scale Triage  Was your weight obtained upon rising/waking today? no   Was your weight obtained after voiding and/or use of the bathroom today? yes   Did you weigh yourself in the same amount of clothing today, compared to how you typically do? yes   Was the scale bumped or moved prior to today's weight? no   Is your scale on a flat/hard surface? yes   Did you obtain your weight with shoes on? yes   If yes, is this something you normally do during your daily weights? yes   Were you standing up straight on the scale today? yes   Were you leaning on anything while obtaining your weight today? no      Clinical Interventions: Reviewed and followed up on alerts and treatments- Pt denies CP,  SOB/dyspnea, and increasing edema at this time. Pt did not obtain weight upon rising, states he was fully dressed, and wearing shoes. Pt c/o continued mild edema to bilateral feet and ankles and dyspnea with exertion. Pt reports cardiologist is aware and, during F/U appointment on 10/19/23, instructed pt to increase Bumex to 2 mg one time a day x 2 days.  Weight obtained during cardio appointment on 10/19/23 was 278.0#, 38.6#

## 2023-10-24 ENCOUNTER — CARE COORDINATION (OUTPATIENT)
Dept: CARE COORDINATION | Facility: CLINIC | Age: 58
End: 2023-10-24

## 2023-10-24 NOTE — CARE COORDINATION
Remote Patient Monitoring Note      Date/Time:  10/24/2023 4:04 PM  [facility] yellow alert received for no weight taken x 2 days. Verified patients name and  as identifiers. Background: Pt enrolled in RPM r/t CHF and HTN. Clinical Interventions: Pt reeducated on RPM adherence and need to complete daily metrics. Pt v/u and stated, \"I'll start back on it first thing in the morning\". Pt v/u of when to notify provider(s) of changes / concerns and when to seek emergent medical care. No further action necessary at this time. Plan/Follow Up: Will continue to review, monitor and address alerts with follow up based on severity of symptoms and risk factors.

## 2023-10-25 ENCOUNTER — CARE COORDINATION (OUTPATIENT)
Dept: CARE COORDINATION | Facility: CLINIC | Age: 58
End: 2023-10-25

## 2023-10-25 NOTE — CARE COORDINATION
Remote Alert Monitoring Note  Rpm alert to be reviewed by the provider   red alert   pulse ox reading (89%) and weight (37.9# increase since 10/20/23)   Additional needs to be addressed by N/A: No                    Date/Time:  10/25/2023 10:08 AM  Patient Current Location: Home: [address] [place], [place] [address]  LPN contacted patient by telephone. Verified patients name and  as identifiers. Background: Pt enrolled in RPM r/t CHF and HTN. Refer to 911 immediately if:  Patient unresponsive or unable to provide history  Change in cognition or sudden confusion  Patient unable to respond in complete sentences  Intense chest pain/tightness  Any concern for any clinical emergency  Red Alert: Provider response time of 1 hr required for any red alert requiring intervention  Yellow Alert: Provider response time of 3hr required for any escalated yellow alert    O2 Triage  Are you having any Chest Pain? no   Are you having any Shortness of Breath? no   Swelling in your hands or feet? no     Are you having any other health concerns or issues? no        Clinical Interventions: Reviewed and followed up on alerts and treatments-Pt denies CP, SOB, and new or worsening edema. Pt agreeable to recheck pulse ox. Updated reading 93% reported. HRS previously contact regarding possible need to recalibrate RPM scale. See RPM note from 10/16/23 and 10/20/23. Discussed RPM weights and recent weights obtained during office visits with pt. Pt requested weight of 239.4# obtained on 10/20/23 be removed from HRS and stated, \"Take that one out. It ain't right\". Current recorded weight is 277.3# 0.7# less than weight obtained during cardiology office visit. RPM green alert reviewed. Pt v/u of when to notify provider(s) of changes / concerns and when to seek emergent medical care. No further action necessary at this time. Plan/Follow Up:  Will continue to review, monitor and address alerts with follow up based on severity of symptoms

## 2023-10-31 ENCOUNTER — CARE COORDINATION (OUTPATIENT)
Facility: CLINIC | Age: 58
End: 2023-10-31

## 2023-10-31 ENCOUNTER — CARE COORDINATION (OUTPATIENT)
Dept: CARE COORDINATION | Facility: CLINIC | Age: 58
End: 2023-10-31

## 2023-10-31 NOTE — CARE COORDINATION
Remote Patient Monitoring Note      Date/Time:  10/31/2023 12:46 PM  Patient Current Location: [facility] [place]  LPN attempted to contact patient by telephone regarding yellow alert received for no BP or weight taken x 2 days. Background: Pt enrolled in RPM r/t CHF and HTN. Clinical Interventions:  Left HIPAA compliant message requesting a return call. Plan/Follow Up: Will continue to review, monitor and address alerts with follow up based on severity of symptoms and risk factors.
Other

## 2023-10-31 NOTE — CARE COORDINATION
Care Transitions Outreach Attempt    Call within 2 business days of discharge: Yes   Attempted to reach patient for transitions of care follow up. Unable to reach patient. Patient: Cinda Owensx Patient : 1965 MRN: [id number]    Last Discharge Facility       Date Complaint Diagnosis Description Type Department Provider    10/6/23 Shortness of Breath Pneumonia of left lower lobe due to infectious organism . .. ED (DISCHARGE) [facility] Veronica Donis MD              Was this an external facility discharge?  No Discharge Facility Name: [facility]    Noted following upcoming appointments from discharge chart review:   [facility] follow up appointment(s):   Future Appointments   Date Time Provider [address]   2023  8:45 AM [facility] NUCLEAR STRESS UCDE GVL AMB   2023  8:45 AM [facility] NUCLEAR STRESS UCDE GVL AMB   2023 10:15 AM Momo Mackey DO [facility] GVL AMB     Non-[facility]  follow up appointment(s): none noted

## 2023-11-08 ENCOUNTER — CARE COORDINATION (OUTPATIENT)
Dept: CARE COORDINATION | Facility: CLINIC | Age: 58
End: 2023-11-08

## 2023-11-08 ENCOUNTER — APPOINTMENT (RX ONLY)
Dept: URBAN - METROPOLITAN AREA CLINIC 329 | Facility: CLINIC | Age: 58
Setting detail: DERMATOLOGY
End: 2023-11-08

## 2023-11-08 DIAGNOSIS — L81.4 OTHER MELANIN HYPERPIGMENTATION: ICD-10-CM

## 2023-11-08 DIAGNOSIS — L82.1 OTHER SEBORRHEIC KERATOSIS: ICD-10-CM

## 2023-11-08 DIAGNOSIS — Z71.89 OTHER SPECIFIED COUNSELING: ICD-10-CM

## 2023-11-08 DIAGNOSIS — D22 MELANOCYTIC NEVI: ICD-10-CM

## 2023-11-08 DIAGNOSIS — Z85.828 PERSONAL HISTORY OF OTHER MALIGNANT NEOPLASM OF SKIN: ICD-10-CM

## 2023-11-08 DIAGNOSIS — L57.8 OTHER SKIN CHANGES DUE TO CHRONIC EXPOSURE TO NONIONIZING RADIATION: ICD-10-CM | Status: STABLE

## 2023-11-08 DIAGNOSIS — L57.0 ACTINIC KERATOSIS: ICD-10-CM | Status: INADEQUATELY CONTROLLED

## 2023-11-08 DIAGNOSIS — Z12.83 ENCOUNTER FOR SCREENING FOR MALIGNANT NEOPLASM OF SKIN: ICD-10-CM

## 2023-11-08 DIAGNOSIS — D18.0 HEMANGIOMA: ICD-10-CM

## 2023-11-08 PROBLEM — D22.5 MELANOCYTIC NEVI OF TRUNK: Status: ACTIVE | Noted: 2023-11-08

## 2023-11-08 PROBLEM — D22.61 MELANOCYTIC NEVI OF RIGHT UPPER LIMB, INCLUDING SHOULDER: Status: ACTIVE | Noted: 2023-11-08

## 2023-11-08 PROBLEM — D18.01 HEMANGIOMA OF SKIN AND SUBCUTANEOUS TISSUE: Status: ACTIVE | Noted: 2023-11-08

## 2023-11-08 PROCEDURE — ? ADDITIONAL NOTES

## 2023-11-08 PROCEDURE — 99213 OFFICE O/P EST LOW 20 MIN: CPT | Mod: 25

## 2023-11-08 PROCEDURE — ? SUNSCREEN RECOMMENDATIONS

## 2023-11-08 PROCEDURE — 17003 DESTRUCT PREMALG LES 2-14: CPT

## 2023-11-08 PROCEDURE — ? DERMATOSCOPIC EVALUATION

## 2023-11-08 PROCEDURE — ? FULL BODY SKIN EXAM - DECLINED

## 2023-11-08 PROCEDURE — ? COUNSELING

## 2023-11-08 PROCEDURE — 17000 DESTRUCT PREMALG LESION: CPT

## 2023-11-08 PROCEDURE — ? LIQUID NITROGEN

## 2023-11-08 PROCEDURE — ? TREATMENT REGIMEN

## 2023-11-08 ASSESSMENT — LOCATION ZONE DERM
LOCATION ZONE: TRUNK
LOCATION ZONE: LEG
LOCATION ZONE: NECK
LOCATION ZONE: EAR
LOCATION ZONE: FACE
LOCATION ZONE: ARM

## 2023-11-08 ASSESSMENT — LOCATION DETAILED DESCRIPTION DERM
LOCATION DETAILED: LEFT DISTAL PRETIBIAL REGION
LOCATION DETAILED: RIGHT INFERIOR MEDIAL UPPER BACK
LOCATION DETAILED: LEFT INFERIOR ANTERIOR NECK
LOCATION DETAILED: RIGHT ANTERIOR PROXIMAL UPPER ARM
LOCATION DETAILED: RIGHT MID TEMPLE
LOCATION DETAILED: MIDDLE STERNUM
LOCATION DETAILED: RIGHT SUPERIOR MEDIAL UPPER BACK
LOCATION DETAILED: LEFT ANTIHELIX
LOCATION DETAILED: RIGHT PROXIMAL DORSAL FOREARM
LOCATION DETAILED: EPIGASTRIC SKIN
LOCATION DETAILED: LEFT INFERIOR HELIX
LOCATION DETAILED: RIGHT PROXIMAL POSTERIOR UPPER ARM
LOCATION DETAILED: LEFT MEDIAL UPPER BACK

## 2023-11-08 ASSESSMENT — LOCATION SIMPLE DESCRIPTION DERM
LOCATION SIMPLE: RIGHT FOREARM
LOCATION SIMPLE: LEFT ANTERIOR NECK
LOCATION SIMPLE: LEFT UPPER BACK
LOCATION SIMPLE: LEFT PRETIBIAL REGION
LOCATION SIMPLE: RIGHT UPPER ARM
LOCATION SIMPLE: RIGHT POSTERIOR UPPER ARM
LOCATION SIMPLE: RIGHT UPPER BACK
LOCATION SIMPLE: ABDOMEN
LOCATION SIMPLE: CHEST
LOCATION SIMPLE: LEFT EAR
LOCATION SIMPLE: RIGHT TEMPLE

## 2023-11-08 NOTE — HPI: SKIN LESION
What Type Of Note Output Would You Prefer (Optional)?: Bullet Format
How Severe Is Your Skin Lesion?: mild
Is This A New Presentation, Or A Follow-Up?: Skin Lesions
Additional History: Patient denies anything new or changing. He also denies any skin related concerns.

## 2023-11-08 NOTE — CARE COORDINATION
Remote Patient Monitoring Note      Date/Time:  2023 1:09 PM  Patient Current Location: [facility]  LPN contacted patient by telephone regarding yellow alert received for no BP or weight taken x 2 days. Verified patients name and  as identifiers. Background: Pt enrolled in RPM r/t CHF and HTN. Clinical Interventions: Pt re-educated on RPM adherence. Pt verbalized understanding, stated \"I've just been really busy this morning\", and is agreeable to complete daily metrics when he returns home. FYI update routed to CTN. Plan/Follow Up: Will continue to review, monitor and address alerts with follow up based on severity of symptoms and risk factors.

## 2023-11-09 ENCOUNTER — CARE COORDINATION (OUTPATIENT)
Dept: CARE COORDINATION | Facility: CLINIC | Age: 58
End: 2023-11-09

## 2023-11-09 NOTE — CARE COORDINATION
Remote Patient Monitoring Note      Date/Time:  11/9/2023 11:24 AM  Patient Current Location: [facility] yellow alert received for no BP or weight taken x 3 days. Pt nonadherent with RPM. LPN previously spoke with pt on 11/8/23. Pt verbalized understanding of RPM adherence. See note. Pt has not update metrics as of this time. Alert escalated to CTN. Background: Pt enrolled in RPM r/t CHF and HTN. Plan/Follow Up: Will continue to review, monitor and address alerts with follow up based on severity of symptoms and risk factors.

## 2023-11-10 ENCOUNTER — CARE COORDINATION (OUTPATIENT)
Dept: CARE COORDINATION | Facility: CLINIC | Age: 58
End: 2023-11-10

## 2023-11-10 NOTE — CARE COORDINATION
Care Transitions Outreach Attempt    Call within 2 business days of discharge: Yes   Attempted to reach patient for transitions of care follow up. Unable to reach patient. Patient: Pallavi Adame Patient : 1965 MRN: [id number]    Last Discharge Facility       Date Complaint Diagnosis Description Type Department Provider    10/6/23 Shortness of Breath Pneumonia of left lower lobe due to infectious organism . .. ED (DISCHARGE) [facility] Lamont Denton MD              Was this an external facility discharge?  No Discharge Facility Name: [facility]    Noted following upcoming appointments from discharge chart review:   [facility] follow up appointment(s):   Future Appointments   Date Time Provider [address]   2023  8:45 AM [facility] [place] NUCLEAR STRESS UC[facility] GVL AMB   2023  8:45 AM [facility] NUCLEAR STRESS UCDE GVL AMB   2023 10:15 AM Ludwig Rendon DO [facility]     Non-[facility]  follow up appointment(s): none noted

## 2023-11-13 ENCOUNTER — CARE COORDINATION (OUTPATIENT)
Dept: CARE COORDINATION | Facility: CLINIC | Age: 58
End: 2023-11-13

## 2023-11-13 VITALS
BODY MASS INDEX: 36.98 KG/M2 | DIASTOLIC BLOOD PRESSURE: 78 MMHG | OXYGEN SATURATION: 94 % | HEART RATE: 63 BPM | WEIGHT: 272.7 LBS | SYSTOLIC BLOOD PRESSURE: 146 MMHG

## 2023-11-13 NOTE — CARE COORDINATION
Remote Patient Monitoring Note      Date/Time:  11/13/2023 3:57 PM  Patient Current Location: [address] [place] [address]  Pt has not update daily metrics as of this time. Pt nonadherent with RPM. See previous RPM note from today, 11/13/23. FYI update routed to RN CTN. Background: Pt enrolled in RPM r/t CHF and HTN. Plan/Follow Up: Will continue to review, monitor and address alerts with follow up based on severity of symptoms and risk factors.

## 2023-11-13 NOTE — CARE COORDINATION
Remote Patient Monitoring Note      Date/Time:  2023 12:37 PM  Patient Current Location: Home: [address] [place] [id number]  LPN contacted patient by telephone regarding yellow alert received for no BP or weight taken x 3 days. Verified patients name and  as identifiers. Background: Pt enrolled in RPM r/t CHF and HTN. Clinical Interventions: Discussed RPM adherence with pt. Pt verbalizes understanding and is agreeable to complete daily metrics by 4:00 PM. Pt also verbalizes understanding of when to notify provider(s) of changes / concerns and when to seek emergent medical care. FYI update routed to RN CTN. Plan/Follow Up: Will continue to review, monitor and address alerts with follow up based on severity of symptoms and risk factors.

## 2023-11-14 ENCOUNTER — CARE COORDINATION (OUTPATIENT)
Dept: CARE COORDINATION | Facility: CLINIC | Age: 58
End: 2023-11-14

## 2023-11-14 NOTE — CARE COORDINATION
Remote Patient Monitoring Note      Date/Time:  11/14/2023 11:59 AM  Patient Current Location: [facility] yellow alert received for no BP or weight taken x 4 days. Pt nonadherent with RPM.    Background: Pt enrolled in RPM r/t CHF and HTN. Clinical Interventions: Escalated alert to RN-Update provided      Plan/Follow Up: Will continue to review, monitor and address alerts with follow up based on severity of symptoms and risk factors.

## 2023-11-15 ENCOUNTER — CARE COORDINATION (OUTPATIENT)
Dept: CARE COORDINATION | Facility: CLINIC | Age: 58
End: 2023-11-15

## 2023-11-15 NOTE — CARE COORDINATION
Care Transitions Follow Up Call    Patient Current Location:  Home: [address] [place] [address] N    Care Transition Nurse contacted the spouse/partner by telephone to follow up after admission on 2023. Verified name and  with spouse/partner as identifiers. Patient: Randy Adame  Patient : 1965   MRN: [id number]  Reason for Admission: CHF  Discharge Date: 10/6/23 RARS: Readmission Risk Score: 12.2      Needs to be reviewed by the provider   Additional needs identified to be addressed with provider: No  none             Method of communication with provider: none. History of: Dyspnea on exertion, Primary hypertension, Coronary artery disease involving native coronary artery of native heart without angina pectoris, Mixed hyperlipidemia. 2023: [facility] NUCLEAR STRESS     Follow Up  Future Appointments   Date Time Provider [address]   2023 10:15 AM Roxanna Colon DO [facility] GVL AMB     External follow up appointment(s): none noted, [facility]       Patients top risk factors for readmission: medical condition-A-fib, CHF, DM type 2 and non compliance with monitoring metrics  Interventions to address risk factors: Education of patient/family/caregiver/guardian to support self-management-voices understanding, Assessment and support for treatment adherence and medication management-RPM adherence, and importance of metrics for providers. Offered patient enrollment in the Remote Patient Monitoring (RPM) program for in-home monitoring: Yes, patient already enrolled. Reviewed and followed up on alerts and treatments-Reviewed expected use of RPM, voices understanding. Noted non-adherence. Reviewed metrics needed for daily management of chronic disease-needs reinforcement. Education of patient/family/caregiver/guardian to support self-management-voices understanding. Discussed continued use of equipment.  States \"Resting now, stress test was tiring,

## 2023-11-15 NOTE — CARE COORDINATION
Remote Patient Monitoring Note      Date/Time:  11/15/2023 1:15 PM  Patient Current Location: [facility] yellow alert received for no BP or weight taken x 5 days. Per communication with RN CTN, pt wants to continue to participate in RPM and verbalized understanding of RPM adherence. Per CTN documentation from today, \"Discussed continued use of equipment. States \"Resting now, stress test was tiring, will be back on it tomorrow morning\". \"  No outreach indicated by this LPN at this time. Background: Pt enrolled in RPM r/t CHF and HTN. Plan/Follow Up: Will continue to review, monitor and address alerts with follow up based on severity of symptoms and risk factors.

## 2023-11-16 ENCOUNTER — CARE COORDINATION (OUTPATIENT)
Dept: CARE COORDINATION | Facility: CLINIC | Age: 58
End: 2023-11-16

## 2023-11-16 ENCOUNTER — TELEPHONE (OUTPATIENT)
Age: 58
End: 2023-11-16

## 2023-11-16 NOTE — TELEPHONE ENCOUNTER
----- Message from Sheldon Hinojosa, [facility] sent at 11/16/2023  9:44 AM EST -----    ----- Message -----  From: Ang Castellanos DO  Sent: 11/16/2023   8:23 AM EST  To: Sheldon Hinojosa MA    Please call the patient regarding their normal result.     Normal heart function and blood flow on your recent stress test.

## 2023-11-16 NOTE — CARE COORDINATION
Remote Patient Monitoring Note      Date/Time:  11/16/2023 2:43 PM  Patient Current Location: [address] [place] [address]  RPM yellow alert received for no BP or weight taken x 6 days. Per communication with RN CTN, pt wants to continue to participate in RPM and verbalized understanding of RPM adherence. Per CTN documentation from 11/15/23, \"Discussed continued use of equipment. States \"Resting now, stress test was tiring, will be back on it tomorrow morning\". Pt nonadherent with RPM. No outreach indicated by this LPN at this time. Background: Pt enrolled in RPM r/t CHF and HTN. Clinical Interventions: Escalated alert to RN-Update provided  Escalated alert to RPM        Plan/Follow Up: Will continue to review, monitor and address alerts with follow up based on severity of symptoms and risk factors.

## 2023-11-16 NOTE — TELEPHONE ENCOUNTER
----- Message from Moriah Castellanos sent at 11/16/2023  9:44 AM EST -----    ----- Message -----  From: Teena Booth DO  Sent: 11/16/2023   8:23 AM EST  To: Stephen Alvarenga MA    Please call the patient regarding their normal result.     Normal heart function and blood flow on your recent stress test.

## 2023-11-16 NOTE — TELEPHONE ENCOUNTER
Called pt regarding his stress test results  and pt voiced understanding. Informed on follow-up appointment.

## 2023-11-17 ENCOUNTER — CARE COORDINATION (OUTPATIENT)
Dept: CARE COORDINATION | Facility: CLINIC | Age: 58
End: 2023-11-17

## 2023-11-17 NOTE — CARE COORDINATION
Remote Patient Monitoring Note      Date/Time:  11/17/2023 12:48 PM  Patient Current Location: [facility]  RPM yellow alert received for no BP or weight taken x 7 days. Per communication with RN CTN, pt wants to continue to participate in RPM and verbalized understanding of RPM adherence. Per CTN documentation from 11/15/23, \"Discussed continued use of equipment. States \"Resting now, stress test was tiring, will be back on it tomorrow morning\". Pt nonadherent with RPM. No outreach indicated by this LPN at this time. Background: Pt enrolled in RPM r/t CHF and HTN. Clinical Interventions: Escalated alert to RN-Update provided  Escalated alert to RPM         Plan/Follow Up: Will continue to review, monitor and address alerts with follow up based on severity of symptoms and risk factors.

## 2023-11-22 ENCOUNTER — OFFICE VISIT (OUTPATIENT)
Age: 58
End: 2023-11-22
Payer: OTHER GOVERNMENT

## 2023-11-22 ENCOUNTER — CARE COORDINATION (OUTPATIENT)
Dept: CARE COORDINATION | Facility: CLINIC | Age: 58
End: 2023-11-22

## 2023-11-22 VITALS
HEIGHT: 72 IN | WEIGHT: 279 LBS | BODY MASS INDEX: 37.79 KG/M2 | HEART RATE: 64 BPM | DIASTOLIC BLOOD PRESSURE: 62 MMHG | SYSTOLIC BLOOD PRESSURE: 116 MMHG

## 2023-11-22 DIAGNOSIS — I48.0 PAROXYSMAL ATRIAL FIBRILLATION (HCC): ICD-10-CM

## 2023-11-22 DIAGNOSIS — R07.2 PRECORDIAL PAIN: ICD-10-CM

## 2023-11-22 DIAGNOSIS — Q23.1 BICUSPID AORTIC VALVE: ICD-10-CM

## 2023-11-22 DIAGNOSIS — R07.2: ICD-10-CM

## 2023-11-22 DIAGNOSIS — E66.01 SEVERE OBESITY (BMI 35.0-39.9) WITH COMORBIDITY (HCC): ICD-10-CM

## 2023-11-22 DIAGNOSIS — N18.32 STAGE 3B CHRONIC KIDNEY DISEASE (HCC): ICD-10-CM

## 2023-11-22 DIAGNOSIS — R06.09 DYSPNEA ON EXERTION: Primary | ICD-10-CM

## 2023-11-22 DIAGNOSIS — I10 PRIMARY HYPERTENSION: ICD-10-CM

## 2023-11-22 DIAGNOSIS — I50.32 CHRONIC DIASTOLIC (CONGESTIVE) HEART FAILURE (HCC): ICD-10-CM

## 2023-11-22 DIAGNOSIS — Z95.818 PRESENCE OF WATCHMAN LEFT ATRIAL APPENDAGE CLOSURE DEVICE: ICD-10-CM

## 2023-11-22 DIAGNOSIS — Z98.890 H/O CARDIAC RADIOFREQUENCY ABLATION: ICD-10-CM

## 2023-11-22 DIAGNOSIS — I10 ESSENTIAL (PRIMARY) HYPERTENSION: ICD-10-CM

## 2023-11-22 DIAGNOSIS — E78.2 MIXED HYPERLIPIDEMIA: ICD-10-CM

## 2023-11-22 PROBLEM — J44.9 CHRONIC OBSTRUCTIVE PULMONARY DISEASE, UNSPECIFIED (HCC): Status: ACTIVE | Noted: 2023-11-22

## 2023-11-22 LAB
ERYTHROCYTE [DISTWIDTH] IN BLOOD BY AUTOMATED COUNT: 18.1 % (ref 11.9–14.6)
HCT VFR BLD AUTO: 45 % (ref 41.1–50.3)
HGB BLD-MCNC: 13.3 G/DL (ref 13.6–17.2)
MCH RBC QN AUTO: 25.6 PG (ref 26.1–32.9)
MCHC RBC AUTO-ENTMCNC: 29.6 G/DL (ref 31.4–35)
MCV RBC AUTO: 86.5 FL (ref 82–102)
NRBC # BLD: 0 K/UL (ref 0–0.2)
PLATELET # BLD AUTO: 130 K/UL (ref 150–450)
PMV BLD AUTO: 12 FL (ref 9.4–12.3)
RBC # BLD AUTO: 5.2 M/UL (ref 4.23–5.6)
WBC # BLD AUTO: 6.9 K/UL (ref 4.3–11.1)

## 2023-11-22 PROCEDURE — 3078F DIAST BP <80 MM HG: CPT | Performed by: INTERNAL MEDICINE

## 2023-11-22 PROCEDURE — 99214 OFFICE O/P EST MOD 30 MIN: CPT | Performed by: INTERNAL MEDICINE

## 2023-11-22 PROCEDURE — 3074F SYST BP LT 130 MM HG: CPT | Performed by: INTERNAL MEDICINE

## 2023-11-22 RX ORDER — ISOSORBIDE MONONITRATE 30 MG/1
30 TABLET, EXTENDED RELEASE ORAL DAILY
Qty: 90 TABLET | Refills: 3 | Status: SHIPPED | OUTPATIENT
Start: 2023-11-22

## 2023-11-22 ASSESSMENT — ENCOUNTER SYMPTOMS
SHORTNESS OF BREATH: 0
COUGH: 0
RESPIRATORY NEGATIVE: 1

## 2023-11-22 NOTE — PROGRESS NOTES
[facility], [facility], [address] [place] [address], [address]      Patient:  Kristine Jackson  1965         SUBJECTIVE:  Kristine Jackson is a  59 y.o. male seen for a follow up visit regarding the following:     Chief Complaint   Patient presents with    Follow-up    Coronary Artery Disease   . CC: Chest pain     HPI:   59 y.o. male with a history of bicuspid aortic valve with mild AI/mild AS, persistent atrial fibrillation s/p watchman, atrial flutter, HFpEF, HTN, HLD, FIDENCIO on CPAP, DM type II who is here for hospital follow-up    Patient was last seen in office on 10/19/23 , since then reports that he continues to have chest pain. Symptoms moderate to severe at times, occurring multiple times throughout the day. Worse with activity such as walking. Improves with resting, however once he starts moving again has more chest pain. Associated with shortness of breath. No other complaints at this time. No interval ER visits or hospitalizations for cardiac problems. Cardiovascular Testing:  - SPECT 11/16/23: LVEF 74%. Normal perfusion. Low risk. - Echo 9/14/23: LVEF 60-65 %, indeterminate diastolic function, RV normal, Valves: Bicuspid aortic valve, mild AI, mild AS. -Cath 10/8/2021: Mild luminal irregularities, normal LVEF. Past medical history, past surgical history, family history, social history, and medications were all reviewed with the patient today and updated as necessary.          Patient Active Problem List    Diagnosis Date Noted    Chronic obstructive pulmonary disease, unspecified 11/22/2023     Priority: High    Chest pain 10/08/2021     Priority: High    Paroxysmal atrial fibrillation ([address]) 08/11/2020     Priority: High    Pericardial effusion 09/14/2023     Priority: Low    Leukocytosis 09/14/2023     Priority: Low    Chronic pancreatitis ([address]) 09/14/2023     Priority: Low    Nonintractable headache 09/14/2023     Priority: Low    Acute bronchitis 07/18/2023

## 2023-11-22 NOTE — CARE COORDINATION
Care Transitions Outreach Attempt    Call within 2 business days of discharge: Yes   Attempted to reach patient for transitions of care follow up. Unable to reach patient. Patient: Rosie Adame Patient : 1965 MRN: [id number]    Last Discharge Facility       Date Complaint Diagnosis Description Type Department Provider    10/6/23 Shortness of Breath Pneumonia of left lower lobe due to infectious organism . .. ED (DISCHARGE) [facility] Dorita Mcfarland MD              Was this an external facility discharge?  No Discharge Facility Name: [facility]    Noted following upcoming appointments from discharge chart review:   [facility] follow up appointment(s):   Future Appointments   Date Time Provider [address]   2023  3:00 PM Maia Avelar DO [facility] AMB     Non-[facility]  follow up appointment(s): none noted

## 2023-11-23 LAB
ALBUMIN SERPL-MCNC: 3.9 G/DL (ref 3.2–4.6)
ALBUMIN/GLOB SERPL: 1.4 (ref 0.4–1.6)
ALP SERPL-CCNC: 78 U/L (ref 50–136)
ALT SERPL-CCNC: 31 U/L (ref 12–65)
ANION GAP SERPL CALC-SCNC: 7 MMOL/L (ref 2–11)
AST SERPL-CCNC: 24 U/L (ref 15–37)
BILIRUB SERPL-MCNC: 0.3 MG/DL (ref 0.2–1.1)
BUN SERPL-MCNC: 18 MG/DL (ref 8–23)
CALCIUM SERPL-MCNC: 8.7 MG/DL (ref 8.3–10.4)
CHLORIDE SERPL-SCNC: 108 MMOL/L (ref 101–110)
CO2 SERPL-SCNC: 26 MMOL/L (ref 21–32)
CREAT SERPL-MCNC: 1.9 MG/DL (ref 0.8–1.5)
GLOBULIN SER CALC-MCNC: 2.7 G/DL (ref 2.8–4.5)
GLUCOSE SERPL-MCNC: 219 MG/DL (ref 65–100)
POTASSIUM SERPL-SCNC: 4.7 MMOL/L (ref 3.5–5.1)
PROT SERPL-MCNC: 6.6 G/DL (ref 6.3–8.2)
SODIUM SERPL-SCNC: 141 MMOL/L (ref 133–143)

## 2023-11-27 NOTE — RESULT ENCOUNTER NOTE
Please call the patient regarding their result. Kidney function improved from 1 month ago. He will need IV fluid hydration prior to coronary angiography, cath lab should help arrange.

## 2023-11-27 NOTE — PROGRESS NOTES
Called pt regarding lab results. Pt voiced understanding. Pt states cath has not reached out. Informed pt the phone number to call.

## 2023-11-29 NOTE — PROGRESS NOTES
Patient pre-assessment complete for [facility] scheduled for 23, arrival time 0600. Patient verified using . Patient instructed to bring a list of all home medications on the day of procedure. NPO status reinforced. Patient informed to take a full dose aspirin 325mg  or 81 mg x 4 on the day of procedure. Patient instructed to HOLD bumex and all insulin tomorrow, only take half dose of insulin tonight. Instructed they can take all other medications excluding vitamins & supplements. Patient verbalizes understanding of all instructions & denies any questions at this time.

## 2023-11-30 ENCOUNTER — HOSPITAL ENCOUNTER (OUTPATIENT)
Age: 58
Setting detail: OUTPATIENT SURGERY
Discharge: HOME | End: 2023-11-30
Attending: INTERNAL MEDICINE | Admitting: INTERNAL MEDICINE
Payer: OTHER GOVERNMENT

## 2023-11-30 ENCOUNTER — TELEPHONE (OUTPATIENT)
Age: 58
End: 2023-11-30

## 2023-11-30 VITALS
TEMPERATURE: 98.1 F | WEIGHT: 279 LBS | OXYGEN SATURATION: 96 % | SYSTOLIC BLOOD PRESSURE: 133 MMHG | HEART RATE: 81 BPM | HEIGHT: 72 IN | BODY MASS INDEX: 37.79 KG/M2 | RESPIRATION RATE: 18 BRPM | DIASTOLIC BLOOD PRESSURE: 73 MMHG

## 2023-11-30 DIAGNOSIS — R07.9 CHEST PAIN, UNSPECIFIED TYPE: ICD-10-CM

## 2023-11-30 LAB
ANION GAP SERPL CALC-SCNC: 1 MMOL/L (ref 2–11)
BUN SERPL-MCNC: 18 MG/DL (ref 8–23)
CALCIUM SERPL-MCNC: 9.3 MG/DL (ref 8.3–10.4)
CHLORIDE SERPL-SCNC: 111 MMOL/L (ref 101–110)
CO2 SERPL-SCNC: 26 MMOL/L (ref 21–32)
CREAT SERPL-MCNC: 1.8 MG/DL (ref 0.8–1.5)
ECHO BSA: 2.54 M2
GLUCOSE SERPL-MCNC: 95 MG/DL (ref 65–100)
POTASSIUM SERPL-SCNC: 3.7 MMOL/L (ref 3.5–5.1)
SODIUM SERPL-SCNC: 138 MMOL/L (ref 133–143)

## 2023-11-30 PROCEDURE — 6360000004 HC RX CONTRAST MEDICATION: Performed by: INTERNAL MEDICINE

## 2023-11-30 PROCEDURE — 2500000003 HC RX 250 WO HCPCS: Performed by: INTERNAL MEDICINE

## 2023-11-30 PROCEDURE — 93458 L HRT ARTERY/VENTRICLE ANGIO: CPT | Performed by: INTERNAL MEDICINE

## 2023-11-30 PROCEDURE — 99152 MOD SED SAME PHYS/QHP 5/>YRS: CPT | Performed by: INTERNAL MEDICINE

## 2023-11-30 PROCEDURE — 2709999900 HC NON-CHARGEABLE SUPPLY: Performed by: INTERNAL MEDICINE

## 2023-11-30 PROCEDURE — 2580000003 HC RX 258: Performed by: INTERNAL MEDICINE

## 2023-11-30 PROCEDURE — C1769 GUIDE WIRE: HCPCS | Performed by: INTERNAL MEDICINE

## 2023-11-30 PROCEDURE — C1894 INTRO/SHEATH, NON-LASER: HCPCS | Performed by: INTERNAL MEDICINE

## 2023-11-30 PROCEDURE — 6360000002 HC RX W HCPCS: Performed by: INTERNAL MEDICINE

## 2023-11-30 PROCEDURE — 80048 BASIC METABOLIC PNL TOTAL CA: CPT

## 2023-11-30 RX ORDER — ACETAMINOPHEN 325 MG/1
650 TABLET ORAL EVERY 4 HOURS PRN
Status: CANCELLED | OUTPATIENT
Start: 2023-11-30

## 2023-11-30 RX ORDER — SODIUM CHLORIDE 0.9 % (FLUSH) 0.9 %
5-40 SYRINGE (ML) INJECTION EVERY 12 HOURS SCHEDULED
Status: CANCELLED | OUTPATIENT
Start: 2023-11-30

## 2023-11-30 RX ORDER — SODIUM CHLORIDE 9 MG/ML
INJECTION, SOLUTION INTRAVENOUS CONTINUOUS
Status: DISCONTINUED | OUTPATIENT
Start: 2023-11-30 | End: 2023-11-30 | Stop reason: HOSPADM

## 2023-11-30 RX ORDER — SODIUM CHLORIDE 0.9 % (FLUSH) 0.9 %
5-40 SYRINGE (ML) INJECTION PRN
Status: CANCELLED | OUTPATIENT
Start: 2023-11-30

## 2023-11-30 RX ORDER — MIDAZOLAM HYDROCHLORIDE 1 MG/ML
INJECTION INTRAMUSCULAR; INTRAVENOUS PRN
Status: DISCONTINUED | OUTPATIENT
Start: 2023-11-30 | End: 2023-11-30 | Stop reason: HOSPADM

## 2023-11-30 RX ORDER — HEPARIN SODIUM 200 [USP'U]/100ML
INJECTION, SOLUTION INTRAVENOUS CONTINUOUS PRN
Status: DISCONTINUED | OUTPATIENT
Start: 2023-11-30 | End: 2023-11-30 | Stop reason: HOSPADM

## 2023-11-30 RX ORDER — SODIUM CHLORIDE 9 MG/ML
INJECTION, SOLUTION INTRAVENOUS PRN
Status: CANCELLED | OUTPATIENT
Start: 2023-11-30

## 2023-11-30 RX ORDER — ASPIRIN 81 MG/1
324 TABLET, CHEWABLE ORAL DAILY
Status: DISCONTINUED | OUTPATIENT
Start: 2023-11-30 | End: 2023-11-30 | Stop reason: HOSPADM

## 2023-11-30 RX ORDER — LIDOCAINE HYDROCHLORIDE 10 MG/ML
INJECTION, SOLUTION INFILTRATION; PERINEURAL PRN
Status: DISCONTINUED | OUTPATIENT
Start: 2023-11-30 | End: 2023-11-30 | Stop reason: HOSPADM

## 2023-11-30 RX ADMIN — SODIUM CHLORIDE: 900 INJECTION, SOLUTION INTRAVENOUS at 06:30

## 2023-11-30 NOTE — PROGRESS NOTES
Patient received to [facility] room # 14. Ambulatory from [facility]. Patient scheduled for [facility] today with Dr Mindi Wray. Procedure reviewed & questions answered, voiced good understanding consent obtained & placed on chart. All medications and medical history reviewed. Will prep patient per orders. Patient & family updated on plan of care. The patient has a fraility score of 3-MANAGING WELL, based on pt ability to ambulate independently and to complete own ADLs.

## 2023-11-30 NOTE — PROGRESS NOTES
TRANSFER - OUT REPORT:    Verbal report given to RN on Natalie Perkins  being transferred to [facility] for routine progression of patient care       Report consisted of patient's Situation, Background, Assessment and   Recommendations(SBAR). Information from the following report(s) Nurse Handoff Report and MAR was reviewed with the receiving nurse.       [facility] w/ Dr. Felisa Nava  No interventions  R radial access  TR band to R radial @12 mL  No s/sxs of bleeding or hematoma to R radial access site    Heparin 5,000 units IC  Versed 3mg IV  Fentanyl 50mcg IV

## 2023-11-30 NOTE — TELEPHONE ENCOUNTER
----- Message from Ruperto Burgos sent at 11/30/2023 10:05 AM EST -----    ----- Message -----  From: Cat Davenport RN  Sent: 11/30/2023   9:48 AM EST  To: #    Please schedule follow up appt with Dr Carlos Mir post cath in 3 weeks.  Leticia Scanlon RN

## 2023-11-30 NOTE — DISCHARGE INSTRUCTIONS
HEART CATHETERIZATION/ANGIOGRAPHY DISCHARGE INSTRUCTIONS    Check puncture site frequently for swelling or bleeding. If there is any bleeding, apply pressure over the area with a clean towel or washcloth and call 911. Notify your doctor for any redness, swelling, drainage, or oozing from the puncture site. Notify your doctor for any fever or chills. If the extremity becomes cold, numb, or painful call Dr. Nacho Jimenez at [phone]. Activity should be limited for the next 48 hours. No heavy lifting, pushing, pulling  or strenuous activity for 48 hours. No heavy lifting (anything over 10 pounds) for 3 days. You may resume your usual diet. Drink more fluids than usual.  Have a responsible person drive you home and stay with you for at least 24 hours after your heart catheterization/angiography. You may remove bandage from your right wrist in 24 hours. You may shower in 24 hours. No tub baths, hot tubs, or swimming for 1 week. Do not place any lotions, creams, powders, or ointments over puncture site for 1 week. You may place a clean band-aid over the puncture site each day for 5 days. Change daily. Sedation for a Medical Procedure: Care Instructions     You were given a sedative medication during your visit. While many of the effects will have worn   off before you leave; you may continue to feel some effects for several hours. Common side effects from sedation include:  Feeling sleepy. (Your doctors and nurses will make sure you are not too sleepy to go home.)  Nausea and vomiting. This usually does not last long. Feeling tired. How can you care for yourself at home? Activity    Don't do anything for 24 hours that requires attention to detail. It takes time for the medicine effects to completely wear off. Do not make important legal decisions for 24 hours. Do not sign any legal documents for 24 hours.      Do not drink alcohol today     For your safety, you should not drive or operate heavy

## 2023-12-01 ENCOUNTER — CARE COORDINATION (OUTPATIENT)
Dept: CARE COORDINATION | Facility: CLINIC | Age: 58
End: 2023-12-01

## 2023-12-01 ENCOUNTER — CARE COORDINATION (OUTPATIENT)
Facility: CLINIC | Age: 58
End: 2023-12-01

## 2023-12-01 DIAGNOSIS — I10 PRIMARY HYPERTENSION: Chronic | ICD-10-CM

## 2023-12-01 DIAGNOSIS — I50.32 CHRONIC DIASTOLIC CONGESTIVE HEART FAILURE (HCC): Primary | Chronic | ICD-10-CM

## 2023-12-01 NOTE — CARE COORDINATION
CCSS placed call to patient to arrange RPM kit  through [facility]. Reviewed with patient how to pack equipment in original packing. Verified patient's availability to schedule UPS  time. UPS  time requested.  Anticipated  date range 2-3 business days

## 2024-07-24 ENCOUNTER — APPOINTMENT (OUTPATIENT)
Dept: GENERAL RADIOLOGY | Age: 59
End: 2024-07-24
Payer: OTHER GOVERNMENT

## 2024-07-24 ENCOUNTER — HOSPITAL ENCOUNTER (EMERGENCY)
Age: 59
Discharge: HOME | End: 2024-07-25
Attending: EMERGENCY MEDICINE | Admitting: RADIOLOGY
Payer: OTHER GOVERNMENT

## 2024-07-24 DIAGNOSIS — R53.1 GENERALIZED WEAKNESS: ICD-10-CM

## 2024-07-24 DIAGNOSIS — R73.9 HYPERGLYCEMIA: ICD-10-CM

## 2024-07-24 DIAGNOSIS — R93.5 ABNORMAL CT OF THE ABDOMEN: ICD-10-CM

## 2024-07-24 DIAGNOSIS — R07.9 ACUTE CHEST PAIN: Primary | ICD-10-CM

## 2024-07-24 LAB
BASOPHILS # BLD: 0 K/UL (ref 0–0.2)
BASOPHILS NFR BLD: 0 % (ref 0–2)
DIFFERENTIAL METHOD BLD: ABNORMAL
EOSINOPHIL # BLD: 0.2 K/UL (ref 0–0.8)
EOSINOPHIL NFR BLD: 2 % (ref 0.5–7.8)
ERYTHROCYTE [DISTWIDTH] IN BLOOD BY AUTOMATED COUNT: 15.5 % (ref 11.9–14.6)
GLUCOSE BLD STRIP.AUTO-MCNC: 384 MG/DL (ref 65–100)
HCT VFR BLD AUTO: 50.2 % (ref 41.1–50.3)
HGB BLD-MCNC: 15.9 G/DL (ref 13.6–17.2)
IMM GRANULOCYTES # BLD AUTO: 0.1 K/UL (ref 0–0.5)
IMM GRANULOCYTES NFR BLD AUTO: 1 % (ref 0–5)
LYMPHOCYTES # BLD: 2.2 K/UL (ref 0.5–4.6)
LYMPHOCYTES NFR BLD: 23 % (ref 13–44)
MCH RBC QN AUTO: 26.6 PG (ref 26.1–32.9)
MCHC RBC AUTO-ENTMCNC: 31.7 G/DL (ref 31.4–35)
MCV RBC AUTO: 83.9 FL (ref 82–102)
MONOCYTES # BLD: 0.4 K/UL (ref 0.1–1.3)
MONOCYTES NFR BLD: 4 % (ref 4–12)
NEUTS SEG # BLD: 6.8 K/UL (ref 1.7–8.2)
NEUTS SEG NFR BLD: 70 % (ref 43–78)
NRBC # BLD: 0 K/UL (ref 0–0.2)
PLATELET # BLD AUTO: 97 K/UL (ref 150–450)
PMV BLD AUTO: 11.4 FL (ref 9.4–12.3)
RBC # BLD AUTO: 5.98 M/UL (ref 4.23–5.6)
SERVICE CMNT-IMP: ABNORMAL
WBC # BLD AUTO: 9.7 K/UL (ref 4.3–11.1)

## 2024-07-24 PROCEDURE — 85025 COMPLETE CBC W/AUTO DIFF WBC: CPT

## 2024-07-24 PROCEDURE — 82962 GLUCOSE BLOOD TEST: CPT

## 2024-07-24 PROCEDURE — 83605 ASSAY OF LACTIC ACID: CPT

## 2024-07-24 PROCEDURE — 80053 COMPREHEN METABOLIC PANEL: CPT

## 2024-07-24 PROCEDURE — 84484 ASSAY OF TROPONIN QUANT: CPT

## 2024-07-24 PROCEDURE — 99285 EMERGENCY DEPT VISIT HI MDM: CPT

## 2024-07-24 PROCEDURE — 84443 ASSAY THYROID STIM HORMONE: CPT

## 2024-07-24 PROCEDURE — 93005 ELECTROCARDIOGRAM TRACING: CPT | Performed by: EMERGENCY MEDICINE

## 2024-07-24 PROCEDURE — 83690 ASSAY OF LIPASE: CPT

## 2024-07-24 PROCEDURE — 84145 PROCALCITONIN (PCT): CPT

## 2024-07-24 PROCEDURE — 71046 X-RAY EXAM CHEST 2 VIEWS: CPT

## 2024-07-25 ENCOUNTER — APPOINTMENT (OUTPATIENT)
Dept: CT IMAGING | Age: 59
End: 2024-07-25
Payer: OTHER GOVERNMENT

## 2024-07-25 VITALS
TEMPERATURE: 97.5 F | DIASTOLIC BLOOD PRESSURE: 71 MMHG | HEIGHT: 72 IN | SYSTOLIC BLOOD PRESSURE: 119 MMHG | WEIGHT: 275 LBS | BODY MASS INDEX: 37.25 KG/M2 | OXYGEN SATURATION: 97 % | HEART RATE: 78 BPM | RESPIRATION RATE: 18 BRPM

## 2024-07-25 LAB
ALBUMIN SERPL-MCNC: 4.3 G/DL (ref 3.2–4.6)
ALBUMIN/GLOB SERPL: 1.4 (ref 1–1.9)
ALP SERPL-CCNC: 112 U/L (ref 40–129)
ALT SERPL-CCNC: 19 U/L (ref 12–65)
AMMONIA PLAS-SCNC: 22 UMOL/L (ref 16–60)
ANION GAP SERPL CALC-SCNC: 14 MMOL/L (ref 9–18)
ARTERIAL PATENCY WRIST A: POSITIVE
AST SERPL-CCNC: 18 U/L (ref 15–37)
BASE DEFICIT BLDV-SCNC: 0.6 MMOL/L
BDY SITE: NORMAL
BILIRUB SERPL-MCNC: 0.3 MG/DL (ref 0–1.2)
BILIRUB UR QL: NEGATIVE
BUN SERPL-MCNC: 20 MG/DL (ref 8–23)
CALCIUM SERPL-MCNC: 9.3 MG/DL (ref 8.8–10.2)
CHLORIDE SERPL-SCNC: 98 MMOL/L (ref 98–107)
CO2 SERPL-SCNC: 20 MMOL/L (ref 20–28)
CREAT SERPL-MCNC: 1.89 MG/DL (ref 0.8–1.3)
EKG ATRIAL RATE: 81 BPM
EKG DIAGNOSIS: NORMAL
EKG P AXIS: 90 DEGREES
EKG P-R INTERVAL: 218 MS
EKG Q-T INTERVAL: 370 MS
EKG QRS DURATION: 120 MS
EKG QTC CALCULATION (BAZETT): 429 MS
EKG R AXIS: 219 DEGREES
EKG T AXIS: 31 DEGREES
EKG VENTRICULAR RATE: 81 BPM
GAS FLOW.O2 O2 DELIVERY SYS: NORMAL
GLOBULIN SER CALC-MCNC: 3.1 G/DL (ref 2.3–3.5)
GLUCOSE BLD STRIP.AUTO-MCNC: 280 MG/DL (ref 65–100)
GLUCOSE BLD STRIP.AUTO-MCNC: 336 MG/DL (ref 65–100)
GLUCOSE SERPL-MCNC: 419 MG/DL (ref 70–99)
GLUCOSE UR QL STRIP.AUTO: 500 MG/DL
HCO3 BLDV-SCNC: 24.7 MMOL/L (ref 23–28)
KETONES UR-MCNC: NEGATIVE MG/DL
LACTATE SERPL-SCNC: 1.7 MMOL/L (ref 0.5–2)
LEUKOCYTE ESTERASE UR QL STRIP: NEGATIVE
LIPASE SERPL-CCNC: 177 U/L (ref 13–60)
NITRITE UR QL: NEGATIVE
O2/TOTAL GAS SETTING VFR VENT: 21 %
PCO2 BLDV: 42 MMHG (ref 41–51)
PH BLDV: 7.38 (ref 7.32–7.42)
PH UR: 5.5 (ref 5–9)
PO2 BLDV: 38 MMHG
POTASSIUM SERPL-SCNC: 4.7 MMOL/L (ref 3.5–5.1)
PROCALCITONIN SERPL-MCNC: 0.07 NG/ML (ref 0–0.1)
PROT SERPL-MCNC: 7.4 G/DL (ref 6.3–8.2)
PROT UR QL: NEGATIVE MG/DL
RBC # UR STRIP: NEGATIVE
SAO2 % BLDV: 71.2 % (ref 65–88)
SERVICE CMNT-IMP: ABNORMAL
SERVICE CMNT-IMP: NORMAL
SERVICE CMNT-IMP: NORMAL
SODIUM SERPL-SCNC: 132 MMOL/L (ref 136–145)
SP GR UR: 1.01 (ref 1–1.02)
SPECIMEN TYPE: NORMAL
TROPONIN T SERPL HS-MCNC: 36 NG/L (ref 0–22)
TROPONIN T SERPL HS-MCNC: 38 NG/L (ref 0–22)
TSH, 3RD GENERATION: 1.62 UIU/ML (ref 0.27–4.2)
UROBILINOGEN UR QL: 0.2 EU/DL (ref 0.2–1)

## 2024-07-25 PROCEDURE — 82140 ASSAY OF AMMONIA: CPT

## 2024-07-25 PROCEDURE — 84484 ASSAY OF TROPONIN QUANT: CPT

## 2024-07-25 PROCEDURE — 36600 WITHDRAWAL OF ARTERIAL BLOOD: CPT

## 2024-07-25 PROCEDURE — 6370000000 HC RX 637 (ALT 250 FOR IP): Performed by: EMERGENCY MEDICINE

## 2024-07-25 PROCEDURE — 81003 URINALYSIS AUTO W/O SCOPE: CPT

## 2024-07-25 PROCEDURE — 74176 CT ABD & PELVIS W/O CONTRAST: CPT

## 2024-07-25 PROCEDURE — 82962 GLUCOSE BLOOD TEST: CPT

## 2024-07-25 PROCEDURE — 82803 BLOOD GASES ANY COMBINATION: CPT

## 2024-07-25 RX ADMIN — INSULIN HUMAN 20 UNITS: 100 INJECTION, SOLUTION PARENTERAL at 03:07

## 2024-07-25 NOTE — ED PROVIDER NOTES
cardioversion        Social History     Socioeconomic History    Marital status:    Tobacco Use    Smoking status: Every Day     Current packs/day: 0.25     Types: Cigarettes    Smokeless tobacco: Never    Tobacco comments:     Quit smoking: down to 2 cigarettes a day (2/17/21 PS)   Vaping Use    Vaping Use: Never used   Substance and Sexual Activity    Alcohol use: No     Alcohol/week: 0.0 standard drinks of alcohol    Drug use: No    Sexual activity: Not Currently        Discharge Medication List as of 7/25/2024  5:05 AM        CONTINUE these medications which have NOT CHANGED    Details   isosorbide mononitrate (IMDUR) 30 MG extended release tablet Take 1 tablet by mouth daily, Disp-90 tablet, R-3Normal      aspirin 81 MG EC tablet Take 1 tablet by mouth dailyHistorical Med      bumetanide (BUMEX) 1 MG tablet Take 1 tablet by mouth daily, Disp-90 tablet, R-3Print      metoprolol succinate (TOPROL XL) 25 MG extended release tablet Take 1 tablet by mouth daily, Disp-90 tablet, R-3Print      Insulin Glargine, 2 Unit Dial, (TOUJEO MAX SOLOSTAR) 300 UNIT/ML SOPN Inject 130 Units into the skin at bedtime, Disp-1 Adjustable Dose Pre-filled Pen Syringe, R-2Adjust Sig      budesonide-formoterol (SYMBICORT) 160-4.5 MCG/ACT AERO Inhale 2 puffs into the lungs daily, Disp-1 each, R-1Normal      albuterol sulfate  (90 Base) MCG/ACT inhaler Inhale 2 puffs into the lungs every 6 hours as neededHistorical Med      atorvastatin (LIPITOR) 40 MG tablet Take 1 tablet by mouthHistorical Med      cetirizine (ZYRTEC) 10 MG tablet Take 1 tablet by mouthHistorical Med      insulin aspart (NOVOLOG) 100 UNIT/ML injection pen Inject 20 Units into the skin 4 times daily (before meals and nightly)Historical Med      oxyCODONE (OXY-IR) 15 MG immediate release tablet Take 1 tablet by mouth 3 times daily.Historical Med      lipase-protease-amylase (CREON) 11609-69049 units delayed release capsule Take 1 capsule by mouth 3 times  118

## 2024-07-25 NOTE — ED TRIAGE NOTES
C/o chest pain that started this morning. Spouse reports that the pt has been confused today. Pt lethargic appearing and states that he feels lightheaded. Took 324 of aspirin before he came here. Hx of necrotizing fasciitis in L arm so has baseline numbness, but is also c/o L leg numbness as well

## 2024-12-08 NOTE — CARE COORDINATION
Care Transitions Outreach Attempt    Call within 2 business days of discharge: Yes   Attempted to reach patient for transitions of care follow up. Unable to reach patient. Will attempt to follow up next business day. Patient: Lucia Isbell Patient : 1965 MRN: [id number]    Last Discharge [address]       Date Complaint Diagnosis Description Type Department Provider    23 Chest Pain Acute kidney injury ([address]) . .. ED to Hosp-Admission (Discharged) (ADMITTED) V2 Sophia Barajas MD; Andres Mask. .. Was this an external facility discharge?  No Discharge Facility: [facility]    Noted following upcoming appointments from discharge chart review:   [address] [facility],[address] follow up appointment(s):   Future Appointments   Date Time Provider [address]   2023 10:00 AM Manuel Ye MD [facility] AMB     Non-[facility] follow up appointment(s): n/a
None

## 2025-01-09 ENCOUNTER — APPOINTMENT (OUTPATIENT)
Dept: URBAN - METROPOLITAN AREA CLINIC 329 | Facility: CLINIC | Age: 60
Setting detail: DERMATOLOGY
End: 2025-01-09

## 2025-01-09 DIAGNOSIS — D485 NEOPLASM OF UNCERTAIN BEHAVIOR OF SKIN: ICD-10-CM

## 2025-01-09 DIAGNOSIS — L57.8 OTHER SKIN CHANGES DUE TO CHRONIC EXPOSURE TO NONIONIZING RADIATION: ICD-10-CM | Status: STABLE

## 2025-01-09 PROBLEM — D48.5 NEOPLASM OF UNCERTAIN BEHAVIOR OF SKIN: Status: ACTIVE | Noted: 2025-01-09

## 2025-01-09 PROCEDURE — 69100 BIOPSY OF EXTERNAL EAR: CPT

## 2025-01-09 PROCEDURE — ? BIOPSY BY SHAVE METHOD

## 2025-01-09 PROCEDURE — 99213 OFFICE O/P EST LOW 20 MIN: CPT | Mod: 25

## 2025-01-09 PROCEDURE — ? FULL BODY SKIN EXAM - DECLINED

## 2025-01-09 PROCEDURE — ? COUNSELING

## 2025-01-09 PROCEDURE — ? SUNSCREEN RECOMMENDATIONS

## 2025-01-09 ASSESSMENT — LOCATION SIMPLE DESCRIPTION DERM
LOCATION SIMPLE: SCALP
LOCATION SIMPLE: LEFT EAR

## 2025-01-09 ASSESSMENT — LOCATION DETAILED DESCRIPTION DERM
LOCATION DETAILED: LEFT SUPERIOR PARIETAL SCALP
LOCATION DETAILED: LEFT SUPERIOR HELIX

## 2025-01-09 ASSESSMENT — LOCATION ZONE DERM
LOCATION ZONE: EAR
LOCATION ZONE: SCALP

## 2025-01-09 NOTE — HPI: SKIN LESION
What Type Of Note Output Would You Prefer (Optional)?: Bullet Format
How Severe Is Your Skin Lesion?: mild
Is This A New Presentation, Or A Follow-Up?: Skin Lesions
Additional History: Patient states that he has a lesion on his left ear that he describes as sore and scaly. He explains that it came up last week and that he has a skin cancer on his ear previously.

## 2025-01-09 NOTE — PROCEDURE: BIOPSY BY SHAVE METHOD
Detail Level: Detailed
Depth Of Biopsy: dermis
Was A Bandage Applied: Yes
Size Of Lesion In Cm: 1.5
X Size Of Lesion In Cm: 0
Biopsy Type: H and E
Biopsy Method: Dermablade
Anesthesia Type: 1% lidocaine with epinephrine
Anesthesia Volume In Cc: 0.5
Hemostasis: Drysol
Wound Care: Petrolatum
Dressing: bandage
Destruction After The Procedure: No
Type Of Destruction Used: Curettage
Curettage Text: The wound bed was treated with curettage after the biopsy was performed.
Cryotherapy Text: The wound bed was treated with cryotherapy after the biopsy was performed.
Electrodesiccation Text: The wound bed was treated with electrodesiccation after the biopsy was performed.
Electrodesiccation And Curettage Text: The wound bed was treated with electrodesiccation and curettage after the biopsy was performed.
Silver Nitrate Text: The wound bed was treated with silver nitrate after the biopsy was performed.
Lab: 6
Lab Facility: 3
Medical Necessity Information: It is in your best interest to select a reason for this procedure from the list below. All of these items fulfill various CMS LCD requirements except the new and changing color options.
Consent was obtained and risks were reviewed including but not limited to scarring, infection, bleeding, scabbing, incomplete removal, nerve damage and allergy to anesthesia.
Post-Care Instructions: I reviewed with the patient in detail post-care instructions. Patient is to keep the biopsy site dry overnight, and then apply bacitracin twice daily until healed. Patient may apply hydrogen peroxide soaks to remove any crusting.
Notification Instructions: Patient will be notified of biopsy results. However, patient instructed to call the office if not contacted within 2 weeks.
Billing Type: Third-Party Bill
Information: Selecting Yes will display possible errors in your note based on the variables you have selected. This validation is only offered as a suggestion for you. PLEASE NOTE THAT THE VALIDATION TEXT WILL BE REMOVED WHEN YOU FINALIZE YOUR NOTE. IF YOU WANT TO FAX A PRELIMINARY NOTE YOU WILL NEED TO TOGGLE THIS TO 'NO' IF YOU DO NOT WANT IT IN YOUR FAXED NOTE.

## 2025-01-17 ENCOUNTER — TELEPHONE (OUTPATIENT)
Age: 60
End: 2025-01-17

## 2025-01-17 NOTE — TELEPHONE ENCOUNTER
Patient called with SOB with exertion, has gained 30 lbs in 2 weeks per wife, feet swollen with heels cracking, cough with yellow phlegm, stomach bloated. Feels heart is out of rhythm, feels that heart is beating out of his chest. Struggling to breathe, per wife.  Unable to get Vital signs as he was not at home. Advised to go to [facility] for evaluation. Patient voiced understanding//brendab

## 2025-01-17 NOTE — TELEPHONE ENCOUNTER
Patient is not doing well. He has swelling of his legs, one of his ankles are cracked and he is struggling to breathe.

## 2025-01-30 ENCOUNTER — HOSPITAL ENCOUNTER (EMERGENCY)
Age: 60
Discharge: HOME | End: 2025-01-30
Payer: OTHER GOVERNMENT

## 2025-01-30 ENCOUNTER — APPOINTMENT (OUTPATIENT)
Dept: GENERAL RADIOLOGY | Age: 60
End: 2025-01-30
Payer: OTHER GOVERNMENT

## 2025-01-30 VITALS
TEMPERATURE: 98.2 F | HEART RATE: 80 BPM | SYSTOLIC BLOOD PRESSURE: 116 MMHG | DIASTOLIC BLOOD PRESSURE: 54 MMHG | RESPIRATION RATE: 18 BRPM | OXYGEN SATURATION: 95 %

## 2025-01-30 DIAGNOSIS — R52 BODY ACHES: Primary | ICD-10-CM

## 2025-01-30 DIAGNOSIS — B34.9 VIRAL SYNDROME: ICD-10-CM

## 2025-01-30 LAB
FLUAV RNA SPEC QL NAA+PROBE: NOT DETECTED
FLUBV RNA SPEC QL NAA+PROBE: NOT DETECTED
SARS-COV-2 RDRP RESP QL NAA+PROBE: NOT DETECTED
SOURCE: NORMAL

## 2025-01-30 PROCEDURE — 93005 ELECTROCARDIOGRAM TRACING: CPT | Performed by: PHYSICIAN ASSISTANT

## 2025-01-30 PROCEDURE — 99285 EMERGENCY DEPT VISIT HI MDM: CPT

## 2025-01-30 PROCEDURE — 87635 SARS-COV-2 COVID-19 AMP PRB: CPT

## 2025-01-30 PROCEDURE — 71045 X-RAY EXAM CHEST 1 VIEW: CPT

## 2025-01-30 PROCEDURE — 87502 INFLUENZA DNA AMP PROBE: CPT

## 2025-01-30 RX ORDER — BENZONATATE 100 MG/1
100 CAPSULE ORAL 2 TIMES DAILY PRN
Qty: 20 CAPSULE | Refills: 0 | Status: SHIPPED | OUTPATIENT
Start: 2025-01-30 | End: 2025-02-06

## 2025-01-30 RX ORDER — AZITHROMYCIN 250 MG/1
TABLET, FILM COATED ORAL
Qty: 6 TABLET | Refills: 0 | Status: SHIPPED | OUTPATIENT
Start: 2025-01-30 | End: 2025-02-09

## 2025-01-30 ASSESSMENT — LIFESTYLE VARIABLES
HOW MANY STANDARD DRINKS CONTAINING ALCOHOL DO YOU HAVE ON A TYPICAL DAY: PATIENT DOES NOT DRINK
HOW OFTEN DO YOU HAVE A DRINK CONTAINING ALCOHOL: NEVER

## 2025-01-30 ASSESSMENT — PAIN DESCRIPTION - LOCATION
LOCATION: GENERALIZED
LOCATION: GENERALIZED

## 2025-01-30 ASSESSMENT — PAIN - FUNCTIONAL ASSESSMENT: PAIN_FUNCTIONAL_ASSESSMENT: 0-10

## 2025-01-30 ASSESSMENT — PAIN SCALES - GENERAL
PAINLEVEL_OUTOF10: 7
PAINLEVEL_OUTOF10: 7

## 2025-01-30 ASSESSMENT — PAIN DESCRIPTION - DESCRIPTORS
DESCRIPTORS: ACHING
DESCRIPTORS: ACHING

## 2025-01-31 LAB
EKG DIAGNOSIS: NORMAL
EKG Q-T INTERVAL: 392 MS
EKG QRS DURATION: 116 MS
EKG QTC CALCULATION (BAZETT): 437 MS
EKG R AXIS: 97 DEGREES
EKG T AXIS: 69 DEGREES
EKG VENTRICULAR RATE: 75 BPM

## 2025-01-31 PROCEDURE — 93010 ELECTROCARDIOGRAM REPORT: CPT | Performed by: INTERNAL MEDICINE

## 2025-01-31 NOTE — DISCHARGE INSTRUCTIONS
Your COVID and flu swabs were negative.  Your chest x-ray does show a very mild pneumonia developing.  Given your history of COPD, ongoing to be extra cautious and place you on 2 antibiotics.  This should clear the pneumonia.  Follow-up with your primary care physician and of course, return here if anything worsen or worrisome develops.    I sent the prescription to the [facility] pharmacy.

## 2025-01-31 NOTE — ED TRIAGE NOTES
Patient arrives ambulatory to triage. Reports body aches, cough, and SOB x3 days. Hx COPD. Denies any known sick contacts.

## 2025-01-31 NOTE — ED PROVIDER NOTES
Emergency Department Provider Note       PCP: Tay Fierro MD   Age: 60 y.o.   Sex: male     DISPOSITION Decision To Discharge 01/30/2025 10:04:22 PM   DISPOSITION CONDITION Stable            ICD-10-CM    1. Body aches  R52       2. Viral syndrome  B34.9           Medical Decision Making     Presents with cough, body aches and shortness of breath.  He has normal vital signs including temperature and respiratory rate and oxygenation.  Has history of COPD.  COVID and flu negative.  Does have a mild retrocardiac infiltrate.  Will treat with azithromycin and doxycycline.  Will have patient follow-up with PCP     1 acute illness with systemic symptoms.  Over the counter drug management performed.  Shared medical decision making was utilized in creating the patients health plan today.  I independently ordered and reviewed each unique test.  I interpreted the X-rays retrocardiac infiltrate..  ED provider's independent EKG interpretation atrial fibrillation, rate controlled at 75 ventricular rate.  No ST elevation.  QRS of 116    History     Patient is a 60-year-old gentleman here with significant other, has history of COPD, CHF, atrial fibrillation.  Presents with 3-day history of cough, congestion, nausea vomiting diarrhea, body aches headache and fatigue.  He has not been taking any medications at home.  Reports a few episodes of diarrhea daily.  No other associated symptoms.    The history is provided by the patient. No  was used.     Physical Exam     Vitals signs and nursing note reviewed:  Vitals:    01/30/25 2005 01/30/25 2313   BP: 128/70 (!) 116/54   Pulse: 74 80   Resp: 22 18   Temp: 97.5 °F (36.4 °C) 98.2 °F (36.8 °C)   TempSrc: Oral Oral   SpO2: 94% 95%      Physical Exam  Vitals and nursing note reviewed.   Constitutional:       General: He is not in acute distress.     Appearance: Normal appearance. He is not toxic-appearing.   HENT:      Head: Normocephalic and atraumatic.

## 2025-02-21 ENCOUNTER — HOSPITAL ENCOUNTER (EMERGENCY)
Age: 60
Discharge: HOME | End: 2025-02-21
Attending: EMERGENCY MEDICINE | Admitting: INTERNAL MEDICINE
Payer: OTHER GOVERNMENT

## 2025-02-21 ENCOUNTER — APPOINTMENT (OUTPATIENT)
Dept: GENERAL RADIOLOGY | Age: 60
End: 2025-02-21
Payer: OTHER GOVERNMENT

## 2025-02-21 VITALS
TEMPERATURE: 97.5 F | RESPIRATION RATE: 22 BRPM | HEART RATE: 71 BPM | BODY MASS INDEX: 37.25 KG/M2 | WEIGHT: 275 LBS | HEIGHT: 72 IN | DIASTOLIC BLOOD PRESSURE: 76 MMHG | SYSTOLIC BLOOD PRESSURE: 133 MMHG | OXYGEN SATURATION: 94 %

## 2025-02-21 DIAGNOSIS — R07.9 CHEST PAIN, UNSPECIFIED TYPE: Primary | ICD-10-CM

## 2025-02-21 LAB
ALBUMIN SERPL-MCNC: 3.4 G/DL (ref 3.2–4.6)
ALBUMIN/GLOB SERPL: 0.8 (ref 1–1.9)
ALP SERPL-CCNC: 126 U/L (ref 40–129)
ALT SERPL-CCNC: 9 U/L (ref 8–55)
ANION GAP SERPL CALC-SCNC: 10 MMOL/L (ref 7–16)
AST SERPL-CCNC: 20 U/L (ref 15–37)
BASOPHILS # BLD: 0.02 K/UL (ref 0–0.2)
BASOPHILS NFR BLD: 0.2 % (ref 0–2)
BILIRUB SERPL-MCNC: 0.6 MG/DL (ref 0–1.2)
BUN SERPL-MCNC: 14 MG/DL (ref 8–23)
CALCIUM SERPL-MCNC: 9.1 MG/DL (ref 8.8–10.2)
CHLORIDE SERPL-SCNC: 102 MMOL/L (ref 98–107)
CO2 SERPL-SCNC: 26 MMOL/L (ref 20–29)
CREAT SERPL-MCNC: 1.71 MG/DL (ref 0.8–1.3)
DIFFERENTIAL METHOD BLD: ABNORMAL
EKG DIAGNOSIS: ABNORMAL
EKG DIAGNOSIS: NORMAL
EKG Q-T INTERVAL: 374 MS
EKG Q-T INTERVAL: 374 MS
EKG QRS DURATION: 114 MS
EKG QRS DURATION: 114 MS
EKG QTC CALCULATION (BAZETT): 423 MS
EKG QTC CALCULATION (BAZETT): 423 MS
EKG R AXIS: 154 DEGREES
EKG R AXIS: 154 DEGREES
EKG T AXIS: 11 DEGREES
EKG T AXIS: 11 DEGREES
EKG VENTRICULAR RATE: 77 BPM
EKG VENTRICULAR RATE: 77 BPM
EOSINOPHIL # BLD: 0.06 K/UL (ref 0–0.8)
EOSINOPHIL NFR BLD: 0.7 % (ref 0.5–7.8)
ERYTHROCYTE [DISTWIDTH] IN BLOOD BY AUTOMATED COUNT: 18.2 % (ref 11.9–14.6)
GLOBULIN SER CALC-MCNC: 4.1 G/DL (ref 2.3–3.5)
GLUCOSE SERPL-MCNC: 87 MG/DL (ref 70–99)
HCT VFR BLD AUTO: 41.1 % (ref 41.1–50.3)
HGB BLD-MCNC: 12.9 G/DL (ref 13.6–17.2)
IMM GRANULOCYTES # BLD AUTO: 0.04 K/UL (ref 0–0.5)
IMM GRANULOCYTES NFR BLD AUTO: 0.4 % (ref 0–5)
LYMPHOCYTES # BLD: 1.61 K/UL (ref 0.5–4.6)
LYMPHOCYTES NFR BLD: 17.8 % (ref 13–44)
MCH RBC QN AUTO: 25.6 PG (ref 26.1–32.9)
MCHC RBC AUTO-ENTMCNC: 31.4 G/DL (ref 31.4–35)
MCV RBC AUTO: 81.7 FL (ref 82–102)
MONOCYTES # BLD: 0.49 K/UL (ref 0.1–1.3)
MONOCYTES NFR BLD: 5.4 % (ref 4–12)
NEUTS SEG # BLD: 6.81 K/UL (ref 1.7–8.2)
NEUTS SEG NFR BLD: 75.5 % (ref 43–78)
NRBC # BLD: 0 K/UL (ref 0–0.2)
NT PRO BNP: 400 PG/ML (ref 0–125)
PLATELET # BLD AUTO: 116 K/UL (ref 150–450)
PMV BLD AUTO: 11.9 FL (ref 9.4–12.3)
POTASSIUM SERPL-SCNC: 4.4 MMOL/L (ref 3.5–5.1)
PROT SERPL-MCNC: 7.6 G/DL (ref 6.3–8.2)
RBC # BLD AUTO: 5.03 M/UL (ref 4.23–5.6)
SODIUM SERPL-SCNC: 138 MMOL/L (ref 136–145)
TROPONIN T SERPL HS-MCNC: 39 NG/L (ref 0–22)
TROPONIN T SERPL HS-MCNC: 41 NG/L (ref 0–22)
WBC # BLD AUTO: 9 K/UL (ref 4.3–11.1)

## 2025-02-21 PROCEDURE — 6370000000 HC RX 637 (ALT 250 FOR IP): Performed by: EMERGENCY MEDICINE

## 2025-02-21 PROCEDURE — 71046 X-RAY EXAM CHEST 2 VIEWS: CPT

## 2025-02-21 PROCEDURE — 80053 COMPREHEN METABOLIC PANEL: CPT

## 2025-02-21 PROCEDURE — 6360000002 HC RX W HCPCS: Performed by: EMERGENCY MEDICINE

## 2025-02-21 PROCEDURE — 99285 EMERGENCY DEPT VISIT HI MDM: CPT

## 2025-02-21 PROCEDURE — 93005 ELECTROCARDIOGRAM TRACING: CPT | Performed by: EMERGENCY MEDICINE

## 2025-02-21 PROCEDURE — 96374 THER/PROPH/DIAG INJ IV PUSH: CPT

## 2025-02-21 PROCEDURE — 84484 ASSAY OF TROPONIN QUANT: CPT

## 2025-02-21 PROCEDURE — 85025 COMPLETE CBC W/AUTO DIFF WBC: CPT

## 2025-02-21 PROCEDURE — 83880 ASSAY OF NATRIURETIC PEPTIDE: CPT

## 2025-02-21 PROCEDURE — 93010 ELECTROCARDIOGRAM REPORT: CPT | Performed by: INTERNAL MEDICINE

## 2025-02-21 RX ORDER — KETOROLAC TROMETHAMINE 15 MG/ML
15 INJECTION, SOLUTION INTRAMUSCULAR; INTRAVENOUS
Status: COMPLETED | OUTPATIENT
Start: 2025-02-21 | End: 2025-02-21

## 2025-02-21 RX ORDER — KETOROLAC TROMETHAMINE 30 MG/ML
30 INJECTION, SOLUTION INTRAMUSCULAR; INTRAVENOUS
Status: DISCONTINUED | OUTPATIENT
Start: 2025-02-21 | End: 2025-02-21

## 2025-02-21 RX ORDER — ASPIRIN 81 MG/1
162 TABLET, CHEWABLE ORAL
Status: COMPLETED | OUTPATIENT
Start: 2025-02-21 | End: 2025-02-21

## 2025-02-21 RX ORDER — NITROGLYCERIN 0.4 MG/1
0.4 TABLET SUBLINGUAL
Status: DISCONTINUED | OUTPATIENT
Start: 2025-02-21 | End: 2025-02-21 | Stop reason: HOSPADM

## 2025-02-21 RX ADMIN — ASPIRIN 162 MG: 81 TABLET, CHEWABLE ORAL at 20:03

## 2025-02-21 RX ADMIN — KETOROLAC TROMETHAMINE 15 MG: 15 INJECTION, SOLUTION INTRAMUSCULAR; INTRAVENOUS at 21:00

## 2025-02-21 ASSESSMENT — PAIN DESCRIPTION - LOCATION: LOCATION: CHEST

## 2025-02-21 ASSESSMENT — PAIN SCALES - GENERAL
PAINLEVEL_OUTOF10: 6
PAINLEVEL_OUTOF10: 5

## 2025-02-21 ASSESSMENT — PAIN DESCRIPTION - DESCRIPTORS: DESCRIPTORS: ACHING

## 2025-02-21 NOTE — ED TRIAGE NOTES
Pt arrives seated on wheelchair accompanied by spouse with c/o non- radiating midsternal chest pain, onset this morning at 1000 while he was in bed. Endorses shortness of breath that worsens with exertion. Hx of Atrial Fibrillation, watchman, and loop recorder. Denies hx of blood clots. Does not take blood thinner at this time r/t allergies. Reports having n/v/d since this morning, denies any sick contacts.

## 2025-02-22 NOTE — DISCHARGE INSTRUCTIONS
Try 1 aleve tablet every 12 hours for 2-3 days  Double lasix from 40 mg to 80 mg Saturday and Sunday +/- Monday  Follow up with his cardiologist   Return to the e.r. if worse

## 2025-02-22 NOTE — ED NOTES
Assumed care of pt at this time. Pt resting quietly in bed, attached to bedside monitor. Warm blankets provided. Pt denies questions and concerns at this time.        Farida Tenorio RN  02/21/25 2007

## 2025-02-24 ENCOUNTER — TELEPHONE (OUTPATIENT)
Age: 60
End: 2025-02-24

## 2025-02-24 NOTE — TELEPHONE ENCOUNTER
Patient wife a patient of Dr. Pacheco. Patient has new patient appt with Dr. Pacheco on 4/10. Patient was seen in the ER on Friday- EKG in ER abnormal. Edema almost to his knees- left heel spilt from swelling- left toe nail fell off. ER placed patient on Lasix 80 mg BID x 2 days then 80 mg QD. No improvement with lasix. Reports SOB at rest- having to sleep with head elevated. Reports CP on Friday. Having nausea and vomiting as well.

## 2025-02-24 NOTE — TELEPHONE ENCOUNTER
Patient was released from [facility] on Friday with CHF.  Patients spouse states he is getting worse.

## 2025-02-25 NOTE — TELEPHONE ENCOUNTER
Patient aware of Dr. Pacheco response. Patient scheduled for sooner appt- 3/14/25 @ 11:45 am. Patient voices understanding.

## 2025-03-05 ASSESSMENT — ENCOUNTER SYMPTOMS
SHORTNESS OF BREATH: 1
COUGH: 0
EYE DISCHARGE: 0
COLOR CHANGE: 0
ABDOMINAL PAIN: 0
RHINORRHEA: 0
EYE REDNESS: 0
VOMITING: 1
NAUSEA: 1
FACIAL SWELLING: 0
BACK PAIN: 0
DIARRHEA: 1

## 2025-03-05 NOTE — ED PROVIDER NOTES
Emergency Department Provider Note       PCP: Tay Fierro MD   Age: 60 y.o.   Sex: male     DISPOSITION Decision To Discharge 02/21/2025 08:53:40 PM   DISPOSITION CONDITION Stable            ICD-10-CM    1. Chest pain, unspecified type  R07.9           Medical Decision Making     Acute atypical chest pain, EKG shows baseline atrial fibrillation without ACS, troponins are satisfactory, suspect gastroenteritis for his GI symptoms, home with extremes     1 acute, uncomplicated illness or injury.  Patient was discharged risks and benefits of hospitalization were considered.  Shared medical decision making was utilized in creating the patients health plan today.    I independently ordered and reviewed each unique test.  I reviewed external records: ED visit note from a different ED.   I reviewed external records: provider visit note from outside specialist.  I reviewed external records: previous EKG including cardiologist interpretation.    I reviewed external records: previous lab results from outside ED.   The patients assessment required an independent historian: Wife at bedside.  The reason they were needed is important historical information not provided by the patient.  I interpreted the X-rays of the chest are negative for pneumothorax, pneumonia, there is some left lower lobe haziness which I favor to represent Adalat.  My Independent EKG Interpretation: no acute changes, atrial fibrillation, non-specific EKG, and right bundle branch block      ST Segments:Normal ST segments - NO STEMI   Rate: 77            History     60-year-old gentleman presents to the ER with chest pain, onset 6 or 7 hours ago pain has been constant.  Patient has a history of atrial fibrillation however he has not noticed any rapid palpitations.  Chest pain is central in location tight in nature.  He does feel short of breath with it.  Patient taking all his medicines as usual.    Patient also having GI symptoms this morning to

## 2025-03-14 ENCOUNTER — OFFICE VISIT (OUTPATIENT)
Age: 60
End: 2025-03-14

## 2025-03-14 VITALS
HEART RATE: 82 BPM | BODY MASS INDEX: 34.09 KG/M2 | WEIGHT: 251.7 LBS | HEIGHT: 72 IN | SYSTOLIC BLOOD PRESSURE: 110 MMHG | DIASTOLIC BLOOD PRESSURE: 70 MMHG

## 2025-03-14 DIAGNOSIS — Q23.81 BICUSPID AORTIC VALVE: ICD-10-CM

## 2025-03-14 DIAGNOSIS — I50.32 CHRONIC DIASTOLIC (CONGESTIVE) HEART FAILURE (HCC): Primary | ICD-10-CM

## 2025-03-14 DIAGNOSIS — Z95.818 PRESENCE OF WATCHMAN LEFT ATRIAL APPENDAGE CLOSURE DEVICE: ICD-10-CM

## 2025-03-14 DIAGNOSIS — I48.0 PAROXYSMAL ATRIAL FIBRILLATION (HCC): ICD-10-CM

## 2025-03-14 DIAGNOSIS — G47.33 OSA (OBSTRUCTIVE SLEEP APNEA): ICD-10-CM

## 2025-03-14 DIAGNOSIS — R06.02 SHORTNESS OF BREATH: ICD-10-CM

## 2025-03-14 DIAGNOSIS — N18.32 STAGE 3B CHRONIC KIDNEY DISEASE (HCC): ICD-10-CM

## 2025-03-14 PROCEDURE — 3074F SYST BP LT 130 MM HG: CPT | Performed by: INTERNAL MEDICINE

## 2025-03-14 PROCEDURE — 99214 OFFICE O/P EST MOD 30 MIN: CPT | Performed by: INTERNAL MEDICINE

## 2025-03-14 PROCEDURE — 3078F DIAST BP <80 MM HG: CPT | Performed by: INTERNAL MEDICINE

## 2025-03-14 RX ORDER — FUROSEMIDE 40 MG/1
TABLET ORAL
Qty: 180 TABLET | Refills: 3 | Status: SHIPPED | OUTPATIENT
Start: 2025-03-14 | End: 2025-03-14 | Stop reason: SDUPTHER

## 2025-03-14 RX ORDER — FUROSEMIDE 40 MG/1
TABLET ORAL
Qty: 180 TABLET | Refills: 3 | Status: SHIPPED | OUTPATIENT
Start: 2025-03-14

## 2025-03-14 NOTE — PROGRESS NOTES
[address], [address]  [place] [address]  PHONE: [phone]    SUBJECTIVE:   Nick Adame is a 60 y.o. male 1965   seen for a follow up visit regarding the following:     Chief Complaint   Patient presents with    Coronary Artery Disease         History of Present Illness  The patient is a 60-year-old male presenting for evaluation of a bicuspid aortic valve, paroxysmal atrial fibrillation, hypertension, atherosclerotic cardiovascular disease (ASCVD), sleep apnea, smoking cessation, and a renal mass.     The patient has a history of a bicuspid aortic valve with mild aortic insufficiency (AI), aortic stenosis (AS), and heart failure with preserved ejection fraction (HFpEF). His last consultation with Dr. Mcnair was in 2023. He has experienced recurrent episodes of congestive heart failure (CHF) over the past two years, accompanied by persistent fatigue and dyspnea. Following his relocation, he is now seeking treatment at [facility]. He has a history of smoking and is currently prescribed Lasix (furosemide) 40 mg once daily, having been switched from Bumex (bumetanide).     He has been advised to occasionally double the dose of Lasix. He also has a history of pulmonary issues and is under the care of pulmonologist     The patient has a history of paroxysmal atrial fibrillation and has undergone radiofrequency ablation with the implantation of a Watchman device. He recently had an emergency room admission due to a recurrence of atrial fibrillation. Dr. Dominique performed the ablations. Despite these interventions, he continues to struggle with the condition. He has a history of cardioversion and experienced an abnormal sinus rhythm following an upper gastrointestinal endoscopy and colonoscopy.    The patient has a history of hypertension and hyperlipidemia.    He also has a history of type 2 diabetes mellitus, with blood glucose levels currently

## 2025-03-27 NOTE — PROGRESS NOTES
Patient pre-assessment complete for CVN scheduled for 1300, arrival time 1200. Patient verified using . Patient instructed to bring a list of all home medications on the day of procedure. NPO status reinforced. Patient instructed to HOLD lasix, toujeo, novolog. Instructed they can take all other medications excluding vitamins & supplements. Patient verbalizes understanding of all instructions & denies any questions at this time.

## 2025-03-28 ENCOUNTER — HOSPITAL ENCOUNTER (OUTPATIENT)
Dept: CARDIAC CATH/INVASIVE PROCEDURES | Age: 60
Discharge: HOME | End: 2025-03-28
Attending: INTERNAL MEDICINE | Admitting: INTERNAL MEDICINE
Payer: OTHER GOVERNMENT

## 2025-03-28 DIAGNOSIS — I48.0 PAROXYSMAL ATRIAL FIBRILLATION (HCC): ICD-10-CM

## 2025-03-28 LAB
EKG ATRIAL RATE: 74 BPM
EKG DIAGNOSIS: NORMAL
EKG P AXIS: 71 DEGREES
EKG P-R INTERVAL: 228 MS
EKG Q-T INTERVAL: 378 MS
EKG QRS DURATION: 122 MS
EKG QTC CALCULATION (BAZETT): 419 MS
EKG R AXIS: -36 DEGREES
EKG T AXIS: 6 DEGREES
EKG VENTRICULAR RATE: 74 BPM

## 2025-03-28 PROCEDURE — 93005 ELECTROCARDIOGRAM TRACING: CPT | Performed by: INTERNAL MEDICINE

## 2025-03-28 PROCEDURE — 93010 ELECTROCARDIOGRAM REPORT: CPT | Performed by: INTERNAL MEDICINE

## 2025-03-28 NOTE — PROCEDURES
PROCEDURE NOTE  Date: 3/28/2025   Name: Nick Jacob Wendi  YOB: 1965    Cardioversion canceled due to sinus rhythm on ECG today.

## 2025-03-28 NOTE — PROGRESS NOTES
Patient received to CPRU room # 16  Ambulatory from [facility]. Patient scheduled for CVN today with Dr Pacheco. Procedure reviewed & questions answered, voiced good understanding consent obtained & placed on chart. All medications and medical history reviewed. Will prep patient per orders. Patient & family updated on plan of care.      The patient has a fraility score of 3-MANAGING WELL, based on ambulation.

## 2025-04-23 ENCOUNTER — OFFICE VISIT (OUTPATIENT)
Age: 60
End: 2025-04-23
Payer: OTHER GOVERNMENT

## 2025-04-23 VITALS
WEIGHT: 256 LBS | BODY MASS INDEX: 34.67 KG/M2 | DIASTOLIC BLOOD PRESSURE: 70 MMHG | SYSTOLIC BLOOD PRESSURE: 138 MMHG | HEIGHT: 72 IN | HEART RATE: 73 BPM

## 2025-04-23 DIAGNOSIS — Z72.0 TOBACCO ABUSE: ICD-10-CM

## 2025-04-23 DIAGNOSIS — I48.0 PAROXYSMAL ATRIAL FIBRILLATION (HCC): ICD-10-CM

## 2025-04-23 DIAGNOSIS — Q23.81 BICUSPID AORTIC VALVE: ICD-10-CM

## 2025-04-23 DIAGNOSIS — Z95.818 PRESENCE OF WATCHMAN LEFT ATRIAL APPENDAGE CLOSURE DEVICE: ICD-10-CM

## 2025-04-23 DIAGNOSIS — R06.02 SHORTNESS OF BREATH: Primary | ICD-10-CM

## 2025-04-23 PROCEDURE — 3075F SYST BP GE 130 - 139MM HG: CPT | Performed by: INTERNAL MEDICINE

## 2025-04-23 PROCEDURE — G8428 CUR MEDS NOT DOCUMENT: HCPCS | Performed by: INTERNAL MEDICINE

## 2025-04-23 PROCEDURE — 3017F COLORECTAL CA SCREEN DOC REV: CPT | Performed by: INTERNAL MEDICINE

## 2025-04-23 PROCEDURE — G8417 CALC BMI ABV UP PARAM F/U: HCPCS | Performed by: INTERNAL MEDICINE

## 2025-04-23 PROCEDURE — 99214 OFFICE O/P EST MOD 30 MIN: CPT | Performed by: INTERNAL MEDICINE

## 2025-04-23 PROCEDURE — 3078F DIAST BP <80 MM HG: CPT | Performed by: INTERNAL MEDICINE

## 2025-04-23 PROCEDURE — 4004F PT TOBACCO SCREEN RCVD TLK: CPT | Performed by: INTERNAL MEDICINE

## 2025-04-23 RX ORDER — MOXIFLOXACIN HYDROCHLORIDE 400 MG/1
400 TABLET ORAL DAILY
COMMUNITY

## 2025-04-23 NOTE — PROGRESS NOTES
[address], [address]  [place] [address]  PHONE: [phone]    SUBJECTIVE:   Nick Adame is a 60 y.o. male 1965   seen for a follow up visit regarding the following:     Chief Complaint   Patient presents with    Valvular Heart Disease         History of Present Illness  The patient, a 60-year-old individual, presents for evaluation of bicuspid aortic valve, paroxysmal atrial fibrillation, hypertension, obstructive sleep apnea, smoking cessation, renal mass, heart failure with preserved ejection fraction (HFpEF), and diabetes mellitus with hyperglycemia. The patient is accompanied by a family member.    The patient reports persistent dyspnea on exertion. Smoking has been reduced to 2 cigarettes as of yesterday. The patient has a productive cough with yellow-green sputum. Dr. Maravilla has prescribed moxifloxacin for suspected left lower lobe pneumonia, with 3 doses remaining. No corticosteroids have been prescribed.    Current medications include: atorvastatin (Lipitor) 40 mg daily, cetirizine (Zyrtec), furosemide (Lasix) 40 mg daily, insulin aspart (NovoLog) pen 20 units four times daily before meals, insulin glargine 130 units at bedtime, isosorbide mononitrate (Imdur) 30 mg daily, metoprolol succinate 25 mg daily, oxycodone 50 mg immediate release, pantoprazole (Protonix) 40 mg, pregabalin (Lyrica) 100 mg, promethazine (Phenergan) 25 mg, and sertraline (Zoloft) 100 mg.    PAST SURGICAL HISTORY:  Cardiac catheterization in 2023.  Atrial fibrillation ablation with Watchman device.    SOCIAL HISTORY  - Tobacco: Smoked two cigarettes yesterday.        Interval history:  - Laboratory Studies:  - Creatinine: 1.7    - Echocardiogram 11/16/2023:  - EF: 74%    - Echocardiogram 9/14/2023:  - EF: 60-65%  - Mild AI  - Mild AS  - Bicuspid aortic valve    - Cardiac catheterization 10/08/2021:  - Mild luminal irregularities    - CT chest/CTA chest 2023:  - No aneurysm

## 2025-04-29 ENCOUNTER — TELEPHONE (OUTPATIENT)
Dept: SLEEP MEDICINE | Age: 60
End: 2025-04-29

## 2025-04-29 ENCOUNTER — OFFICE VISIT (OUTPATIENT)
Dept: SLEEP MEDICINE | Age: 60
End: 2025-04-29
Payer: MEDICARE

## 2025-04-29 VITALS
DIASTOLIC BLOOD PRESSURE: 72 MMHG | RESPIRATION RATE: 15 BRPM | WEIGHT: 254.2 LBS | HEIGHT: 72 IN | OXYGEN SATURATION: 93 % | BODY MASS INDEX: 34.43 KG/M2 | SYSTOLIC BLOOD PRESSURE: 120 MMHG | HEART RATE: 71 BPM

## 2025-04-29 DIAGNOSIS — R06.2 WHEEZING: ICD-10-CM

## 2025-04-29 DIAGNOSIS — G47.33 OSA (OBSTRUCTIVE SLEEP APNEA): Primary | ICD-10-CM

## 2025-04-29 DIAGNOSIS — Z78.9 DIFFICULTY WITH CPAP USE: ICD-10-CM

## 2025-04-29 DIAGNOSIS — J44.9 CHRONIC OBSTRUCTIVE PULMONARY DISEASE, UNSPECIFIED COPD TYPE (HCC): ICD-10-CM

## 2025-04-29 DIAGNOSIS — G47.34 NOCTURNAL HYPOXEMIA: ICD-10-CM

## 2025-04-29 PROCEDURE — G8427 DOCREV CUR MEDS BY ELIG CLIN: HCPCS | Performed by: PHYSICIAN ASSISTANT

## 2025-04-29 PROCEDURE — 99203 OFFICE O/P NEW LOW 30 MIN: CPT | Performed by: PHYSICIAN ASSISTANT

## 2025-04-29 PROCEDURE — G8417 CALC BMI ABV UP PARAM F/U: HCPCS | Performed by: PHYSICIAN ASSISTANT

## 2025-04-29 PROCEDURE — 3074F SYST BP LT 130 MM HG: CPT | Performed by: PHYSICIAN ASSISTANT

## 2025-04-29 PROCEDURE — 3078F DIAST BP <80 MM HG: CPT | Performed by: PHYSICIAN ASSISTANT

## 2025-04-29 PROCEDURE — 3023F SPIROM DOC REV: CPT | Performed by: PHYSICIAN ASSISTANT

## 2025-04-29 PROCEDURE — 4004F PT TOBACCO SCREEN RCVD TLK: CPT | Performed by: PHYSICIAN ASSISTANT

## 2025-04-29 PROCEDURE — 3017F COLORECTAL CA SCREEN DOC REV: CPT | Performed by: PHYSICIAN ASSISTANT

## 2025-04-29 ASSESSMENT — SLEEP AND FATIGUE QUESTIONNAIRES
HOW LIKELY ARE YOU TO NOD OFF OR FALL ASLEEP WHILE SITTING QUIETLY AFTER LUNCH WITHOUT ALCOHOL: SLIGHT CHANCE OF DOZING
HOW LIKELY ARE YOU TO NOD OFF OR FALL ASLEEP WHEN YOU ARE A PASSENGER IN A CAR FOR AN HOUR WITHOUT A BREAK: HIGH CHANCE OF DOZING
HOW LIKELY ARE YOU TO NOD OFF OR FALL ASLEEP WHILE WATCHING TV: SLIGHT CHANCE OF DOZING
HOW LIKELY ARE YOU TO NOD OFF OR FALL ASLEEP WHILE LYING DOWN TO REST IN THE AFTERNOON WHEN CIRCUMSTANCES PERMIT: HIGH CHANCE OF DOZING
HOW LIKELY ARE YOU TO NOD OFF OR FALL ASLEEP WHILE SITTING AND TALKING TO SOMEONE: WOULD NEVER DOZE
HOW LIKELY ARE YOU TO NOD OFF OR FALL ASLEEP IN A CAR, WHILE STOPPED FOR A FEW MINUTES IN TRAFFIC: WOULD NEVER DOZE
HOW LIKELY ARE YOU TO NOD OFF OR FALL ASLEEP WHILE SITTING AND READING: MODERATE CHANCE OF DOZING
ESS TOTAL SCORE: 13
HOW LIKELY ARE YOU TO NOD OFF OR FALL ASLEEP WHILE SITTING INACTIVE IN A PUBLIC PLACE: HIGH CHANCE OF DOZING

## 2025-04-29 NOTE — TELEPHONE ENCOUNTER
Pt needs to be scheduled for a DISE procedure with either Dr. Mulligan or Dr. Powell to evaluate to see if he would qualify for [facility].     Please contact pt with appt.     Thank you!

## 2025-04-29 NOTE — PROGRESS NOTES
Heart Disease Mother     No Known Problems Father     Pulmonary Embolism Sister          Allergies   Allergen Reactions    Metformin Rash and Shortness Of Breath    Lisinopril Other (See Comments)    Ondansetron Hcl Other (See Comments)     headache    Pioglitazone     Apixaban Rash    Morphine Nausea And Vomiting    Rivaroxaban Rash         Current Outpatient Medications   Medication Sig    moxifloxacin (AVELOX) 400 MG tablet Take 1 tablet by mouth daily    furosemide (LASIX) 40 MG tablet Take 40 mg daily.  If weight increases by more than 2 pounds in 24 hours or 4 pounds in 48 hours, take twice daily    isosorbide mononitrate (IMDUR) 30 MG extended release tablet Take 1 tablet by mouth daily    metoprolol succinate (TOPROL XL) 25 MG extended release tablet Take 1 tablet by mouth daily    Insulin Glargine, 2 Unit Dial, (TOUJEO MAX SOLOSTAR) 300 UNIT/ML SOPN Inject 130 Units into the skin at bedtime    budesonide-formoterol (SYMBICORT) 160-4.5 MCG/ACT AERO Inhale 2 puffs into the lungs daily    albuterol sulfate  (90 Base) MCG/ACT inhaler Inhale 2 puffs into the lungs every 6 hours as needed    atorvastatin (LIPITOR) 40 MG tablet Take 1 tablet by mouth    cetirizine (ZYRTEC) 10 MG tablet Take 1 tablet by mouth    insulin aspart (NOVOLOG) 100 UNIT/ML injection pen Inject 20 Units into the skin 4 times daily (before meals and nightly)    oxyCODONE (OXY-IR) 15 MG immediate release tablet Take 1 tablet by mouth 3 times daily.    lipase-protease-amylase (CREON) 70530-69922 units delayed release capsule Take 1 capsule by mouth 3 times daily (with meals)    pantoprazole (PROTONIX) 40 MG tablet Take 1 tablet by mouth 2 times daily    pregabalin (LYRICA) 100 MG capsule Take 1 capsule by mouth 3 times daily.    promethazine (PHENERGAN) 25 MG tablet Take 1 tablet by mouth every 6 hours as needed    sertraline (ZOLOFT) 100 MG tablet Take 1.5 tablets by mouth daily    aspirin 81 MG EC tablet Take 1 tablet by mouth daily

## (undated) DEVICE — SLING ARM 2-42INX9-23IN PCH -- XLG

## (undated) DEVICE — BUTTON SWITCH PENCIL BLADE ELECTRODE, HOLSTER: Brand: EDGE

## (undated) DEVICE — GUIDEWIRE VASC L260CM DIA0.035IN STR TIP L7CM PTFE FIX COR

## (undated) DEVICE — DEVICE COMPR LNG 27 CM VASC BND

## (undated) DEVICE — MASTISOL ADHESIVE LIQ 2/3ML

## (undated) DEVICE — GLIDESHEATH SLENDER STAINLESS STEEL KIT: Brand: GLIDESHEATH SLENDER

## (undated) DEVICE — DRAPE, FILM SHEET, 44X65 STERILE: Brand: MEDLINE

## (undated) DEVICE — BAND RADIAL COMPR ARTERY 27CM -- LNG BX/10

## (undated) DEVICE — STERILE HOOK LOCK LATEX FREE ELASTIC BANDAGE 4INX5YD: Brand: HOOK LOCK™

## (undated) DEVICE — CATHETER COR DIAG 4.0 6FR 110CM 2 SIDE H

## (undated) DEVICE — GUIDEWIRE 035IN 210CM PTFE COAT FIX COR J TIP 15MM FIRM BODY

## (undated) DEVICE — STOCKINETTE,IMPERVIOUS,12X48,STERILE: Brand: MEDLINE

## (undated) DEVICE — WATERPROOF, BACTERIA PROOF DRESSING WITH ABSORBENT SEE THROUGH PAD: Brand: OPSITE POST-OP VISIBLE 15X10CM CTN 20

## (undated) DEVICE — RADIFOCUS OPTITORQUE ANGIOGRAPHIC CATHETER: Brand: OPTITORQUE

## (undated) DEVICE — NEEDLE HYPO 25GA L1.5IN BLU POLYPR HUB S STL REG BVL STR

## (undated) DEVICE — (D)PREP SKN CHLRAPRP APPL 26ML -- CONVERT TO ITEM 371833

## (undated) DEVICE — AMD ANTIMICROBIAL GAUZE SPONGES,12 PLY USP TYPE VII, 0.2% POLYHEXAMETHYLENE BIGUANIDE HCI (PHMB): Brand: CURITY

## (undated) DEVICE — CATHETER DIAG AD 5FR L100CM COR GRY HYDRPHLC NYL MPA 2 W/ 2

## (undated) DEVICE — SOLUTION IV 1000ML 0.9% SOD CHL

## (undated) DEVICE — CATHETER DIAG AD 5FR L110CM 145DEG COR GRY HYDRPHLC NYL

## (undated) DEVICE — DRAPE,HAND,STERILE: Brand: MEDLINE

## (undated) DEVICE — SUTURE MCRYL SZ 3-0 L27IN ABSRB UD L19MM PS-2 3/8 CIR PRIM Y427H

## (undated) DEVICE — (D)STRIP SKN CLSR 0.5X4IN WHT --

## (undated) DEVICE — Device

## (undated) DEVICE — SURGICAL PROCEDURE PACK BASIC ST FRANCIS

## (undated) DEVICE — BANDAGE COMPR SELF ADH 5 YDX4 IN TAN STRL PREMIERPRO LF

## (undated) DEVICE — ZIMMER® STERILE DISPOSABLE TOURNIQUET CUFF WITH PLC, DUAL PORT, SINGLE BLADDER, 18 IN. (46 CM)

## (undated) DEVICE — REM POLYHESIVE ADULT PATIENT RETURN ELECTRODE: Brand: VALLEYLAB